# Patient Record
Sex: FEMALE | Race: WHITE | NOT HISPANIC OR LATINO | Employment: OTHER | URBAN - METROPOLITAN AREA
[De-identification: names, ages, dates, MRNs, and addresses within clinical notes are randomized per-mention and may not be internally consistent; named-entity substitution may affect disease eponyms.]

---

## 2020-08-07 ENCOUNTER — HOSPITAL ENCOUNTER (EMERGENCY)
Facility: CLINIC | Age: 70
Discharge: HOME | End: 2020-08-07
Attending: EMERGENCY MEDICINE | Admitting: EMERGENCY MEDICINE
Payer: COMMERCIAL

## 2020-08-07 VITALS
RESPIRATION RATE: 16 BRPM | DIASTOLIC BLOOD PRESSURE: 85 MMHG | SYSTOLIC BLOOD PRESSURE: 169 MMHG | OXYGEN SATURATION: 99 % | TEMPERATURE: 97.8 F | HEART RATE: 68 BPM

## 2020-08-07 DIAGNOSIS — Z01.89 LABORATORY TEST: ICD-10-CM

## 2020-08-07 LAB
ANION GAP SERPL CALCULATED.3IONS-SCNC: 6 MMOL/L (ref 3–14)
BUN SERPL-MCNC: 28 MG/DL (ref 7–30)
CALCIUM SERPL-MCNC: 8.7 MG/DL (ref 8.5–10.1)
CHLORIDE SERPL-SCNC: 102 MMOL/L (ref 94–109)
CO2 SERPL-SCNC: 26 MMOL/L (ref 20–32)
CREAT SERPL-MCNC: 1.14 MG/DL (ref 0.52–1.04)
GFR SERPL CREATININE-BSD FRML MDRD: 48 ML/MIN/{1.73_M2}
GLUCOSE SERPL-MCNC: 117 MG/DL (ref 70–99)
POTASSIUM SERPL-SCNC: 4.9 MMOL/L (ref 3.4–5.3)
SODIUM SERPL-SCNC: 134 MMOL/L (ref 133–144)

## 2020-08-07 PROCEDURE — 99285 EMERGENCY DEPT VISIT HI MDM: CPT

## 2020-08-07 PROCEDURE — 80048 BASIC METABOLIC PNL TOTAL CA: CPT | Performed by: EMERGENCY MEDICINE

## 2020-08-07 PROCEDURE — 93005 ELECTROCARDIOGRAM TRACING: CPT

## 2020-08-07 ASSESSMENT — ENCOUNTER SYMPTOMS
NAUSEA: 0
VOMITING: 0

## 2020-08-07 NOTE — ED AVS SNAPSHOT
[facility] Emergency Department  [address]  [facility] [address]  Phone:  [phone]  Fax:  [phone]                                    Julia Andre   MRN: [id number]    Department:  [facility] Emergency Department   Date of Visit:  8/7/2020           After Visit Summary Signature Page    I have received my discharge instructions, and my questions have been answered. I have discussed any challenges I see with this plan with the nurse or doctor.    ..........................................................................................................................................  Patient/Patient Representative Signature      ..........................................................................................................................................  Patient Representative Print Name and Relationship to Patient    ..................................................               ................................................  Date                                   Time    ..........................................................................................................................................  Reviewed by Signature/Title    ...................................................              ..............................................  Date                                               Time          22EPIC Rev 08/18

## 2020-08-08 NOTE — ED PROVIDER NOTES
History     Chief Complaint:  Abnormal Labs    HPI   Julia Andre is a 69 year old female, with a history of myocardial infarction, CAD, type 2 diabetes, hypertension, and hyperlipidemia, who presents with her  to the ED for evaluation of abnormal labs. The patient reports she had repeat blood work completed today due to abnormal labs a week ago. She was advised to visit the ED because of a potassium level of 5.4. The patient denies any recent diet change, nausea, vomiting, or other symptoms/complaints.     Laboratory, 7/28/2020  Iron: 40(L)  HGB A1c: 6.2(H)  CBC: HGB 10.6(L), o/w WNL (WBC 9.8, )  BMP: (L), GFR estimate 42(L), Creatinine 1.30(H), o/w WNL     Laboratory, 8/7/2020  Cortisol: 16.0  BMP: (L), Potassium 5.4(H), GFR estimate 46(L), Creatinine 1.20(H), o/w WNL     Allergies:  Atorvastatin: myalgia  Simvastatin   Sulfa drugs: hives      Medications:    Magnesium   Calcium  Vitamin C  Aspirin 81mg  Fish oil  Wellbutrin  Lisinopril   Rosuvastatin    Metoprolol   Metformin    Past Medical History:    Type 2 diabetes   Squamous cell carcinoma  Anxiety   Chronic rhinitis  Osteopenia  Anemia, iron deficiency  Sleep apnea  Hypertension   Hyperlipidemia   Depression   Psoriasis  GERD  Cervical cancer  Angina pectoris  Myocardial infarction  CAD    Past Surgical History:    D&C  Cervix cone biopsy  Strabismus surgery  Bladder suspension x2    Family History:    Esophageal cancer, heart disease, hypertension, stroke: father  Arthritis: mother  Stroke, kidney cancer: brother    Social History:  Smoking status: Former smoker, Quit 1993  Alcohol use: No  Presents to ED with        Review of Systems   Gastrointestinal: Negative for nausea and vomiting.   All other systems reviewed and are negative.    Physical Exam     Patient Vitals for the past 24 hrs:   BP Temp Temp src Pulse Heart Rate Resp SpO2   08/07/20 2151 (!) 169/85 97.8  F (36.6  C) Oral -- -- -- --   08/07/20 2150 -- --  -- 68 68 16 99 %     Physical Exam  Constitutional:       Appearance: She is well-developed.   HENT:      Right Ear: External ear normal.      Left Ear: External ear normal.      Mouth/Throat:      Mouth: Mucous membranes are moist.      Pharynx: Oropharynx is clear. No oropharyngeal exudate or posterior oropharyngeal erythema.   Cardiovascular:      Rate and Rhythm: Normal rate and regular rhythm.      Heart sounds: Normal heart sounds. No murmur. No friction rub. No gallop.    Pulmonary:      Effort: Pulmonary effort is normal. No respiratory distress.      Breath sounds: Normal breath sounds. No wheezing or rales.   Abdominal:      General: Bowel sounds are normal. There is no distension.      Palpations: Abdomen is soft. There is no mass.      Tenderness: There is no abdominal tenderness.   Skin:     General: Skin is warm and dry.      Capillary Refill: Capillary refill takes less than 2 seconds.      Findings: No rash.   Neurological:      Mental Status: She is alert.         Emergency Department Course     ECG (22:06:20):  Rate 67 bpm. VA interval 196. QRS duration 88. QT/QTc 392/414. P-R-T axes 61 -11 44. Normal sinus rhythm. Normal ECG. Interpreted at 2224 by Alexsander Gasca MD.    Laboratory:  Laboratory findings were communicated with the patient and family who voiced understanding of the findings.    BMP: Glucose 117(H), GFR estimate 48(L), Creatinine 1.14(H), o/w WNL     Emergency Department Course:  Past medical records, nursing notes, and vitals reviewed.    2158: I performed an exam of the patient as documented above.     2206: EKG obtained in the ED, see results above. Blood was drawn for laboratory testing, results above.    2319: I rechecked the patient and discussed the results of her workup thus far.     Findings and plan explained to the patient and spouse. Patient discharged home with instructions regarding supportive care, medications, and reasons to return. The importance of close follow-up  was reviewed.     I personally reviewed the laboratory results with the patient and spouse and answered all related questions prior to discharge.     Impression & Plan      Medical Decision Making:  Patient presents with possible hyperkalemia. Patient had no symptoms. She did have a potassium elevated on her [facility] labs earlier today, but repeat check here shows a potassium of 4.9. She can continue all her medications. Likely could be an error or hemolysis that caused the previous elevation. She's asked to continue seeing her primary care provider for reevaluation and work-up.     Diagnosis:   ICD-10-CM   Laboratory test  Z01.89     Disposition: Patient discharged to home with      Natacha Claudio  8/7/2020   [facility] EMERGENCY DEPARTMENT    Scribe Disclosure:  I, Natacha Claudio, am serving as a scribe at 9:58 PM on 8/7/2020 to document services personally performed by Alexsander Gasca MD based on my observations and the provider's statements to me.        Alexsander Gasca MD  08/08/20 0028

## 2020-08-08 NOTE — ED TRIAGE NOTES
Called by PCP at [facility], told K+ was 5.4 after blood work drawn d/t constipation.  Asymptomatic.

## 2020-08-08 NOTE — ED NOTES
Patient remains asymptomatic throughout stay. Discussed symptoms/reasons to return. Verbalized understanding.

## 2020-08-10 LAB — INTERPRETATION ECG - MUSE: NORMAL

## 2020-08-21 ENCOUNTER — TRANSFERRED RECORDS (OUTPATIENT)
Dept: HEALTH INFORMATION MANAGEMENT | Facility: CLINIC | Age: 70
End: 2020-08-21

## 2020-08-21 DIAGNOSIS — Z11.59 ENCOUNTER FOR SCREENING FOR OTHER VIRAL DISEASES: Primary | ICD-10-CM

## 2020-08-21 PROBLEM — F32.A ANXIETY AND DEPRESSION: Status: ACTIVE | Noted: 2020-08-21

## 2020-08-21 PROBLEM — F41.9 ANXIETY AND DEPRESSION: Status: ACTIVE | Noted: 2020-08-21

## 2020-08-21 PROBLEM — R19.00 PELVIC MASS: Status: ACTIVE | Noted: 2020-08-21

## 2020-08-21 PROBLEM — K59.00 CONSTIPATION: Status: ACTIVE | Noted: 2020-08-21

## 2020-08-21 PROBLEM — E78.5 HYPERLIPIDEMIA: Status: ACTIVE | Noted: 2020-08-21

## 2020-08-21 PROBLEM — R97.1 ELEVATED CA-125: Status: ACTIVE | Noted: 2020-08-21

## 2020-08-21 PROBLEM — K21.9 GERD (GASTROESOPHAGEAL REFLUX DISEASE): Status: ACTIVE | Noted: 2020-08-21

## 2020-08-21 PROBLEM — E11.9 DIABETES MELLITUS TYPE II, CONTROLLED (H): Status: ACTIVE | Noted: 2020-08-21

## 2020-08-21 PROBLEM — I25.10 CAD (CORONARY ARTERY DISEASE): Status: ACTIVE | Noted: 2020-08-21

## 2020-08-30 DIAGNOSIS — Z11.59 ENCOUNTER FOR SCREENING FOR OTHER VIRAL DISEASES: ICD-10-CM

## 2020-08-30 PROCEDURE — U0003 INFECTIOUS AGENT DETECTION BY NUCLEIC ACID (DNA OR RNA); SEVERE ACUTE RESPIRATORY SYNDROME CORONAVIRUS 2 (SARS-COV-2) (CORONAVIRUS DISEASE [COVID-19]), AMPLIFIED PROBE TECHNIQUE, MAKING USE OF HIGH THROUGHPUT TECHNOLOGIES AS DESCRIBED BY CMS-2020-01-R: HCPCS | Performed by: OBSTETRICS & GYNECOLOGY

## 2020-09-01 LAB
SARS-COV-2 RNA SPEC QL NAA+PROBE: NOT DETECTED
SPECIMEN SOURCE: NORMAL

## 2020-09-01 RX ORDER — MULTIVIT-MIN/IRON/FOLIC ACID/K 18-600-40
500 CAPSULE ORAL EVERY EVENING
COMMUNITY
End: 2020-09-18

## 2020-09-01 RX ORDER — ASPIRIN 81 MG/1
81 TABLET ORAL DAILY
COMMUNITY

## 2020-09-01 RX ORDER — OXYMETAZOLINE HYDROCHLORIDE 0.05 G/100ML
2 SPRAY NASAL DAILY PRN
COMMUNITY

## 2020-09-01 RX ORDER — LISINOPRIL 10 MG/1
10 TABLET ORAL DAILY
Status: ON HOLD | COMMUNITY
End: 2020-09-07

## 2020-09-01 RX ORDER — POLYETHYLENE GLYCOL 3350 17 G/17G
1 POWDER, FOR SOLUTION ORAL DAILY
COMMUNITY
End: 2021-08-16

## 2020-09-01 RX ORDER — CALCIUM ACETATE 667 MG/1
667 CAPSULE ORAL EVERY EVENING
COMMUNITY
End: 2021-08-16

## 2020-09-01 RX ORDER — METOPROLOL TARTRATE 50 MG
50 TABLET ORAL 2 TIMES DAILY
COMMUNITY
End: 2021-12-07

## 2020-09-01 RX ORDER — ROSUVASTATIN CALCIUM 20 MG/1
20 TABLET, COATED ORAL DAILY
COMMUNITY
End: 2021-12-07

## 2020-09-01 RX ORDER — BUPROPION HYDROCHLORIDE 300 MG/1
300 TABLET ORAL EVERY MORNING
COMMUNITY
End: 2021-12-07

## 2020-09-01 NOTE — PROGRESS NOTES
PTA medications updated by Medication Scribe prior to surgery via phone call with patient      -LAST DOSES ENTERED BY NURSE-    Comments:    Medication history sources: Patient, Surescripts and H&P  Medication history source reliability: Good  Adherence assessment: N/A Not Observed    Significant changes made to the medication list:  None      Additional medication history information:   None        Prior to Admission medications    Medication Sig Last Dose Taking? Auth Provider   Ascorbic Acid (VITAMIN C) 500 MG CAPS Take 500 mg by mouth every evening  at PM Yes Reported, Patient   aspirin 81 MG EC tablet Take 81 mg by mouth daily 1 WEEK AGO Yes Reported, Patient   BIOTIN PO Take 1 tablet by mouth every morning  at AM Yes Reported, Patient   buPROPion (WELLBUTRIN XL) 300 MG 24 hr tablet Take 300 mg by mouth every morning  at AM Yes Reported, Patient   calcium acetate (PHOSLO) 667 MG CAPS capsule Take 667 mg by mouth every evening   at PM Yes Reported, Patient   lisinopril (ZESTRIL) 10 MG tablet Take 10 mg by mouth daily  at AM Yes Reported, Patient   metFORMIN (GLUCOPHAGE) 500 MG tablet Take 500 mg by mouth 2 times daily (with meals)  Yes Reported, Patient   metoprolol tartrate (LOPRESSOR) 50 MG tablet Take 50 mg by mouth 2 times daily  Yes Reported, Patient   Multiple Vitamin (MULTIVITAMIN PO) Take 1 tablet by mouth every morning  at AM Yes Reported, Patient   Omega-3 Fatty Acids (FISH OIL PO) Take 2 capsules by mouth 2 times daily  Yes Reported, Patient   oxymetazoline (AFRIN) 0.05 % nasal spray Spray 2 sprays into both nostrils daily as needed for congestion  Yes Reported, Patient   polyethylene glycol (MIRALAX) 17 g packet Take 1 packet by mouth daily  Yes Reported, Patient   rosuvastatin (CRESTOR) 20 MG tablet Take 20 mg by mouth daily  at AM Yes Reported, Patient

## 2020-09-02 ENCOUNTER — HOSPITAL ENCOUNTER (INPATIENT)
Facility: CLINIC | Age: 70
LOS: 13 days | Discharge: HOME | DRG: 737 | End: 2020-09-15
Attending: OBSTETRICS & GYNECOLOGY | Admitting: OBSTETRICS & GYNECOLOGY
Payer: COMMERCIAL

## 2020-09-02 ENCOUNTER — TRANSFERRED RECORDS (OUTPATIENT)
Dept: HEALTH INFORMATION MANAGEMENT | Facility: CLINIC | Age: 70
End: 2020-09-02

## 2020-09-02 ENCOUNTER — ANESTHESIA (OUTPATIENT)
Dept: SURGERY | Facility: CLINIC | Age: 70
DRG: 737 | End: 2020-09-02
Payer: COMMERCIAL

## 2020-09-02 ENCOUNTER — ANESTHESIA EVENT (OUTPATIENT)
Dept: SURGERY | Facility: CLINIC | Age: 70
DRG: 737 | End: 2020-09-02
Payer: COMMERCIAL

## 2020-09-02 DIAGNOSIS — I10 ESSENTIAL HYPERTENSION: ICD-10-CM

## 2020-09-02 DIAGNOSIS — R78.81 BACTEREMIA: ICD-10-CM

## 2020-09-02 DIAGNOSIS — B99.9 INTRA-ABDOMINAL INFECTION: ICD-10-CM

## 2020-09-02 DIAGNOSIS — R19.00 PELVIC MASS: Primary | ICD-10-CM

## 2020-09-02 LAB
ABO + RH BLD: NORMAL
ABO + RH BLD: NORMAL
BLD GP AB SCN SERPL QL: NORMAL
BLOOD BANK CMNT PATIENT-IMP: NORMAL
CREAT SERPL-MCNC: 1.11 MG/DL (ref 0.52–1.04)
GFR SERPL CREATININE-BSD FRML MDRD: 50 ML/MIN/{1.73_M2}
GLUCOSE BLDC GLUCOMTR-MCNC: 138 MG/DL (ref 70–99)
GLUCOSE BLDC GLUCOMTR-MCNC: 144 MG/DL (ref 70–99)
GLUCOSE BLDC GLUCOMTR-MCNC: 149 MG/DL (ref 70–99)
GLUCOSE SERPL-MCNC: 123 MG/DL (ref 70–99)
HBA1C MFR BLD: 6.1 % (ref 0–5.6)
HGB BLD-MCNC: 10.7 G/DL (ref 11.7–15.7)
PLATELET # BLD AUTO: 393 10E9/L (ref 150–450)
POTASSIUM SERPL-SCNC: 4.6 MMOL/L (ref 3.4–5.3)
SPECIMEN EXP DATE BLD: NORMAL

## 2020-09-02 PROCEDURE — 25000128 H RX IP 250 OP 636: Performed by: NURSE ANESTHETIST, CERTIFIED REGISTERED

## 2020-09-02 PROCEDURE — 27210794 ZZH OR GENERAL SUPPLY STERILE: Performed by: OBSTETRICS & GYNECOLOGY

## 2020-09-02 PROCEDURE — 88341 IMHCHEM/IMCYTCHM EA ADD ANTB: CPT | Mod: 26 | Performed by: OBSTETRICS & GYNECOLOGY

## 2020-09-02 PROCEDURE — 88307 TISSUE EXAM BY PATHOLOGIST: CPT | Mod: 26,59 | Performed by: OBSTETRICS & GYNECOLOGY

## 2020-09-02 PROCEDURE — 99207 ZZC CONSULT E&M CHANGED TO INITIAL LEVEL: CPT | Performed by: PHYSICIAN ASSISTANT

## 2020-09-02 PROCEDURE — 37000009 ZZH ANESTHESIA TECHNICAL FEE, EACH ADDTL 15 MIN: Performed by: OBSTETRICS & GYNECOLOGY

## 2020-09-02 PROCEDURE — 25000125 ZZHC RX 250: Performed by: NURSE ANESTHETIST, CERTIFIED REGISTERED

## 2020-09-02 PROCEDURE — 88342 IMHCHEM/IMCYTCHM 1ST ANTB: CPT | Mod: 26,XU | Performed by: OBSTETRICS & GYNECOLOGY

## 2020-09-02 PROCEDURE — 25000301 ZZH OR RX SURGIFLO W/THROMBIN KIT 2ML 1991 OPNP: Performed by: OBSTETRICS & GYNECOLOGY

## 2020-09-02 PROCEDURE — 88305 TISSUE EXAM BY PATHOLOGIST: CPT | Performed by: OBSTETRICS & GYNECOLOGY

## 2020-09-02 PROCEDURE — 25800030 ZZH RX IP 258 OP 636: Performed by: NURSE ANESTHETIST, CERTIFIED REGISTERED

## 2020-09-02 PROCEDURE — 88112 CYTOPATH CELL ENHANCE TECH: CPT | Mod: 26 | Performed by: OBSTETRICS & GYNECOLOGY

## 2020-09-02 PROCEDURE — 0UT20ZZ RESECTION OF BILATERAL OVARIES, OPEN APPROACH: ICD-10-PCS | Performed by: OBSTETRICS & GYNECOLOGY

## 2020-09-02 PROCEDURE — 86901 BLOOD TYPING SEROLOGIC RH(D): CPT | Performed by: OBSTETRICS & GYNECOLOGY

## 2020-09-02 PROCEDURE — 25800030 ZZH RX IP 258 OP 636: Performed by: ANESTHESIOLOGY

## 2020-09-02 PROCEDURE — 0UT90ZZ RESECTION OF UTERUS, OPEN APPROACH: ICD-10-PCS | Performed by: OBSTETRICS & GYNECOLOGY

## 2020-09-02 PROCEDURE — 25000128 H RX IP 250 OP 636: Performed by: NURSE PRACTITIONER

## 2020-09-02 PROCEDURE — 0DBU0ZZ EXCISION OF OMENTUM, OPEN APPROACH: ICD-10-PCS | Performed by: OBSTETRICS & GYNECOLOGY

## 2020-09-02 PROCEDURE — 36415 COLL VENOUS BLD VENIPUNCTURE: CPT | Performed by: OBSTETRICS & GYNECOLOGY

## 2020-09-02 PROCEDURE — 88309 TISSUE EXAM BY PATHOLOGIST: CPT | Performed by: OBSTETRICS & GYNECOLOGY

## 2020-09-02 PROCEDURE — 88341 IMHCHEM/IMCYTCHM EA ADD ANTB: CPT | Performed by: OBSTETRICS & GYNECOLOGY

## 2020-09-02 PROCEDURE — 12000000 ZZH R&B MED SURG/OB

## 2020-09-02 PROCEDURE — 25000132 ZZH RX MED GY IP 250 OP 250 PS 637: Performed by: PHYSICIAN ASSISTANT

## 2020-09-02 PROCEDURE — 88331 PATH CONSLTJ SURG 1 BLK 1SPC: CPT | Performed by: OBSTETRICS & GYNECOLOGY

## 2020-09-02 PROCEDURE — 86900 BLOOD TYPING SEROLOGIC ABO: CPT | Performed by: OBSTETRICS & GYNECOLOGY

## 2020-09-02 PROCEDURE — 85018 HEMOGLOBIN: CPT | Performed by: ANESTHESIOLOGY

## 2020-09-02 PROCEDURE — 71000013 ZZH RECOVERY PHASE 1 LEVEL 1 EA ADDTL HR: Performed by: OBSTETRICS & GYNECOLOGY

## 2020-09-02 PROCEDURE — 82947 ASSAY GLUCOSE BLOOD QUANT: CPT | Performed by: ANESTHESIOLOGY

## 2020-09-02 PROCEDURE — 25000128 H RX IP 250 OP 636: Performed by: ANESTHESIOLOGY

## 2020-09-02 PROCEDURE — 88305 TISSUE EXAM BY PATHOLOGIST: CPT | Mod: 26 | Performed by: OBSTETRICS & GYNECOLOGY

## 2020-09-02 PROCEDURE — 25000132 ZZH RX MED GY IP 250 OP 250 PS 637: Performed by: OBSTETRICS & GYNECOLOGY

## 2020-09-02 PROCEDURE — 85049 AUTOMATED PLATELET COUNT: CPT | Performed by: ANESTHESIOLOGY

## 2020-09-02 PROCEDURE — 36000063 ZZH SURGERY LEVEL 4 EA 15 ADDTL MIN: Performed by: OBSTETRICS & GYNECOLOGY

## 2020-09-02 PROCEDURE — 88360 TUMOR IMMUNOHISTOCHEM/MANUAL: CPT | Performed by: OBSTETRICS & GYNECOLOGY

## 2020-09-02 PROCEDURE — 00000146 ZZHCL STATISTIC GLUCOSE BY METER IP

## 2020-09-02 PROCEDURE — 71000012 ZZH RECOVERY PHASE 1 LEVEL 1 FIRST HR: Performed by: OBSTETRICS & GYNECOLOGY

## 2020-09-02 PROCEDURE — C1765 ADHESION BARRIER: HCPCS | Performed by: OBSTETRICS & GYNECOLOGY

## 2020-09-02 PROCEDURE — 36000093 ZZH SURGERY LEVEL 4 1ST 30 MIN: Performed by: OBSTETRICS & GYNECOLOGY

## 2020-09-02 PROCEDURE — 0DTJ0ZZ RESECTION OF APPENDIX, OPEN APPROACH: ICD-10-PCS | Performed by: OBSTETRICS & GYNECOLOGY

## 2020-09-02 PROCEDURE — 88331 PATH CONSLTJ SURG 1 BLK 1SPC: CPT | Mod: 26 | Performed by: OBSTETRICS & GYNECOLOGY

## 2020-09-02 PROCEDURE — 88309 TISSUE EXAM BY PATHOLOGIST: CPT | Mod: 26 | Performed by: OBSTETRICS & GYNECOLOGY

## 2020-09-02 PROCEDURE — 88112 CYTOPATH CELL ENHANCE TECH: CPT | Performed by: OBSTETRICS & GYNECOLOGY

## 2020-09-02 PROCEDURE — 84132 ASSAY OF SERUM POTASSIUM: CPT | Performed by: ANESTHESIOLOGY

## 2020-09-02 PROCEDURE — 37000008 ZZH ANESTHESIA TECHNICAL FEE, 1ST 30 MIN: Performed by: OBSTETRICS & GYNECOLOGY

## 2020-09-02 PROCEDURE — 88307 TISSUE EXAM BY PATHOLOGIST: CPT | Performed by: OBSTETRICS & GYNECOLOGY

## 2020-09-02 PROCEDURE — 40000170 ZZH STATISTIC PRE-PROCEDURE ASSESSMENT II: Performed by: OBSTETRICS & GYNECOLOGY

## 2020-09-02 PROCEDURE — 86850 RBC ANTIBODY SCREEN: CPT | Performed by: OBSTETRICS & GYNECOLOGY

## 2020-09-02 PROCEDURE — 00000155 ZZHCL STATISTIC H-CELL BLOCK W/STAIN: Performed by: OBSTETRICS & GYNECOLOGY

## 2020-09-02 PROCEDURE — 82565 ASSAY OF CREATININE: CPT | Performed by: NURSE PRACTITIONER

## 2020-09-02 PROCEDURE — 25000125 ZZHC RX 250: Performed by: OBSTETRICS & GYNECOLOGY

## 2020-09-02 PROCEDURE — 07BD0ZZ EXCISION OF AORTIC LYMPHATIC, OPEN APPROACH: ICD-10-PCS | Performed by: OBSTETRICS & GYNECOLOGY

## 2020-09-02 PROCEDURE — 0UT70ZZ RESECTION OF BILATERAL FALLOPIAN TUBES, OPEN APPROACH: ICD-10-PCS | Performed by: OBSTETRICS & GYNECOLOGY

## 2020-09-02 PROCEDURE — 25000128 H RX IP 250 OP 636: Performed by: OBSTETRICS & GYNECOLOGY

## 2020-09-02 PROCEDURE — 07BC0ZZ EXCISION OF PELVIS LYMPHATIC, OPEN APPROACH: ICD-10-PCS | Performed by: OBSTETRICS & GYNECOLOGY

## 2020-09-02 PROCEDURE — 88305 TISSUE EXAM BY PATHOLOGIST: CPT | Mod: 26,59 | Performed by: OBSTETRICS & GYNECOLOGY

## 2020-09-02 PROCEDURE — 25000132 ZZH RX MED GY IP 250 OP 250 PS 637: Performed by: NURSE PRACTITIONER

## 2020-09-02 PROCEDURE — P9041 ALBUMIN (HUMAN),5%, 50ML: HCPCS | Performed by: NURSE ANESTHETIST, CERTIFIED REGISTERED

## 2020-09-02 PROCEDURE — 0UTC0ZZ RESECTION OF CERVIX, OPEN APPROACH: ICD-10-PCS | Performed by: OBSTETRICS & GYNECOLOGY

## 2020-09-02 PROCEDURE — 88360 TUMOR IMMUNOHISTOCHEM/MANUAL: CPT | Mod: 26 | Performed by: OBSTETRICS & GYNECOLOGY

## 2020-09-02 PROCEDURE — 83036 HEMOGLOBIN GLYCOSYLATED A1C: CPT | Performed by: ANESTHESIOLOGY

## 2020-09-02 PROCEDURE — 36415 COLL VENOUS BLD VENIPUNCTURE: CPT | Performed by: NURSE PRACTITIONER

## 2020-09-02 PROCEDURE — 99222 1ST HOSP IP/OBS MODERATE 55: CPT | Performed by: PHYSICIAN ASSISTANT

## 2020-09-02 PROCEDURE — 25800030 ZZH RX IP 258 OP 636: Performed by: NURSE PRACTITIONER

## 2020-09-02 PROCEDURE — 3E033PZ INTRODUCTION OF PLATELET INHIBITOR INTO PERIPHERAL VEIN, PERCUTANEOUS APPROACH: ICD-10-PCS | Performed by: OBSTETRICS & GYNECOLOGY

## 2020-09-02 PROCEDURE — 88342 IMHCHEM/IMCYTCHM 1ST ANTB: CPT | Performed by: OBSTETRICS & GYNECOLOGY

## 2020-09-02 RX ORDER — PROCHLORPERAZINE MALEATE 5 MG
5 TABLET ORAL EVERY 6 HOURS PRN
Status: DISCONTINUED | OUTPATIENT
Start: 2020-09-02 | End: 2020-09-15 | Stop reason: HOSPADM

## 2020-09-02 RX ORDER — FENTANYL CITRATE 50 UG/ML
25-50 INJECTION, SOLUTION INTRAMUSCULAR; INTRAVENOUS
Status: DISCONTINUED | OUTPATIENT
Start: 2020-09-02 | End: 2020-09-02 | Stop reason: HOSPADM

## 2020-09-02 RX ORDER — ACETAMINOPHEN 325 MG/1
975 TABLET ORAL ONCE
Status: COMPLETED | OUTPATIENT
Start: 2020-09-02 | End: 2020-09-02

## 2020-09-02 RX ORDER — FENTANYL CITRATE 50 UG/ML
INJECTION, SOLUTION INTRAMUSCULAR; INTRAVENOUS PRN
Status: DISCONTINUED | OUTPATIENT
Start: 2020-09-02 | End: 2020-09-02

## 2020-09-02 RX ORDER — NEOSTIGMINE METHYLSULFATE 1 MG/ML
VIAL (ML) INJECTION PRN
Status: DISCONTINUED | OUTPATIENT
Start: 2020-09-02 | End: 2020-09-02

## 2020-09-02 RX ORDER — DEXTROSE MONOHYDRATE 25 G/50ML
25-50 INJECTION, SOLUTION INTRAVENOUS
Status: DISCONTINUED | OUTPATIENT
Start: 2020-09-02 | End: 2020-09-15 | Stop reason: HOSPADM

## 2020-09-02 RX ORDER — HEPARIN SODIUM 5000 [USP'U]/.5ML
5000 INJECTION, SOLUTION INTRAVENOUS; SUBCUTANEOUS EVERY 8 HOURS
Status: COMPLETED | OUTPATIENT
Start: 2020-09-03 | End: 2020-09-03

## 2020-09-02 RX ORDER — LIDOCAINE 40 MG/G
CREAM TOPICAL
Status: DISCONTINUED | OUTPATIENT
Start: 2020-09-02 | End: 2020-09-15 | Stop reason: HOSPADM

## 2020-09-02 RX ORDER — VECURONIUM BROMIDE 1 MG/ML
INJECTION, POWDER, LYOPHILIZED, FOR SOLUTION INTRAVENOUS PRN
Status: DISCONTINUED | OUTPATIENT
Start: 2020-09-02 | End: 2020-09-02

## 2020-09-02 RX ORDER — ONDANSETRON 4 MG/1
4 TABLET, ORALLY DISINTEGRATING ORAL EVERY 6 HOURS PRN
Status: DISCONTINUED | OUTPATIENT
Start: 2020-09-02 | End: 2020-09-15 | Stop reason: HOSPADM

## 2020-09-02 RX ORDER — FENTANYL CITRATE 50 UG/ML
50 INJECTION, SOLUTION INTRAMUSCULAR; INTRAVENOUS
Status: DISCONTINUED | OUTPATIENT
Start: 2020-09-02 | End: 2020-09-02 | Stop reason: HOSPADM

## 2020-09-02 RX ORDER — ONDANSETRON 4 MG/1
4 TABLET, ORALLY DISINTEGRATING ORAL EVERY 30 MIN PRN
Status: DISCONTINUED | OUTPATIENT
Start: 2020-09-02 | End: 2020-09-02 | Stop reason: HOSPADM

## 2020-09-02 RX ORDER — HYDROMORPHONE HYDROCHLORIDE 1 MG/ML
.3-.5 INJECTION, SOLUTION INTRAMUSCULAR; INTRAVENOUS; SUBCUTANEOUS EVERY 5 MIN PRN
Status: DISCONTINUED | OUTPATIENT
Start: 2020-09-02 | End: 2020-09-02 | Stop reason: HOSPADM

## 2020-09-02 RX ORDER — LIDOCAINE HYDROCHLORIDE 20 MG/ML
INJECTION, SOLUTION INFILTRATION; PERINEURAL PRN
Status: DISCONTINUED | OUTPATIENT
Start: 2020-09-02 | End: 2020-09-02

## 2020-09-02 RX ORDER — SODIUM CHLORIDE, SODIUM LACTATE, POTASSIUM CHLORIDE, CALCIUM CHLORIDE 600; 310; 30; 20 MG/100ML; MG/100ML; MG/100ML; MG/100ML
INJECTION, SOLUTION INTRAVENOUS CONTINUOUS
Status: DISCONTINUED | OUTPATIENT
Start: 2020-09-02 | End: 2020-09-02 | Stop reason: HOSPADM

## 2020-09-02 RX ORDER — PROPOFOL 10 MG/ML
INJECTION, EMULSION INTRAVENOUS PRN
Status: DISCONTINUED | OUTPATIENT
Start: 2020-09-02 | End: 2020-09-02

## 2020-09-02 RX ORDER — CEFAZOLIN SODIUM 2 G/100ML
2 INJECTION, SOLUTION INTRAVENOUS
Status: COMPLETED | OUTPATIENT
Start: 2020-09-02 | End: 2020-09-02

## 2020-09-02 RX ORDER — CEFAZOLIN SODIUM 1 G/3ML
1 INJECTION, POWDER, FOR SOLUTION INTRAMUSCULAR; INTRAVENOUS SEE ADMIN INSTRUCTIONS
Status: DISCONTINUED | OUTPATIENT
Start: 2020-09-02 | End: 2020-09-02 | Stop reason: HOSPADM

## 2020-09-02 RX ORDER — HYDROCODONE BITARTRATE AND ACETAMINOPHEN 5; 325 MG/1; MG/1
1 TABLET ORAL EVERY 4 HOURS PRN
Status: DISCONTINUED | OUTPATIENT
Start: 2020-09-02 | End: 2020-09-03

## 2020-09-02 RX ORDER — BUPROPION HYDROCHLORIDE 150 MG/1
300 TABLET ORAL EVERY MORNING
Status: DISCONTINUED | OUTPATIENT
Start: 2020-09-03 | End: 2020-09-15 | Stop reason: HOSPADM

## 2020-09-02 RX ORDER — HYDRALAZINE HYDROCHLORIDE 20 MG/ML
10 INJECTION INTRAMUSCULAR; INTRAVENOUS EVERY 4 HOURS PRN
Status: DISCONTINUED | OUTPATIENT
Start: 2020-09-02 | End: 2020-09-15 | Stop reason: HOSPADM

## 2020-09-02 RX ORDER — DIMENHYDRINATE 50 MG/ML
12.5 INJECTION, SOLUTION INTRAMUSCULAR; INTRAVENOUS
Status: DISCONTINUED | OUTPATIENT
Start: 2020-09-02 | End: 2020-09-02 | Stop reason: HOSPADM

## 2020-09-02 RX ORDER — SENNOSIDES A AND B 8.6 MG/1
1-3 TABLET, FILM COATED ORAL 2 TIMES DAILY PRN
Qty: 30 TABLET | Refills: 0 | Status: SHIPPED | OUTPATIENT
Start: 2020-09-02 | End: 2021-08-16

## 2020-09-02 RX ORDER — ACETAMINOPHEN 500 MG
1000 TABLET ORAL EVERY 8 HOURS PRN
Qty: 30 TABLET | Refills: 1 | Status: SHIPPED | OUTPATIENT
Start: 2020-09-02 | End: 2021-08-16

## 2020-09-02 RX ORDER — GLYCOPYRROLATE 0.2 MG/ML
INJECTION, SOLUTION INTRAMUSCULAR; INTRAVENOUS PRN
Status: DISCONTINUED | OUTPATIENT
Start: 2020-09-02 | End: 2020-09-02

## 2020-09-02 RX ORDER — MEPERIDINE HYDROCHLORIDE 25 MG/ML
12.5 INJECTION INTRAMUSCULAR; INTRAVENOUS; SUBCUTANEOUS EVERY 5 MIN PRN
Status: DISCONTINUED | OUTPATIENT
Start: 2020-09-02 | End: 2020-09-02 | Stop reason: HOSPADM

## 2020-09-02 RX ORDER — LISINOPRIL 10 MG/1
10 TABLET ORAL DAILY
Status: DISCONTINUED | OUTPATIENT
Start: 2020-09-03 | End: 2020-09-04

## 2020-09-02 RX ORDER — NALOXONE HYDROCHLORIDE 0.4 MG/ML
.1-.4 INJECTION, SOLUTION INTRAMUSCULAR; INTRAVENOUS; SUBCUTANEOUS
Status: ACTIVE | OUTPATIENT
Start: 2020-09-02 | End: 2020-09-03

## 2020-09-02 RX ORDER — ONDANSETRON 2 MG/ML
4 INJECTION INTRAMUSCULAR; INTRAVENOUS EVERY 6 HOURS PRN
Status: DISCONTINUED | OUTPATIENT
Start: 2020-09-02 | End: 2020-09-15 | Stop reason: HOSPADM

## 2020-09-02 RX ORDER — ACETAMINOPHEN 325 MG/1
975 TABLET ORAL EVERY 6 HOURS PRN
Status: DISCONTINUED | OUTPATIENT
Start: 2020-09-02 | End: 2020-09-15 | Stop reason: HOSPADM

## 2020-09-02 RX ORDER — NALOXONE HYDROCHLORIDE 0.4 MG/ML
.1-.4 INJECTION, SOLUTION INTRAMUSCULAR; INTRAVENOUS; SUBCUTANEOUS
Status: DISCONTINUED | OUTPATIENT
Start: 2020-09-02 | End: 2020-09-15 | Stop reason: HOSPADM

## 2020-09-02 RX ORDER — ROSUVASTATIN CALCIUM 10 MG/1
20 TABLET, COATED ORAL DAILY
Status: DISCONTINUED | OUTPATIENT
Start: 2020-09-03 | End: 2020-09-15 | Stop reason: HOSPADM

## 2020-09-02 RX ORDER — METOPROLOL TARTRATE 50 MG
50 TABLET ORAL 2 TIMES DAILY
Status: DISCONTINUED | OUTPATIENT
Start: 2020-09-02 | End: 2020-09-15 | Stop reason: HOSPADM

## 2020-09-02 RX ORDER — NICOTINE POLACRILEX 4 MG
15-30 LOZENGE BUCCAL
Status: DISCONTINUED | OUTPATIENT
Start: 2020-09-02 | End: 2020-09-15 | Stop reason: HOSPADM

## 2020-09-02 RX ORDER — PROPOFOL 10 MG/ML
INJECTION, EMULSION INTRAVENOUS CONTINUOUS PRN
Status: DISCONTINUED | OUTPATIENT
Start: 2020-09-02 | End: 2020-09-02

## 2020-09-02 RX ORDER — MAGNESIUM HYDROXIDE 1200 MG/15ML
LIQUID ORAL PRN
Status: DISCONTINUED | OUTPATIENT
Start: 2020-09-02 | End: 2020-09-02 | Stop reason: HOSPADM

## 2020-09-02 RX ORDER — DEXAMETHASONE SODIUM PHOSPHATE 4 MG/ML
INJECTION, SOLUTION INTRA-ARTICULAR; INTRALESIONAL; INTRAMUSCULAR; INTRAVENOUS; SOFT TISSUE PRN
Status: DISCONTINUED | OUTPATIENT
Start: 2020-09-02 | End: 2020-09-02

## 2020-09-02 RX ORDER — ALBUMIN, HUMAN INJ 5% 5 %
SOLUTION INTRAVENOUS CONTINUOUS PRN
Status: DISCONTINUED | OUTPATIENT
Start: 2020-09-02 | End: 2020-09-02

## 2020-09-02 RX ORDER — ONDANSETRON 2 MG/ML
4 INJECTION INTRAMUSCULAR; INTRAVENOUS EVERY 30 MIN PRN
Status: DISCONTINUED | OUTPATIENT
Start: 2020-09-02 | End: 2020-09-02 | Stop reason: HOSPADM

## 2020-09-02 RX ORDER — SODIUM CHLORIDE 9 MG/ML
INJECTION, SOLUTION INTRAVENOUS CONTINUOUS
Status: DISCONTINUED | OUTPATIENT
Start: 2020-09-02 | End: 2020-09-05

## 2020-09-02 RX ORDER — ONDANSETRON 2 MG/ML
INJECTION INTRAMUSCULAR; INTRAVENOUS PRN
Status: DISCONTINUED | OUTPATIENT
Start: 2020-09-02 | End: 2020-09-02

## 2020-09-02 RX ORDER — EPHEDRINE SULFATE 50 MG/ML
INJECTION, SOLUTION INTRAMUSCULAR; INTRAVENOUS; SUBCUTANEOUS PRN
Status: DISCONTINUED | OUTPATIENT
Start: 2020-09-02 | End: 2020-09-02

## 2020-09-02 RX ORDER — CEFAZOLIN SODIUM 1 G/3ML
1 INJECTION, POWDER, FOR SOLUTION INTRAMUSCULAR; INTRAVENOUS EVERY 8 HOURS
Status: COMPLETED | OUTPATIENT
Start: 2020-09-02 | End: 2020-09-03

## 2020-09-02 RX ORDER — CEFAZOLIN SODIUM 1 G/3ML
INJECTION, POWDER, FOR SOLUTION INTRAMUSCULAR; INTRAVENOUS PRN
Status: DISCONTINUED | OUTPATIENT
Start: 2020-09-02 | End: 2020-09-02

## 2020-09-02 RX ADMIN — PHENYLEPHRINE HYDROCHLORIDE 0.2 MCG/KG/MIN: 10 INJECTION INTRAVENOUS at 09:05

## 2020-09-02 RX ADMIN — ONDANSETRON 4 MG: 2 INJECTION INTRAMUSCULAR; INTRAVENOUS at 10:06

## 2020-09-02 RX ADMIN — FENTANYL CITRATE 100 MCG: 50 INJECTION, SOLUTION INTRAMUSCULAR; INTRAVENOUS at 07:45

## 2020-09-02 RX ADMIN — VECURONIUM BROMIDE 1 MG: 1 INJECTION, POWDER, LYOPHILIZED, FOR SOLUTION INTRAVENOUS at 09:10

## 2020-09-02 RX ADMIN — ONDANSETRON 4 MG: 2 INJECTION INTRAMUSCULAR; INTRAVENOUS at 07:55

## 2020-09-02 RX ADMIN — FENTANYL CITRATE 50 MCG: 0.05 INJECTION, SOLUTION INTRAMUSCULAR; INTRAVENOUS at 13:12

## 2020-09-02 RX ADMIN — ACETAMINOPHEN 975 MG: 325 TABLET, FILM COATED ORAL at 06:54

## 2020-09-02 RX ADMIN — ACETAMINOPHEN 975 MG: 325 TABLET, FILM COATED ORAL at 16:02

## 2020-09-02 RX ADMIN — METOPROLOL TARTRATE 50 MG: 50 TABLET, FILM COATED ORAL at 19:42

## 2020-09-02 RX ADMIN — SODIUM CHLORIDE: 9 INJECTION, SOLUTION INTRAVENOUS at 15:16

## 2020-09-02 RX ADMIN — FENTANYL CITRATE 50 MCG: 0.05 INJECTION, SOLUTION INTRAMUSCULAR; INTRAVENOUS at 11:23

## 2020-09-02 RX ADMIN — CEFAZOLIN SODIUM 2 G: 2 INJECTION, SOLUTION INTRAVENOUS at 07:55

## 2020-09-02 RX ADMIN — SODIUM CHLORIDE, POTASSIUM CHLORIDE, SODIUM LACTATE AND CALCIUM CHLORIDE: 600; 310; 30; 20 INJECTION, SOLUTION INTRAVENOUS at 06:55

## 2020-09-02 RX ADMIN — VECURONIUM BROMIDE 2 MG: 1 INJECTION, POWDER, LYOPHILIZED, FOR SOLUTION INTRAVENOUS at 08:39

## 2020-09-02 RX ADMIN — LIDOCAINE HYDROCHLORIDE 100 MG: 20 INJECTION, SOLUTION INFILTRATION; PERINEURAL at 07:45

## 2020-09-02 RX ADMIN — BUPIVACAINE HYDROCHLORIDE 20 ML: 2.5 INJECTION, SOLUTION EPIDURAL; INFILTRATION; INTRACAUDAL at 10:14

## 2020-09-02 RX ADMIN — MIDAZOLAM 2 MG: 1 INJECTION INTRAMUSCULAR; INTRAVENOUS at 07:38

## 2020-09-02 RX ADMIN — CEFAZOLIN 1 G: 1 INJECTION, POWDER, FOR SOLUTION INTRAMUSCULAR; INTRAVENOUS at 09:51

## 2020-09-02 RX ADMIN — HYDROMORPHONE HYDROCHLORIDE 0.5 MG: 1 INJECTION, SOLUTION INTRAMUSCULAR; INTRAVENOUS; SUBCUTANEOUS at 11:13

## 2020-09-02 RX ADMIN — ROCURONIUM BROMIDE 50 MG: 10 INJECTION INTRAVENOUS at 07:45

## 2020-09-02 RX ADMIN — GLYCOPYRROLATE 0.5 MG: 0.2 INJECTION, SOLUTION INTRAMUSCULAR; INTRAVENOUS at 10:05

## 2020-09-02 RX ADMIN — SODIUM CHLORIDE, POTASSIUM CHLORIDE, SODIUM LACTATE AND CALCIUM CHLORIDE: 600; 310; 30; 20 INJECTION, SOLUTION INTRAVENOUS at 09:04

## 2020-09-02 RX ADMIN — HYDROCODONE BITARTRATE AND ACETAMINOPHEN 1 TABLET: 5; 325 TABLET ORAL at 21:46

## 2020-09-02 RX ADMIN — DEXAMETHASONE SODIUM PHOSPHATE 4 MG: 4 INJECTION, SOLUTION INTRA-ARTICULAR; INTRALESIONAL; INTRAMUSCULAR; INTRAVENOUS; SOFT TISSUE at 07:57

## 2020-09-02 RX ADMIN — Medication 5 MG: at 07:57

## 2020-09-02 RX ADMIN — PROPOFOL 150 MCG/KG/MIN: 10 INJECTION, EMULSION INTRAVENOUS at 07:49

## 2020-09-02 RX ADMIN — HYDROMORPHONE HYDROCHLORIDE 0.5 MG: 1 INJECTION, SOLUTION INTRAMUSCULAR; INTRAVENOUS; SUBCUTANEOUS at 09:19

## 2020-09-02 RX ADMIN — PROPOFOL 150 MG: 10 INJECTION, EMULSION INTRAVENOUS at 07:45

## 2020-09-02 RX ADMIN — BUPIVACAINE HYDROCHLORIDE 20 ML: 2.5 INJECTION, SOLUTION EPIDURAL; INFILTRATION; INTRACAUDAL at 10:20

## 2020-09-02 RX ADMIN — PHENYLEPHRINE HYDROCHLORIDE 100 MCG: 10 INJECTION INTRAVENOUS at 08:39

## 2020-09-02 RX ADMIN — CEFAZOLIN 1 G: 1 INJECTION, POWDER, FOR SOLUTION INTRAMUSCULAR; INTRAVENOUS at 16:02

## 2020-09-02 RX ADMIN — NEOSTIGMINE METHYLSULFATE 3.5 MG: 1 INJECTION, SOLUTION INTRAVENOUS at 10:05

## 2020-09-02 RX ADMIN — PHENYLEPHRINE HYDROCHLORIDE 100 MCG: 10 INJECTION INTRAVENOUS at 07:57

## 2020-09-02 RX ADMIN — ALBUMIN HUMAN: 0.05 INJECTION, SOLUTION INTRAVENOUS at 08:44

## 2020-09-02 ASSESSMENT — MIFFLIN-ST. JEOR: SCORE: 1153.65

## 2020-09-02 ASSESSMENT — ACTIVITIES OF DAILY LIVING (ADL)
ADLS_ACUITY_SCORE: 11
ADLS_ACUITY_SCORE: 14

## 2020-09-02 ASSESSMENT — LIFESTYLE VARIABLES: TOBACCO_USE: 1

## 2020-09-02 ASSESSMENT — ENCOUNTER SYMPTOMS: SEIZURES: 0

## 2020-09-02 ASSESSMENT — COPD QUESTIONNAIRES: COPD: 0

## 2020-09-02 NOTE — ANESTHESIA PREPROCEDURE EVALUATION
Anesthesia Pre-Procedure Evaluation    Patient: Julia Andre   MRN: [id number] : 1950          Preoperative Diagnosis: Pelvic mass [R19.00]    Procedure(s):  EXPLORATORY LAPAROTOMY  TOTAL ABDOMINAL HYSTERECTOMY WITH BILATERAL SALPINGO-OOPHORECTOMY; WASHINGS; OMENTECTOMY TUMOR DEBULKING; POSSIBLE PELVIC AND PARA-AORTIC LYMPHADENECTOMY  POSSIBLE BOWEL RESECTION    Past Medical History:   Diagnosis Date     Anxiety      Cervical cancer (H)      Coronary artery disease 2009    angina. elevated troponins, normal coronary arteries found     Depression      Depressive disorder      Diabetes (H)     type 2     Gastroesophageal reflux disease      Heart attack (H)      Hyperlipidemia      Hypertension      Iron deficiency anemia      Osteopenia      Psoriasis      Sleep apnea      Past Surgical History:   Procedure Laterality Date     EYE SURGERY      strabismus     GI SURGERY      cystoscopy     GYN SURGERY      bladder suspension     GYN SURGERY      cone biopsy     SOFT TISSUE SURGERY      knee liposuction     Allergies   Allergen Reactions     Atorvastatin Muscle Pain (Myalgia)     Simvastatin      Sulfa Drugs      Social History     Tobacco Use     Smoking status: Former Smoker     Packs/day: 2.50     Years: 27.00     Pack years: 67.50     Last attempt to quit: 1993     Years since quittin.7     Smokeless tobacco: Never Used   Substance Use Topics     Alcohol use: Not Currently     Comment: sober for 32 years     Prior to Admission medications    Medication Sig Start Date End Date Taking? Authorizing Provider   Ascorbic Acid (VITAMIN C) 500 MG CAPS Take 500 mg by mouth every evening   Yes Reported, Patient   aspirin 81 MG EC tablet Take 81 mg by mouth daily   Yes Reported, Patient   BIOTIN PO Take 1 tablet by mouth every morning   Yes Reported, Patient   buPROPion (WELLBUTRIN XL) 300 MG 24 hr tablet Take 300 mg by mouth every morning   Yes Reported, Patient   calcium acetate (PHOSLO) 667 MG  CAPS capsule Take 667 mg by mouth every evening    Yes Reported, Patient   lisinopril (ZESTRIL) 10 MG tablet Take 10 mg by mouth daily   Yes Reported, Patient   metFORMIN (GLUCOPHAGE) 500 MG tablet Take 500 mg by mouth 2 times daily (with meals)   Yes Reported, Patient   metoprolol tartrate (LOPRESSOR) 50 MG tablet Take 50 mg by mouth 2 times daily   Yes Reported, Patient   Multiple Vitamin (MULTIVITAMIN PO) Take 1 tablet by mouth every morning   Yes Reported, Patient   Omega-3 Fatty Acids (FISH OIL PO) Take 2 capsules by mouth 2 times daily   Yes Reported, Patient   oxymetazoline (AFRIN) 0.05 % nasal spray Spray 2 sprays into both nostrils daily as needed for congestion   Yes Reported, Patient   polyethylene glycol (MIRALAX) 17 g packet Take 1 packet by mouth daily   Yes Reported, Patient   rosuvastatin (CRESTOR) 20 MG tablet Take 20 mg by mouth daily   Yes Reported, Patient     Current Facility-Administered Medications Ordered in Epic   Medication Dose Route Frequency Last Rate Last Dose     acetaminophen (TYLENOL) tablet 975 mg  975 mg Oral Once         ceFAZolin (ANCEF) 1 g vial to attach to  ml bag for ADULT or 50 ml bag for PEDS  1 g Intravenous See Admin Instructions         ceFAZolin (ANCEF) intermittent infusion 2 g in 100 mL dextrose PRE-MIX  2 g Intravenous Pre-Op/Pre-procedure x 1 dose         lactated ringers infusion   Intravenous Continuous         lidocaine 1 % 0.1-1 mL  0.1-1 mL Other Q1H PRN         No current [facility]-ordered outpatient medications on file.       lactated ringers       Recent Labs   Lab Test 09/02/20  0611 08/07/20  2206   NA  --  134   POTASSIUM 4.6 4.9   CHLORIDE  --  102   CO2  --  26   ANIONGAP  --  6   * 117*   BUN  --  28   CR  --  1.14*   RADHA  --  8.7     Recent Labs   Lab Test 09/02/20 0611   HGB 10.7*     Recent Labs   Lab Test 09/02/20  0611   ABO PENDING     RECENT LABS:     Anesthesia Evaluation     . Pt has had prior anesthetic. Type: General    History of  anesthetic complications   - PONV and motion sickness        ROS/MED HX    ENT/Pulmonary:     (+)sleep apnea, tobacco use, Past use doesn't use CPAP , . .   (-) asthma, COPD and recent URI   Neurologic:      (-) seizures and CVA   Cardiovascular:     (+) Dyslipidemia, hypertension--CAD, --. : . . . :. . Previous cardiac testing date:results:Stress Testdate:2012 results:CONCLUSIONS  REST:  Chamber size, wall motion, and wall thickness are normal. No  significant valvular abnormalities are seen.  The visually estimated LVEF is 60%.  STRESS:  All segments display appropriate hyperkinesis; ejection fraction  increases appropriately.  End systolic area did NOT increase.  CONCLUSION:  Normal bicycle stress echocardiogram with adequate heart rate and  workload.  No evidence for inducible ischemia.ECG reviewed date: results:NSR, no significant abnormalities date: results:         (-) angina, irregular heartbeat/palpitations, valvular problems/murmurs, stent and angina   METS/Exercise Tolerance:  >4 METS   Hematologic:        (-) History of Transfusion   Musculoskeletal:         GI/Hepatic:     (+) GERD (none recently) Other, hiatal hernia,      (-) liver disease   Renal/Genitourinary:      (-) renal disease   Endo:     (+) type II DM Not using insulin .   (-) thyroid disease and chronic steroid usage   Psychiatric:     (+) psychiatric history anxiety and depression      Infectious Disease:         Malignancy:         Other:                          Physical Exam      Airway   Mallampati: II  TM distance: >3 FB  Neck ROM: full    Dental   (+) caps    Cardiovascular   Rhythm and rate: regular and normal  (-) no murmur    Pulmonary    breath sounds clear to auscultation(-) no wheezes            Lab Results   Component Value Date    HGB 10.7 (L) 09/02/2020     08/07/2020    POTASSIUM 4.6 09/02/2020    CHLORIDE 102 08/07/2020    CO2 26 08/07/2020    BUN 28 08/07/2020    CR 1.14 (H) 08/07/2020     (H) 09/02/2020     "RADHA 8.7 08/07/2020       Preop Vitals  BP Readings from Last 3 Encounters:   09/02/20 (!) 158/77   08/07/20 (!) 169/85    Pulse Readings from Last 3 Encounters:   09/02/20 58   08/07/20 68      Resp Readings from Last 3 Encounters:   09/02/20 17   08/07/20 16    SpO2 Readings from Last 3 Encounters:   09/02/20 96%   08/07/20 99%      Temp Readings from Last 1 Encounters:   09/02/20 36.8  C (98.2  F) (Temporal)    Ht Readings from Last 1 Encounters:   09/02/20 1.575 m (5' 2\")      Wt Readings from Last 1 Encounters:   09/02/20 68 kg (150 lb)    Estimated body mass index is 27.44 kg/m  as calculated from the following:    Height as of this encounter: 1.575 m (5' 2\").    Weight as of this encounter: 68 kg (150 lb).       Anesthesia Plan      History & Physical Review  History and physical reviewed and following examination; no interval change.    ASA Status:  3 .    NPO Status:  > 8 hours    Plan for General with Intravenous and Propofol induction. Maintenance will be Balanced.    PONV prophylaxis:  Ondansetron (or other 5HT-3), Other (See comment) and Dexamethasone or Solumedrol (Propofol infusion)  Propofol infusion - 150 mcg/kg/min or >   Zofran 8 mg (divided)   Decadron 4 mg   Benadryl 12.5 mg     Precedex prior to wakeup   Ketamine 20 mg prior to wakeup   Light on narcotics as able     Pt and  requested a nerve block, as they had discussed with Dr. Hickey prior to the DOS.  We will discuss with Dr. Hickey, and if requested, will perform TAP blocks.            Postoperative Care  Postoperative pain management:  Multi-modal analgesia and Peripheral nerve block (Single Shot).      Consents  Anesthetic plan, risks, benefits and alternatives discussed with:  Patient..                 Kadeem Lima MD  "

## 2020-09-02 NOTE — CONSULTS
Consult Date:  09/02/2020      MEDICINE CONSULTATION      PRIMARY CARE PHYSICIAN:  Dr. Martinez      REQUESTING PROVIDER:  Sherita Cordova NP      REASON FOR CONSULTATION:  Assistance with postoperative medical management following an exploratory laparotomy with total abdominal hysterectomy with bilateral salpingo-oophorectomy, omentectomy with tumor debulking and pelvic and paraaortic lymphadenectomy.      HISTORY OF PRESENT ILLNESS:  Julia Andre is a 69-year-old female with a past medical history significant for stress-induced MI with associated ischemic cardiomyopathy, hypertension, hyperlipidemia, type 2 diabetes, major depressive disorder with anxiety, osteopenia, GERD, obstructive sleep apnea, history of cervical cancer and recent diagnosis of ovarian mass/cancer with elevated CA-125 lab who underwent an elective exploratory laparotomy with total abdominal hysterectomy with bilateral salpingo-oophorectomy, omentectomy with tumor debulking and pelvic and paraaortic lymphadenectomy for which the Hospitalist Service has been consulted to assist with medical management.      This procedure was performed earlier today under general endotracheal anesthesia with estimated blood loss of 200 mL and no complications.      I evaluated the patient in her hospital room with her  present at bedside.  The patient indicates that she is always tired and this has been an ongoing issue for 1-2 years.  She indicates that she is very active and exercises daily.  She has ongoing itchiness on her back that is normal for her.  The patient indicated that she can become winded if she is walking uphill for a prolonged period of time.  The patient has occasional cough that is sometimes associated with eating.  She sometimes states she gets overly excited or distracted.  The patient's  indicated that she does have a very large hiatal hernia.  The patient indicated that about 2 months ago she developed abdominal pain, which was  thought to be secondary to constipation and led to the diagnosis of the ovarian mass/cancer.  The patient has not had issues with constipation lately and indicated that she had completed the bowel prep in order to proceed with surgery that occurred earlier today.  The patient occasionally has back pain and indicates some discoloration that is ongoing in her ankles that is unchanged.  At night, she gets some tingling and numbness sensation in her feet, which she utilizes a magnesium spray that clears that up, but she indicates she has to get up frequently throughout the night.      PAST MEDICAL HISTORY:   1.  Stress-induced MI with known clean coronary arteries, but associated ischemic cardiomyopathy.   2.  Hypertension.   3.  Hyperlipidemia.   4.  Type 2 diabetes.   5.  Major depressive disorder with anxiety.   6.  History of iron deficiency anemia.   7.  Osteopenia.   8.  Obstructive sleep apnea, for which the patient does not utilize CPAP.   9.  GERD.   10.  History of cervical cancer.   11.  Ovarian mass/cancer, with an elevated CA-125 lab study.      PAST SURGICAL HISTORY:   1.  Bladder suspension x2.   2.  Cervical surgery at the age of 24.   3.  D and C.   4.  Strabismus correction as a child.   5.  Right macular hole performed 2 years ago.   6.  Right cataract removal and lens replacement.   7.  Bilateral LASIK procedure.   8.  Bilateral liposuction of the knees.      PRIOR TO ADMIT MEDICATIONS:    Prior to Admission Medications   Prescriptions Last Dose Informant Patient Reported? Taking?   Ascorbic Acid (VITAMIN C) 500 MG CAPS 8/31/2020 at 2200 Self Yes Yes   Sig: Take 500 mg by mouth every evening   BIOTIN PO 8/31/2020 at 2200 Self Yes Yes   Sig: Take 1 tablet by mouth every morning   Multiple Vitamin (MULTIVITAMIN PO) 9/1/2020 at 0900 Self Yes Yes   Sig: Take 1 tablet by mouth every morning   Omega-3 Fatty Acids (FISH OIL PO) 8/29/2020 at 2200 Self Yes Yes   Sig: Take 2 capsules by mouth 2 times daily    aspirin 81 MG EC tablet Past Week at 0900 Self Yes Yes   Sig: Take 81 mg by mouth daily   buPROPion (WELLBUTRIN XL) 300 MG 24 hr tablet 9/2/2020 at 0500 Self Yes Yes   Sig: Take 300 mg by mouth every morning   calcium acetate (PHOSLO) 667 MG CAPS capsule 8/31/2020 at 2200 Self Yes Yes   Sig: Take 667 mg by mouth every evening    lisinopril (ZESTRIL) 10 MG tablet 9/2/2020 at 0500 Self Yes Yes   Sig: Take 10 mg by mouth daily   metFORMIN (GLUCOPHAGE) 500 MG tablet 9/1/2020 at 0900 Self Yes Yes   Sig: Take 500 mg by mouth 2 times daily (with meals)   metoprolol tartrate (LOPRESSOR) 50 MG tablet 9/2/2020 at 0500 Self Yes Yes   Sig: Take 50 mg by mouth 2 times daily   oxymetazoline (AFRIN) 0.05 % nasal spray 9/1/2020 at 1600 Self Yes Yes   Sig: Spray 2 sprays into both nostrils daily as needed for congestion   polyethylene glycol (MIRALAX) 17 g packet 8/31/2020 at 2200 Self Yes Yes   Sig: Take 1 packet by mouth daily   rosuvastatin (CRESTOR) 20 MG tablet 9/2/2020 at 0500 Self Yes Yes   Sig: Take 20 mg by mouth daily      Facility-Administered Medications: None        ALLERGIES:    Allergies   Allergen Reactions     Atorvastatin Muscle Pain (Myalgia)     Simvastatin      Sulfa Drugs        SOCIAL HISTORY:  The patient currently resides in a [facility] in [place] with her .  She is a former smoker.  She had a 2-1/2-pack-per-day habit, for which she quit in 1993 and has a 67.5 pack-year history.  The patient has been sober from alcohol for approximately 32 years.  She denied any history or active use of illicit drugs.  She does not currently utilize a cane or a walker.      FAMILY HISTORY:   1.  Father had esophageal cancer and known coronary artery disease with hypertension and passed away due to a CVA.   2.  Brother had a CVA.   3.  Brother #2 passed away with COPD and known kidney cancer.      REVIEW OF SYSTEMS:  A 10-point review of systems was performed.  All pertinent positives are listed in the history  of present illness, otherwise is negative.      PHYSICAL EXAMINATION:   VITAL SIGNS:  Temperature is 97.5 degrees Fahrenheit with a blood pressure of 142/68, heart rate of 67 beats per minute, respiratory rate of 12, O2 saturation 98% on 2 liters per nasal cannula.  The patient's last pain rating was noted to be 1/10.   GENERAL:  The patient is awake, alert, cooperative, in no apparent distress, alert and oriented x3.   HEENT:  Normocephalic, atraumatic.  Moist mucous membranes present.  No exudates noted in the posterior pharynx.  Uvula is midline.  Eyes:  Pupils are equal, round, reactive to light.  Extraocular movements are intact.  Normal sclerae.   NECK:  Supple.  Normal range of motion.  No tracheal deviation.  No cervical lymphadenopathy present.   CARDIOVASCULAR:  Regular rate and rhythm.  No rubs, murmurs or gallops appreciated.   PULMONARY:  Lungs are clear to auscultation bilaterally.  No wheezes, rhonchus or rales appreciated.   GASTROINTESTINAL:  The patient currently has an abdominal binder in place, so was deferred.  The patient is complaining of some slight abdominal tenderness.   GENITOURINARY:  Gutierrez catheter is in place, with clear yellow urine present in the bag.   NEUROLOGIC:  Cranial nerves II-XII are grossly intact.  The patient demonstrates equal sensation, coordination and strength in the upper and lower extremities bilaterally.   EXTREMITIES:  No lower extremity edema noted bilaterally.  Calves are nontender to palpation and PCDs are in place bilaterally.      ASSESSMENT AND PLAN:  Julia Andre is a 69-year-old female with a past medical history significant for stress-induced MI with associated ischemic cardiomyopathy, hypertension, hyperlipidemia, type 2 diabetes, major depressive disorder with anxiety, osteopenia, GERD, obstructive sleep apnea, history of cervical cancer and recent diagnosis of ovarian mass/cancer with elevated CA-125 lab who underwent an elective exploratory laparotomy  with total abdominal hysterectomy with bilateral salpingo-oophorectomy, omentectomy with tumor debulking and pelvic and paraaortic lymphadenectomy for which the Hospitalist Service has been consulted to assist with medical management.     1.  Ovarian mass/cancer with elevated CA-125 lab study, status post elective exploratory laparotomy with total abdominal hysterectomy with bilateral salpingo-oophorectomy, omentectomy with tumor debulking and pelvic and paraaortic lymphadenectomy:  Postoperative day #0.  OB/GYN Service is managing at this point.  We will defer analgesic management, DVT prophylaxis and PT, OT assessment to their service.  Would strongly encourage utilization of incentive spirometer.  Would check a hemoglobin in the morning to assess for acute blood loss anemia.   2.  History of stress-induced MI with associated ischemic cardiomyopathy, hypertension and hyperlipidemia:  This appears to be stable at this point.  Upon review of electronic medical records, it appears coronary angiogram at time of MI had clean coronaries.  The patient has been holding baby aspirin daily for the last week and this will be continued to be held.  We will plan to resume prior to admit lisinopril 10 mg daily, Lopressor 50 mg 2 times daily with hold parameters in place.  We will also resume prior to admit Crestor 20 mg daily.   3.  Type 2 diabetes:  The patient indicated that this is well controlled and she is currently on metformin 500 mg 2 times daily.  We will hold this medication and initiate medium intensity sliding scale insulin with Accu-Cheks performed 4 times daily and hypoglycemic protocol in place.   4.  History of iron deficiency anemia:  Per electronic medical record, the patient underwent a full GI workup and 2012 that indicated tiny erosions in the stomach.  The patient is no longer on iron supplements.  We will plan to check a hemoglobin in the morning to assess for acute blood loss anemia.   5.  GERD:  The  patient infrequently uses antacids.  We will ensure as-needed Tums are made available.   6.  Obstructive sleep apnea:  The patient indicated that she has a diagnosis of moderate obstructive sleep apnea and has been working on trying to get a new CPAP machine; however, she would prefer not having this, as she did not tolerate it in the past.  We will monitor O2 spot checks.   7.  Major depressive disorder with anxiety:  We will resume prior to admit Wellbutrin 300 mg every morning.   8.  History of cervical cancer:  The patient is status post surgical intervention.  No further interventions appear necessary at this point.   9.  DVT prophylaxis:  Per OB/GYN Service, the patient has been started on subcutaneous heparin and PCDs.      CODE STATUS:  Discussed with the patient, she elected to be full code.      DISPOSITION:  Ultimately up to OB/GYN Service.      The patient was discussed with Dr. Mery Garcia, who agrees with the assessment and plan as stated above.  Dr. Garcia will evaluate the patient independently.      At this time, I would like to thank nurse practitioner Sherita Cordova for consulting the Hospitalist Service.  We will continue to follow.         MERY GARCIA MD       As dictated by MUSHTAQ KLEIN PA-C            D: 2020   T: 2020   MT: ERICK      Name:     BARBARA PERRIN   MRN:      [id number]        Account:       [id number]   :      1950           Consult Date:  2020      Document: [id number]       cc: Linda Martinez MD

## 2020-09-02 NOTE — PLAN OF CARE
Patient arrived to unit around 14:30. VSS. Hypotension resolved. A&O. CMS intact. Bowel sounds absent. Abdominal dressing marked and intact with small amount of bleed. Abdominal binder and ice packs in place. Pt. Currently reports only mild discomfort. On 2L oxgyen and CAPNO. Handoff given.

## 2020-09-02 NOTE — OR NURSING
PNDS Criteria met.  Dr Lazo here to assess Mrs. Andre; order received to transfer to [facility] for continued recovery.  Awaiting availability of room for transfer.

## 2020-09-02 NOTE — PROGRESS NOTES
MD Notification     Notified Person: MD GYNONC    Notified Person Name: Sherita Cordova    Notification Date/Time: 1530 9.2.2020    Notification Interaction: Telephone w/ readback    Purpose of Notification: Only have  PRN Tylenol 975mg q6h for pain. Can we get something stronger if needed?     Orders Received: Norco 5/325mg q4h 1 tablet    Comments:

## 2020-09-02 NOTE — BRIEF OP NOTE
[facility]    Brief Operative Note    Pre-operative diagnosis: Pelvic mass [R19.00]  Post-operative diagnosis Same as pre-operative diagnosis    Procedure: Procedure(s):  EXPLORATORY LAPAROTOMY  TOTAL ABDOMINAL HYSTERECTOMY WITH BILATERAL SALPINGO-OOPHORECTOMY; WASHINGS; OMENTECTOMY TUMOR DEBULKING; PELVIC AND PARA-AORTIC LYMPHADENECTOMY  Surgeon: Surgeon(s) and Role:     * Emily Hickey MD - Primary  Anesthesia: General   Estimated blood loss: 200 ml  Drains: None  Specimens:   ID Type Source Tests Collected by Time Destination   1 : pelvic washings  Washings Pelvis CYTOLOGY NON GYN Emily Hickey MD 9/2/2020  8:26 AM    A : uterus, cervix, bilateral fallopian tubes, bilateral ovaries and appendix Tissue Other SURGICAL PATHOLOGY EXAM Emily Hickey MD 9/2/2020  8:45 AM    B : cul de sac peritoneum Tissue Peritoneum SURGICAL PATHOLOGY EXAM Emily Hickey MD 9/2/2020  9:03 AM    C : right pelvic lymph nodes Tissue Lymph Node, Right Pelvic SURGICAL PATHOLOGY EXAM Emily Hickey MD 9/2/2020  9:06 AM    D : omentum Tissue Omentum SURGICAL PATHOLOGY EXAM Emily Hickey MD 9/2/2020  9:20 AM    E :  Tissue Lymph Node, Right Para Aortic SURGICAL PATHOLOGY EXAM Emily Hickey MD 9/2/2020  9:25 AM      Findings:   Large, right ovarian mass metastatic to appendix, c/w ovarian malignancy on frozen. Unresectable right para-aortic lymph nodes encased around vena cava.   Complications: None.  Implants: * No implants in log *

## 2020-09-02 NOTE — OP NOTE
Procedure Date: 09/02/2020      PREOPERATIVE DIAGNOSIS:  Right pelvic mass.  Elevated CA-125.      POSTOPERATIVE DIAGNOSIS:  Ovarian cancer and abnormal appendix.      SURGEON:  VIVEK Hickey MD.      ASSISTANT:  SAVAGE Santiago.      ANESTHESIA:  General endotracheal anesthesia.      PROCEDURES:  Exploratory laparotomy, radical resection of pelvic tumor with total abdominal hysterectomy, bilateral salpingo-oophorectomy, bilateral ureterolysis, appendectomy, stripping of vesicouterine peritoneum and cul-de-sac peritoneum, omentectomy, right pelvic and paraaortic lymphadenectomy.      FINDINGS:  The patient was found to have a large abnormal mass accounting for the right ovary.  Initially, I could not tell if this was of an ovarian origin or appendiceal because the appendix was densely plastered to it with a dilated tip of the appendix, bringing up the question whether this was an appendiceal primary or an ovarian primary.  Her left tube and ovary were normal.  She did have a 4 cm nodule of tumor on the vesicouterine peritoneum and the small amount of disease in the cul-de-sac peritoneum.  She had some grossly suspicious lymph nodes in the paraaortic area.  I was able to resect 3 of these lymph nodes.  There were 2 lymph nodes that were retroduodenal that I could palpate that were densely adherent to the vena cava and I felt that they were unresectable.  She had no other upper abdominal disease on the diaphragm surfaces pericolic surfaces, mesenteric surfaces or infrahepatic areas.  At the completion of the procedure, the only disease remaining was the 2 lymph nodes that were retroduodenal.      INDICATIONS FOR THE PROCEDURE:  The patient is a 69-year-old female who on 06/10/2020 had a telehealth visit with Dr. Martinez and was complaining of right-sided lower back pain of a few days' duration and 1 day of right flank pain wrapping to the right lower abdomen.  Labs and UA were done as well as abdominal x-ray.   She was found to have a large amount of stool and was noted to be anemic with a hemoglobin of 11.4.  She was treated with magnesium citrate and MiraLax.  Repeat abdominal x-ray was consistent with constipation on 06/22/2020.  On 07/30/2020, a renal ultrasound was done and revealed no hydronephrosis.  On 08/07/2020 she was noted to have elevated creatinine of 1.3, a hemoglobin of 10.6, sodium 134 and potassium of 5.1.  On 08/13/2020, a pelvic ultrasound was obtained and revealed a very small uterus, 3.6 x 1.8 x 2.6 cm with a 3 mm endometrial stripe.  The right adnexa had a large complex mass with irregular borders, cystic areas and some intrinsic vascularity measuring 11.4 x 6.6 x 6.6.  The left ovary was normal.  There was trace fluid in the endometrial canal and a moderate amount of complex free fluid in the pelvis.  A CT scan of the chest, abdomen and pelvis on 08/19/2020 revealed a 5.3 x 4.6 mass-like soft tissue thickening in the right adnexa, which could not be  from the uterus.  A few mildly prominent retroperitoneal lymph nodes, small amount of ascites, a 0.8 cm nodule in the left upper quadrant mesentery or omentum and a large hiatal hernia containing nearly the entire stomach.  CA-125 done on 08/2020 was elevated at 4670 units/mL.  She was brought to the operating room for definitive surgical removal and staging.  She had had informed consent was obtained in the office and also at the bedside prior to the procedure.      FINDINGS:  As noted above.      PROCEDURE IN DETAIL:  The patient was taken to the operating room.  She was placed in a supine position.  She had received broad-spectrum antibiotic prophylaxis and knee-high sequential compression devices were placed on her lower extremities.  General endotracheal anesthesia was administered in the usual fashion.  Once intubated, she was repositioned in low lithotomy position using well-padded Calin stirrups.  Her arms were left on arm boards and  well padded.  She was prepped and draped in the usual sterile fashion including insertion of a 16-Afghan Gutierrez catheter into her bladder.  A midline vertical incision was made from the symphysis pubis around the right side of the umbilicus and to the infra-epigastric area.  It was taken down through the subcutaneous tissue and through the fascia with electrocautery.  The right posterior rectus sheath was opened.  The abdomen was tented up, and I opened the peritoneum and then extended this incision superiorly and inferiorly.  Manual exploration of the upper abdomen revealed no nodularity that I could appreciate.  There was no diaphragm.  involvement.  The pericolic gutters looked good.  You could feel a solid mass in the pelvis.  At that point, I placed a Bookwalter around the incision.  I had Anesthesia place her in moderate Trendelenburg.  I packed the bowel away with moist laps and a moist towel.  The first thing that I visualized was a large malignant looking mass.  On top of this was the appendix that was extremely densely adherent to the mass and the tip of the appendix was dilated and looked abnormal.  I made a defect in the mesoappendix.  I used the LigaSure to ligate and divide the mesoappendix, freeing it up from the cecum and the ileum.  I used a TA-30 stapler, the base of the appendix to amputate the appendix and it was amputated with a scalpel blade and then I tied off the free end that was attached to the tumor with a suture.  I then elevated the right round ligament with an Allis.  I cauterized and divided it with the LigaSure device.  I opened up the posterior broad ligament lateral to the ovarian vessels.  I isolated the ovarian vessels and then made sure that the ureter was clearly out of harm's way and then came across the rest of the mesentery along with the ovarian vessels on that side and that freed up the cecum and ileum from the appendix completely.  I then undercut the peritoneum, mobilizing  the mass with it and it appeared to be possibly ovarian in nature.  I then dissected out the ureter with a right angle clamp and I tagged it with a vessel loop and then dissected it off the peritoneum towards its entrance into the cardinal web.  I then repeated this on the left hand side.  On the left hand side, the ovary and tube appeared normal.  Again, I dissected out the ureter, tagged with a vessel loop, cauterized and divided the ovarian vessels and undercut the peritoneum below the ovarian vessels towards the uterus.  There was a large clump of tumor nodule on the vesicouterine peritoneum.  I divided the peritoneum above this from side-to-side from where we had divided the round ligaments and then I peeled back the bladder off of that peritoneum.  There was one area where it was quite stuck because there was tumor infiltrating into the bladder muscularis.  Once I had the bladder down off of the peritoneum, I took it off of the cervix as well.  I oversewed the area where the muscularis had been densely attached to the tumor and this was done with a running 3-0 Vicryl suture on an SH needle.  Once I got the bladder down completely, we used the LigaSure device to come across the soft tissues at the isthmus on either side and come across the main trunk of the uterine artery.  We then used straight Zeppelins to come down the cardinal ligament, clamping the tissue lateral to the cervix, dividing it with the scalpel blade and suture ligating it with 0 Vicryl suture on a CT2 needle.  We did this down to and included the uterosacral ligament in this endeavor.  Making sure the bladder was adequately down anteriorly, I placed 2 right angle Zeppelins across the upper vagina and used Esther scissors to amputate the cervix free.  The cervix, uterus, tubes, ovaries, appendix and vesicouterine peritoneum were passed off the table and sent to pathology for frozen section evaluation of the appendix and the right ovary.  At  that juncture, I had already freed up the ureters.  I divided the peritoneum in the cul-de-sac where it looked like there was nodular enhancement and peeled this off of the colon and off of the posterior vagina as well.  I then performed a pelvic lymphadenectomy on the right-hand side.  There were no suspicious lymph nodes.  I stripped the lymph-bearing fatty tissue from the circumflex iliac vein to the mid common iliac artery off the external iliac artery and vein and out of the obturator fossa and freed it up from the obturator nerve.  This was passed off as right pelvic lymph nodes.  I then went into the upper abdomen.  We optimized visualization with a body wall retractor superiorly.  I brought the omentum out of the patient's abdomen.  The peritoneal attachments of the omentum to the transverse colon were taken down from the hepatic flexure towards the splenic flexure.  We mobilized part of the gastrocolic ligament and divided this free of the stomach with the LigaSure device.  Then I came across the omentum in the left upper quadrant, freeing up the omentum completely.  There was no diaphragmatic disease, no infrahepatic disease, no pericolic gutter disease and no clear mesenteric disease.  I then opened up the white line of Toldt along the ascending colon.  I also opened up the peritoneum at the base of the mesentery and I could feel abnormal lymph nodes in the periaortic distribution.  We used Bookwalter retractors to optimize visualization in this area.  I dissected at least 3 suspicious or positive lymph nodes from the aorta and the vena cava.  This went all the way up to the entrance of the ovarian vessels into the vena cava.  Above this I mobilized the lower part of the duodenum and I could feel two 1.5 cm lymph nodes that were densely adherent to the vena cava and I felt that these were unresectable.  At that point, we placed Surgiflo in the cul-de-sac after ensuring hemostasis.  We placed Seprafilm in  the pelvis and then over the bowel underneath the incision.  We used a clean closure tray and closed the abdomen.  This was done in a mass closure using #1 looped PDS suture from the superior and inferior aspects towards the midline where each suture was tied to itself.  We then reinforced it with interrupted #1 Ethibond sutures along the incision and irrigated the subcutaneous tissue was then reapproximated the skin with staples.  Estimated blood loss for the procedure was 200 mL.  Anesthesia, prior to extubating her, came to the room to perform a TAP block and you will have to see their separate dictation.         RANDY BARRERA MD             D: 2020   T: 2020   MT: KIRK      Name:     BARBARA PERRIN   MRN:      [id number]        Account:        [id number]   :      1950           Procedure Date: 2020      Document: [id number]       cc: Linda Martinez MD

## 2020-09-02 NOTE — ANESTHESIA CARE TRANSFER NOTE
Patient: Julia Andre    Procedure(s):  EXPLORATORY LAPAROTOMY  TOTAL ABDOMINAL HYSTERECTOMY WITH BILATERAL SALPINGO-OOPHORECTOMY; WASHINGS; OMENTECTOMY TUMOR DEBULKING; PELVIC AND PARA-AORTIC LYMPHADENECTOMY    Diagnosis: Pelvic mass [R19.00]  Diagnosis Additional Information: No value filed.    Anesthesia Type:   General     Note:  Airway :Face Mask  Patient transferred to:PACU  Comments: Neuromuscular blockade reversed after TOF 4/4, spontaneous respirations, adequate tidal volumes, followed commands to voice, oropharynx suctioned with soft flexible catheter, extubated atraumatically, extubated with suction, airway patent after extubation.  Oxygen via facemask at 6 liters per minute to PACU. Oxygen tubing connected to wall O2 in PACU, SpO2, NiBP, and EKG monitors and alarms on and functioning, Janet Hugger warmer connected to patient gown.   Handoff Report: Identifed the Patient, Identified the Reponsible Provider, Reviewed the pertinent medical history, Discussed the surgical course, Reviewed Intra-OP anesthesia mangement and issues during anesthesia, Set expectations for post-procedure period and Allowed opportunity for questions and acknowledgement of understanding      Vitals: (Last set prior to Anesthesia Care Transfer)    CRNA VITALS  9/2/2020 0956 - 9/2/2020 1033      9/2/2020             Pulse:  59    SpO2:  98 %    Resp Rate (set):  10                Electronically Signed By: CARMEN Nelson CRNA  September 2, 2020  10:33 AM

## 2020-09-02 NOTE — ANESTHESIA PROCEDURE NOTES
Procedure note : TAP  Staff -   Anesthesiologist:  Antoine Lazo MD      Performed By: Anesthesiologist and anesthesiologist        Pre-Procedure  Performed by Antoine Lazo MD  Location: OR    Procedure Times:9/2/2020 10:05 AM and 9/2/2020 10:20 AM  Pre-Anesthestic Checklist: patient identified, IV checked, site marked, risks and benefits discussed, informed consent, monitors and equipment checked, at physician/surgeon's request and post-op pain management    Timeout  Correct Patient: Yes   Correct Procedure: Yes   Correct Site: Yes   Correct Laterality: Yes   Correct Position: Yes   Site Marked: Yes   .   Procedure Documentation    .    Procedure: TAP, bilateral.   Patient Position:supine Local skin infiltrated with mL of 1% lidocaine.    Ultrasound used to identify targeted nerve, plexus, or vascular marker and placed a needle adjacent to it., Ultrasound was used to visualize the spread of the anesthetic in close proximity to the above stated nerve. A permanent image is entered into the patient's record.  Patient Prep/Sterile Barriers; mask, sterile gloves, chlorhexidine gluconate and isopropyl alcohol.  .  Needle: insulated, short bevel (Arrow)   Needle Gauge: 21.  Needle Length (millimeters) 90  Insertion Method: Single Shot.        Assessment/Narrative  Paresthesias: No.  .  The placement was negative for: blood aspirated, painful injection and site bleeding.  Bolus given via needle..   Secured via.   Complications: none. Comments:  Postoperative pain block requested by surgeon for severe postoperative pain. In OR prior to emergence.      Ultrasound Interpretation, Peripheral Nerve Block    1. Under ultrasound guidance, the needle was inserted and placed in the correct fascial plane.  2. Ultrasound was also used to visualize the spread of the anesthetic in close proximity to the nerve(s) being blocked.  Local anesthetic was administered in incremental doses, with intermittent negative aspiration.    3.  The nerve(s) appeared anatomically normal.  4. There were no apparent abnormal pathological findings.  5. A permanent ultrasound image was saved in the patient's record.    Pt tolerated well.    No complications.      The surgeon has given a verbal order transferring care of this patient to me for the performance of a regional analgesia block for post-op pain control. It is requested of me because I am uniquely trained and qualified to perform this block and the surgeon is neither trained nor qualified to perform this procedure.

## 2020-09-02 NOTE — ANESTHESIA POSTPROCEDURE EVALUATION
Patient: Julia Andre    Procedure(s):  EXPLORATORY LAPAROTOMY  TOTAL ABDOMINAL HYSTERECTOMY WITH BILATERAL SALPINGO-OOPHORECTOMY; WASHINGS; OMENTECTOMY TUMOR DEBULKING; PELVIC AND PARA-AORTIC LYMPHADENECTOMY    Diagnosis:Pelvic mass [R19.00]  Diagnosis Additional Information: No value filed.    Anesthesia Type:  General    Note:  Anesthesia Post Evaluation    Patient location during evaluation: PACU  Patient participation: Able to fully participate in evaluation  Level of consciousness: awake  Pain management: adequate  Airway patency: patent  Cardiovascular status: acceptable  Respiratory status: acceptable  Hydration status: acceptable  PONV: none     Anesthetic complications: None          Last vitals:  Vitals:    09/02/20 1110 09/02/20 1120 09/02/20 1130   BP: 138/71 118/74    Pulse: 61 62 60   Resp: 16 12 9   Temp: 36.8  C (98.2  F) 36.8  C (98.2  F) 36.9  C (98.4  F)   SpO2: 99% 91% 96%         Electronically Signed By: YANE COLUNGA MD  September 2, 2020  11:31 AM

## 2020-09-03 LAB
ANION GAP SERPL CALCULATED.3IONS-SCNC: 3 MMOL/L (ref 3–14)
BASOPHILS # BLD AUTO: 0 10E9/L (ref 0–0.2)
BASOPHILS NFR BLD AUTO: 0.2 %
BUN SERPL-MCNC: 21 MG/DL (ref 7–30)
CALCIUM SERPL-MCNC: 7.8 MG/DL (ref 8.5–10.1)
CHLORIDE SERPL-SCNC: 103 MMOL/L (ref 94–109)
CO2 SERPL-SCNC: 25 MMOL/L (ref 20–32)
CREAT SERPL-MCNC: 1.08 MG/DL (ref 0.52–1.04)
DIFFERENTIAL METHOD BLD: ABNORMAL
EOSINOPHIL # BLD AUTO: 0.1 10E9/L (ref 0–0.7)
EOSINOPHIL NFR BLD AUTO: 1.4 %
ERYTHROCYTE [DISTWIDTH] IN BLOOD BY AUTOMATED COUNT: 14.2 % (ref 10–15)
GFR SERPL CREATININE-BSD FRML MDRD: 52 ML/MIN/{1.73_M2}
GLUCOSE BLDC GLUCOMTR-MCNC: 102 MG/DL (ref 70–99)
GLUCOSE BLDC GLUCOMTR-MCNC: 107 MG/DL (ref 70–99)
GLUCOSE BLDC GLUCOMTR-MCNC: 108 MG/DL (ref 70–99)
GLUCOSE BLDC GLUCOMTR-MCNC: 116 MG/DL (ref 70–99)
GLUCOSE BLDC GLUCOMTR-MCNC: 119 MG/DL (ref 70–99)
GLUCOSE SERPL-MCNC: 116 MG/DL (ref 70–99)
HCT VFR BLD AUTO: 30 % (ref 35–47)
HGB BLD-MCNC: 9.5 G/DL (ref 11.7–15.7)
IMM GRANULOCYTES # BLD: 0 10E9/L (ref 0–0.4)
IMM GRANULOCYTES NFR BLD: 0.2 %
LYMPHOCYTES # BLD AUTO: 0.9 10E9/L (ref 0.8–5.3)
LYMPHOCYTES NFR BLD AUTO: 15.7 %
MCH RBC QN AUTO: 27.5 PG (ref 26.5–33)
MCHC RBC AUTO-ENTMCNC: 31.7 G/DL (ref 31.5–36.5)
MCV RBC AUTO: 87 FL (ref 78–100)
MONOCYTES # BLD AUTO: 0.7 10E9/L (ref 0–1.3)
MONOCYTES NFR BLD AUTO: 12.2 %
NEUTROPHILS # BLD AUTO: 3.9 10E9/L (ref 1.6–8.3)
NEUTROPHILS NFR BLD AUTO: 70.3 %
NRBC # BLD AUTO: 0 10*3/UL
NRBC BLD AUTO-RTO: 0 /100
PLATELET # BLD AUTO: 362 10E9/L (ref 150–450)
POTASSIUM SERPL-SCNC: 4.7 MMOL/L (ref 3.4–5.3)
POTASSIUM SERPL-SCNC: 5.2 MMOL/L (ref 3.4–5.3)
POTASSIUM SERPL-SCNC: 5.5 MMOL/L (ref 3.4–5.3)
RBC # BLD AUTO: 3.46 10E12/L (ref 3.8–5.2)
SODIUM SERPL-SCNC: 131 MMOL/L (ref 133–144)
SODIUM SERPL-SCNC: 132 MMOL/L (ref 133–144)
SODIUM SERPL-SCNC: 133 MMOL/L (ref 133–144)
WBC # BLD AUTO: 5.6 10E9/L (ref 4–11)

## 2020-09-03 PROCEDURE — 36415 COLL VENOUS BLD VENIPUNCTURE: CPT | Performed by: NURSE PRACTITIONER

## 2020-09-03 PROCEDURE — 36415 COLL VENOUS BLD VENIPUNCTURE: CPT | Performed by: OBSTETRICS & GYNECOLOGY

## 2020-09-03 PROCEDURE — 25000128 H RX IP 250 OP 636: Performed by: NURSE PRACTITIONER

## 2020-09-03 PROCEDURE — 36415 COLL VENOUS BLD VENIPUNCTURE: CPT | Performed by: PHYSICIAN ASSISTANT

## 2020-09-03 PROCEDURE — 25000132 ZZH RX MED GY IP 250 OP 250 PS 637: Performed by: PHYSICIAN ASSISTANT

## 2020-09-03 PROCEDURE — 85025 COMPLETE CBC W/AUTO DIFF WBC: CPT | Performed by: OBSTETRICS & GYNECOLOGY

## 2020-09-03 PROCEDURE — 25800030 ZZH RX IP 258 OP 636: Performed by: NURSE PRACTITIONER

## 2020-09-03 PROCEDURE — 85025 COMPLETE CBC W/AUTO DIFF WBC: CPT | Performed by: NURSE PRACTITIONER

## 2020-09-03 PROCEDURE — 00000146 ZZHCL STATISTIC GLUCOSE BY METER IP

## 2020-09-03 PROCEDURE — 84295 ASSAY OF SERUM SODIUM: CPT | Performed by: PHYSICIAN ASSISTANT

## 2020-09-03 PROCEDURE — 84132 ASSAY OF SERUM POTASSIUM: CPT | Performed by: PHYSICIAN ASSISTANT

## 2020-09-03 PROCEDURE — 12000000 ZZH R&B MED SURG/OB

## 2020-09-03 PROCEDURE — 25000132 ZZH RX MED GY IP 250 OP 250 PS 637: Performed by: NURSE PRACTITIONER

## 2020-09-03 PROCEDURE — 80048 BASIC METABOLIC PNL TOTAL CA: CPT | Performed by: NURSE PRACTITIONER

## 2020-09-03 PROCEDURE — 99232 SBSQ HOSP IP/OBS MODERATE 35: CPT | Performed by: PHYSICIAN ASSISTANT

## 2020-09-03 PROCEDURE — 25800030 ZZH RX IP 258 OP 636: Performed by: PHYSICIAN ASSISTANT

## 2020-09-03 RX ORDER — CEFAZOLIN SODIUM 2 G/100ML
2 INJECTION, SOLUTION INTRAVENOUS
Status: COMPLETED | OUTPATIENT
Start: 2020-09-03 | End: 2020-09-04

## 2020-09-03 RX ORDER — LIDOCAINE 40 MG/G
CREAM TOPICAL
Status: DISCONTINUED | OUTPATIENT
Start: 2020-09-03 | End: 2020-09-04 | Stop reason: HOSPADM

## 2020-09-03 RX ORDER — HYDROCODONE BITARTRATE AND ACETAMINOPHEN 5; 325 MG/1; MG/1
1-2 TABLET ORAL EVERY 4 HOURS PRN
Qty: 15 TABLET | Refills: 0 | Status: SHIPPED | OUTPATIENT
Start: 2020-09-03 | End: 2021-08-16

## 2020-09-03 RX ORDER — HYDROCODONE BITARTRATE AND ACETAMINOPHEN 5; 325 MG/1; MG/1
1-2 TABLET ORAL EVERY 4 HOURS PRN
Status: DISCONTINUED | OUTPATIENT
Start: 2020-09-03 | End: 2020-09-15 | Stop reason: HOSPADM

## 2020-09-03 RX ORDER — CALCIUM CARBONATE 500 MG/1
500 TABLET, CHEWABLE ORAL DAILY PRN
Status: DISCONTINUED | OUTPATIENT
Start: 2020-09-03 | End: 2020-09-15 | Stop reason: HOSPADM

## 2020-09-03 RX ADMIN — HYDROCODONE BITARTRATE AND ACETAMINOPHEN 1 TABLET: 5; 325 TABLET ORAL at 08:04

## 2020-09-03 RX ADMIN — METOPROLOL TARTRATE 50 MG: 50 TABLET, FILM COATED ORAL at 08:03

## 2020-09-03 RX ADMIN — ROSUVASTATIN CALCIUM 20 MG: 20 TABLET, FILM COATED ORAL at 08:04

## 2020-09-03 RX ADMIN — HYDROCODONE BITARTRATE AND ACETAMINOPHEN 1 TABLET: 5; 325 TABLET ORAL at 17:28

## 2020-09-03 RX ADMIN — HYDROCODONE BITARTRATE AND ACETAMINOPHEN 2 TABLET: 5; 325 TABLET ORAL at 12:58

## 2020-09-03 RX ADMIN — ACETAMINOPHEN 975 MG: 325 TABLET, FILM COATED ORAL at 08:33

## 2020-09-03 RX ADMIN — BUPROPION HYDROCHLORIDE 300 MG: 150 TABLET, FILM COATED, EXTENDED RELEASE ORAL at 08:04

## 2020-09-03 RX ADMIN — METOPROLOL TARTRATE 50 MG: 50 TABLET, FILM COATED ORAL at 21:35

## 2020-09-03 RX ADMIN — CEFAZOLIN 1 G: 1 INJECTION, POWDER, FOR SOLUTION INTRAMUSCULAR; INTRAVENOUS at 00:01

## 2020-09-03 RX ADMIN — HEPARIN SODIUM 5000 UNITS: 5000 INJECTION, SOLUTION INTRAVENOUS; SUBCUTANEOUS at 05:46

## 2020-09-03 RX ADMIN — HYDROCODONE BITARTRATE AND ACETAMINOPHEN 1 TABLET: 5; 325 TABLET ORAL at 02:36

## 2020-09-03 RX ADMIN — SODIUM CHLORIDE: 9 INJECTION, SOLUTION INTRAVENOUS at 15:47

## 2020-09-03 RX ADMIN — HEPARIN SODIUM 5000 UNITS: 5000 INJECTION, SOLUTION INTRAVENOUS; SUBCUTANEOUS at 12:58

## 2020-09-03 RX ADMIN — HYDROCODONE BITARTRATE AND ACETAMINOPHEN 2 TABLET: 5; 325 TABLET ORAL at 21:35

## 2020-09-03 RX ADMIN — LISINOPRIL 10 MG: 10 TABLET ORAL at 08:04

## 2020-09-03 RX ADMIN — SODIUM CHLORIDE: 9 INJECTION, SOLUTION INTRAVENOUS at 05:46

## 2020-09-03 RX ADMIN — HEPARIN SODIUM 5000 UNITS: 5000 INJECTION, SOLUTION INTRAVENOUS; SUBCUTANEOUS at 21:35

## 2020-09-03 ASSESSMENT — ACTIVITIES OF DAILY LIVING (ADL)
ADLS_ACUITY_SCORE: 13
ADLS_ACUITY_SCORE: 13
ADLS_ACUITY_SCORE: 15
ADLS_ACUITY_SCORE: 13

## 2020-09-03 NOTE — PROGRESS NOTES
[facility] Progress Note  [facility]    Brief visit with pt and her , per request in flow sheet.    Pt said she had surgery yesterday, and indicated that she and her  received good news today.  She hopes to discharge home tomorrow, and has no need for additional support at this time.     team available if needs arise.                                                                                                                                                 Sofi Hagan M.A.  Staff   Pager [phone]  Phone [phone]

## 2020-09-03 NOTE — PROGRESS NOTES
[facility]    Hospitalist Progress Note    Date of Service (when I saw the patient): 09/03/2020    Assessment & Plan   Pelvic mass: A 70 yo POD #1 s/p Exploratory laparotomy, radical resection of pelvic tumor with total abdominal hysterectomy, bilateral salpingo-oophorectomy, bilateral ureterolysis, appendectomy, stripping of vesicouterine peritoneum and cul-de-sac peritoneum, omentectomy, right pelvic and paraaortic lymphadenectomy. Discussed intraop findings and need for chemotherapy. Discussed port placement prior to discharge and will need 3 1/2 weeks of Lovenox and teaching prior to discharge.   Abdominal pain:  Norco, Tylenol and Ice. Patient w/ history of alcoholism, so trying to minimize narcotic use.   Can ADAT once passing flatus. Ambulate. IS.     Likely home over weekend once passing flatus and tolerating regular diet.    Patient seen and examined with Sherita Cordova NP.  Discussed intra-op findings, procedures and need for chemotherpay starting in 4 wks.  All questions answered.      VIVEK Hickey MD    Interval History   Having some abdominal discomfort. No flatus. Denies N/V.     Physical Exam   Temp: 97.4  F (36.3  C) Temp src: Oral BP: 133/56 Pulse: 59   Resp: 16 SpO2: 92 % O2 Device: None (Room air) Oxygen Delivery: 1 LPM  Vitals:    09/02/20 0605   Weight: 68 kg (150 lb)     Vital Signs with Ranges  Temp:  [97.1  F (36.2  C)-97.8  F (36.6  C)] 97.4  F (36.3  C)  Pulse:  [59-71] 59  Resp:  [12-19] 16  BP: (107-147)/(40-68) 133/56  SpO2:  [90 %-99 %] 92 %  I/O last 3 completed shifts:  In: 788 [P.O.:240; I.V.:548]  Out: 1150 [Urine:1150]    Constitutional:  NAD  Respiratory: No dyspnea  Cardiovascular: RRR  GI: Soft, NT, ND, dressing intact  Skin/Integumen: no rashes or edema     Medications     sodium chloride 100 mL/hr at 09/03/20 0738       buPROPion  300 mg Oral QAM     ceFAZolin  2 g Intravenous Pre-Op/Pre-procedure x 1 dose     [START ON 9/4/2020] enoxaparin ANTICOAGULANT   40 mg Subcutaneous Q24H     heparin ANTICOAGULANT  5,000 Units Subcutaneous Q8H     insulin aspart  1-7 Units Subcutaneous TID AC     insulin aspart  1-5 Units Subcutaneous At Bedtime     lisinopril  10 mg Oral Daily     metoprolol tartrate  50 mg Oral BID     rosuvastatin  20 mg Oral Daily     sodium chloride (PF)  3 mL Intracatheter Q8H       Data   Recent Labs   Lab 09/03/20  1205 09/03/20  0736 09/03/20  0717 09/03/20  0643 09/02/20  1053 09/02/20  0611   WBC  --   --  5.6  --   --   --    HGB  --   --  9.5*  --   --  10.7*   MCV  --   --  87  --   --   --    PLT  --   --  362  --   --  393   *  --   --  131*  --   --    POTASSIUM  --  5.2  --  5.5*  --  4.6   CHLORIDE  --   --   --  103  --   --    CO2  --   --   --  25  --   --    BUN  --   --   --  21  --   --    CR  --   --   --  1.08* 1.11*  --    ANIONGAP  --   --   --  3  --   --    RADHA  --   --   --  7.8*  --   --    GLC  --   --   --  116*  --  123*       No results found for this or any previous visit (from the past 24 hour(s)).

## 2020-09-03 NOTE — PROVIDER NOTIFICATION
MD Notification    Notified Person: MD    Notified Person Name: Skylar    Notification Date/Time: 9/3 0715    Notification Interaction: web page    Purpose of Notification: ELISABETH JOY 5.5    Orders Received: Repeat STAT K,     Comments:

## 2020-09-03 NOTE — DISCHARGE SUMMARY
HOSPITAL DISCHARGE SUMMARY    Patient Name: Julia Andre  YOB: 1950 Age: 69 year old  Medical Record Number: [id number]  Primary Physician: Linda Martinez MD  Phone: [phone]  Admission Date: 9/2/2020  Discharge Date: 9/15/20    Julia Andre will be discharged from [facility] to Home.    PRINCIPAL DISCHARGE DIAGNOSIS:   FINAL DIAGNOSIS:   A. 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   - Appendiceal mucosa with no evidence of dysplasia or malignancy; proximal    appendiceal resection margin   uninvolved by malignancy.   - Parametrial involvement by serous carcinoma   - Left ovary and fallopian tube with no evidence of malignancy.   - Inactive atrophic endometrium; no evidence of hyperplasia, atypia or   malignancy.   - Cervical glandular and squamous epithelium with no evidence of dysplasia    or malignancy.     B. Peritoneum cul-de-sac:   - Serous carcinoma, high grade.     C. Lymph nodes, right pelvic, excision:   - One lymph node with no evidence of malignancy in the planes examined   (0/1).     D. Omentum resection:   - Adipose tissue with no evidence of malignancy in the planes examined.     E. 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 She tolerated the procedure well. Her post operative course was complicated by fevers and leukocytosis. Blood culture was positive for GNR and she was placed on Zosyn with ID consult. A CT a/p demonstrated a pelvic fluid collection. IR placed a drain and cultures grew bacterioids. Patient is being discharged with a drain in place, IV invanz x 2 weeks with a repeat CT scan in 2 weeks. She will go home with 16 days of prophylactic Lovenox to  "complete a 28 day course. She is discharged to  home on POD #13 with adequate pain control, tolerating orals, voiding and ambulating.    PROCEDURES PERFORMED DURING HOSPITALIZATION:   Exploratory laparotomy, radical resection of pelvic tumor with total abdominal hysterectomy, bilateral salpingo-oophorectomy, bilateral ureterolysis, appendectomy, stripping of vesicouterine peritoneum and cul-de-sac peritoneum, omentectomy, right pelvic and paraaortic lymphadenectomy  Port placement  IR drain placement    COMPLICATIONS IN HOSPITAL: None    CONSULTATIONS:  IM  IR  Case mgmt  ID    PERTINENT FINDINGS/RESULTS AT DISCHARGE:   /56   Pulse 59   Temp 97.4  F (36.3  C) (Oral)   Resp 16   Ht 1.575 m (5' 2\")   Wt 68 kg (150 lb)   SpO2 92%   BMI 27.44 kg/m      Latest Laboratory Results:  Chem:  CBC RESULTS:   Recent Labs   Lab Test 09/03/20  0717   WBC 5.6   RBC 3.46*   HGB 9.5*   HCT 30.0*   MCV 87   MCH 27.5   MCHC 31.7   RDW 14.2        Last Basic Metabolic Panel:  Lab Results   Component Value Date     09/03/2020      Lab Results   Component Value Date    POTASSIUM 5.2 09/03/2020     Lab Results   Component Value Date    CHLORIDE 103 09/03/2020     Lab Results   Component Value Date    RADHA 7.8 09/03/2020     Lab Results   Component Value Date    CO2 25 09/03/2020     Lab Results   Component Value Date    BUN 21 09/03/2020     Lab Results   Component Value Date    CR 1.08 09/03/2020     Lab Results   Component Value Date     09/03/2020         IMPORTANT PENDING TEST RESULTS:  Pathology    CONDITION AT DISCHARGE:    Stabilized    DISCHARGE ORDERS  Current Discharge Medication List      START taking these medications    Details   acetaminophen (TYLENOL) 500 MG tablet Take 2 tablets (1,000 mg) by mouth every 8 hours as needed for mild pain  Qty: 30 tablet, Refills: 1    Associated Diagnoses: Pelvic mass      enoxaparin ANTICOAGULANT (LOVENOX ANTICOAGULANT) 40 MG/0.4ML syringe Take daily for 24 " days.  Qty: 24 Syringe, Refills: 0    Associated Diagnoses: Pelvic mass      HYDROcodone-acetaminophen (NORCO) 5-325 MG tablet Take 1-2 tablets by mouth every 4 hours as needed for pain maximum 10 tablet(s) per day  Qty: 15 tablet, Refills: 0    Associated Diagnoses: Pelvic mass      senna (SENOKOT) 8.6 MG tablet Take 1-3 tablets by mouth 2 times daily as needed for constipation  Qty: 30 tablet, Refills: 0    Associated Diagnoses: Pelvic mass         CONTINUE these medications which have NOT CHANGED    Details   Ascorbic Acid (VITAMIN C) 500 MG CAPS Take 500 mg by mouth every evening      aspirin 81 MG EC tablet Take 81 mg by mouth daily      BIOTIN PO Take 1 tablet by mouth every morning      buPROPion (WELLBUTRIN XL) 300 MG 24 hr tablet Take 300 mg by mouth every morning      calcium acetate (PHOSLO) 667 MG CAPS capsule Take 667 mg by mouth every evening       lisinopril (ZESTRIL) 10 MG tablet Take 10 mg by mouth daily      metFORMIN (GLUCOPHAGE) 500 MG tablet Take 500 mg by mouth 2 times daily (with meals)      metoprolol tartrate (LOPRESSOR) 50 MG tablet Take 50 mg by mouth 2 times daily      Multiple Vitamin (MULTIVITAMIN PO) Take 1 tablet by mouth every morning      Omega-3 Fatty Acids (FISH OIL PO) Take 2 capsules by mouth 2 times daily      oxymetazoline (AFRIN) 0.05 % nasal spray Spray 2 sprays into both nostrils daily as needed for congestion      polyethylene glycol (MIRALAX) 17 g packet Take 1 packet by mouth daily      rosuvastatin (CRESTOR) 20 MG tablet Take 20 mg by mouth daily             DISCHARGE INSTRUCTION.      FOLLOW-UP: Julia Andre should see Linda Martinez MD PRN.    Specialty follow-up: as above.     AFTER HOSPITAL RECOMMENDATIONS  As above.      Physician(s) in addition to primary physician who should receive a copy:  Linda Martinez MD Kelli Frances Daly, APRN CNP

## 2020-09-03 NOTE — PLAN OF CARE
POD#0 TAHBSO. Patient is A/ox4, lethargic but resolved as evening progressed. VSS on RA. CAPNO WNL. C/o dull, constant mid-abdominal incisional pain, PRN Tylenol (975mg) given x1 and PRN Noroc x1 at bedtime with ice pack applied to site - effective. LS clear. Tolerating clear liquids for supper (popsicle, apple juice, and water), advanced to full liquids for breakfast tomorrow morning. Blood sugar checks 141 and 149.  BS hypoactive. - gas. Midline incision covered with a gauze dressing, small dried serosanguinous drainage to lower area of dressing - marked and within boarders. Abdominal binder in place. Gutierrez cathter patent with adequate yellow urine output. Dangled at EOB, stood, and ambulated in hallway x1 with SBA and gb - patient denied being lightheaded or dizzy.  L PIV infusing NS at 75mL/hr. Cytology/pathology pending. Discharge pending. Nursing to continue to monitor.

## 2020-09-03 NOTE — PLAN OF CARE
"  Problem: Bowel Elimination Impaired (Hysterectomy)  Goal: Effective Bowel Elimination  9/3/2020 1741 by Xiao Contreras, RN  Outcome: No Change  VSS, A/O, ambulating SBA w/GB.  Norco given for pain x1 with moderate relief, pt does not like taking 2 due to feeling \"loopy\".  Regular diet, poor appetite, BS hypo, no flatus, denies nausea.  NPO at midnight for port placement tomorrow.  Midline incision with dried sanguinous drainage on the bottom of the bandage, abdominal binder in place.    ** per pharmacy ok to give Heparin dose this evening @ 2100 but hold AM Lovenox dose tomorrow.       "

## 2020-09-03 NOTE — PLAN OF CARE
POD 1 TAHBSO. A&Ox4. VSS on RA, capno WNL. C/o incisional pain, controlled w/ ice & norco. Full liquid diet, no nausea. BG check overnight 119. BS hypoactive, no gas. Midline incision w/ dried drainage, abdominal binder in place. Gutierrez patent, adequate UOP. A1 + gb/ IV pole to ambulate. L PIV infusing NS @75ml/hr. Discharge pending recovery.

## 2020-09-03 NOTE — PROGRESS NOTES
[facility]    Hospitalist Progress Note    Assessment & Plan   Julia Andre is a 69 year old female who was admitted on 9/2/2020.     Past medical history significant for stress-induced MI with associated ischemic cardiomyopathy, hypertension, hyperlipidemia, type 2 diabetes, major depressive disorder with anxiety, osteopenia, GERD, obstructive sleep apnea, history of cervical cancer and recent diagnosis of ovarian mass/cancer with elevated CA-125 lab who underwent an elective exploratory laparotomy with total abdominal hysterectomy with bilateral salpingo-oophorectomy, omentectomy with tumor debulking and pelvic and paraaortic lymphadenectomy for which the Hospitalist Service has been consulted to assist with medical management.     Ovarian mass/cancer with elevated CA-125 lab study, status post elective exploratory laparotomy with total abdominal hysterectomy with bilateral salpingo-oophorectomy, omentectomy with tumor debulking and pelvic and paraaortic lymphadenectomy:    Postoperative day #1.    --OB/GYN Service is managing at this point.     --Defer analgesic management, DVT prophylaxis and PT/OT assessment to their service.    --Strongly encourage utilization of incentive spirometer.      Acute blood loss anemia in the setting of suspected chronic anemia:  Review of EMR with noted HGB ranging between 11.5-10.5.  Pre-op 10.7 and post-op HGB of 9.5.    --Monitor.      Hyperkalemia:  Noted potassium of 5.5 this morning.  Repeat lab with potassium of 5.2.  Discussed with the patient and she indicated ongoing issues as an outpatient with elevated potassium levels.    --Continue IV Fluids, but increase to 100 ml/hr.   --Potassium level tonight.     --Repeat BMP tomorrow morning.      Mild hyponatremia:  Sodium level of 131 this morning.  Recheck at noon at 132.    --Continue IV Fluids, but increase to 100 ml/hr.    --Sodium level tonight.     --Repeat BMP tomorrow morning.      History of  stress-induced MI with associated ischemic cardiomyopathy, hypertension and hyperlipidemia:    This appears to be stable at this point.  Upon review of electronic medical records, it appears coronary angiogram at time of MI had clean coronaries.  The patient has been holding baby aspirin daily for the last week.  --Hold ASA and defer to OB/GYN as to when this can be resumed.    --Resume prior to admit lisinopril 10 mg daily, Lopressor 50 mg 2 times daily with hold parameters in place.    --Resume prior to admit Crestor 20 mg daily.     Type 2 diabetes:    The patient indicated that this is well controlled and she is currently on metformin 500 mg 2 times daily.    --Hold PTA metformin.    --Initiate medium intensity sliding scale insulin with Accu-Cheks performed 4 times daily.  --Hypoglycemic protocol in place.     History of iron deficiency anemia:    Per electronic medical record, the patient underwent a full GI workup and 2012 that indicated tiny erosions in the stomach.  The patient is no longer on iron supplements. --Plan to check a hemoglobin in the morning to assess for acute blood loss anemia.     GERD:    The patient infrequently uses antacids.   --PRNTums are made available.     Obstructive sleep apnea:    The patient indicated that she has a diagnosis of moderate obstructive sleep apnea and has been working on trying to get a new CPAP machine; however, she would prefer not having this, as she did not tolerate it in the past.    --Monitor O2 spot checks.     Major depressive disorder with anxiety:    --Resume prior to admit Wellbutrin 300 mg every morning.     History of cervical cancer:    The patient is status post surgical intervention.  No further interventions appear necessary at this point.    Diet: Advance Diet as Tolerated: Full Liquid Diet     DVT Prophylaxis: Defer to primary service (subcutaneous Heparins and PCD'S)   Gutierrez Catheter: in place, indication: /GI/GYN Pelvic Procedure  Code Status:  Full Code     Disposition Plan    Expected discharge: Per OB/GYN  Entered: Lenin Portia Cheng PA-C 09/03/2020, 7:08 AM        The patient has been discussed with Dr. Mares, who agrees with the assessment and plan at this time.  Dr. Mares will evaluate the patient independently.      Kevin Cheng PA-C   [phone]    Interval History   Patient was resting in bed upon arrival.  She continues to have abdominal pain and has requested increase/adjustment of pain medications.      She denied fever, chills, chest pain, shortness of breath.  Reviewed lab studies (potassium and sodium).  Patient indicated that she has had ongoing issues with elevated potassium and low sodium and underwent a work-up that included renal ultrasound.  She was told she was dehydrated.      Patient is slowly advancing her diet (has only had cream of wheat) since surgery.      -Data reviewed today: I reviewed all new labs and imaging results over the last 24 hours. I personally reviewed no images or EKG's today.    Physical Exam   Temp: 97.5  F (36.4  C) Temp src: Oral BP: 107/40 Pulse: 63   Resp: 16 SpO2: 93 % O2 Device: None (Room air) Oxygen Delivery: 1 LPM  Vitals:    09/02/20 0605   Weight: 68 kg (150 lb)     Vital Signs with Ranges  Temp:  [96.4  F (35.8  C)-99.1  F (37.3  C)] 97.5  F (36.4  C)  Pulse:  [59-67] 63  Resp:  [8-24] 16  BP: ()/(40-89) 107/40  SpO2:  [91 %-100 %] 93 %  I/O last 3 completed shifts:  In: 2828 [P.O.:30; I.V.:2548]  Out: 350 [Urine:350]      Constitutional: Awake, alert, cooperative, no apparent distress.   ENT: Normocephalic, without obvious abnormality, atraumatic, oral pharynx with moist mucus membranes, tonsils without erythema or exudates.  Neck: Supple, symmetrical, trachea midline, no adenopathy.  Pulmonary: No increased work of breathing, good air exchange, clear to auscultation bilaterally, no crackles or wheezing.  Cardiovascular: Regular rate and rhythm, normal S1 and S2, no S3 or S4,  and no murmur noted.  GI: Abdominal binder in place.  Tender to palpation.    Skin/Integumen: Clear.  Neuro: CN II-XII grossly intact.  Psych:  Alert and oriented x 3. Normal affect.  Extremities: No lower extremity edema noted, and calves are non-TTP bilaterally.   : Gutierrez catheter in place with clear yellow urine.        Medications     sodium chloride 75 mL/hr at 09/03/20 0546       buPROPion  300 mg Oral QAM     [START ON 9/4/2020] enoxaparin ANTICOAGULANT  40 mg Subcutaneous Q24H     heparin ANTICOAGULANT  5,000 Units Subcutaneous Q8H     insulin aspart  1-7 Units Subcutaneous TID AC     insulin aspart  1-5 Units Subcutaneous At Bedtime     lisinopril  10 mg Oral Daily     metoprolol tartrate  50 mg Oral BID     rosuvastatin  20 mg Oral Daily     sodium chloride (PF)  3 mL Intracatheter Q8H       Data   Recent Labs   Lab 09/03/20  1205 09/03/20  0736 09/03/20  0717 09/03/20  0643 09/02/20  1053 09/02/20  0611   WBC  --   --  5.6  --   --   --    HGB  --   --  9.5*  --   --  10.7*   MCV  --   --  87  --   --   --    PLT  --   --  362  --   --  393   *  --   --  131*  --   --    POTASSIUM  --  5.2  --  5.5*  --  4.6   CHLORIDE  --   --   --  103  --   --    CO2  --   --   --  25  --   --    BUN  --   --   --  21  --   --    CR  --   --   --  1.08* 1.11*  --    ANIONGAP  --   --   --  3  --   --    RADHA  --   --   --  7.8*  --   --    GLC  --   --   --  116*  --  123*       No results found for this or any previous visit (from the past 24 hour(s)).

## 2020-09-03 NOTE — PLAN OF CARE
Pt A/Ox4, VSS on RA except BP elevated. C/o 7/10 incisional pain, 1 tab norco not effective, MD aware and dose increased to 2 tabs Q4. K and Na levels abnormal, recheck scheduled @ 1700 and order to update provider with levels once they are back as fluids may need to be adjusted.  Diet advanced to reg, tolerating, no nausea. BS hypoactive, no flatus. Ambulated in sidhu x2 with SBA, steady. Midline incision with marked dried drainage, abdominal binder in place. Using IS. Plan for port placement tomorrow @ 1000, NPO@ midnight per IR.       ** per pharmacy ok to give Heparin dose this evening @ 2100 but hold AM Lovenox dose tomorrow.

## 2020-09-04 ENCOUNTER — APPOINTMENT (OUTPATIENT)
Dept: INTERVENTIONAL RADIOLOGY/VASCULAR | Facility: CLINIC | Age: 70
DRG: 737 | End: 2020-09-04
Attending: NURSE PRACTITIONER
Payer: COMMERCIAL

## 2020-09-04 LAB
ANION GAP SERPL CALCULATED.3IONS-SCNC: 3 MMOL/L (ref 3–14)
BUN SERPL-MCNC: 13 MG/DL (ref 7–30)
CALCIUM SERPL-MCNC: 8.1 MG/DL (ref 8.5–10.1)
CHLORIDE SERPL-SCNC: 106 MMOL/L (ref 94–109)
CO2 SERPL-SCNC: 28 MMOL/L (ref 20–32)
COPATH REPORT: NORMAL
COPATH REPORT: NORMAL
CREAT SERPL-MCNC: 0.93 MG/DL (ref 0.52–1.04)
GFR SERPL CREATININE-BSD FRML MDRD: 62 ML/MIN/{1.73_M2}
GLUCOSE BLDC GLUCOMTR-MCNC: 104 MG/DL (ref 70–99)
GLUCOSE BLDC GLUCOMTR-MCNC: 107 MG/DL (ref 70–99)
GLUCOSE BLDC GLUCOMTR-MCNC: 107 MG/DL (ref 70–99)
GLUCOSE BLDC GLUCOMTR-MCNC: 108 MG/DL (ref 70–99)
GLUCOSE BLDC GLUCOMTR-MCNC: 95 MG/DL (ref 70–99)
GLUCOSE SERPL-MCNC: 107 MG/DL (ref 70–99)
INR PPP: 1.13 (ref 0.86–1.14)
POTASSIUM SERPL-SCNC: 4.7 MMOL/L (ref 3.4–5.3)
SODIUM SERPL-SCNC: 137 MMOL/L (ref 133–144)

## 2020-09-04 PROCEDURE — C1788 PORT, INDWELLING, IMP: HCPCS

## 2020-09-04 PROCEDURE — 12000000 ZZH R&B MED SURG/OB

## 2020-09-04 PROCEDURE — 25000128 H RX IP 250 OP 636: Performed by: PHYSICIAN ASSISTANT

## 2020-09-04 PROCEDURE — 85610 PROTHROMBIN TIME: CPT | Performed by: PHYSICIAN ASSISTANT

## 2020-09-04 PROCEDURE — 99232 SBSQ HOSP IP/OBS MODERATE 35: CPT | Performed by: PHYSICIAN ASSISTANT

## 2020-09-04 PROCEDURE — 36415 COLL VENOUS BLD VENIPUNCTURE: CPT | Performed by: PHYSICIAN ASSISTANT

## 2020-09-04 PROCEDURE — 25800030 ZZH RX IP 258 OP 636: Performed by: PHYSICIAN ASSISTANT

## 2020-09-04 PROCEDURE — 25000132 ZZH RX MED GY IP 250 OP 250 PS 637: Performed by: NURSE PRACTITIONER

## 2020-09-04 PROCEDURE — 25000125 ZZHC RX 250: Performed by: PHYSICIAN ASSISTANT

## 2020-09-04 PROCEDURE — C1769 GUIDE WIRE: HCPCS

## 2020-09-04 PROCEDURE — 25000128 H RX IP 250 OP 636: Performed by: RADIOLOGY

## 2020-09-04 PROCEDURE — 02HV33Z INSERTION OF INFUSION DEVICE INTO SUPERIOR VENA CAVA, PERCUTANEOUS APPROACH: ICD-10-PCS | Performed by: RADIOLOGY

## 2020-09-04 PROCEDURE — 00000146 ZZHCL STATISTIC GLUCOSE BY METER IP

## 2020-09-04 PROCEDURE — 27211193 ZZ H WOUND GLUE CR1

## 2020-09-04 PROCEDURE — 36561 INSERT TUNNELED CV CATH: CPT | Mod: LT

## 2020-09-04 PROCEDURE — 80048 BASIC METABOLIC PNL TOTAL CA: CPT | Performed by: PHYSICIAN ASSISTANT

## 2020-09-04 PROCEDURE — 27210886 ZZH ACCESSORY CR5

## 2020-09-04 PROCEDURE — 25000132 ZZH RX MED GY IP 250 OP 250 PS 637: Performed by: PHYSICIAN ASSISTANT

## 2020-09-04 RX ORDER — FENTANYL CITRATE 50 UG/ML
25-50 INJECTION, SOLUTION INTRAMUSCULAR; INTRAVENOUS EVERY 5 MIN PRN
Status: DISCONTINUED | OUTPATIENT
Start: 2020-09-04 | End: 2020-09-04 | Stop reason: HOSPADM

## 2020-09-04 RX ORDER — HEPARIN SODIUM (PORCINE) LOCK FLUSH IV SOLN 100 UNIT/ML 100 UNIT/ML
500 SOLUTION INTRAVENOUS ONCE
Status: COMPLETED | OUTPATIENT
Start: 2020-09-04 | End: 2020-09-04

## 2020-09-04 RX ORDER — AMLODIPINE BESYLATE 5 MG/1
5 TABLET ORAL DAILY
Status: DISCONTINUED | OUTPATIENT
Start: 2020-09-05 | End: 2020-09-15 | Stop reason: HOSPADM

## 2020-09-04 RX ORDER — FLUMAZENIL 0.1 MG/ML
0.2 INJECTION, SOLUTION INTRAVENOUS
Status: DISCONTINUED | OUTPATIENT
Start: 2020-09-04 | End: 2020-09-04 | Stop reason: HOSPADM

## 2020-09-04 RX ORDER — LISINOPRIL 5 MG/1
5 TABLET ORAL DAILY
Status: DISCONTINUED | OUTPATIENT
Start: 2020-09-05 | End: 2020-09-11

## 2020-09-04 RX ORDER — POLYETHYLENE GLYCOL 3350 17 G/17G
17 POWDER, FOR SOLUTION ORAL DAILY
Status: DISCONTINUED | OUTPATIENT
Start: 2020-09-04 | End: 2020-09-15 | Stop reason: HOSPADM

## 2020-09-04 RX ORDER — NALOXONE HYDROCHLORIDE 0.4 MG/ML
.1-.4 INJECTION, SOLUTION INTRAMUSCULAR; INTRAVENOUS; SUBCUTANEOUS
Status: DISCONTINUED | OUTPATIENT
Start: 2020-09-04 | End: 2020-09-04 | Stop reason: HOSPADM

## 2020-09-04 RX ORDER — SENNOSIDES 8.6 MG
1 TABLET ORAL 2 TIMES DAILY
Status: DISCONTINUED | OUTPATIENT
Start: 2020-09-04 | End: 2020-09-08 | Stop reason: ALTCHOICE

## 2020-09-04 RX ADMIN — MIDAZOLAM HYDROCHLORIDE 1 MG: 1 INJECTION, SOLUTION INTRAMUSCULAR; INTRAVENOUS at 11:37

## 2020-09-04 RX ADMIN — POLYETHYLENE GLYCOL 3350 17 G: 17 POWDER, FOR SOLUTION ORAL at 13:04

## 2020-09-04 RX ADMIN — METOPROLOL TARTRATE 50 MG: 50 TABLET, FILM COATED ORAL at 20:17

## 2020-09-04 RX ADMIN — MIDAZOLAM HYDROCHLORIDE 1 MG: 1 INJECTION, SOLUTION INTRAMUSCULAR; INTRAVENOUS at 11:22

## 2020-09-04 RX ADMIN — SODIUM CHLORIDE: 9 INJECTION, SOLUTION INTRAVENOUS at 12:58

## 2020-09-04 RX ADMIN — FENTANYL CITRATE 50 MCG: 50 INJECTION, SOLUTION INTRAMUSCULAR; INTRAVENOUS at 11:37

## 2020-09-04 RX ADMIN — LISINOPRIL 10 MG: 10 TABLET ORAL at 07:56

## 2020-09-04 RX ADMIN — SODIUM CHLORIDE: 9 INJECTION, SOLUTION INTRAVENOUS at 01:27

## 2020-09-04 RX ADMIN — LIDOCAINE HYDROCHLORIDE 17 ML: 10 INJECTION, SOLUTION INFILTRATION; PERINEURAL at 11:53

## 2020-09-04 RX ADMIN — HYDROCODONE BITARTRATE AND ACETAMINOPHEN 1 TABLET: 5; 325 TABLET ORAL at 17:26

## 2020-09-04 RX ADMIN — MIDAZOLAM HYDROCHLORIDE 0.5 MG: 1 INJECTION, SOLUTION INTRAMUSCULAR; INTRAVENOUS at 11:45

## 2020-09-04 RX ADMIN — FENTANYL CITRATE 25 MCG: 50 INJECTION, SOLUTION INTRAMUSCULAR; INTRAVENOUS at 11:45

## 2020-09-04 RX ADMIN — FENTANYL CITRATE 50 MCG: 50 INJECTION, SOLUTION INTRAMUSCULAR; INTRAVENOUS at 11:22

## 2020-09-04 RX ADMIN — SENNOSIDES 1 TABLET: 8.6 TABLET, FILM COATED ORAL at 20:17

## 2020-09-04 RX ADMIN — BUPROPION HYDROCHLORIDE 300 MG: 150 TABLET, FILM COATED, EXTENDED RELEASE ORAL at 07:56

## 2020-09-04 RX ADMIN — HYDROCODONE BITARTRATE AND ACETAMINOPHEN 2 TABLET: 5; 325 TABLET ORAL at 22:46

## 2020-09-04 RX ADMIN — ROSUVASTATIN CALCIUM 20 MG: 20 TABLET, FILM COATED ORAL at 07:56

## 2020-09-04 RX ADMIN — METOPROLOL TARTRATE 50 MG: 50 TABLET, FILM COATED ORAL at 07:56

## 2020-09-04 RX ADMIN — HYDROCODONE BITARTRATE AND ACETAMINOPHEN 2 TABLET: 5; 325 TABLET ORAL at 03:45

## 2020-09-04 RX ADMIN — SENNOSIDES 1 TABLET: 8.6 TABLET, FILM COATED ORAL at 13:03

## 2020-09-04 RX ADMIN — CEFAZOLIN SODIUM 2 G: 2 INJECTION, SOLUTION INTRAVENOUS at 10:50

## 2020-09-04 RX ADMIN — HYDROCODONE BITARTRATE AND ACETAMINOPHEN 1 TABLET: 5; 325 TABLET ORAL at 13:04

## 2020-09-04 RX ADMIN — HEPARIN SODIUM (PORCINE) LOCK FLUSH IV SOLN 100 UNIT/ML 500 UNITS: 100 SOLUTION at 11:54

## 2020-09-04 ASSESSMENT — ACTIVITIES OF DAILY LIVING (ADL)
ADLS_ACUITY_SCORE: 15
ADLS_ACUITY_SCORE: 14
ADLS_ACUITY_SCORE: 15

## 2020-09-04 NOTE — IR NOTE
Interventional Radiology Intra-procedural Nursing Note    Patient Name: Julia Andre  Medical Record Number: [id number]  Today's Date: September 4, 2020    Start Time: 1122  End of procedure time: 1202  Procedure: Port a Catheter Placement  Report given to: RN station [id number]    Other Notes: Pt. transferred to IR table. Prepped and draped appropriately. Monitoring equipment applied. NC applied. No complaints of pain at this time. Timeout recorded.    VSS. Pt remains on RA. No c/o pain at this time. Chest site sutured in multiple layers, dermabond, steristrips, gauze and tegaderm. Neck site has dermabond and steristrips. Both sites are CDI, soft.    Medication administration:    Fentanyl 125 mcg IVP  Versed 2.5 mg IVP  Ancef 2g IV  Heparin 500 units Intraport Flush (given by Dr. Altamirano)

## 2020-09-04 NOTE — PRE-PROCEDURE
GENERAL PRE-PROCEDURE:   Procedure:  CVC port placement  Date/Time:  9/4/2020 9:40 AM    Verbal consent obtained?: Yes    Written consent obtained?: Yes    Risks and benefits: Risks, benefits and alternatives were discussed    Consent given by:  Patient  Patient states understanding of procedure being performed: Yes    Patient's understanding of procedure matches consent: Yes    Procedure consent matches procedure scheduled: Yes    Expected level of sedation:  Moderate  Appropriately NPO:  Yes  ASA Class:  Class 1- healthy patient  Mallampati  :  Grade 1- soft palate, uvula, tonsillar pillars, and posterior pharyngeal wall visible  Lungs:  Lungs clear with good breath sounds bilaterally  Heart:  Normal heart sounds and rate  History & Physical reviewed:  History and physical reviewed and no updates needed  Statement of review:  I have reviewed the lab findings, diagnostic data, medications, and the plan for sedation

## 2020-09-04 NOTE — PROGRESS NOTES
[facility]    Hospitalist Progress Note    Assessment & Plan   Julia Andre is a 69 year old female who was admitted on 9/2/2020.     Past medical history significant for stress-induced MI with associated ischemic cardiomyopathy, hypertension, hyperlipidemia, type 2 diabetes, major depressive disorder with anxiety, osteopenia, GERD, obstructive sleep apnea, history of cervical cancer and recent diagnosis of ovarian mass/cancer with elevated CA-125 lab who underwent an elective exploratory laparotomy with total abdominal hysterectomy with bilateral salpingo-oophorectomy, omentectomy with tumor debulking and pelvic and paraaortic lymphadenectomy for which the Hospitalist Service has been consulted to assist with medical management.     Ovarian mass/cancer with elevated CA-125 lab study, status post elective exploratory laparotomy with total abdominal hysterectomy with bilateral salpingo-oophorectomy, omentectomy with tumor debulking and pelvic and paraaortic lymphadenectomy:    Postoperative day #2.    --OB/GYN Service is managing at this point.     --Defer analgesic management, DVT prophylaxis and PT/OT assessment to their service.     --Plan for chest port placement today.    --Strongly encourage utilization of incentive spirometer.    --Bowel regimen started today (Miralax daily and BID Senokot).      Hyperkalemia:  Resolved  Noted potassium of 5.5 this morning.  Repeat lab with potassium of 5.2.  Discussed with the patient and she indicated ongoing issues as an outpatient with elevated potassium levels.   Recent Labs   Lab 09/04/20  0708 09/03/20  1816 09/03/20  0736 09/03/20  0643 09/02/20  0611   POTASSIUM 4.7 4.7 5.2 5.5* 4.6   --Continue IV Fluids, will decrease to 75 ml/hr and plan to stop once tolerating adequate PO intake.      Mild hyponatremia:  Resolved  Sodium level of 131 this morning.  Recheck at noon at 132.    Recent Labs   Lab 09/04/20  0708 09/03/20  1816 09/03/20  1205  09/03/20  0643    133 132* 131*   --Continue IV Fluids, will decrease to 75 ml/hr and plan to stop once tolerating adequate PO intake.      History of stress-induced MI with associated ischemic cardiomyopathy, hypertension and hyperlipidemia:    This appears to be stable at this point.  Upon review of electronic medical records, it appears coronary angiogram at time of MI had clean coronaries.  The patient has been holding baby aspirin daily for the last week.  -Continued hypertension with blood pressure peak noted at 185/93.   --Starting 9/5 will reduce lisinopril to 5 mg daily and start Norvasc 5 mg daily.    --Hold ASA and defer to OB/GYN as to when this can be resumed.    --Resume prior to admit lisinopril 10 mg daily, Lopressor 50 mg 2 times daily with hold parameters in place.    --Resume prior to admit Crestor 20 mg daily.   --PRN IV hydralazine available for SBP > 180.      Type 2 diabetes:    The patient indicated that this is well controlled and she is currently on metformin 500 mg 2 times daily.    Recent Labs   Lab 09/04/20  0708 09/04/20  0600 09/04/20  0132 09/03/20  2145 09/03/20  1721 09/03/20  1230 09/03/20  0736 09/03/20  0643  09/02/20  0611   *  --   --   --   --   --   --  116*  --  123*   BGM  --  104* 108* 108* 107* 116* 102*  --    < >  --     < > = values in this interval not displayed.   --Hold PTA metformin.    --Initiate medium intensity sliding scale insulin with Accu-Cheks performed 4 times daily.  --Hypoglycemic protocol in place.     Suspected CKD stage 2-3:  Review of EMR with noted creatinine between 1.0-1.3 and GFR in the 50-60 range and at times in the 40's.  Previous renal US 7/30/2020 with noted bilateral renal cysts.    Recent Labs   Lab 09/04/20  0708 09/03/20  0643 09/02/20  1053   CR 0.93 1.08* 1.11*   --Continue IV Fluids, will decrease to 75 ml/hr and plan to stop once tolerating adequate PO intake.     History of iron deficiency anemia with acute blood loss  anemia:    Per electronic medical record, the patient underwent a full GI workup and 2012 that indicated tiny erosions in the stomach.  The patient is no longer on iron supplements.   Recent Labs   Lab 09/03/20  0717 09/02/20  0611   HGB 9.5* 10.7*   --Monitor.      GERD:    The patient infrequently uses antacids.   --PRN Tums are made available.     Obstructive sleep apnea:    The patient indicated that she has a diagnosis of moderate obstructive sleep apnea and has been working on trying to get a new CPAP machine; however, she would prefer not having this, as she did not tolerate it in the past.    --Monitor O2 spot checks.     Major depressive disorder with anxiety:    --Resume prior to admit Wellbutrin 300 mg every morning.     History of cervical cancer:    The patient is status post surgical intervention.  No further interventions appear necessary at this point.    Diet: NPO for Medical/Clinical Reasons Except for: Meds     DVT Prophylaxis: Defer to primary service (subcutaneous Heparins and PCD'S)   Gutierrez Catheter: in place, indication: /GI/GYN Pelvic Procedure  Code Status: Full Code     Disposition Plan    Expected discharge: Per OB/GYN  Entered: Lenin Cheng PA-C 09/04/2020, 7:33 AM        The patient has been discussed with Dr. Mares, who agrees with the assessment and plan at this time.  Dr. Mares will evaluate the patient independently.      Kevin Cheng PA-C   [phone]    Interval History   Patient was resting in bed upon arrival and being evaluated by OB/GYN.  She indicated that she had a fever last night (99.9).  She has some shortness of breath when using the incentive spirometer.  She continues to have abdominal pain and has not passed flatus yet.  She denied chest pain.      Discussed PTA blood pressure medications again and she indicated that her PCP attempted to adjust medications.  They attempted to increase lisinopril to 20 mg daily and reduce metoprolol to once a day.   Patient had elevated blood pressures and after discussion returned to previous regimen (lisinopril 10 mg daily and metoprolol 50 mg BID).      Reviewed lab studies with normalization of sodium and potassium.  Discussed potential dehydration as a cause.  Reviewed what she drinks at home and she appears to drink plenty of water throughout the day.      Discussed potential for adjusting blood pressure medications.  Reviewed use of IS.     -Data reviewed today: I reviewed all new labs and imaging results over the last 24 hours. I personally reviewed no images or EKG's today.    Physical Exam   Temp: 98  F (36.7  C) Temp src: Oral BP: (!) 152/83 Pulse: 69   Resp: 18 SpO2: 98 % O2 Device: Nasal cannula Oxygen Delivery: 2 LPM  Vitals:    09/02/20 0605   Weight: 68 kg (150 lb)     Vital Signs with Ranges  Temp:  [97.3  F (36.3  C)-99.9  F (37.7  C)] 98  F (36.7  C)  Pulse:  [59-79] 69  Resp:  [16-18] 18  BP: (112-185)/(52-93) 152/83  SpO2:  [86 %-98 %] 98 %  I/O last 3 completed shifts:  In: 2715 [P.O.:360; I.V.:2355]  Out: 3650 [Urine:3650]      Constitutional: Awake, alert, cooperative, NAD.    ENT: NCAT, oral pharynx with moist mucus membranes, tonsils without erythema or exudates.  Neck: Supple, symmetrical, trachea midline, no adenopathy.  Pulmonary: No increased work of breathing, fair air exchange, CTA bilaterally, no crackles or wheezing.  Cardiovascular: R/R/R, normal S1 and S2, no S3 or S4, and no murmur noted.  GI: Abdominal binder in place.  Tender to palpation.  Hypoactive.     Skin/Integumen: Clear.  Neuro: CN II-XII grossly intact.  Psych:  Alert and oriented x 3. Normal affect.  Extremities: No lower extremity edema noted, and calves are non-TTP bilaterally.   : Gutierrez catheter in place with clear yellow urine.        Medications     sodium chloride 100 mL/hr at 09/04/20 0127       buPROPion  300 mg Oral QAM     ceFAZolin  2 g Intravenous Pre-Op/Pre-procedure x 1 dose     enoxaparin ANTICOAGULANT  40 mg  Subcutaneous Q24H     insulin aspart  1-7 Units Subcutaneous TID AC     insulin aspart  1-5 Units Subcutaneous At Bedtime     lisinopril  10 mg Oral Daily     metoprolol tartrate  50 mg Oral BID     polyethylene glycol  17 g Oral Daily     rosuvastatin  20 mg Oral Daily     sennosides  1 tablet Oral BID     sodium chloride (PF)  3 mL Intracatheter Q8H       Data   Recent Labs   Lab 09/04/20  0708 09/03/20  1816 09/03/20  1205 09/03/20  0736 09/03/20  0717 09/03/20  0643  09/02/20  1053 09/02/20  0611   WBC  --   --   --   --  5.6  --   --   --   --    HGB  --   --   --   --  9.5*  --   --   --  10.7*   MCV  --   --   --   --  87  --   --   --   --    PLT  --   --   --   --  362  --   --   --  393   INR 1.13  --   --   --   --   --   --   --   --     133 132*  --   --  131*   < >  --   --    POTASSIUM 4.7 4.7  --  5.2  --  5.5*  --   --  4.6   CHLORIDE 106  --   --   --   --  103  --   --   --    CO2 28  --   --   --   --  25  --   --   --    BUN 13  --   --   --   --  21  --   --   --    CR 0.93  --   --   --   --  1.08*  --  1.11*  --    ANIONGAP 3  --   --   --   --  3  --   --   --    RADHA 8.1*  --   --   --   --  7.8*  --   --   --    *  --   --   --   --  116*  --   --  123*    < > = values in this interval not displayed.       No results found for this or any previous visit (from the past 24 hour(s)).

## 2020-09-04 NOTE — PLAN OF CARE
Pt A&O- anxious at times. VSS on 2 L overnight due to sleep apnea- tmax 99.9 and BP hypertensive episode that resolved on its own. 4-6 /10 pain controlled with 2 tabs of norco x2. Denied nausea. Tolerated diet, NPO at midnight. BS hypoactive. No gas. Midline incision with dried drainage. PIV infusing NS @ 100 ml. Na 133, K 4.7. Lovenox held this AM for port placement- scheduled for 10 AM. Ambulated SBA in sidhu this AM. MD to address james removal due to port placement.

## 2020-09-04 NOTE — PROCEDURES
[facility]    Procedure: Port and catheter    Date/Time: 9/4/2020 1:18 PM  Performed by: Ben Altamirano MD  Authorized by: Ben Altamirano MD     UNIVERSAL PROTOCOL   Site Marked: NA  Prior Images Obtained and Reviewed:  Yes  Required items: Required blood products, implants, devices and special equipment available    Patient identity confirmed:  Verbally with patient, arm band, provided demographic data and hospital-assigned identification number  Patient was reevaluated immediately before administering moderate or deep sedation or anesthesia  Confirmation Checklist:  Patient's identity using two indicators, relevant allergies, procedure was appropriate and matched the consent or emergent situation and correct equipment/implants were available  Time out: Immediately prior to the procedure a time out was called    Universal Protocol: the Joint Commission Universal Protocol was followed    Preparation: Patient was prepped and draped in usual sterile fashion    ESBL (mL):  8         ANESTHESIA    Anesthesia: Local infiltration  Local Anesthetic:  Lidocaine 1% without epinephrine      SEDATION    Patient Sedated: Yes    Sedation Type:  Moderate (conscious) sedation  Vital signs: Vital signs monitored during sedation    See dictated procedure note for full details.  Findings: Right Internal Jugular port and catheter with tip at the RA/SVC junction.  Heparinized and ready for use.  No complications.    Specimens: none    Complications: None    Condition: Stable    PROCEDURE   Patient Tolerance:  Patient tolerated the procedure well with no immediate complications    Length of time physician/provider present for 1:1 monitoring during sedation: 25

## 2020-09-04 NOTE — PROGRESS NOTES
[facility]    Hospitalist Progress Note    Date of Service (when I saw the patient): 09/04/2020    Assessment & Plan   Pelvic mass: A 68 yo POD #2 s/p Exploratory laparotomy, radical resection of pelvic tumor with total abdominal hysterectomy, bilateral salpingo-oophorectomy, bilateral ureterolysis, appendectomy, stripping of vesicouterine peritoneum and cul-de-sac peritoneum, omentectomy, right pelvic and paraaortic lymphadenectomy. She will need chemotherapy as adjuvant, so port will be placed today.  She will need 3 1/2 weeks of Lovenox and teaching prior to discharge.   Abdominal pain:  Norco, Tylenol and Ice. Patient w/ history of alcoholism, so trying to minimize narcotic use.   Can ADAT once passing flatus. Ambulate. IS.   Gutierrez out today if mobilizing well.   Likely home over weekend once passing flatus and tolerating regular diet.    Hypokalemia/HTN/RADHA/GERD:  Hospitalist is following.  Creatinine has normalized. Kidney function stable.   Aurelia Gleason,   [facility]  [phone]    Interval History   Having some abdominal discomfort, but it's better today. No flatus yet. Denies N/V.     Physical Exam   Temp: 97.5  F (36.4  C) Temp src: Oral BP: (!) 145/66 Pulse: 65   Resp: 16 SpO2: 98 % O2 Device: Nasal cannula Oxygen Delivery: 2 LPM  Vitals:    09/02/20 0605   Weight: 68 kg (150 lb)     Vital Signs with Ranges  Temp:  [97.5  F (36.4  C)-99.9  F (37.7  C)] 97.5  F (36.4  C)  Pulse:  [65-79] 65  Resp:  [16-18] 16  BP: (112-185)/(52-93) 145/66  SpO2:  [86 %-98 %] 98 %  I/O last 3 completed shifts:  In: 2715 [P.O.:360; I.V.:2355]  Out: 3650 [Urine:3650]    Constitutional:  NAD  Respiratory: No dyspnea  Cardiovascular: RRR  GI: Soft, NT, ND, no BS.  Incision with staples clean, dry, intact.   Skin/Integumen: no rashes or edema     Medications     sodium chloride 75 mL/hr at 09/04/20 0757       [START ON 9/5/2020] amLODIPine  5 mg Oral Daily     buPROPion  300 mg Oral QAM     ceFAZolin  2 g  Intravenous Pre-Op/Pre-procedure x 1 dose     enoxaparin ANTICOAGULANT  40 mg Subcutaneous Q24H     insulin aspart  1-7 Units Subcutaneous TID AC     insulin aspart  1-5 Units Subcutaneous At Bedtime     [START ON 9/5/2020] lisinopril  5 mg Oral Daily     metoprolol tartrate  50 mg Oral BID     polyethylene glycol  17 g Oral Daily     rosuvastatin  20 mg Oral Daily     sennosides  1 tablet Oral BID     sodium chloride (PF)  3 mL Intracatheter Q8H       Data   Recent Labs   Lab 09/04/20  0708 09/03/20  1816 09/03/20  1205 09/03/20  0736 09/03/20  0717 09/03/20  0643  09/02/20  1053 09/02/20  0611   WBC  --   --   --   --  5.6  --   --   --   --    HGB  --   --   --   --  9.5*  --   --   --  10.7*   MCV  --   --   --   --  87  --   --   --   --    PLT  --   --   --   --  362  --   --   --  393   INR 1.13  --   --   --   --   --   --   --   --     133 132*  --   --  131*   < >  --   --    POTASSIUM 4.7 4.7  --  5.2  --  5.5*  --   --  4.6   CHLORIDE 106  --   --   --   --  103  --   --   --    CO2 28  --   --   --   --  25  --   --   --    BUN 13  --   --   --   --  21  --   --   --    CR 0.93  --   --   --   --  1.08*  --  1.11*  --    ANIONGAP 3  --   --   --   --  3  --   --   --    RADHA 8.1*  --   --   --   --  7.8*  --   --   --    *  --   --   --   --  116*  --   --  123*    < > = values in this interval not displayed.       No results found for this or any previous visit (from the past 24 hour(s)).

## 2020-09-04 NOTE — PLAN OF CARE
Pt A/Ox4, VSS on RA except hypertensive. Norco 1 tab given for incisional pain. BS hypoactive, no flatus. Miralax and senna given.  NPO this am, down for port placement in IR @ 1100. UTV, bandage CDI, not accessed.  Gutierrez removed @ 1300, DTV. Midline incison WDL, staples, MANDY. Discharge pending TOV, bowel function, continue to monitor.    **pt's spouse (Don) will need Lovenox teaching before discharge.

## 2020-09-05 LAB
ANION GAP SERPL CALCULATED.3IONS-SCNC: 3 MMOL/L (ref 3–14)
BUN SERPL-MCNC: 10 MG/DL (ref 7–30)
CALCIUM SERPL-MCNC: 8.3 MG/DL (ref 8.5–10.1)
CHLORIDE SERPL-SCNC: 105 MMOL/L (ref 94–109)
CO2 SERPL-SCNC: 27 MMOL/L (ref 20–32)
CREAT SERPL-MCNC: 0.73 MG/DL (ref 0.52–1.04)
GFR SERPL CREATININE-BSD FRML MDRD: 83 ML/MIN/{1.73_M2}
GLUCOSE BLDC GLUCOMTR-MCNC: 113 MG/DL (ref 70–99)
GLUCOSE BLDC GLUCOMTR-MCNC: 126 MG/DL (ref 70–99)
GLUCOSE BLDC GLUCOMTR-MCNC: 127 MG/DL (ref 70–99)
GLUCOSE BLDC GLUCOMTR-MCNC: 153 MG/DL (ref 70–99)
GLUCOSE BLDC GLUCOMTR-MCNC: 99 MG/DL (ref 70–99)
GLUCOSE SERPL-MCNC: 105 MG/DL (ref 70–99)
HGB BLD-MCNC: 8.5 G/DL (ref 11.7–15.7)
PLATELET # BLD AUTO: 301 10E9/L (ref 150–450)
POTASSIUM SERPL-SCNC: 4.4 MMOL/L (ref 3.4–5.3)
SODIUM SERPL-SCNC: 135 MMOL/L (ref 133–144)

## 2020-09-05 PROCEDURE — 80048 BASIC METABOLIC PNL TOTAL CA: CPT | Performed by: PHYSICIAN ASSISTANT

## 2020-09-05 PROCEDURE — 25000132 ZZH RX MED GY IP 250 OP 250 PS 637: Performed by: NURSE PRACTITIONER

## 2020-09-05 PROCEDURE — 25000132 ZZH RX MED GY IP 250 OP 250 PS 637: Performed by: PHYSICIAN ASSISTANT

## 2020-09-05 PROCEDURE — 99232 SBSQ HOSP IP/OBS MODERATE 35: CPT | Performed by: INTERNAL MEDICINE

## 2020-09-05 PROCEDURE — 00000146 ZZHCL STATISTIC GLUCOSE BY METER IP

## 2020-09-05 PROCEDURE — 25800030 ZZH RX IP 258 OP 636: Performed by: PHYSICIAN ASSISTANT

## 2020-09-05 PROCEDURE — 12000000 ZZH R&B MED SURG/OB

## 2020-09-05 PROCEDURE — 85018 HEMOGLOBIN: CPT | Performed by: PHYSICIAN ASSISTANT

## 2020-09-05 PROCEDURE — 25000132 ZZH RX MED GY IP 250 OP 250 PS 637: Performed by: OBSTETRICS & GYNECOLOGY

## 2020-09-05 PROCEDURE — 25000128 H RX IP 250 OP 636: Performed by: INTERNAL MEDICINE

## 2020-09-05 PROCEDURE — 85049 AUTOMATED PLATELET COUNT: CPT | Performed by: PHYSICIAN ASSISTANT

## 2020-09-05 PROCEDURE — 25000125 ZZHC RX 250: Performed by: NURSE PRACTITIONER

## 2020-09-05 PROCEDURE — 36415 COLL VENOUS BLD VENIPUNCTURE: CPT | Performed by: PHYSICIAN ASSISTANT

## 2020-09-05 PROCEDURE — 25000128 H RX IP 250 OP 636: Performed by: NURSE PRACTITIONER

## 2020-09-05 RX ORDER — HEPARIN SODIUM,PORCINE 10 UNIT/ML
5-10 VIAL (ML) INTRAVENOUS EVERY 24 HOURS
Status: DISCONTINUED | OUTPATIENT
Start: 2020-09-05 | End: 2020-09-15 | Stop reason: HOSPADM

## 2020-09-05 RX ORDER — SIMETHICONE 80 MG
80 TABLET,CHEWABLE ORAL 4 TIMES DAILY
Status: DISCONTINUED | OUTPATIENT
Start: 2020-09-05 | End: 2020-09-15 | Stop reason: HOSPADM

## 2020-09-05 RX ORDER — HEPARIN SODIUM,PORCINE 10 UNIT/ML
5-10 VIAL (ML) INTRAVENOUS
Status: DISCONTINUED | OUTPATIENT
Start: 2020-09-05 | End: 2020-09-15 | Stop reason: HOSPADM

## 2020-09-05 RX ORDER — LIDOCAINE 40 MG/G
CREAM TOPICAL
Status: DISCONTINUED | OUTPATIENT
Start: 2020-09-05 | End: 2020-09-15 | Stop reason: HOSPADM

## 2020-09-05 RX ORDER — HEPARIN SODIUM (PORCINE) LOCK FLUSH IV SOLN 100 UNIT/ML 100 UNIT/ML
5 SOLUTION INTRAVENOUS
Status: DISCONTINUED | OUTPATIENT
Start: 2020-09-05 | End: 2020-09-15 | Stop reason: HOSPADM

## 2020-09-05 RX ADMIN — AMLODIPINE BESYLATE 5 MG: 5 TABLET ORAL at 08:18

## 2020-09-05 RX ADMIN — SENNOSIDES 1 TABLET: 8.6 TABLET, FILM COATED ORAL at 08:18

## 2020-09-05 RX ADMIN — HYDROCODONE BITARTRATE AND ACETAMINOPHEN 1 TABLET: 5; 325 TABLET ORAL at 08:18

## 2020-09-05 RX ADMIN — LIDOCAINE HYDROCHLORIDE 1 ML: 10 INJECTION, SOLUTION INFILTRATION; PERINEURAL at 13:58

## 2020-09-05 RX ADMIN — POLYETHYLENE GLYCOL 3350 17 G: 17 POWDER, FOR SOLUTION ORAL at 08:24

## 2020-09-05 RX ADMIN — IBUPROFEN 600 MG: 400 TABLET ORAL at 15:35

## 2020-09-05 RX ADMIN — SENNOSIDES 1 TABLET: 8.6 TABLET, FILM COATED ORAL at 21:42

## 2020-09-05 RX ADMIN — SODIUM CHLORIDE: 9 INJECTION, SOLUTION INTRAVENOUS at 02:18

## 2020-09-05 RX ADMIN — METOPROLOL TARTRATE 50 MG: 50 TABLET, FILM COATED ORAL at 08:18

## 2020-09-05 RX ADMIN — HYDROCODONE BITARTRATE AND ACETAMINOPHEN 1 TABLET: 5; 325 TABLET ORAL at 13:04

## 2020-09-05 RX ADMIN — SIMETHICONE 80 MG: 80 TABLET, CHEWABLE ORAL at 21:41

## 2020-09-05 RX ADMIN — BUPROPION HYDROCHLORIDE 300 MG: 150 TABLET, FILM COATED, EXTENDED RELEASE ORAL at 08:18

## 2020-09-05 RX ADMIN — SIMETHICONE 80 MG: 80 TABLET, CHEWABLE ORAL at 15:35

## 2020-09-05 RX ADMIN — METOPROLOL TARTRATE 50 MG: 50 TABLET, FILM COATED ORAL at 21:42

## 2020-09-05 RX ADMIN — ROSUVASTATIN CALCIUM 20 MG: 20 TABLET, FILM COATED ORAL at 08:18

## 2020-09-05 RX ADMIN — LISINOPRIL 5 MG: 5 TABLET ORAL at 08:19

## 2020-09-05 RX ADMIN — ENOXAPARIN SODIUM 40 MG: 40 INJECTION SUBCUTANEOUS at 08:24

## 2020-09-05 RX ADMIN — IBUPROFEN 600 MG: 400 TABLET ORAL at 21:43

## 2020-09-05 RX ADMIN — Medication 5 ML: at 18:04

## 2020-09-05 RX ADMIN — IBUPROFEN 400 MG: 400 TABLET ORAL at 15:37

## 2020-09-05 RX ADMIN — SIMETHICONE 80 MG: 80 TABLET, CHEWABLE ORAL at 18:02

## 2020-09-05 ASSESSMENT — ENCOUNTER SYMPTOMS
NAUSEA: 0
SUBJECTIVE PAIN PROGRESSION: IMPROVING
CHEST PRESSURE: 0
PAIN SEVERITY NOW: MODERATE
SUBJECTIVE PATIENT PAIN CONTROL: WELL CONTROLLED
SHORTNESS OF BREATH: 0
WEAKNESS: 1
DIETARY ISSUES: POOR INTAKE
VOMITING: 0
ANOREXIA: 1
ACTIVITY IMPAIRMENT: IMPAIRED DUE TO PAIN

## 2020-09-05 ASSESSMENT — ACTIVITIES OF DAILY LIVING (ADL)
ADLS_ACUITY_SCORE: 13

## 2020-09-05 ASSESSMENT — MIFFLIN-ST. JEOR: SCORE: 1180.86

## 2020-09-05 NOTE — PROGRESS NOTES
"Julia Andre is a 69 year old female patient.  1. Pelvic mass      Past Medical History:   Diagnosis Date     Anxiety      Cervical cancer (H)      Coronary artery disease 2009    angina. elevated troponins, normal coronary arteries found     Depression      Depressive disorder      Diabetes (H)     type 2     Gastroesophageal reflux disease      Heart attack (H)      Hyperlipidemia      Hypertension      Iron deficiency anemia      Osteopenia      Psoriasis      Sleep apnea      Current Outpatient Medications   Medication Sig Dispense Refill     acetaminophen (TYLENOL) 500 MG tablet Take 2 tablets (1,000 mg) by mouth every 8 hours as needed for mild pain 30 tablet 1     enoxaparin ANTICOAGULANT (LOVENOX ANTICOAGULANT) 40 MG/0.4ML syringe Take daily for 24 days. 24 Syringe 0     HYDROcodone-acetaminophen (NORCO) 5-325 MG tablet Take 1-2 tablets by mouth every 4 hours as needed for pain maximum 10 tablet(s) per day 15 tablet 0     senna (SENOKOT) 8.6 MG tablet Take 1-3 tablets by mouth 2 times daily as needed for constipation 30 tablet 0     Allergies   Allergen Reactions     Atorvastatin Muscle Pain (Myalgia)     Simvastatin      Sulfa Drugs      Principal Problem:    Pelvic mass  Active Problems:    Elevated CA-125    CAD (coronary artery disease)    Diabetes mellitus type II, controlled (H)    HTN (hypertension)    Hyperlipidemia    GERD (gastroesophageal reflux disease)    Anxiety and depression    Constipation    Iron deficiency anemia    Osteopenia    Psoriasis    Depression    Pelvic mass in female    Blood pressure (!) 165/76, pulse 71, temperature 99.5  F (37.5  C), temperature source Oral, resp. rate 15, height 1.575 m (5' 2\"), weight 70.8 kg (156 lb), SpO2 91 %.    Subjective:  Symptoms:  Stable.  She reports weakness and anorexia.  No shortness of breath, chest pain or chest pressure.  (Low grade temps, abdominal bloating. ).    Diet:  Poor intake.  No nausea or vomiting.    Activity level: Impaired " "due to pain.    Pain:  She complains of pain that is moderate.  She reports pain is improving.  Pain is well controlled.      Objective:  General Appearance:  Comfortable, well-appearing, in no acute distress and not in pain (Appears less than baseline).    Vital signs: (most recent): Blood pressure (!) 165/76, pulse 71, temperature 99.5  F (37.5  C), temperature source Oral, resp. rate 15, height 1.575 m (5' 2\"), weight 70.8 kg (156 lb), SpO2 91 %.  Vital signs are normal.    Output: Producing urine and no stool output.    HEENT: Normal HEENT exam.    Lungs:  Normal effort.  She is not in respiratory distress.  No decreased breath sounds.    Chest: Symmetric chest wall expansion. No chest wall tenderness.    Abdomen: Abdomen is soft and distended.  Hypoactive bowel sounds.   There is generalized tenderness.     Extremities: Normal range of motion.  There is no deformity or dependent edema.    Pulses: Distal pulses are intact.    Neurological: Patient is alert and oriented to person, place and time.    Pupils:  Pupils are equal, round, and reactive to light.    Skin:  Warm and dry.      Assessment:    Condition: In stable condition.  Improving.   (Pelvic mass: A 70 yo POD #3 s/p Exploratory laparotomy, radical resection of pelvic tumor with total abdominal hysterectomy, bilateral salpingo-oophorectomy, bilateral ureterolysis, appendectomy, stripping of vesicouterine peritoneum and cul-de-sac peritoneum, omentectomy, right pelvic and paraaortic lymphadenectomy.     Path: reviewed with primary team. She will need chemotherapy as adjuvant, s/p port placement in house.  She will need 3 1/2 weeks of Lovenox and teaching prior to discharge.     Low grade temps. Most likely atelectasis at this point. Encourage IS. Incision looks great, denies urinary symptoms. Not requiring o2. IF develops fevers, plan to obtain cultures (urine, blood) and get CT scan since no bowel activity yet.     Abdominal pain:  Norco, Tylenol and " Ice. Now with a postoperative ileus. Reviewed using ibuprofen as adjunct considering recent renal function. Add simethicone. Patient w/ history of alcoholism, so trying to minimize narcotic use.   Can ADAT once passing flatus. Ambulate. IS.     S/p james removal with adequate ambulation, voiding.     Likely home over weekend once passing flatus and tolerating regular diet.     Hypokalemia/HTN/RADHA/GERD:  Hospitalist is following.  Creatinine has normalized. Kidney function stable.       Samantha Nelson MD  Gynecologic Oncology  [facility]  [place] office  [phone]  ).       Samantha Nelson MD  9/5/2020

## 2020-09-05 NOTE — PLAN OF CARE
A&Ox4. VSS on RA. C/o mild incisional pain, norco & ice effective for pain control. Midline incision w/ staples, WDL & MANDY. BS hypoactive, no gas. Voiding adequately. Regular diet, tolerating well but minimal intake. Port placed today, dressing covered w/ gauze & UTV. PIV infusing NS @75ml/hr. SBA to ambulate. Will need Lovenox teaching prior to discharge. Discharge pending bowel function.

## 2020-09-05 NOTE — PROGRESS NOTES
[facility]    Hospitalist Progress Note    Assessment & Plan   Julia Andre is a 69 year old female who was admitted on 9/2/2020.      Past medical history significant for stress-induced MI with associated ischemic cardiomyopathy, hypertension, hyperlipidemia, type 2 diabetes, major depressive disorder with anxiety, osteopenia, GERD, obstructive sleep apnea, history of cervical cancer and recent diagnosis of ovarian mass/cancer with elevated CA-125 lab who underwent an elective exploratory laparotomy with total abdominal hysterectomy with bilateral salpingo-oophorectomy, omentectomy with tumor debulking and pelvic and paraaortic lymphadenectomy for which the Hospitalist Service has been consulted to assist with medical management.     Ovarian mass/cancer with elevated CA-125 lab study, status post elective exploratory laparotomy with total abdominal hysterectomy with bilateral salpingo-oophorectomy, omentectomy with tumor debulking and pelvic and paraaortic lymphadenectomy:    -Postoperative day #3.    -  --chest port placement 9/4  -feels a little worse today. Temp 99. No focal sxs. Still no passing gas and stool. Taking po, ambulating  -hb down to 8 range.     Plan:   --OB/GYN Service is managing at this point.                 --Defer analgesic management, DVT prophylaxis and PT/OT assessment to their service.                 --Strongly encourage utilization of incentive spirometer.    --Bowel regimen started today (Miralax daily and BID Senokot).  -regular diet  - will stop fluids and follow po intake  - am cbc and bmp         Hyperkalemia:  Resolved  Noted potassium of 5.5 peak this admission. Improved to nl range with IVFs and reduction in Lisinopril dose.   - follow bmp, consider increase lisinopril to PTA dosing outpatient     Mild hyponatremia:  Resolved with NS fluids.   2/2 dehydration     History of stress-induced MI with associated ischemic cardiomyopathy, hypertension and  hyperlipidemia:    This appears to be stable at this point.  Upon review of electronic medical records, it appears coronary angiogram at time of MI had clean coronaries.  The patient has been holding baby aspirin daily for the last week.  - good control on current regimen. High in am before am meds    Plan:     --Hold ASA and defer to OB/GYN as to when this can be resumed.    --Resumed prior to admit lisinopril 10 mg daily, Lopressor 50 mg 2 times daily with hold parameters in place---> -Starting 9/5 reduced lisinopril to 5 mg daily and start Norvasc 5 mg daily.      --Resumed prior to admit Crestor 20 mg daily.   --PRN IV hydralazine available for SBP > 180.    -follow  Bmp  -stop fluids.      Type 2 diabetes:    The patient indicated that this is well controlled and she is currently on metformin 500 mg 2 times daily.    - range. Not needing ISS  Plan;   --Hold PTA metformin.    --Initiate medium intensity sliding scale insulin with Accu-Cheks performed 4 times daily.  --Hypoglycemic protocol in place.      Suspected CKD stage 2-3:  Review of EMR with noted creatinine between 1.0-1.3 and GFR in the 50-60 range and at times in the 40's.  Previous renal US 7/30/2020 with noted bilateral renal cysts.    -KRISSY and hypovolemic hyponatremia resolved with NS fluids  - taking po  -K nl.   - stop fluids and follow po intake. Restart if RN reports poor po intake.   -     History of iron deficiency anemia with acute blood loss anemia:    Per electronic medical record, the patient underwent a full GI workup and 2012 that indicated tiny erosions in the stomach.  The patient is no longer on iron supplements.   Hb 10 [id number]> 9.5---> 8.5 today.   --Monitor per surgery     GERD:    The patient infrequently uses antacids.   --PRN Tums are made available.      Obstructive sleep apnea:    The patient indicated that she has a diagnosis of moderate obstructive sleep apnea and has been working on trying to get a new CPAP machine;  however, she would prefer not having this, as she did not tolerate it in the past.    --Monitor O2 spot checks.      Major depressive disorder with anxiety:    --Resume prior to admit Wellbutrin 300 mg every morning.      History of cervical cancer:    The patient is status post surgical intervention.  No further interventions appear necessary at this point.     Diet: regular diet per surgery. Encourage po/fluid intake  DVT Prophylaxis: Defer to primary service (subcutaneous Heparins and PCD'S)   Gutierrez Catheter: in place, indication: /GI/GYN Pelvic Procedure  Code Status: Full Code        Disposition Plan-      Expected discharge: Per OB/GYN      Tavo Mares MD  Text Page  (7am to 6pm)  Interval History   Feels a little worse today. Temp 99. No cp,sob, uti sxs. Iv site ok. No cough. No leg/calf pain.   No abd pain. Not yet passing gas/stool.   Taking fluids ok and ambulating. Good IS.     -Data reviewed today: I reviewed all new labs and imaging results over the last 24 hours. I personally reviewed labs last 24 hours    Physical Exam   Temp: 99.5  F (37.5  C) Temp src: Oral BP: (!) 165/76 Pulse: 71   Resp: 14 SpO2: 91 % O2 Device: None (Room air) Oxygen Delivery: 1 LPM  Vitals:    09/02/20 0605 09/05/20 0455   Weight: 68 kg (150 lb) 70.8 kg (156 lb)     Vital Signs with Ranges  Temp:  [96.6  F (35.9  C)-99.5  F (37.5  C)] 99.5  F (37.5  C)  Pulse:  [61-78] 71  Resp:  [12-16] 14  BP: (106-165)/(55-79) 165/76  SpO2:  [90 %-100 %] 91 %  I/O last 3 completed shifts:  In: 2243 [I.V.:2243]  Out: 3075 [Urine:3075]    Constitutional: Up in bed, nad  Respiratory: CTAB, breathing easily  Cardiovascular: RRR no r/g/m  GI: midline  incision, binder, slight BS.   Skin/Integumen: no rash, no calf pain  Neuro/psych: nl affect, nl speech and mentation      Medications     sodium chloride Stopped (09/05/20 0826)       amLODIPine  5 mg Oral Daily     buPROPion  300 mg Oral QAM     enoxaparin ANTICOAGULANT  40 mg Subcutaneous  Q24H     insulin aspart  1-7 Units Subcutaneous TID AC     insulin aspart  1-5 Units Subcutaneous At Bedtime     lisinopril  5 mg Oral Daily     metoprolol tartrate  50 mg Oral BID     polyethylene glycol  17 g Oral Daily     rosuvastatin  20 mg Oral Daily     sennosides  1 tablet Oral BID     sodium chloride (PF)  3 mL Intracatheter Q8H       Data   Recent Labs   Lab 09/05/20  0713 09/04/20  0708 09/03/20  1816  09/03/20  0717 09/03/20  0643  09/02/20  0611   WBC  --   --   --   --  5.6  --   --   --    HGB 8.5*  --   --   --  9.5*  --   --  10.7*   MCV  --   --   --   --  87  --   --   --      --   --   --  362  --   --  393   INR  --  1.13  --   --   --   --   --   --     137 133   < >  --  131*  --   --    POTASSIUM 4.4 4.7 4.7   < >  --  5.5*  --  4.6   CHLORIDE 105 106  --   --   --  103  --   --    CO2 27 28  --   --   --  25  --   --    BUN 10 13  --   --   --  21  --   --    CR 0.73 0.93  --   --   --  1.08*   < >  --    ANIONGAP 3 3  --   --   --  3  --   --    RADHA 8.3* 8.1*  --   --   --  7.8*  --   --    * 107*  --   --   --  116*  --  123*    < > = values in this interval not displayed.       Imaging:   Recent Results (from the past 24 hour(s))   IR Chest Port Placement > 5 Yrs of Age    Narrative    INTERVENTIONAL RADIOLOGY CHEST PORT PLACEMENT GREATER THAN FIVE YEARS  OF AGE 9/4/2020 11:57 AM    PORT PLACEMENT     INDICATION:  Chronic intravenous access required for drug infusion.   69-year-old woman with need for long-term IV access for chemotherapy  administration.    LOCATION:  Right internal jugular vein    PROCEDURE: Ultrasound was used to localize and document patency of the  internal jugular vein.  A permanent image of the vein was recorded.   Local anesthesia was administered to the skin over the vein and a 4 mm  incision was made.  Ultrasound was used to place a 6F peel-away sheath  in the vein using standard technique.      Maximal Sterile Barrier Technique Utilized: Cap  AND mask AND sterile  gown AND sterile gloves AND sterile full body drape AND hand hygiene  AND skin preparation 2% chlorhexidine for cutaneous antisepsis (or  acceptable alternative antiseptics).  Sterile Ultrasound Technique  Utilized ?Sterile gel AND sterile probe covers.    Lidocaine was then administered in the subcutaneous tissue over the  clavicle to a point about 5 cm below the midclavicle.  A transverse 2  cm long incision was made at this point.  Blunt dissection was carried  out to create a small subcutaneous pocket cephalad to the incision.   The 6F port catheter was brought through the tract between the two  incisions and trimmed to appropriate length.  The catheter was  attached to the port and the port was brought into the pocket.  The  pocket was flushed with antibiotic solution.      The catheter was then inserted into the superior vena cava through the  jugular sheath.  It was adjusted so that it lay well with no kinking.  The port was flushed with heparinized solution.  The port incision was  closed with interrupted 3-0 Vicryl and Dermabond adhesive.  The neck  incision was closed with adhesive.  Dressings were applied.      Complications:  None    Sedation:       A moderate level of sedation was achieved with 2.5 mg  midazolam                            125  mcg fentanyl     Vital signs and sedation monitored by our nursing staff under my  supervision.      Sedation time:  40 minutes    Fluoroscopy time:  .2 minutes   Air Kerma: 1 mGy  Contrast given:  None  Local anesthetic:  17 mL of 1% lidocaine    FINDINGS:  Fluoroscopic guidance with a permanent image confirmed the  port catheter tip location is at the right atrial/SVC junction.      Impression    IMPRESSION: Successful placement of right internal jugular slim  PowerPort and catheter. The port is ready for immediate use.    JACKY CORONEL MD

## 2020-09-05 NOTE — PLAN OF CARE
Pt A/Ox4. Tmax 100.2, BP elevated. Norco given x1 for incisional pain, ibuprofen ordered by gyn for future pain control. BS still hypoactive, no flatus.   Miralax and senna given, ambulation and PO fluids encouraged. Per gyn if no flatus/BM by tomorrow, may need CT scan of abdomen. Voiding adequately. Fair appetite, no nausea. IV fluids discontinued. Lovenox teaching completed with spouse, Shashi.  Midline incision MANDY, WDL. R port accessed and heparin locked. Discharge to home once bowel function returns, continue to monitor.

## 2020-09-06 ENCOUNTER — APPOINTMENT (OUTPATIENT)
Dept: PHYSICAL THERAPY | Facility: CLINIC | Age: 70
DRG: 737 | End: 2020-09-06
Attending: OBSTETRICS & GYNECOLOGY
Payer: COMMERCIAL

## 2020-09-06 LAB
ANION GAP SERPL CALCULATED.3IONS-SCNC: 4 MMOL/L (ref 3–14)
BUN SERPL-MCNC: 10 MG/DL (ref 7–30)
CALCIUM SERPL-MCNC: 7.6 MG/DL (ref 8.5–10.1)
CHLORIDE SERPL-SCNC: 105 MMOL/L (ref 94–109)
CO2 SERPL-SCNC: 26 MMOL/L (ref 20–32)
CREAT SERPL-MCNC: 0.84 MG/DL (ref 0.52–1.04)
ERYTHROCYTE [DISTWIDTH] IN BLOOD BY AUTOMATED COUNT: 14.2 % (ref 10–15)
GFR SERPL CREATININE-BSD FRML MDRD: 70 ML/MIN/{1.73_M2}
GLUCOSE BLDC GLUCOMTR-MCNC: 105 MG/DL (ref 70–99)
GLUCOSE BLDC GLUCOMTR-MCNC: 122 MG/DL (ref 70–99)
GLUCOSE BLDC GLUCOMTR-MCNC: 123 MG/DL (ref 70–99)
GLUCOSE BLDC GLUCOMTR-MCNC: 162 MG/DL (ref 70–99)
GLUCOSE SERPL-MCNC: 117 MG/DL (ref 70–99)
HCT VFR BLD AUTO: 24.1 % (ref 35–47)
HGB BLD-MCNC: 7.6 G/DL (ref 11.7–15.7)
MCH RBC QN AUTO: 27.2 PG (ref 26.5–33)
MCHC RBC AUTO-ENTMCNC: 31.5 G/DL (ref 31.5–36.5)
MCV RBC AUTO: 86 FL (ref 78–100)
PLATELET # BLD AUTO: 279 10E9/L (ref 150–450)
POTASSIUM SERPL-SCNC: 3.8 MMOL/L (ref 3.4–5.3)
RBC # BLD AUTO: 2.79 10E12/L (ref 3.8–5.2)
SODIUM SERPL-SCNC: 135 MMOL/L (ref 133–144)
WBC # BLD AUTO: 6.7 10E9/L (ref 4–11)

## 2020-09-06 PROCEDURE — 97161 PT EVAL LOW COMPLEX 20 MIN: CPT | Mod: GP | Performed by: PHYSICAL THERAPIST

## 2020-09-06 PROCEDURE — 99232 SBSQ HOSP IP/OBS MODERATE 35: CPT | Performed by: INTERNAL MEDICINE

## 2020-09-06 PROCEDURE — 36415 COLL VENOUS BLD VENIPUNCTURE: CPT | Performed by: INTERNAL MEDICINE

## 2020-09-06 PROCEDURE — 87076 CULTURE ANAEROBE IDENT EACH: CPT | Performed by: INTERNAL MEDICINE

## 2020-09-06 PROCEDURE — 87181 SC STD AGAR DILUTION PER AGT: CPT | Performed by: STUDENT IN AN ORGANIZED HEALTH CARE EDUCATION/TRAINING PROGRAM

## 2020-09-06 PROCEDURE — 25000132 ZZH RX MED GY IP 250 OP 250 PS 637: Performed by: NURSE PRACTITIONER

## 2020-09-06 PROCEDURE — 87040 BLOOD CULTURE FOR BACTERIA: CPT | Performed by: INTERNAL MEDICINE

## 2020-09-06 PROCEDURE — 80048 BASIC METABOLIC PNL TOTAL CA: CPT | Performed by: OBSTETRICS & GYNECOLOGY

## 2020-09-06 PROCEDURE — 87800 DETECT AGNT MULT DNA DIREC: CPT | Performed by: STUDENT IN AN ORGANIZED HEALTH CARE EDUCATION/TRAINING PROGRAM

## 2020-09-06 PROCEDURE — 00000146 ZZHCL STATISTIC GLUCOSE BY METER IP

## 2020-09-06 PROCEDURE — 87076 CULTURE ANAEROBE IDENT EACH: CPT | Performed by: STUDENT IN AN ORGANIZED HEALTH CARE EDUCATION/TRAINING PROGRAM

## 2020-09-06 PROCEDURE — 25000132 ZZH RX MED GY IP 250 OP 250 PS 637: Performed by: OBSTETRICS & GYNECOLOGY

## 2020-09-06 PROCEDURE — 87040 BLOOD CULTURE FOR BACTERIA: CPT | Performed by: STUDENT IN AN ORGANIZED HEALTH CARE EDUCATION/TRAINING PROGRAM

## 2020-09-06 PROCEDURE — 97530 THERAPEUTIC ACTIVITIES: CPT | Mod: GP | Performed by: PHYSICAL THERAPIST

## 2020-09-06 PROCEDURE — 85027 COMPLETE CBC AUTOMATED: CPT | Performed by: OBSTETRICS & GYNECOLOGY

## 2020-09-06 PROCEDURE — 25000128 H RX IP 250 OP 636: Performed by: INTERNAL MEDICINE

## 2020-09-06 PROCEDURE — 25000132 ZZH RX MED GY IP 250 OP 250 PS 637: Performed by: PHYSICIAN ASSISTANT

## 2020-09-06 PROCEDURE — 25000128 H RX IP 250 OP 636: Performed by: NURSE PRACTITIONER

## 2020-09-06 PROCEDURE — 12000000 ZZH R&B MED SURG/OB

## 2020-09-06 RX ADMIN — SIMETHICONE 80 MG: 80 TABLET, CHEWABLE ORAL at 22:34

## 2020-09-06 RX ADMIN — BUPROPION HYDROCHLORIDE 300 MG: 150 TABLET, FILM COATED, EXTENDED RELEASE ORAL at 09:36

## 2020-09-06 RX ADMIN — SIMETHICONE 80 MG: 80 TABLET, CHEWABLE ORAL at 09:36

## 2020-09-06 RX ADMIN — SENNOSIDES 1 TABLET: 8.6 TABLET, FILM COATED ORAL at 09:37

## 2020-09-06 RX ADMIN — IBUPROFEN 600 MG: 400 TABLET ORAL at 12:41

## 2020-09-06 RX ADMIN — LISINOPRIL 5 MG: 5 TABLET ORAL at 09:37

## 2020-09-06 RX ADMIN — POLYETHYLENE GLYCOL 3350 17 G: 17 POWDER, FOR SOLUTION ORAL at 09:37

## 2020-09-06 RX ADMIN — SODIUM CHLORIDE, PRESERVATIVE FREE 5 ML: 5 INJECTION INTRAVENOUS at 23:34

## 2020-09-06 RX ADMIN — AMLODIPINE BESYLATE 5 MG: 5 TABLET ORAL at 09:37

## 2020-09-06 RX ADMIN — ENOXAPARIN SODIUM 40 MG: 40 INJECTION SUBCUTANEOUS at 09:37

## 2020-09-06 RX ADMIN — SIMETHICONE 80 MG: 80 TABLET, CHEWABLE ORAL at 19:04

## 2020-09-06 RX ADMIN — METOPROLOL TARTRATE 50 MG: 50 TABLET, FILM COATED ORAL at 09:37

## 2020-09-06 RX ADMIN — ACETAMINOPHEN 975 MG: 325 TABLET, FILM COATED ORAL at 01:53

## 2020-09-06 RX ADMIN — SENNOSIDES 1 TABLET: 8.6 TABLET, FILM COATED ORAL at 20:30

## 2020-09-06 RX ADMIN — ACETAMINOPHEN 975 MG: 325 TABLET, FILM COATED ORAL at 23:25

## 2020-09-06 RX ADMIN — Medication 5 ML: at 19:04

## 2020-09-06 RX ADMIN — ROSUVASTATIN CALCIUM 20 MG: 20 TABLET, FILM COATED ORAL at 09:36

## 2020-09-06 RX ADMIN — IBUPROFEN 600 MG: 400 TABLET ORAL at 23:25

## 2020-09-06 RX ADMIN — METOPROLOL TARTRATE 50 MG: 50 TABLET, FILM COATED ORAL at 20:31

## 2020-09-06 ASSESSMENT — ACTIVITIES OF DAILY LIVING (ADL)
ADLS_ACUITY_SCORE: 13

## 2020-09-06 ASSESSMENT — MIFFLIN-ST. JEOR: SCORE: 1188.12

## 2020-09-06 NOTE — PROGRESS NOTES
[facility]    Hospitalist Progress Note    Assessment & Plan   Julia Andre is a 69 year old female who was admitted on 9/2/2020.      Past medical history significant for stress-induced MI with associated ischemic cardiomyopathy, hypertension, hyperlipidemia, type 2 diabetes, major depressive disorder with anxiety, osteopenia, GERD, obstructive sleep apnea, history of cervical cancer and recent diagnosis of ovarian mass/cancer with elevated CA-125 lab who underwent an elective exploratory laparotomy with total abdominal hysterectomy with bilateral salpingo-oophorectomy, omentectomy with tumor debulking and pelvic and paraaortic lymphadenectomy for which the Hospitalist Service has been consulted to assist with medical management.     Ovarian mass/cancer with elevated CA-125 lab study, status post elective exploratory laparotomy with total abdominal hysterectomy with bilateral salpingo-oophorectomy, omentectomy with tumor debulking and pelvic and paraaortic lymphadenectomy:    -Postoperative day #4.    -  --chest port placement 9/4  - Taking po, ambulating  -hb down to 7 range  Tolerating po. Taking adequate po  Nl bmp.   Now passing gas  Feeling better today.   No fever. No new issues. Seems to be improving    Plan:   --OB/GYN Service is managing at this point.                 --Defer analgesic management, DVT prophylaxis and PT/OT assessment to their service.                 --Strongly encourage utilization of incentive spirometer.    --Bowel regimen started today (Miralax daily and BID Senokot).  -regular diet         Hyperkalemia:  Resolved  Noted potassium of 5.5 peak this admission. Improved to nl range with IVFs and reduction in Lisinopril dose.   - follow bmp, consider increase lisinopril to PTA dosing outpatient, for now follow on reduced dose Lisinopril     Mild hyponatremia:  Resolved with NS fluids.   2/2 dehydration     History of stress-induced MI with associated ischemic  cardiomyopathy, hypertension and hyperlipidemia:    This appears to be stable at this point.  Upon review of electronic medical records, it appears coronary angiogram at time of MI had clean coronaries.  The patient has been holding baby aspirin daily for the last week.  - good control on current regimen. High in am before am meds    Plan:     --Hold ASA and defer to OB/GYN as to when this can be resumed.    --Resumed prior to admit lisinopril 10 mg daily, Lopressor 50 mg 2 times daily with hold parameters in place---> -Starting 9/5 reduced lisinopril to 5 mg daily and start Norvasc 5 mg daily.      --Resumed prior to admit Crestor 20 mg daily.   --PRN IV hydralazine available for SBP > 180.    -follow  Bmp  -stop fluids.      Type 2 diabetes:    The patient indicated that this is well controlled and she is currently on metformin 500 mg 2 times daily.    - range. Not needing ISS  Plan;   --Hold PTA metformin.  restart several days after discharge once po intake at/nearer baseline  --Initiate medium intensity sliding scale insulin with Accu-Cheks performed 4 times daily.  --Hypoglycemic protocol in place.      Suspected CKD stage 2-3:  Review of EMR with noted creatinine between 1.0-1.3 and GFR in the 50-60 range and at times in the 40's.  Previous renal US 7/30/2020 with noted bilateral renal cysts.    -KRISSY and hypovolemic hyponatremia resolved with NS fluids  - taking po  -K nl.   - stopped fluids 9/5 and po intake has been adequate. Bmp nl.   - encouraged po intake and fluid intake    History of iron deficiency anemia with acute blood loss anemia:    Per electronic medical record, the patient underwent a full GI workup and 2012 that indicated tiny erosions in the stomach.  The patient is no longer on iron supplements.   Hb 10 [id number]> 9.5---> 8.5 --> 7.6 today.   --Monitor per surgery     GERD:    The patient infrequently uses antacids.   --PRN Tums are made available.      Obstructive sleep apnea:    The  patient indicated that she has a diagnosis of moderate obstructive sleep apnea and has been working on trying to get a new CPAP machine; however, she would prefer not having this, as she did not tolerate it in the past.    --Monitor O2 spot checks.   -IS     Major depressive disorder with anxiety:    --Resume prior to admit Wellbutrin 300 mg every morning.      History of cervical cancer:    The patient is status post surgical intervention.  No further interventions appear necessary at this point.     Diet: regular diet per surgery. Encourage po/fluid intake  DVT Prophylaxis: Defer to primary service (subcutaneous Heparins and PCD'S)   Gutierrez Catheter: in place, indication: /GI/GYN Pelvic Procedure  Code Status: Full Code        Disposition Plan-      Expected discharge: Per OB/GYN, maybe tomorrow       Tavo Mares MD  Text Page  (7am to 6pm)  Interval History   Feels better today. Taking po and fluids, no fever.   No cp or sob. No n/v.   Tolerating po  Passing gas this am  Up ambulating.   No new issues.   -Data reviewed today: I reviewed all new labs and imaging results over the last 24 hours. I personally reviewed labs last 24 hours    Physical Exam   Temp: 97.6  F (36.4  C) Temp src: Oral BP: 132/74 Pulse: 64   Resp: 16 SpO2: 93 % O2 Device: None (Room air)    Vitals:    09/02/20 0605 09/05/20 0455 09/06/20 0548   Weight: 68 kg (150 lb) 70.8 kg (156 lb) 71.5 kg (157 lb 9.6 oz)     Vital Signs with Ranges  Temp:  [97.6  F (36.4  C)-100.2  F (37.9  C)] 97.6  F (36.4  C)  Pulse:  [64-73] 64  Resp:  [15-17] 16  BP: (125-137)/(60-74) 132/74  SpO2:  [92 %-93 %] 93 %  I/O last 3 completed shifts:  In: 120 [P.O.:120]  Out: 1500 [Urine:1500]    Constitutional: Up in bed, nad  Respiratory: CTAB, breathing easily  Chest: port right upper chest  Cardiovascular: RRR no r/g/m  GI: midline  incision, binder, slight BS.   Skin/Integumen: no rash, no calf pain  Neuro/psych: nl affect, nl speech and  mentation      Medications       amLODIPine  5 mg Oral Daily     buPROPion  300 mg Oral QAM     enoxaparin ANTICOAGULANT  40 mg Subcutaneous Q24H     heparin  5 mL Intracatheter Q28 Days     heparin lock flush  5-10 mL Intracatheter Q24H     insulin aspart  1-7 Units Subcutaneous TID AC     insulin aspart  1-5 Units Subcutaneous At Bedtime     lisinopril  5 mg Oral Daily     metoprolol tartrate  50 mg Oral BID     polyethylene glycol  17 g Oral Daily     rosuvastatin  20 mg Oral Daily     sennosides  1 tablet Oral BID     simethicone  80 mg Oral 4x Daily     sodium chloride (PF)  3 mL Intracatheter Q8H       Data   Recent Labs   Lab 09/06/20  0600 09/05/20  0713 09/04/20  0708  09/03/20  0717   WBC 6.7  --   --   --  5.6   HGB 7.6* 8.5*  --   --  9.5*   MCV 86  --   --   --  87    301  --   --  362   INR  --   --  1.13  --   --     135 137   < >  --    POTASSIUM 3.8 4.4 4.7   < >  --    CHLORIDE 105 105 106  --   --    CO2 26 27 28  --   --    BUN 10 10 13  --   --    CR 0.84 0.73 0.93  --   --    ANIONGAP 4 3 3  --   --    RADHA 7.6* 8.3* 8.1*  --   --    * 105* 107*  --   --     < > = values in this interval not displayed.       Imaging:   No results found for this or any previous visit (from the past 24 hour(s)).

## 2020-09-06 NOTE — PLAN OF CARE
POD 3. AOx4. VSS on RA, Tmax 99.5. Up SBA with IV pole, ambulating in hallways. Reg diet with BS checks, tolerated well, denies nausea. Midline incision with staples, MANDY. Port hep locked, blood return noted. IVF discontinued, PO intake encouraged. PRN ibuprofen x2 for incisional pain, effective. Voiding adequately. BS hypoactive, miralax and senna given, ambulation and PO fluids encouraged, denies flatus, no BM. May need CT scan of abdomen tomorrow if no flatus/BM. Discharge home pending return of bowel functions.

## 2020-09-06 NOTE — PLAN OF CARE
Up ind in room and halls. Tolerating small po intake. C/o abdominal pain, ibuprofen given with relief. TMax 99. Passing flatus. Plan to discharge tomorrow.

## 2020-09-06 NOTE — PLAN OF CARE
[id number]: POD4. A&O. VSS, lower O2 sats- encouraging deep breathing and using IS. Afebrile. Tylenol/ibuprofen for pain. Midline incision WDL. BS hypo throughout. Still no flatus. Ambulating well. Good UOP. Not taking in much PO overnight, encouraging fluid intake. Denies N/V. Hgb drop to 7.6. No S/S of bleeding. Possible abdominal CT today pending return of bowel fxn.

## 2020-09-06 NOTE — PROGRESS NOTES
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 She will need chemotherapy as adjuvant, s/p port placement in house.  She will need 3 1/2 weeks of Lovenox and teaching prior to discharge.      Low grade temps: Now improving. Most likely atelectasis at this point. Encourage IS. Incision looks great, denies urinary symptoms. Not requiring o2. WBC normal.     Abdominal pain:  Norco, Tylenol and Ice.  Reviewed using ibuprofen as adjunct considering recent renal function. Patient w/ history of alcoholism, so trying to minimize narcotic use.     GI: Diet as tolerated, now starting to pass flatus. Still somewhat distended/tympanitic on exam, will monitor. Ambulate. IS.      S/p james removal with adequate ambulation, voiding.      Hypokalemia/HTN/RADHA/GERD:  Hospitalist is following.  Creatinine has normalized. Kidney function stable.     Anemia: Hgb 7.6 from 8.5 yesterday, suspect dilutional component, no evidence of bleeding. Asymptomatic. Repeat hgb in am. Consider iron supplement at discharge.    Dispo: anticipate discharge to home tomorrow if continues to do well, and improvement in abdominal distention    Steffi Silva PA-C  GYN Oncology    Interval History   Overall feeling better. No nausea, tolerating small amounts of soft diet. Just started passing a little gas this am, reports x 2. No BM. Pain managed. Denies CP, SOB, dizziness. Ambulating without issues. No new fevers.     Physical Exam   Temp: 97.6  F (36.4  C) Temp src: Oral BP: 132/74 Pulse: 64   Resp: 16 SpO2: 93 % O2  Device: None (Room air)    Vitals:    09/02/20 0605 09/05/20 0455 09/06/20 0548   Weight: 68 kg (150 lb) 70.8 kg (156 lb) 71.5 kg (157 lb 9.6 oz)     Vital Signs with Ranges  Temp:  [97.6  F (36.4  C)-100.2  F (37.9  C)] 97.6  F (36.4  C)  Pulse:  [64-73] 64  Resp:  [15-17] 16  BP: (125-137)/(60-74) 132/74  SpO2:  [92 %-93 %] 93 %  I/O last 3 completed shifts:  In: 120 [P.O.:120]  Out: 1500 [Urine:1500]    Constitutional:  NAD  Respiratory: No dyspnea, slightly diminished at bases  Cardiovascular: RRR  GI: Somewhat distended/tympanitic, +some BS, soft, nontender.  Incision with staples clean, dry, intact.   Skin/Integumen: no rashes or edema     Medications       amLODIPine  5 mg Oral Daily     buPROPion  300 mg Oral QAM     enoxaparin ANTICOAGULANT  40 mg Subcutaneous Q24H     heparin  5 mL Intracatheter Q28 Days     heparin lock flush  5-10 mL Intracatheter Q24H     insulin aspart  1-7 Units Subcutaneous TID AC     insulin aspart  1-5 Units Subcutaneous At Bedtime     lisinopril  5 mg Oral Daily     metoprolol tartrate  50 mg Oral BID     polyethylene glycol  17 g Oral Daily     rosuvastatin  20 mg Oral Daily     sennosides  1 tablet Oral BID     simethicone  80 mg Oral 4x Daily     sodium chloride (PF)  3 mL Intracatheter Q8H       Data   Recent Labs   Lab 09/06/20  0600 09/05/20  0713 09/04/20  0708  09/03/20  0717   WBC 6.7  --   --   --  5.6   HGB 7.6* 8.5*  --   --  9.5*   MCV 86  --   --   --  87    301  --   --  362   INR  --   --  1.13  --   --     135 137   < >  --    POTASSIUM 3.8 4.4 4.7   < >  --    CHLORIDE 105 105 106  --   --    CO2 26 27 28  --   --    BUN 10 10 13  --   --    CR 0.84 0.73 0.93  --   --    ANIONGAP 4 3 3  --   --    RADHA 7.6* 8.3* 8.1*  --   --    * 105* 107*  --   --     < > = values in this interval not displayed.       No results found for this or any previous visit (from the past 24 hour(s)).

## 2020-09-06 NOTE — PLAN OF CARE
Discharge Planner PT   Patient plan for discharge: Home with assist of .  Current status: Patient seen for initial eval and treatment provided. Patient and  [facility] with 2 steps to enter without a rail. Patient was independent and very active prior to admission.   Currently patient is able to demonstrate independence with bed mobility after educated on abdominal precautions. She is independent with sit to/from stand with amb on level surface. She needs min assist to go up and down 2 steps without a rail.  is retired and will be able to provide assist needed on steps. No further PT indicated. Patient left in supine.   Barriers to return to prior living situation: 2 steps to enter without a rail.   Recommendations for discharge: Home with assist of  on steps without a rail and on household chores.   Rationale for recommendations: Patient is able to demonstrate independence with all functional mobility except on steps without a rail. She reports her  will be with her any time she is going in or out of the home.        Entered by: Triny Taveras 09/06/2020 2:29 PM

## 2020-09-06 NOTE — PROGRESS NOTES
09/06/20 1354   Quick Adds   Type of Visit Initial PT Evaluation   Living Environment   Lives With spouse   Living Arrangements   ([facility])   Home Accessibility stairs to enter home;stairs within home   Number of Stairs, Main Entrance 2   Stair Railings, Main Entrance none   Number of Stairs, Within Home, Primary   (Stairs to basement but she doens't have to access. )   Transportation Anticipated car, drives self;family or friend will provide   Living Environment Comment Main floor bedroom.    Self-Care   Usual Activity Tolerance good   Regular Exercise Yes   Activity/Exercise Type strength training;walking   Exercise Amount/Frequency daily   Functional Level Prior   Ambulation 0-->independent   Transferring 0-->independent   Toileting 0-->independent   Bathing 0-->independent   Communication 0-->understands/communicates without difficulty   Swallowing 0-->swallows foods/liquids without difficulty   Cognition 0 - no cognition issues reported   Fall history within last six months no   Which of the above functional risks had a recent onset or change? none   General Information   Onset of Illness/Injury or Date of Surgery - Date 09/02/20   Referring Physician Nahum Nelson, Samantha Diaz MD   Patient/Family Goals Statement To go home with .    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   Precautions/Limitations abdominal precautions   Cognitive Status Examination   Orientation orientation to person, place and time   Level of Consciousness alert   Follows Commands and Answers Questions 100% of the time   Personal  Safety and Judgment intact   Memory intact   Pain Assessment   Patient Currently in Pain Yes, see Vital Sign flowsheet  (Especially with sup to sit at incision sites. )   Posture    Posture Not impaired   Range of Motion (ROM)   ROM Comment Trunk limited by pain from incision sites. C/o stretching with lying flat.    Strength   Strength Comments No specific deficits noted.    Bed Mobility   Bed Mobility Comments Sup to sit with supervision after educated on technique.    Transfer Skills   Transfer Comments Sit to stand independently.    Gait   Gait Comments Gait without AD independnetly on level surface.    Balance   Balance Comments Good sitting and stanidng balance.    Sensory Examination   Sensory Perception no deficits were identified   General Therapy Interventions   Planned Therapy Interventions bed mobility training;other (see comments)   Intervention Comments Stair training as she has 2 to enter without a rail.    Clinical Impression   Criteria for Skilled Therapeutic Intervention yes, treatment indicated   PT Diagnosis Impaired functional independence.    Influenced by the following impairments Pain, generalized weakness from surgery.    Functional limitations due to impairments Needs cues for decreasing pain with bed mobility, steps   Clinical Presentation Stable/Uncomplicated   Clinical Decision Making (Complexity) Low complexity   Therapy Frequency   (No further treatment indicated. )   Predicted Duration of Therapy Intervention (days/wks) One time eval and treat only.    Anticipated Discharge Disposition   ( to assist on steps and with IADL's)   Risk & Benefits of therapy have been explained Yes   Patient, Family & other staff in agreement with plan of care Yes   Total Evaluation Time   Total Evaluation Time (Minutes) 12

## 2020-09-07 LAB
GLUCOSE BLDC GLUCOMTR-MCNC: 113 MG/DL (ref 70–99)
GLUCOSE BLDC GLUCOMTR-MCNC: 118 MG/DL (ref 70–99)
GLUCOSE BLDC GLUCOMTR-MCNC: 133 MG/DL (ref 70–99)
GLUCOSE BLDC GLUCOMTR-MCNC: 136 MG/DL (ref 70–99)
GLUCOSE BLDC GLUCOMTR-MCNC: 96 MG/DL (ref 70–99)
HGB BLD-MCNC: 7.9 G/DL (ref 11.7–15.7)
WBC # BLD AUTO: 6.6 10E9/L (ref 4–11)

## 2020-09-07 PROCEDURE — 85018 HEMOGLOBIN: CPT | Performed by: PHYSICIAN ASSISTANT

## 2020-09-07 PROCEDURE — 25000132 ZZH RX MED GY IP 250 OP 250 PS 637: Performed by: OBSTETRICS & GYNECOLOGY

## 2020-09-07 PROCEDURE — 94799 UNLISTED PULMONARY SVC/PX: CPT

## 2020-09-07 PROCEDURE — 25000132 ZZH RX MED GY IP 250 OP 250 PS 637: Performed by: NURSE PRACTITIONER

## 2020-09-07 PROCEDURE — 25000128 H RX IP 250 OP 636: Performed by: INTERNAL MEDICINE

## 2020-09-07 PROCEDURE — 25000128 H RX IP 250 OP 636: Performed by: NURSE PRACTITIONER

## 2020-09-07 PROCEDURE — 12000000 ZZH R&B MED SURG/OB

## 2020-09-07 PROCEDURE — 25000132 ZZH RX MED GY IP 250 OP 250 PS 637: Performed by: PHYSICIAN ASSISTANT

## 2020-09-07 PROCEDURE — 85048 AUTOMATED LEUKOCYTE COUNT: CPT | Performed by: PHYSICIAN ASSISTANT

## 2020-09-07 PROCEDURE — 00000146 ZZHCL STATISTIC GLUCOSE BY METER IP

## 2020-09-07 PROCEDURE — 99232 SBSQ HOSP IP/OBS MODERATE 35: CPT | Performed by: INTERNAL MEDICINE

## 2020-09-07 RX ORDER — AMLODIPINE BESYLATE 5 MG/1
5 TABLET ORAL DAILY
Qty: 30 TABLET | Refills: 0 | Status: SHIPPED | OUTPATIENT
Start: 2020-09-07 | End: 2021-12-07

## 2020-09-07 RX ORDER — LISINOPRIL 5 MG/1
5 TABLET ORAL DAILY
Qty: 30 TABLET | Refills: 0 | Status: SHIPPED | OUTPATIENT
Start: 2020-09-07 | End: 2021-12-07

## 2020-09-07 RX ADMIN — SIMETHICONE 80 MG: 80 TABLET, CHEWABLE ORAL at 09:29

## 2020-09-07 RX ADMIN — ROSUVASTATIN CALCIUM 20 MG: 20 TABLET, FILM COATED ORAL at 09:29

## 2020-09-07 RX ADMIN — HYDROCODONE BITARTRATE AND ACETAMINOPHEN 2 TABLET: 5; 325 TABLET ORAL at 22:16

## 2020-09-07 RX ADMIN — SENNOSIDES 1 TABLET: 8.6 TABLET, FILM COATED ORAL at 21:01

## 2020-09-07 RX ADMIN — IBUPROFEN 600 MG: 400 TABLET ORAL at 19:12

## 2020-09-07 RX ADMIN — METOPROLOL TARTRATE 50 MG: 50 TABLET, FILM COATED ORAL at 09:29

## 2020-09-07 RX ADMIN — Medication 5 ML: at 16:30

## 2020-09-07 RX ADMIN — METOPROLOL TARTRATE 50 MG: 50 TABLET, FILM COATED ORAL at 19:13

## 2020-09-07 RX ADMIN — LISINOPRIL 5 MG: 5 TABLET ORAL at 09:31

## 2020-09-07 RX ADMIN — AMLODIPINE BESYLATE 5 MG: 5 TABLET ORAL at 09:30

## 2020-09-07 RX ADMIN — ACETAMINOPHEN 975 MG: 325 TABLET, FILM COATED ORAL at 11:17

## 2020-09-07 RX ADMIN — SENNOSIDES 1 TABLET: 8.6 TABLET, FILM COATED ORAL at 09:32

## 2020-09-07 RX ADMIN — ENOXAPARIN SODIUM 40 MG: 40 INJECTION SUBCUTANEOUS at 09:29

## 2020-09-07 RX ADMIN — HYDROCODONE BITARTRATE AND ACETAMINOPHEN 1 TABLET: 5; 325 TABLET ORAL at 18:14

## 2020-09-07 RX ADMIN — POLYETHYLENE GLYCOL 3350 17 G: 17 POWDER, FOR SOLUTION ORAL at 09:29

## 2020-09-07 RX ADMIN — BUPROPION HYDROCHLORIDE 300 MG: 150 TABLET, FILM COATED, EXTENDED RELEASE ORAL at 09:29

## 2020-09-07 RX ADMIN — IBUPROFEN 600 MG: 400 TABLET ORAL at 11:17

## 2020-09-07 RX ADMIN — SODIUM CHLORIDE, PRESERVATIVE FREE 5 ML: 5 INJECTION INTRAVENOUS at 06:17

## 2020-09-07 ASSESSMENT — ENCOUNTER SYMPTOMS
FEVER: 0
ACTIVITY IMPAIRMENT: RETURNING TO NORMAL
DIETARY ISSUES: ADEQUATE INTAKE
NAUSEA: 0
SUBJECTIVE PAIN PROGRESSION: IMPROVING
CHEST PRESSURE: 0
SUBJECTIVE PATIENT PAIN CONTROL: PARTIALLY CONTROLLED
SHORTNESS OF BREATH: 0
PAIN SEVERITY NOW: MODERATE
VOMITING: 0

## 2020-09-07 ASSESSMENT — ACTIVITIES OF DAILY LIVING (ADL)
ADLS_ACUITY_SCORE: 13

## 2020-09-07 ASSESSMENT — MIFFLIN-ST. JEOR: SCORE: 1179.05

## 2020-09-07 NOTE — PLAN OF CARE
Up ind in room and halls. Tolerating small PO intake. Passing flatus, no stool.   C/o abdominal pain, tylenol and ibuprofen given. Afebrile. Plan to discharge 9/8

## 2020-09-07 NOTE — PLAN OF CARE
POD 5 A&O. VSS ex spiked new temp of 100.7- MD notified and blood cultures drawn. Ibuprofen/tylenol for abdominal pain r/t incision, mostly effective. BS more active this shift, + flatus. No BM. Midline incision CDI, ice and abdominal binder for comfort. Denies N/V. Encouraging PO intake. Abd slightly distended, monitor. Hgb 7.9. R port HL, good blood return, dressing CDI. BGM, no coverage needed. LS dim, encouraging IS. Discharge pending resolution of fevers, bowel fxn.

## 2020-09-07 NOTE — PROVIDER NOTIFICATION
MD Notification    Notified Person: MD    Notified Person Name: yamilex    Notification Date/Time: 9/6 2255    Notification Interaction:  Text page    Purpose of Notification: new temp of 100.7, do you want blood cultures?    Orders Received: STAT Bc's    Comments:

## 2020-09-07 NOTE — PROGRESS NOTES
"Julia Andre is a 69 year old female patient.  1. Pelvic mass    2. Essential hypertension      Past Medical History:   Diagnosis Date     Anxiety      Cervical cancer (H)      Coronary artery disease 2009    angina. elevated troponins, normal coronary arteries found     Depression      Depressive disorder      Diabetes (H)     type 2     Gastroesophageal reflux disease      Heart attack (H)      Hyperlipidemia      Hypertension      Iron deficiency anemia      Osteopenia      Psoriasis      Sleep apnea      Current Outpatient Medications   Medication Sig Dispense Refill     acetaminophen (TYLENOL) 500 MG tablet Take 2 tablets (1,000 mg) by mouth every 8 hours as needed for mild pain 30 tablet 1     amLODIPine (NORVASC) 5 MG tablet Take 1 tablet (5 mg) by mouth daily 30 tablet 0     enoxaparin ANTICOAGULANT (LOVENOX ANTICOAGULANT) 40 MG/0.4ML syringe Take daily for 24 days. 24 Syringe 0     HYDROcodone-acetaminophen (NORCO) 5-325 MG tablet Take 1-2 tablets by mouth every 4 hours as needed for pain maximum 10 tablet(s) per day 15 tablet 0     lisinopril (ZESTRIL) 5 MG tablet Take 1 tablet (5 mg) by mouth daily 30 tablet 0     senna (SENOKOT) 8.6 MG tablet Take 1-3 tablets by mouth 2 times daily as needed for constipation 30 tablet 0     Allergies   Allergen Reactions     Atorvastatin Muscle Pain (Myalgia)     Simvastatin      Sulfa Drugs      Principal Problem:    Pelvic mass  Active Problems:    Elevated CA-125    CAD (coronary artery disease)    Diabetes mellitus type II, controlled (H)    HTN (hypertension)    Hyperlipidemia    GERD (gastroesophageal reflux disease)    Anxiety and depression    Constipation    Iron deficiency anemia    Osteopenia    Psoriasis    Depression    Pelvic mass in female    Blood pressure (!) 141/58, pulse 62, temperature 97.1  F (36.2  C), temperature source Oral, resp. rate 16, height 1.575 m (5' 2\"), weight 70.6 kg (155 lb 9.6 oz), SpO2 95 %.    Subjective:  Symptoms:  Stable.  No " "shortness of breath, chest pain or chest pressure.    Diet:  Adequate intake.  No nausea or vomiting.    Activity level: Returning to normal.    Pain:  She complains of pain that is moderate.  She reports pain is improving.  Pain is partially controlled.      Objective:  General Appearance:  Well-appearing, in no acute distress and uncomfortable (Appears mildly uncomfortable, shallow breaths. ).    Vital signs: (most recent): Blood pressure (!) 141/58, pulse 62, temperature 97.1  F (36.2  C), temperature source Oral, resp. rate 16, height 1.575 m (5' 2\"), weight 70.6 kg (155 lb 9.6 oz), SpO2 95 %.  Vital signs are normal.  No fever.    Output: Producing urine and no stool output.    HEENT: Normal HEENT exam.    Lungs:  Normal effort.  Breath sounds clear to auscultation.    Chest: Symmetric chest wall expansion.   Abdomen: Abdomen is soft and non-distended.  (Incision c/d/i).  Bowel sounds are normal.   There is no abdominal tenderness.     Extremities: Normal range of motion.    Pulses: Distal pulses are intact.    Neurological: Patient is alert and oriented to person, place and time.    Pupils:  Pupils are equal, round, and reactive to light.    Skin:  Warm and dry.  No rash.     Assessment:    Condition: In stable condition.  Improving.   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     Plan:   Encourage ambulation, out of bed and up to chair and ad radha activity.  Start/continue incentive spirometry.  Regular diet.  (Path: reviewed with primary team. She will need chemotherapy as adjuvant, s/p port placement in house.  She will need 3 1/2 weeks of Lovenox and teaching prior to discharge.      Low grade temps: Improving. Most " likely atelectasis at this point. Encourage IS. Incision looks great, denies urinary symptoms. Not requiring o2. WBC normal.     Abdominal pain:  Norco, Tylenol and Ice. Patient w/ history of alcoholism, so trying to minimize narcotic use. However, we reviewed that inadequate pain control can contribute to shallow breathing, atelectasis and low grade temps.      GI: Diet as tolerated, passing flatus, no stool as of yet. Still somewhat distended/tympanitic on exam, will monitor. Ambulate. IS.      S/p james removal with adequate ambulation, voiding.      Hypokalemia/HTN/RADHA/GERD:  Hospitalist is following.  Creatinine has normalized. Kidney function stable.      Anemia: Hgb stable at 7.9. suspect dilutional component, no evidence of bleeding. Asymptomatic. Consider iron supplement at discharge.     Dispo: anticipate discharge to home tomorrow if continues to do well with lack of recurrent low grade temps. ).       Samantha Nelson MD  9/7/2020

## 2020-09-07 NOTE — PROGRESS NOTES
[facility]    Hospitalist Progress Note    Assessment & Plan   Julia Andre is a 69 year old female who was admitted on 9/2/2020.      Past medical history significant for stress-induced MI with associated ischemic cardiomyopathy, hypertension, hyperlipidemia, type 2 diabetes, major depressive disorder with anxiety, osteopenia, GERD, obstructive sleep apnea, history of cervical cancer and recent diagnosis of ovarian mass/cancer with elevated CA-125 lab who underwent an elective exploratory laparotomy with total abdominal hysterectomy with bilateral salpingo-oophorectomy, omentectomy with tumor debulking and pelvic and paraaortic lymphadenectomy for which the Hospitalist Service has been consulted to assist with medical management.     Ovarian mass/cancer with elevated CA-125 lab study, status post elective exploratory laparotomy with total abdominal hysterectomy with bilateral salpingo-oophorectomy, omentectomy with tumor debulking and pelvic and paraaortic lymphadenectomy:    -Postoperative day #5.    -  --chest port placement 9/4  - Taking po, ambulating  -hb down to 7 range  Tolerating po. Taking adequate po  Nl bmp.    passing gas, no bm yet  Feeling better daily overall.   Low grade fever last night but no new symtpoms. Wbc nl today.   No focal infectious sx.   Seems to be improving daily overall.   -suspect atelectasis and/or surgery.   -bld cx drawn last night.   -vitals nl    Plan:   -management per surgery  --OB/GYN Service is managing at this point.                 --Defer analgesic management, DVT prophylaxis and PT/OT assessment to their service.                 --Strongly encourage utilization of incentive spirometer.    --Bowel regimen started today (Miralax daily and BID Senokot).  -regular diet         Hyperkalemia:  Resolved  Noted potassium of 5.5 peak this admission. Improved to nl range with IVFs and reduction in Lisinopril dose.   -  consider increase lisinopril to PTA dosing  outpatient, for now follow on reduced dose Lisinopril as prone to hyperkalemia. Follow up pcp with giuseppe     Mild hyponatremia:  Resolved with NS fluids.   2/2 dehydration     History of stress-induced MI with associated ischemic cardiomyopathy, hypertension and hyperlipidemia:    This appears to be stable at this point.  Upon review of electronic medical records, it appears coronary angiogram at time of MI had clean coronaries.  The patient has been holding baby aspirin daily for the last week.  - good control on current regimen. High in am before am meds    Plan:     --Hold ASA and defer to OB/GYN as to when this can be resumed.    --Resumed prior to admit lisinopril 10 mg daily, with hold parameters in place---> -Starting 9/5 reduced lisinopril to 5 mg daily as prone to hyperkalemia and start Norvasc 5 mg daily.   Scripts sent to pharmacy. Follow up with pcp. Discussed with pt  - Lopressor 50 mg 2 times daily    --Resumed prior to admit Crestor 20 mg daily.   --PRN IV hydralazine available for SBP > 180.  not need  - folow up pcp 1 week       Type 2 diabetes:    The patient indicated that this is well controlled and she is currently on metformin 500 mg 2 times daily.    - range. Not needing ISS  Plan;   --Hold PTA metformin.  restart several days after discharge once po intake at/nearer baseline  --Initiate medium intensity sliding scale insulin with Accu-Cheks performed 4 times daily.  --Hypoglycemic protocol in place.      Suspected CKD stage 2-3:  Review of EMR with noted creatinine between 1.0-1.3 and GFR in the 50-60 range and at times in the 40's.  Previous renal US 7/30/2020 with noted bilateral renal cysts.    -KRISSY and hypovolemic hyponatremia resolved with NS fluids  - taking po  -K nl.   - stopped fluids 9/5 and po intake has been adequate. Bmp nl.   - encouraged po intake and fluid intake---> taking good po    History of iron deficiency anemia with acute blood loss anemia:    Per electronic medical  record, the patient underwent a full GI workup and 2012 that indicated tiny erosions in the stomach.  The patient is no longer on iron supplements.   Hb 10 [id number]> 9.5---> 8.5 --> 7.6 ---> 7.9 today. Asymptomatic  - every other day iron per surgery  --Monitor per surgery     GERD:    The patient infrequently uses antacids.   --PRN Tums are made available.      Obstructive sleep apnea:    The patient indicated that she has a diagnosis of moderate obstructive sleep apnea and has been working on trying to get a new CPAP machine; however, she would prefer not having this, as she did not tolerate it in the past.    --Monitor O2 spot checks.   -IS     Major depressive disorder with anxiety:    --Resume prior to admit Wellbutrin 300 mg every morning.      History of cervical cancer:    The patient is status post surgical intervention.  No further interventions appear necessary at this point.     Diet: regular diet per surgery. Encourage po/fluid intake  DVT Prophylaxis: Defer to primary service (subcutaneous Heparins and PCD'S)   Gutierrez Catheter: in place, indication: /GI/GYN Pelvic Procedure  Code Status: Full Code        Disposition Plan-      Expected discharge: Per OB/GYN, maybe today      Tavo Mares MD  Text Page  (7am to 6pm)  Interval History   Low grade fever last night  Continues to feel better though. Passing gas. No bm  Taking po and ambulating  No cough,sob, n/v/d. No uti symptoms  No calf tenderness    -Data reviewed today: I reviewed all new labs and imaging results over the last 24 hours. I personally reviewed labs last 24 hours    Physical Exam   Temp: 97.8  F (36.6  C) Temp src: Oral BP: 132/74 Pulse: 76   Resp: 17 SpO2: 93 % O2 Device: None (Room air)    Vitals:    09/05/20 0455 09/06/20 0548 09/07/20 0556   Weight: 70.8 kg (156 lb) 71.5 kg (157 lb 9.6 oz) 70.6 kg (155 lb 9.6 oz)     Vital Signs with Ranges  Temp:  [97.8  F (36.6  C)-100.7  F (38.2  C)] 97.8  F (36.6  C)  Pulse:  [74-81]  76  Resp:  [16-17] 17  BP: (132-159)/(66-80) 132/74  SpO2:  [92 %-93 %] 93 %  I/O last 3 completed shifts:  In: 600 [P.O.:600]  Out: 1100 [Urine:1100]    Constitutional: Up in bed, nad  Respiratory: CTAB, breathing easily  Chest: port right upper chest  Cardiovascular: RRR no r/g/m  GI: midline  incision, binder, good BS.   Skin/Integumen: no rash, no calf pain  Neuro/psych: nl affect, nl speech and mentation      Medications       amLODIPine  5 mg Oral Daily     buPROPion  300 mg Oral QAM     enoxaparin ANTICOAGULANT  40 mg Subcutaneous Q24H     heparin  5 mL Intracatheter Q28 Days     heparin lock flush  5-10 mL Intracatheter Q24H     insulin aspart  1-7 Units Subcutaneous TID AC     insulin aspart  1-5 Units Subcutaneous At Bedtime     lisinopril  5 mg Oral Daily     metoprolol tartrate  50 mg Oral BID     polyethylene glycol  17 g Oral Daily     rosuvastatin  20 mg Oral Daily     sennosides  1 tablet Oral BID     simethicone  80 mg Oral 4x Daily       Data   Recent Labs   Lab 09/07/20  0600 09/06/20  0600 09/05/20  0713 09/04/20  0708  09/03/20  0717   WBC 6.6 6.7  --   --   --  5.6   HGB 7.9* 7.6* 8.5*  --   --  9.5*   MCV  --  86  --   --   --  87   PLT  --  279 301  --   --  362   INR  --   --   --  1.13  --   --    NA  --  135 135 137   < >  --    POTASSIUM  --  3.8 4.4 4.7   < >  --    CHLORIDE  --  105 105 106  --   --    CO2  --  26 27 28  --   --    BUN  --  10 10 13  --   --    CR  --  0.84 0.73 0.93  --   --    ANIONGAP  --  4 3 3  --   --    RADHA  --  7.6* 8.3* 8.1*  --   --    GLC  --  117* 105* 107*  --   --     < > = values in this interval not displayed.       Imaging:   No results found for this or any previous visit (from the past 24 hour(s)).

## 2020-09-08 LAB
ALBUMIN UR-MCNC: 10 MG/DL
APPEARANCE UR: CLEAR
BASOPHILS # BLD AUTO: 0 10E9/L (ref 0–0.2)
BASOPHILS NFR BLD AUTO: 0.1 %
BILIRUB UR QL STRIP: NEGATIVE
COLOR UR AUTO: YELLOW
CREAT SERPL-MCNC: 0.81 MG/DL (ref 0.52–1.04)
DIFFERENTIAL METHOD BLD: ABNORMAL
EOSINOPHIL # BLD AUTO: 0.6 10E9/L (ref 0–0.7)
EOSINOPHIL NFR BLD AUTO: 6.7 %
ERYTHROCYTE [DISTWIDTH] IN BLOOD BY AUTOMATED COUNT: 14.2 % (ref 10–15)
GFR SERPL CREATININE-BSD FRML MDRD: 73 ML/MIN/{1.73_M2}
GLUCOSE BLDC GLUCOMTR-MCNC: 116 MG/DL (ref 70–99)
GLUCOSE BLDC GLUCOMTR-MCNC: 117 MG/DL (ref 70–99)
GLUCOSE BLDC GLUCOMTR-MCNC: 93 MG/DL (ref 70–99)
GLUCOSE BLDC GLUCOMTR-MCNC: 98 MG/DL (ref 70–99)
GLUCOSE UR STRIP-MCNC: NEGATIVE MG/DL
HCT VFR BLD AUTO: 24.9 % (ref 35–47)
HGB BLD-MCNC: 8 G/DL (ref 11.7–15.7)
HGB UR QL STRIP: ABNORMAL
IMM GRANULOCYTES # BLD: 0 10E9/L (ref 0–0.4)
IMM GRANULOCYTES NFR BLD: 0.2 %
KETONES UR STRIP-MCNC: NEGATIVE MG/DL
LEUKOCYTE ESTERASE UR QL STRIP: ABNORMAL
LYMPHOCYTES # BLD AUTO: 0.6 10E9/L (ref 0.8–5.3)
LYMPHOCYTES NFR BLD AUTO: 7.6 %
MCH RBC QN AUTO: 27.5 PG (ref 26.5–33)
MCHC RBC AUTO-ENTMCNC: 32.1 G/DL (ref 31.5–36.5)
MCV RBC AUTO: 86 FL (ref 78–100)
MONOCYTES # BLD AUTO: 0.7 10E9/L (ref 0–1.3)
MONOCYTES NFR BLD AUTO: 7.9 %
NEUTROPHILS # BLD AUTO: 6.5 10E9/L (ref 1.6–8.3)
NEUTROPHILS NFR BLD AUTO: 77.5 %
NITRATE UR QL: NEGATIVE
NRBC # BLD AUTO: 0 10*3/UL
NRBC BLD AUTO-RTO: 0 /100
PH UR STRIP: 6 PH (ref 5–7)
PLATELET # BLD AUTO: 338 10E9/L (ref 150–450)
PLATELET # BLD AUTO: 411 10E9/L (ref 150–450)
RBC # BLD AUTO: 2.91 10E12/L (ref 3.8–5.2)
RBC #/AREA URNS AUTO: 3 /HPF (ref 0–2)
SOURCE: ABNORMAL
SP GR UR STRIP: 1.02 (ref 1–1.03)
SQUAMOUS #/AREA URNS AUTO: <1 /HPF (ref 0–1)
UROBILINOGEN UR STRIP-MCNC: NORMAL MG/DL (ref 0–2)
WBC # BLD AUTO: 8.4 10E9/L (ref 4–11)
WBC #/AREA URNS AUTO: 37 /HPF (ref 0–5)
WBC CLUMPS #/AREA URNS HPF: PRESENT /HPF

## 2020-09-08 PROCEDURE — 99207 ZZC NO BILLABLE SERVICE THIS VISIT: CPT | Performed by: STUDENT IN AN ORGANIZED HEALTH CARE EDUCATION/TRAINING PROGRAM

## 2020-09-08 PROCEDURE — 99233 SBSQ HOSP IP/OBS HIGH 50: CPT | Performed by: HOSPITALIST

## 2020-09-08 PROCEDURE — 25000132 ZZH RX MED GY IP 250 OP 250 PS 637: Performed by: PHYSICIAN ASSISTANT

## 2020-09-08 PROCEDURE — 87076 CULTURE ANAEROBE IDENT EACH: CPT | Performed by: STUDENT IN AN ORGANIZED HEALTH CARE EDUCATION/TRAINING PROGRAM

## 2020-09-08 PROCEDURE — 82565 ASSAY OF CREATININE: CPT | Performed by: OBSTETRICS & GYNECOLOGY

## 2020-09-08 PROCEDURE — 94799 UNLISTED PULMONARY SVC/PX: CPT

## 2020-09-08 PROCEDURE — 87040 BLOOD CULTURE FOR BACTERIA: CPT | Performed by: STUDENT IN AN ORGANIZED HEALTH CARE EDUCATION/TRAINING PROGRAM

## 2020-09-08 PROCEDURE — 25000132 ZZH RX MED GY IP 250 OP 250 PS 637: Performed by: OBSTETRICS & GYNECOLOGY

## 2020-09-08 PROCEDURE — 85049 AUTOMATED PLATELET COUNT: CPT | Performed by: OBSTETRICS & GYNECOLOGY

## 2020-09-08 PROCEDURE — 25000128 H RX IP 250 OP 636: Performed by: NURSE PRACTITIONER

## 2020-09-08 PROCEDURE — 12000000 ZZH R&B MED SURG/OB

## 2020-09-08 PROCEDURE — 00000146 ZZHCL STATISTIC GLUCOSE BY METER IP

## 2020-09-08 PROCEDURE — 87086 URINE CULTURE/COLONY COUNT: CPT | Performed by: INTERNAL MEDICINE

## 2020-09-08 PROCEDURE — 25000128 H RX IP 250 OP 636: Performed by: STUDENT IN AN ORGANIZED HEALTH CARE EDUCATION/TRAINING PROGRAM

## 2020-09-08 PROCEDURE — 25000132 ZZH RX MED GY IP 250 OP 250 PS 637: Performed by: NURSE PRACTITIONER

## 2020-09-08 PROCEDURE — 81001 URINALYSIS AUTO W/SCOPE: CPT | Performed by: INTERNAL MEDICINE

## 2020-09-08 PROCEDURE — 25000128 H RX IP 250 OP 636: Performed by: INTERNAL MEDICINE

## 2020-09-08 PROCEDURE — 25000132 ZZH RX MED GY IP 250 OP 250 PS 637: Performed by: HOSPITALIST

## 2020-09-08 PROCEDURE — 85025 COMPLETE CBC W/AUTO DIFF WBC: CPT | Performed by: HOSPITALIST

## 2020-09-08 PROCEDURE — 40000275 ZZH STATISTIC RCP TIME EA 10 MIN

## 2020-09-08 RX ORDER — BISACODYL 10 MG
10 SUPPOSITORY, RECTAL RECTAL 2 TIMES DAILY PRN
Status: DISCONTINUED | OUTPATIENT
Start: 2020-09-08 | End: 2020-09-11

## 2020-09-08 RX ORDER — PIPERACILLIN SODIUM, TAZOBACTAM SODIUM 3; .375 G/15ML; G/15ML
3.38 INJECTION, POWDER, LYOPHILIZED, FOR SOLUTION INTRAVENOUS EVERY 6 HOURS
Status: DISCONTINUED | OUTPATIENT
Start: 2020-09-08 | End: 2020-09-14

## 2020-09-08 RX ORDER — ASPIRIN 81 MG/1
81 TABLET, CHEWABLE ORAL DAILY
Status: DISCONTINUED | OUTPATIENT
Start: 2020-09-08 | End: 2020-09-08

## 2020-09-08 RX ORDER — ASPIRIN 81 MG/1
81 TABLET ORAL DAILY
Status: DISCONTINUED | OUTPATIENT
Start: 2020-09-08 | End: 2020-09-15 | Stop reason: HOSPADM

## 2020-09-08 RX ORDER — AMOXICILLIN 250 MG
2 CAPSULE ORAL 2 TIMES DAILY
Status: DISCONTINUED | OUTPATIENT
Start: 2020-09-08 | End: 2020-09-09 | Stop reason: ALTCHOICE

## 2020-09-08 RX ADMIN — PIPERACILLIN AND TAZOBACTAM 3.38 G: 3; .375 INJECTION, POWDER, FOR SOLUTION INTRAVENOUS at 10:21

## 2020-09-08 RX ADMIN — LISINOPRIL 5 MG: 5 TABLET ORAL at 08:52

## 2020-09-08 RX ADMIN — METOPROLOL TARTRATE 50 MG: 50 TABLET, FILM COATED ORAL at 19:53

## 2020-09-08 RX ADMIN — ASPIRIN 81 MG: 81 TABLET ORAL at 10:21

## 2020-09-08 RX ADMIN — IBUPROFEN 600 MG: 400 TABLET ORAL at 16:18

## 2020-09-08 RX ADMIN — METOPROLOL TARTRATE 50 MG: 50 TABLET, FILM COATED ORAL at 08:52

## 2020-09-08 RX ADMIN — PIPERACILLIN AND TAZOBACTAM 3.38 G: 3; .375 INJECTION, POWDER, FOR SOLUTION INTRAVENOUS at 22:29

## 2020-09-08 RX ADMIN — PIPERACILLIN AND TAZOBACTAM 3.38 G: 3; .375 INJECTION, POWDER, FOR SOLUTION INTRAVENOUS at 16:15

## 2020-09-08 RX ADMIN — IBUPROFEN 600 MG: 400 TABLET ORAL at 08:58

## 2020-09-08 RX ADMIN — BUPROPION HYDROCHLORIDE 300 MG: 150 TABLET, FILM COATED, EXTENDED RELEASE ORAL at 08:52

## 2020-09-08 RX ADMIN — DOCUSATE SODIUM AND SENNOSIDES 2 TABLET: 8.6; 5 TABLET, FILM COATED ORAL at 22:28

## 2020-09-08 RX ADMIN — AMLODIPINE BESYLATE 5 MG: 5 TABLET ORAL at 08:52

## 2020-09-08 RX ADMIN — ENOXAPARIN SODIUM 40 MG: 40 INJECTION SUBCUTANEOUS at 08:49

## 2020-09-08 RX ADMIN — SIMETHICONE 80 MG: 80 TABLET, CHEWABLE ORAL at 08:53

## 2020-09-08 RX ADMIN — SODIUM CHLORIDE, PRESERVATIVE FREE 5 ML: 5 INJECTION INTRAVENOUS at 23:05

## 2020-09-08 RX ADMIN — ROSUVASTATIN CALCIUM 20 MG: 20 TABLET, FILM COATED ORAL at 08:52

## 2020-09-08 RX ADMIN — SODIUM CHLORIDE, PRESERVATIVE FREE 5 ML: 5 INJECTION INTRAVENOUS at 11:12

## 2020-09-08 RX ADMIN — DOCUSATE SODIUM AND SENNOSIDES 1 TABLET: 8.6; 5 TABLET, FILM COATED ORAL at 10:21

## 2020-09-08 RX ADMIN — SENNOSIDES 1 TABLET: 8.6 TABLET, FILM COATED ORAL at 08:52

## 2020-09-08 RX ADMIN — ACETAMINOPHEN 975 MG: 325 TABLET, FILM COATED ORAL at 11:50

## 2020-09-08 RX ADMIN — Medication 5 ML: at 16:54

## 2020-09-08 RX ADMIN — BISACODYL 10 MG: 10 SUPPOSITORY RECTAL at 11:44

## 2020-09-08 RX ADMIN — IBUPROFEN 600 MG: 400 TABLET ORAL at 22:29

## 2020-09-08 RX ADMIN — ACETAMINOPHEN 975 MG: 325 TABLET, FILM COATED ORAL at 18:24

## 2020-09-08 RX ADMIN — SIMETHICONE 80 MG: 80 TABLET, CHEWABLE ORAL at 13:20

## 2020-09-08 RX ADMIN — POLYETHYLENE GLYCOL 3350 17 G: 17 POWDER, FOR SOLUTION ORAL at 08:53

## 2020-09-08 RX ADMIN — PIPERACILLIN AND TAZOBACTAM 3.38 G: 3; .375 INJECTION, POWDER, FOR SOLUTION INTRAVENOUS at 05:05

## 2020-09-08 ASSESSMENT — ACTIVITIES OF DAILY LIVING (ADL)
ADLS_ACUITY_SCORE: 13

## 2020-09-08 NOTE — PLAN OF CARE
POD6 A&O. VSS, afebrile. Blood cultures from port back + for ARTI DE LEÓN notified and zosyn started as well as repeat blood cultures drawn. Pain well controlled this shift. Incision sight WDL. + flatus. No BM yet. Discharge pending cultures.

## 2020-09-08 NOTE — PLAN OF CARE
Ambulating independently. Tolerating regular diet well. Passing flatus, suppository given, no results. Does not want another supp or enema today. Afebrile. Continue zosyn for + BC as ordered. Discharge home pending cultures.

## 2020-09-08 NOTE — PROVIDER NOTIFICATION
MD Notification    Notified Person: MD    Notified Person Name: yamilex    Notification Date/Time: 9/8 0350    Notification Interaction: text page    Purpose of Notification: blood cultures from port 9/6 2315 positive for GNR    Orders Received:    Comments:

## 2020-09-08 NOTE — PROGRESS NOTES
"[facility]  Hospitalist Progress Note          Assessment and Plan:    Pelvic mass:  POD 6 X-lap, TAHBSO, omentectomy, tumor debulking, right pelvic and para-aortic lymphadenectomy.  GI function returning and tolerated regular diet.  Heplock IV.     Gram neg alen bacteremia:  1 BC from port reportedly positive with GNR.  On Zosyn and afebrile with normal WBC.  Will consult ID for duration and type of oral antibiotic she should be on and whether port needs to be removed.  Repeat BC from port drawn last night.      Anemia:  Stable        Disposition:   Likely 1-2 days once ID determines antibiotic.               Interval History:   Passing flatus.  Ambulating well.  Voiding well.              Medications:   I have reviewed this patient's current medications               Physical Exam:   Blood pressure (!) 149/66, pulse 69, temperature 98.1  F (36.7  C), temperature source Oral, resp. rate 16, height 1.575 m (5' 2\"), weight 70.6 kg (155 lb 9.6 oz), SpO2 96 %.        Vital Sign Ranges  Temperature Temp  Av.9  F (36.6  C)  Min: 97  F (36.1  C)  Max: 98.3  F (36.8  C)   Blood pressure Systolic (24hrs), Av , Min:118 , Max:163        Diastolic (24hrs), Av, Min:60, Max:71      Pulse Pulse  Av.6  Min: 62  Max: 72   Respirations Resp  Avg: 15.9  Min: 15  Max: 16   Pulse oximetry SpO2  Av.5 %  Min: 94 %  Max: 96 %         Intake/Output Summary (Last 24 hours) at 2020 0840  Last data filed at 2020 0400  Gross per 24 hour   Intake 720 ml   Output 1200 ml   Net -480 ml       Lungs:   Clear to auscultation     Cardiovascular:   normal apical pulses      Abdomen:   BS present.  No incisional erythema     Musculoskeletal:   no lower extremity pitting edema present                Data:   All laboratory data reviewed  "

## 2020-09-08 NOTE — PROGRESS NOTES
[facility]    Hospitalist Progress Note    Assessment & Plan   Julia Andre is a 69 year old female who was admitted on 9/2/2020.      Past medical history significant for stress-induced MI with associated ischemic cardiomyopathy, hypertension, hyperlipidemia, type 2 diabetes, major depressive disorder with anxiety, osteopenia, GERD, obstructive sleep apnea, history of cervical cancer and recent diagnosis of ovarian mass/cancer with elevated CA-125 lab who underwent an elective exploratory laparotomy with total abdominal hysterectomy with bilateral salpingo-oophorectomy, omentectomy with tumor debulking and pelvic and paraaortic lymphadenectomy for which the Hospitalist Service has been consulted to assist with medical management.     Ovarian mass/cancer with elevated CA-125 lab study, status post elective exploratory laparotomy with total abdominal hysterectomy with bilateral salpingo-oophorectomy, omentectomy with tumor debulking and pelvic and paraaortic lymphadenectomy:    -  --chest port placement 9/4.   Low grade fever 9/7/2020, BC from port positive GNR, started on Vanco, BC x2 repeated including from port.  Monitor blood cultures/sensitivities  -ID consult, appreciate input.  They felt that port can remain in place for now, follow-up BC.  -Monitor temp profile, WBC.    --Defer analgesic management, DVT prophylaxis and PT/OT assessment to GYN.    Bacteremia, 9/6/2020, GNR, see #1.  -Follow-up blood culture/sensitivity including repeat BC 9/8/2020 including from port.  -ID consult, appreciate input  -Follow-up UA/UC.        Hyperkalemia:  Resolved  Noted potassium of 5.5 peak this admission. Improved to nl range with IVFs and reduction in Lisinopril dose.   -  consider increase lisinopril to PTA dosing outpatient, for now follow on reduced dose Lisinopril as prone to hyperkalemia. Follow up pcp with bmp     Mild hyponatremia:  Resolved with NS fluids.   2/2 dehydration     History of  stress-induced MI with associated ischemic cardiomyopathy, hypertension and hyperlipidemia:    This appears to be stable at this point.  Upon review of electronic medical records, it appears coronary angiogram at time of MI had clean coronaries.  The patient has been holding baby aspirin daily for the last week.  - good control on current regimen. High in am before am meds    --Hold ASA and defer to OB/GYN as to when this can be resumed.    --Resumed prior to admit lisinopril 10 mg daily, with hold parameters in place---> -Starting 9/5 reduced lisinopril to 5 mg daily as prone to hyperkalemia and start Norvasc 5 mg daily. - Lopressor 50 mg 2 times daily  --Resumed prior to admit Crestor 20 mg daily.   --PRN IV hydralazine available for SBP > 180.        Type 2 diabetes:    The patient indicated that this is well controlled and she is currently on metformin 500 mg 2 times daily.    - range. Not needing ISS  --Hold PTA metformin.  restart several days after discharge once po intake at/nearer baseline  --Initiate medium intensity sliding scale insulin with Accu-Cheks performed 4 times daily.  --Hypoglycemic protocol in place.      Suspected CKD stage 2-3:  Review of EMR with noted creatinine between 1.0-1.3 and GFR in the 50-60 range and at times in the 40's.  Previous renal US 7/30/2020 with noted bilateral renal cysts.    -KRISSY and hypovolemic hyponatremia resolved with NS fluids  - taking po  -K nl.   - stopped fluids 9/5 and po intake has been adequate. Bmp nl.   - encouraged po intake and fluid intake---> taking good po    History of iron deficiency anemia with acute blood loss anemia:    Per electronic medical record, the patient underwent a full GI workup and 2012 that indicated tiny erosions in the stomach.  The patient is no longer on iron supplements.   Hb 10 [id number]> 9.5---> 8.5 --> 7.6 ---> 7.9 Asymptomatic  - every other day iron per surgery  --Monitor per surgery     GERD:    The patient  infrequently uses antacids.   --PRN Tums are made available.      Obstructive sleep apnea:    The patient indicated that she has a diagnosis of moderate obstructive sleep apnea and has been working on trying to get a new CPAP machine; however, she would prefer not having this, as she did not tolerate it in the past.    --Monitor O2 spot checks.   -IS     Major depressive disorder with anxiety:    --Resume prior to admit Wellbutrin 300 mg every morning.      History of cervical cancer:    The patient is status post surgical intervention.  Follow-up GYN.      Diet: regular diet per surgery. Encourage po/fluid intake  DVT Prophylaxis: Defer to primary service (subcutaneous Heparins and PCD'S)   Gutierrez Catheter: in place, indication: /GI/GYN Pelvic Procedure  Code Status: Full Code        Disposition Plan-  TBD pending blood culture results including repeat BC from port, ID recommendations/plan established.      Memo Sanchez MD  Text Page  (7am to 6pm)  Interval History   Disappointed and reported positive culture, she wanted to go home.  Feels well, some discomfort on position changes related to recent abdominal/pelvic surgery.  Nursing reports no wound issues, afebrile.  BC positive from port, GNR.      Physical Exam   Temp: 98.6  F (37  C) Temp src: Oral BP: (!) 149/66 Pulse: 69   Resp: 16 SpO2: 96 % O2 Device: None (Room air)    Vitals:    09/05/20 0455 09/06/20 0548 09/07/20 0556   Weight: 70.8 kg (156 lb) 71.5 kg (157 lb 9.6 oz) 70.6 kg (155 lb 9.6 oz)     Vital Signs with Ranges  Temp:  [97  F (36.1  C)-98.6  F (37  C)] 98.6  F (37  C)  Pulse:  [62-72] 69  Resp:  [15-16] 16  BP: (118-163)/(60-71) 149/66  SpO2:  [94 %-96 %] 96 %  I/O last 3 completed shifts:  In: 720 [P.O.:720]  Out: 1200 [Urine:1200]    Constitutional: NAD, alert, calm, cooperative.  Respiratory: CTAB, respirations nonlabored.  Chest: port right upper chest  Cardiovascular: The right, no murmur appreciated.  GI: midline  incision, binder,  good BS.  Nontender, nondistended.  Skin/Integumen: no rash on gross exam., no calf pain  Neuro, nl speech and mentation  Psych: Affect calm.      Medications       amLODIPine  5 mg Oral Daily     aspirin  81 mg Oral Daily     buPROPion  300 mg Oral QAM     enoxaparin ANTICOAGULANT  40 mg Subcutaneous Q24H     heparin  5 mL Intracatheter Q28 Days     heparin lock flush  5-10 mL Intracatheter Q24H     insulin aspart  1-7 Units Subcutaneous TID AC     insulin aspart  1-5 Units Subcutaneous At Bedtime     lisinopril  5 mg Oral Daily     metoprolol tartrate  50 mg Oral BID     piperacillin-tazobactam  3.375 g Intravenous Q6H     polyethylene glycol  17 g Oral Daily     rosuvastatin  20 mg Oral Daily     senna-docusate  2 tablet Oral BID     simethicone  80 mg Oral 4x Daily       Data   Recent Labs   Lab 09/08/20  1025 09/08/20  0540 09/07/20  0600 09/06/20  0600 09/05/20  0713 09/04/20  0708  09/03/20  0717   WBC 8.4  --  6.6 6.7  --   --   --  5.6   HGB 8.0*  --  7.9* 7.6* 8.5*  --   --  9.5*   MCV 86  --   --  86  --   --   --  87    338  --  279 301  --   --  362   INR  --   --   --   --   --  1.13  --   --    NA  --   --   --  135 135 137   < >  --    POTASSIUM  --   --   --  3.8 4.4 4.7   < >  --    CHLORIDE  --   --   --  105 105 106  --   --    CO2  --   --   --  26 27 28  --   --    BUN  --   --   --  10 10 13  --   --    CR  --  0.81  --  0.84 0.73 0.93  --   --    ANIONGAP  --   --   --  4 3 3  --   --    RADHA  --   --   --  7.6* 8.3* 8.1*  --   --    GLC  --   --   --  117* 105* 107*  --   --     < > = values in this interval not displayed.

## 2020-09-08 NOTE — PLAN OF CARE
POD5. Vitals stable, afebrile. Ambulating halls independently. C/o pain in abdomen, Norco and Ibuprofen effective. Tolerating regular diet. Adequate urine out put. Passing gas, no BM yet. Midline incision stapled and open to air. Port hep locked, good blood return. Likely discharge to home tomorrow.

## 2020-09-08 NOTE — PROVIDER NOTIFICATION
Dr. Sanchez notified of UA results. Per MD, pt already on Zosyn which will cover. Wait for UC results.

## 2020-09-08 NOTE — PROGRESS NOTES
Blood cultures from port growing gram negative rods.     Currently pt is vitally stable and afebrile, but will start Zosyn for treatment of bacteremia. Collect repeat culture from port. Defer to day provider for additional work-up.     Yvonne Lloyd MD

## 2020-09-08 NOTE — CONSULTS
[facility]    Infectious Disease Consultation     Date of Admission:  9/2/2020  Date of Consult (When I saw the patient): 09/08/20    Assessment & Plan   Julia Andre is a 69 year old female who was admitted on 9/2/2020.     Impression:  1. 69 y.o female with stress-induced MI with associated ischemic cardiomyopathy, hypertension, hyperlipidemia.   2. Type 2 diabetes.   3. Major depressive disorder with anxiety.   4. History of cervical cancer and recent diagnosis of ovarian mass/cancer with elevated CA-125 lab who underwent an elective exploratory laparotomy with total abdominal hysterectomy with bilateral salpingo-oophorectomy, omentectomy with tumor debulking and pelvic and paraaortic lymphadenectomy on 9/2  5. Recent PORT placement on 9/4. No local pain   6. Post operative fevers leading to blood cultures, which are now positive for GNR, pending ID.     Recommendations:   Continue on zosyn   Follow up on the full cultures ID and SLOANE   Get UA and UC   PORT OK TO STAY for now       Chas Trent MD    Reason for Consult   Reason for consult: I was asked to evaluate this patient for GNR bacteremia.    Primary Care Physician   Linda Martinez MD    Chief Complaint   Elective surgery     History is obtained from the patient and medical records    History of Present Illness   Julia Andre is a 69 year old female with stress-induced MI with associated ischemic cardiomyopathy, hypertension, hyperlipidemia, type 2 diabetes, major depressive disorder with anxiety, osteopenia, GERD, obstructive sleep apnea, history of cervical cancer and recent diagnosis of ovarian mass/cancer with elevated CA-125 lab who underwent an elective exploratory laparotomy with total abdominal hysterectomy with bilateral salpingo-oophorectomy, omentectomy with tumor debulking and pelvic and paraaortic lymphadenectomy    Past Medical History   I have reviewed this patient's medical history and updated it with pertinent  information if needed.   Past Medical History:   Diagnosis Date     Anxiety      Cervical cancer (H)      Coronary artery disease 2009    angina. elevated troponins, normal coronary arteries found     Depression      Depressive disorder      Diabetes (H)     type 2     Gastroesophageal reflux disease      Heart attack (H)      Hyperlipidemia      Hypertension      Iron deficiency anemia      Osteopenia      Psoriasis      Sleep apnea        Past Surgical History   I have reviewed this patient's surgical history and updated it with pertinent information if needed.  Past Surgical History:   Procedure Laterality Date     EYE SURGERY      strabismus     GI SURGERY  1996    cystoscopy     GYN SURGERY      bladder suspension     GYN SURGERY  1974    cone biopsy     HYSTERECTOMY TOTAL ABD, BRANDI SALPINGO-OOPHORECTOMY, NODE DISSECTION, TUMOR DEBULKING, COMBINED Bilateral 9/2/2020    Procedure: TOTAL ABDOMINAL HYSTERECTOMY WITH BILATERAL SALPINGO-OOPHORECTOMY; WASHINGS; OMENTECTOMY TUMOR DEBULKING; PELVIC AND PARA-AORTIC LYMPHADENECTOMY;  Surgeon: Emily Hickey MD;  Location: [facility] OR     IR CHEST PORT PLACEMENT > 5 YRS OF AGE  9/4/2020     LAPAROTOMY EXPLORATORY N/A 9/2/2020    Procedure: EXPLORATORY LAPAROTOMY;  Surgeon: Emily Hickey MD;  Location: [facility] OR     SOFT TISSUE SURGERY      knee liposuction       Prior to Admission Medications   Prior to Admission Medications   Prescriptions Last Dose Informant Patient Reported? Taking?   Ascorbic Acid (VITAMIN C) 500 MG CAPS 8/31/2020 at 2200 Self Yes Yes   Sig: Take 500 mg by mouth every evening   BIOTIN PO 8/31/2020 at 2200 Self Yes Yes   Sig: Take 1 tablet by mouth every morning   Multiple Vitamin (MULTIVITAMIN PO) 9/1/2020 at 0900 Self Yes Yes   Sig: Take 1 tablet by mouth every morning   Omega-3 Fatty Acids (FISH OIL PO) 8/29/2020 at 2200 Self Yes Yes   Sig: Take 2 capsules by mouth 2 times daily   aspirin 81 MG EC tablet Past Week at 0900 Self Yes Yes   Sig:  Take 81 mg by mouth daily   buPROPion (WELLBUTRIN XL) 300 MG 24 hr tablet 9/2/2020 at 0500 Self Yes Yes   Sig: Take 300 mg by mouth every morning   calcium acetate (PHOSLO) 667 MG CAPS capsule 8/31/2020 at 2200 Self Yes Yes   Sig: Take 667 mg by mouth every evening    lisinopril (ZESTRIL) 10 MG tablet 9/2/2020 at 0500 Self Yes No   Sig: Take 10 mg by mouth daily   metFORMIN (GLUCOPHAGE) 500 MG tablet 9/1/2020 at 0900 Self Yes Yes   Sig: Take 500 mg by mouth 2 times daily (with meals)   metoprolol tartrate (LOPRESSOR) 50 MG tablet 9/2/2020 at 0500 Self Yes Yes   Sig: Take 50 mg by mouth 2 times daily   oxymetazoline (AFRIN) 0.05 % nasal spray 9/1/2020 at 1600 Self Yes Yes   Sig: Spray 2 sprays into both nostrils daily as needed for congestion   polyethylene glycol (MIRALAX) 17 g packet 8/31/2020 at 2200 Self Yes Yes   Sig: Take 1 packet by mouth daily   rosuvastatin (CRESTOR) 20 MG tablet 9/2/2020 at 0500 Self Yes Yes   Sig: Take 20 mg by mouth daily      Facility-Administered Medications: None     Allergies   Allergies   Allergen Reactions     Atorvastatin Muscle Pain (Myalgia)     Simvastatin      Sulfa Drugs        Immunization History     There is no immunization history on file for this patient.    Social History   I have reviewed this patient's social history and updated it with pertinent information if needed. Julia Andre  reports that she quit smoking about 26 years ago. She has a 67.50 pack-year smoking history. She has never used smokeless tobacco. She reports previous alcohol use. She reports that she does not use drugs.    Family History   I have reviewed this patient's family history and updated it with pertinent information if needed.   History reviewed. No pertinent family history.    Review of Systems   The 10 point Review of Systems is negative other than noted in the HPI or here.     Physical Exam   Temp: 98.6  F (37  C) Temp src: Oral BP: (!) 149/66 Pulse: 69   Resp: 16 SpO2: 96 % O2 Device:  None (Room air)    Vital Signs with Ranges  Temp:  [97  F (36.1  C)-98.6  F (37  C)] 98.6  F (37  C)  Pulse:  [62-72] 69  Resp:  [15-16] 16  BP: (118-163)/(60-71) 149/66  SpO2:  [94 %-96 %] 96 %  155 lbs 9.6 oz  Body mass index is 28.46 kg/m .    GENERAL APPEARANCE:  alert and no distress  EYES: Eyes grossly normal to inspection, PERRL and conjunctivae and sclerae normal  HENT: ear canals and TM's normal and nose and mouth without ulcers or lesions  NECK: no adenopathy, no asymmetry, masses, or scars and thyroid normal to palpation  RESP: lungs clear to auscultation - no rales, rhonchi or wheezes  CV: regular rates and rhythm, normal S1 S2, no S3 or S4 and no murmur, click or rub  LYMPHATICS: normal ant/post cervical and supraclavicular nodes  ABDOMEN: soft, nontender, without hepatosplenomegaly or masses and bowel sounds normal  MS: extremities normal- no gross deformities noted  SKIN: no suspicious lesions or rashes      Data   Lab Results   Component Value Date    WBC 8.4 09/08/2020    HGB 8.0 (L) 09/08/2020    HCT 24.9 (L) 09/08/2020     09/08/2020     09/06/2020    POTASSIUM 3.8 09/06/2020    CHLORIDE 105 09/06/2020    CO2 26 09/06/2020    BUN 10 09/06/2020    CR 0.81 09/08/2020     (H) 09/06/2020    INR 1.13 09/04/2020     Recent Labs   Lab 09/08/20  0420 09/06/20  2353 09/06/20  2315   CULT No growth after 2 hours Cultured on the 1st day of incubation:  Gram negative rods  *  Critical Value/Significant Value, preliminary result only, called to and read back by  Nela Catherine RN @ 7402 9.8.20 JE/LM   Cultured on the 1st day of incubation:  Gram negative rods    Critical Value/Significant Value, preliminary result only, called to and read back by  Dante Coffey RN at 2361 9.8.20. amd    (Note)  NEGATIVE for the following organisms and resistance markers:  Acinetobacter sp., Citrobacter sp., Enterobacter sp., Proteus sp., E.  coli, K. pneumoniae/oxytoca, P. aeruginosa, CTX-M, KPC, NDM,  VIM, IMP  and OXA by Verigene multiplex nucleic acid test. Final identification  and antimicrobial susceptibility testing will be verified by standard  methods.    Critical Value/Significant Value called to and read back by  Clara Kwon RN 9/8/20 @ 0557 TF       Recent Labs   Lab Test 09/08/20  0420 09/06/20  2353 09/06/20  2315   CULT No growth after 2 hours Cultured on the 1st day of incubation:  Gram negative rods  *  Critical Value/Significant Value, preliminary result only, called to and read back by  Nela Catherine RN @ 3130 9.8.20 JE/LM   Cultured on the 1st day of incubation:  Gram negative rods    Critical Value/Significant Value, preliminary result only, called to and read back by  Dante Coffey RN at 0346 9.8.20. amd    (Note)  NEGATIVE for the following organisms and resistance markers:  Acinetobacter sp., Citrobacter sp., Enterobacter sp., Proteus sp., E.  coli, K. pneumoniae/oxytoca, P. aeruginosa, CTX-M, KPC, NDM, VIM, IMP  and OXA by Verigene multiplex nucleic acid test. Final identification  and antimicrobial susceptibility testing will be verified by standard  methods.    Critical Value/Significant Value called to and read back by  Clara Kwon RN 9/8/20 @ 0557 TF         Amount of time performed on this consult: 45 minutes. This includes face to face assessment and care coordination with the primary team.

## 2020-09-08 NOTE — PROVIDER NOTIFICATION
Lab reported GM neg rods from L arm BC 9/6/20. + BC already reported (GN neg rods from port), antibiotics started. Will update MD when rounding.

## 2020-09-09 ENCOUNTER — APPOINTMENT (OUTPATIENT)
Dept: CT IMAGING | Facility: CLINIC | Age: 70
DRG: 737 | End: 2020-09-09
Attending: NURSE PRACTITIONER
Payer: COMMERCIAL

## 2020-09-09 LAB
BACTERIA SPEC CULT: NO GROWTH
ERYTHROCYTE [DISTWIDTH] IN BLOOD BY AUTOMATED COUNT: 14.1 % (ref 10–15)
GLUCOSE BLDC GLUCOMTR-MCNC: 102 MG/DL (ref 70–99)
GLUCOSE BLDC GLUCOMTR-MCNC: 103 MG/DL (ref 70–99)
GLUCOSE BLDC GLUCOMTR-MCNC: 107 MG/DL (ref 70–99)
GLUCOSE BLDC GLUCOMTR-MCNC: 152 MG/DL (ref 70–99)
HCT VFR BLD AUTO: 24 % (ref 35–47)
HGB BLD-MCNC: 7.8 G/DL (ref 11.7–15.7)
Lab: NORMAL
MCH RBC QN AUTO: 27.8 PG (ref 26.5–33)
MCHC RBC AUTO-ENTMCNC: 32.5 G/DL (ref 31.5–36.5)
MCV RBC AUTO: 85 FL (ref 78–100)
PLATELET # BLD AUTO: 404 10E9/L (ref 150–450)
RBC # BLD AUTO: 2.81 10E12/L (ref 3.8–5.2)
SPECIMEN SOURCE: NORMAL
WBC # BLD AUTO: 7.5 10E9/L (ref 4–11)

## 2020-09-09 PROCEDURE — 25000132 ZZH RX MED GY IP 250 OP 250 PS 637: Performed by: NURSE PRACTITIONER

## 2020-09-09 PROCEDURE — 94799 UNLISTED PULMONARY SVC/PX: CPT

## 2020-09-09 PROCEDURE — 74177 CT ABD & PELVIS W/CONTRAST: CPT

## 2020-09-09 PROCEDURE — 25000132 ZZH RX MED GY IP 250 OP 250 PS 637: Performed by: PHYSICIAN ASSISTANT

## 2020-09-09 PROCEDURE — 99232 SBSQ HOSP IP/OBS MODERATE 35: CPT | Performed by: HOSPITALIST

## 2020-09-09 PROCEDURE — 25000128 H RX IP 250 OP 636: Performed by: NURSE PRACTITIONER

## 2020-09-09 PROCEDURE — 25000128 H RX IP 250 OP 636: Performed by: INTERNAL MEDICINE

## 2020-09-09 PROCEDURE — 12000000 ZZH R&B MED SURG/OB

## 2020-09-09 PROCEDURE — 25000125 ZZHC RX 250: Performed by: OBSTETRICS & GYNECOLOGY

## 2020-09-09 PROCEDURE — 25000128 H RX IP 250 OP 636: Performed by: OBSTETRICS & GYNECOLOGY

## 2020-09-09 PROCEDURE — 25000132 ZZH RX MED GY IP 250 OP 250 PS 637: Performed by: OBSTETRICS & GYNECOLOGY

## 2020-09-09 PROCEDURE — 40000275 ZZH STATISTIC RCP TIME EA 10 MIN

## 2020-09-09 PROCEDURE — 25000132 ZZH RX MED GY IP 250 OP 250 PS 637: Performed by: HOSPITALIST

## 2020-09-09 PROCEDURE — 25000128 H RX IP 250 OP 636: Performed by: STUDENT IN AN ORGANIZED HEALTH CARE EDUCATION/TRAINING PROGRAM

## 2020-09-09 PROCEDURE — 85027 COMPLETE CBC AUTOMATED: CPT | Performed by: HOSPITALIST

## 2020-09-09 PROCEDURE — 00000146 ZZHCL STATISTIC GLUCOSE BY METER IP

## 2020-09-09 RX ORDER — IOPAMIDOL 755 MG/ML
78 INJECTION, SOLUTION INTRAVASCULAR ONCE
Status: COMPLETED | OUTPATIENT
Start: 2020-09-09 | End: 2020-09-09

## 2020-09-09 RX ORDER — SENNOSIDES 8.6 MG
2 TABLET ORAL 2 TIMES DAILY
Status: DISCONTINUED | OUTPATIENT
Start: 2020-09-09 | End: 2020-09-11 | Stop reason: ALTCHOICE

## 2020-09-09 RX ORDER — LACTOBACILLUS RHAMNOSUS GG 10B CELL
1 CAPSULE ORAL 2 TIMES DAILY
Status: DISCONTINUED | OUTPATIENT
Start: 2020-09-09 | End: 2020-09-15 | Stop reason: HOSPADM

## 2020-09-09 RX ADMIN — SODIUM CHLORIDE, PRESERVATIVE FREE 5 ML: 5 INJECTION INTRAVENOUS at 17:53

## 2020-09-09 RX ADMIN — SENNOSIDES 2 TABLET: 8.6 TABLET, FILM COATED ORAL at 20:27

## 2020-09-09 RX ADMIN — ACETAMINOPHEN 975 MG: 325 TABLET, FILM COATED ORAL at 23:14

## 2020-09-09 RX ADMIN — ENOXAPARIN SODIUM 40 MG: 40 INJECTION SUBCUTANEOUS at 09:13

## 2020-09-09 RX ADMIN — PIPERACILLIN AND TAZOBACTAM 3.38 G: 3; .375 INJECTION, POWDER, FOR SOLUTION INTRAVENOUS at 05:22

## 2020-09-09 RX ADMIN — SODIUM CHLORIDE 62 ML: 9 INJECTION, SOLUTION INTRAVENOUS at 15:52

## 2020-09-09 RX ADMIN — ACETAMINOPHEN 975 MG: 325 TABLET, FILM COATED ORAL at 01:22

## 2020-09-09 RX ADMIN — METOPROLOL TARTRATE 50 MG: 50 TABLET, FILM COATED ORAL at 09:14

## 2020-09-09 RX ADMIN — PIPERACILLIN AND TAZOBACTAM 3.38 G: 3; .375 INJECTION, POWDER, FOR SOLUTION INTRAVENOUS at 23:07

## 2020-09-09 RX ADMIN — SODIUM CHLORIDE, PRESERVATIVE FREE 5 ML: 5 INJECTION INTRAVENOUS at 12:08

## 2020-09-09 RX ADMIN — SIMETHICONE 80 MG: 80 TABLET, CHEWABLE ORAL at 17:10

## 2020-09-09 RX ADMIN — SIMETHICONE 80 MG: 80 TABLET, CHEWABLE ORAL at 14:57

## 2020-09-09 RX ADMIN — POLYETHYLENE GLYCOL 3350 17 G: 17 POWDER, FOR SOLUTION ORAL at 09:13

## 2020-09-09 RX ADMIN — Medication 1 CAPSULE: at 20:28

## 2020-09-09 RX ADMIN — IBUPROFEN 600 MG: 400 TABLET ORAL at 19:26

## 2020-09-09 RX ADMIN — AMLODIPINE BESYLATE 5 MG: 5 TABLET ORAL at 09:13

## 2020-09-09 RX ADMIN — IOPAMIDOL 78 ML: 755 INJECTION, SOLUTION INTRAVENOUS at 15:53

## 2020-09-09 RX ADMIN — LISINOPRIL 5 MG: 5 TABLET ORAL at 09:14

## 2020-09-09 RX ADMIN — DOCUSATE SODIUM AND SENNOSIDES 2 TABLET: 8.6; 5 TABLET, FILM COATED ORAL at 09:13

## 2020-09-09 RX ADMIN — METOPROLOL TARTRATE 50 MG: 50 TABLET, FILM COATED ORAL at 20:28

## 2020-09-09 RX ADMIN — ASPIRIN 81 MG: 81 TABLET ORAL at 09:14

## 2020-09-09 RX ADMIN — ACETAMINOPHEN 975 MG: 325 TABLET, FILM COATED ORAL at 14:57

## 2020-09-09 RX ADMIN — BISACODYL 10 MG: 10 SUPPOSITORY RECTAL at 14:09

## 2020-09-09 RX ADMIN — BUPROPION HYDROCHLORIDE 300 MG: 150 TABLET, FILM COATED, EXTENDED RELEASE ORAL at 09:14

## 2020-09-09 RX ADMIN — IBUPROFEN 600 MG: 400 TABLET ORAL at 09:33

## 2020-09-09 RX ADMIN — ROSUVASTATIN CALCIUM 20 MG: 20 TABLET, FILM COATED ORAL at 09:13

## 2020-09-09 RX ADMIN — PIPERACILLIN AND TAZOBACTAM 3.38 G: 3; .375 INJECTION, POWDER, FOR SOLUTION INTRAVENOUS at 17:10

## 2020-09-09 RX ADMIN — SIMETHICONE 80 MG: 80 TABLET, CHEWABLE ORAL at 09:15

## 2020-09-09 RX ADMIN — PIPERACILLIN AND TAZOBACTAM 3.38 G: 3; .375 INJECTION, POWDER, FOR SOLUTION INTRAVENOUS at 10:53

## 2020-09-09 ASSESSMENT — ACTIVITIES OF DAILY LIVING (ADL)
ADLS_ACUITY_SCORE: 13

## 2020-09-09 ASSESSMENT — MIFFLIN-ST. JEOR: SCORE: 1175.87

## 2020-09-09 NOTE — PLAN OF CARE
POD7. A&O. VSS. Pain controlled w/ tylenol and ibuprofen. Continues on zosyn q6h. ID following. + flatus, having small amount of stool that is mucous appearance. Abd distended, BS slightly more hypo than previous days, pt ambulating throughout the day. Denies N/V. Abdominal binder in place, incision site WDL. Following repeat Bc's, so far negative. Uc's pending. Discharge pending abx plan.

## 2020-09-09 NOTE — PROGRESS NOTES
"[facility]  Hospitalist Progress Note          Assessment and Plan:    Pelvic mass:  POD 7 X-lap, TAHBSO, omentectomy, tumor debulking, right pelvic and para-aortic lymphadenectomy.  GI function returning and tolerated regular diet.  Constipated. Repeat suppository.      Gram neg alen bacteremia:  1 BC from port reportedly positive with GNR.  On Zosyn and afebrile with normal WBC.  Appreciate ID rec's for duration and type of oral antibiotic she should be on and Port may stay in for now.  Repeat BC's NTD. UC pending.       Anemia:  Stable        Disposition: Once ID determines PO antibiotic and gives clearance for discharge.                Interval History:   Passing flatus.  Ambulating well.  Voiding well. No N/V.               Medications:   I have reviewed this patient's current medications               Physical Exam:   Blood pressure (!) 153/66, pulse 66, temperature 97.4  F (36.3  C), temperature source Oral, resp. rate 16, height 1.575 m (5' 2\"), weight 70.3 kg (154 lb 14.4 oz), SpO2 96 %.        Vital Sign Ranges  Temperature Temp  Av.9  F (36.6  C)  Min: 97  F (36.1  C)  Max: 98.3  F (36.8  C)   Blood pressure Systolic (24hrs), Av , Min:118 , Max:163        Diastolic (24hrs), Av, Min:60, Max:71      Pulse Pulse  Av.6  Min: 62  Max: 72   Respirations Resp  Avg: 15.9  Min: 15  Max: 16   Pulse oximetry SpO2  Av.5 %  Min: 94 %  Max: 96 %         Intake/Output Summary (Last 24 hours) at 2020 0840  Last data filed at 2020 0400  Gross per 24 hour   Intake 720 ml   Output 1200 ml   Net -480 ml       Lungs:   Clear to auscultation     Cardiovascular:   normal apical pulses      Abdomen:   BS present.  No incisional erythema     Musculoskeletal:   no lower extremity pitting edema present                Data:   All laboratory data reviewed  "

## 2020-09-09 NOTE — PROGRESS NOTES
"BRIEF NUTRITION ASSESSMENT      REASON FOR ASSESSMENT:  Julia Andre is a 69 year old female assessed by Registered Dietitian for LOS      CURRENT DIET AND INTAKE:  Diet:  Regular              Chart reviewed  Pt receiving cares  Pt noted to be tolerating po  Breakfast today - bagel, eggs, pancake, valdez      ANTHROPOMETRICS:  Height: 5' 2\"  Weight: (9/9) 70.3 kg / 154 lbs 14.4 oz  Body mass index is 28.33 kg/m .   Weight Status: Overweight BMI 25-29.9  IBW:  50 kg  %IBW: 141%  Weight History:   Wt Readings from Last 10 Encounters:   09/09/20 70.3 kg (154 lb 14.4 oz)     Per Care Everywhere:  7/28/20 67.6 kg    LABS:  Labs noted    MALNUTRITION:  Visual Nutrition Focused Physical Assessment (NFPA) not completed due to pt not available at this time to visit.   Do not suspect muscle/fat losses.  Patient does not meet two of the following criteria necessary for diagnosing malnutrition.     % Weight Loss:  None noted  % Intake:  No decreased intake noted  Subcutaneous Fat Loss:  deferred  Muscle Loss:  deferred  Fluid Retention:  None noted    NUTRITION INTERVENTION:  Nutrition Diagnosis:  No nutrition diagnosis at this time.    Implementation:  Nutrition Education ---> Per Provider order if indicated    FOLLOW UP/MONITORING:   Will re-evaluate in 7 - 10 days, or sooner, if re-consulted.          "

## 2020-09-09 NOTE — PLAN OF CARE
POD7. A&O. VSS. Pain controlled w/ tylenol and ibuprofen. Continues on zosyn q6h. ID following. + flatus, had scant mucous w/ suppository. Abd sl distended from baseline, pt ambulating throughout the day. Denies N/V. Abdominal binder in place, staples intact with faint pink. repeat Bc's from 9/8 +, UC ok. CT scan of abdomen results pending. Mat PO well. Afebrile and WBC WDL. Discharge pending abx plan.

## 2020-09-09 NOTE — PHARMACY-RX INSURANCE COVERAGE
Enoxaparin (Lovenox) coverage check.  Patient has Medicare Part D through with unmet $350 deductible.     Enoxaparin 40mg x 24 syringes: $191    RX was dispensed by [facility] on 9/3/20.     ROSALEE Garza, Pharmacy Technician/Liaison, Discharge Pharmacy [phone]

## 2020-09-09 NOTE — PROGRESS NOTES
[facility]    Hospitalist Progress Note    Assessment & Plan   Julia Andre is a 69 year old female who was admitted on 9/2/2020.      Past medical history significant for stress-induced MI with associated ischemic cardiomyopathy, hypertension, hyperlipidemia, type 2 diabetes, major depressive disorder with anxiety, osteopenia, GERD, obstructive sleep apnea, history of cervical cancer and recent diagnosis of ovarian mass/cancer with elevated CA-125 lab who underwent an elective exploratory laparotomy with total abdominal hysterectomy with bilateral salpingo-oophorectomy, omentectomy with tumor debulking and pelvic and paraaortic lymphadenectomy for which the Hospitalist Service has been consulted to assist with medical management.     Ovarian mass/cancer with elevated CA-125 lab study, status post elective exploratory laparotomy with total abdominal hysterectomy with bilateral salpingo-oophorectomy, omentectomy with tumor debulking and pelvic and paraaortic lymphadenectomy:    -  --chest port placement 9/4.   Low grade fever 9/7/2020, BC from port positive GNR, started on Vanco, BC x2 repeated 9/8/2020 including from port.  Monitor blood cultures/sensitivities  -ID consult, appreciate input.  They felt that port can remain in place for now, follow-up BC.  9/9/2020 cites Bacteroides, generally not from urinary tract, proceed with CT abdomen/pelvis,  -Monitor temp profile, WBC.    --Defer analgesic management, DVT prophylaxis and PT/OT assessment to GYN.  Minimizing use of narcotics due to patient's self-reported past overuse.  Reports no substantial BM however 1 looser mucousy stool, see Subjective.  Continue stool softener, continue rectal suppository, MiraLAX, senna.  Monitor.      Bacteremia, 9/6/2020, GNR, see #1.  -Follow-up blood culture/sensitivity including repeat BC 9/8/2020 including from port.  -ID consult, appreciate input, see above.  -Follow-up UA UC 37, UC pending, on Zosyn,  asymptomatic..        Hyperkalemia:  Resolved  Noted potassium of 5.5 peak this admission. Improved to nl range with IVFs and reduction in Lisinopril dose.   -  consider increase lisinopril to PTA dosing outpatient, for now follow on reduced dose Lisinopril as prone to hyperkalemia. Follow up pcp with giuseppe     Mild hyponatremia:  Resolved with NS fluids.   2/2 dehydration     History of stress-induced MI with associated ischemic cardiomyopathy, hypertension and hyperlipidemia:    This appears to be stable at this point.  Upon review of electronic medical records, it appears coronary angiogram at time of MI had clean coronaries.  The patient has been holding baby aspirin daily for the last week.  - good control on current regimen. High in am before am meds    --Hold ASA and defer to OB/GYN as to when this can be resumed.    --Resumed prior to admit lisinopril 10 mg daily, with hold parameters in place---> -Starting 9/5 reduced lisinopril to 5 mg daily as prone to hyperkalemia and start Norvasc 5 mg daily. - Lopressor 50 mg 2 times daily  --Resumed prior to admit Crestor 20 mg daily.   --PRN IV hydralazine available for SBP > 180.        Type 2 diabetes:    The patient indicated that this is well controlled and she is currently on metformin 500 mg 2 times daily.    - range. Not needing ISS  --Hold PTA metformin.  restart several days after discharge once po intake at/nearer baseline  --Initiate medium intensity sliding scale insulin with Accu-Cheks performed 4 times daily.  --Hypoglycemic protocol in place.      Suspected CKD stage 2-3:  Review of EMR with noted creatinine between 1.0-1.3 and GFR in the 50-60 range and at times in the 40's.  Previous renal US 7/30/2020 with noted bilateral renal cysts.    -KRISSY and hypovolemic hyponatremia resolved with NS fluids  - taking po  -K nl.   - stopped fluids 9/5 and po intake has been adequate. Bmp nl.   - encouraged po intake and fluid intake---> taking good  po    History of iron deficiency anemia with acute blood loss anemia:    Per electronic medical record, the patient underwent a full GI workup and 2012 that indicated tiny erosions in the stomach.  The patient is no longer on iron supplements.   Hb 10 [id number]> 9.5---> 8.5 --> 7.6 ---> 7.9 Asymptomatic  - every other day iron per surgery  --Monitor per surgery     GERD:    The patient infrequently uses antacids.   --PRN Tums are made available.      Obstructive sleep apnea:    The patient indicated that she has a diagnosis of moderate obstructive sleep apnea and has been working on trying to get a new CPAP machine; however, she would prefer not having this, as she did not tolerate it in the past.    --Monitor O2 spot checks.   -IS     Major depressive disorder with anxiety:    --Resume prior to admit Wellbutrin 300 mg every morning.      History of cervical cancer:    The patient is status post surgical intervention.  Follow-up GYN.      Diet: regular diet per surgery. Encourage po/fluid intake  DVT Prophylaxis: Defer to primary service (subcutaneous Heparins and PCD'S)   Gutierrez Catheter: in place, indication: /GI/GYN Pelvic Procedure  Code Status: Full Code        Disposition Plan-  TBD pending blood culture results including repeat BC from port, ID recommendations/plan established.      Memo Sanchez MD  Text Page  (7am to 6pm)  Interval History   Still no substantial bowel movement, she reports described as mucousy, no hematochezia, non-melanotic.  No nausea, emesis, continued discomfort on position changes related to recent abdominal surgery.  Eating.  Nursing reports no wound issues, afebrile.    Physical Exam   Temp: 95.7  F (35.4  C) Temp src: Oral BP: 139/67 Pulse: 61   Resp: 16 SpO2: 97 % O2 Device: None (Room air)    Vitals:    09/06/20 0548 09/07/20 0556 09/09/20 0122   Weight: 71.5 kg (157 lb 9.6 oz) 70.6 kg (155 lb 9.6 oz) 70.3 kg (154 lb 14.4 oz)     Vital Signs with Ranges  Temp:  [95.7  F  (35.4  C)-97.6  F (36.4  C)] 95.7  F (35.4  C)  Pulse:  [61-72] 61  Resp:  [16-17] 16  BP: (130-159)/(62-72) 139/67  SpO2:  [95 %-98 %] 97 %  I/O last 3 completed shifts:  In: -   Out: 200 [Urine:200]    Constitutional: Alert, calm, cooperative, NAD.  Respiratory: CTAB, respirations nonlabored.  Chest: port right upper chest  Cardiovascular: The right, no murmur appreciated.  GI: midline  incision, binder, good BS.  Nontender, nondistended.  Skin/Integumen: no rash on gross exam., no calf pain  Neuro, nl speech and mentation  Psych: Affect calm.      Medications       amLODIPine  5 mg Oral Daily     aspirin  81 mg Oral Daily     buPROPion  300 mg Oral QAM     enoxaparin ANTICOAGULANT  40 mg Subcutaneous Q24H     heparin  5 mL Intracatheter Q28 Days     heparin lock flush  5-10 mL Intracatheter Q24H     insulin aspart  1-7 Units Subcutaneous TID AC     insulin aspart  1-5 Units Subcutaneous At Bedtime     lactobacillus rhamnosus (GG)  1 capsule Oral BID     lisinopril  5 mg Oral Daily     metoprolol tartrate  50 mg Oral BID     piperacillin-tazobactam  3.375 g Intravenous Q6H     polyethylene glycol  17 g Oral Daily     rosuvastatin  20 mg Oral Daily     sennosides  2 tablet Oral BID     simethicone  80 mg Oral 4x Daily       Data   Recent Labs   Lab 09/09/20  0500 09/08/20  1025 09/08/20  0540 09/07/20  0600 09/06/20  0600 09/05/20  0713 09/04/20  0708   WBC 7.5 8.4  --  6.6 6.7  --   --    HGB 7.8* 8.0*  --  7.9* 7.6* 8.5*  --    MCV 85 86  --   --  86  --   --     411 338  --  279 301  --    INR  --   --   --   --   --   --  1.13   NA  --   --   --   --  135 135 137   POTASSIUM  --   --   --   --  3.8 4.4 4.7   CHLORIDE  --   --   --   --  105 105 106   CO2  --   --   --   --  26 27 28   BUN  --   --   --   --  10 10 13   CR  --   --  0.81  --  0.84 0.73 0.93   ANIONGAP  --   --   --   --  4 3 3   RADHA  --   --   --   --  7.6* 8.3* 8.1*   GLC  --   --   --   --  117* 105* 107*

## 2020-09-09 NOTE — PROGRESS NOTES
[facility]    Infectious Disease Progress Note    Date of Service (when I saw the patient): 09/09/2020     Assessment & Plan   Julia Andre is a 69 year old female who was admitted on 9/2/2020.     Impression:  1. 69 y.o female with stress-induced MI with associated ischemic cardiomyopathy, hypertension, hyperlipidemia.   2. Type 2 diabetes.   3. Major depressive disorder with anxiety.   4. History of cervical cancer and recent diagnosis of ovarian mass/cancer with elevated CA-125 lab who underwent an elective exploratory laparotomy with total abdominal hysterectomy with bilateral salpingo-oophorectomy, omentectomy with tumor debulking and pelvic and paraaortic lymphadenectomy on 9/2  5. Recent PORT placement on 9/4. No local pain   6. Post operative fevers leading to blood cultures, which are now positive for GNR, pending ID.      Recommendations:   Bacteroids in the cultures, not a uti organism. Most cases bacteremia related to intraabdominal source, zosyn covers, consider CT abdomen and pelvis to look for obvious sources.       PORT OK TO STAY for now       Chas Trent MD    Interval History   Afebrile   Right flank pain     Physical Exam   Temp: 97.4  F (36.3  C) Temp src: Oral BP: (!) 153/66 Pulse: 66   Resp: 16 SpO2: 96 % O2 Device: None (Room air)    Vitals:    09/06/20 0548 09/07/20 0556 09/09/20 0122   Weight: 71.5 kg (157 lb 9.6 oz) 70.6 kg (155 lb 9.6 oz) 70.3 kg (154 lb 14.4 oz)     Vital Signs with Ranges  Temp:  [96.9  F (36.1  C)-97.6  F (36.4  C)] 97.4  F (36.3  C)  Pulse:  [64-72] 66  Resp:  [16-17] 16  BP: (130-159)/(62-72) 153/66  SpO2:  [95 %-98 %] 96 %    Constitutional: Awake, alert, cooperative, no apparent distress  Lungs: Clear to auscultation bilaterally, no crackles or wheezing  Cardiovascular: Regular rate and rhythm, normal S1 and S2, and no murmur noted  Abdomen: Normal bowel sounds, soft, non-distended, non-tender  Skin: No rashes, no cyanosis, no  edema  Other:    Medications       amLODIPine  5 mg Oral Daily     aspirin  81 mg Oral Daily     buPROPion  300 mg Oral QAM     enoxaparin ANTICOAGULANT  40 mg Subcutaneous Q24H     heparin  5 mL Intracatheter Q28 Days     heparin lock flush  5-10 mL Intracatheter Q24H     insulin aspart  1-7 Units Subcutaneous TID AC     insulin aspart  1-5 Units Subcutaneous At Bedtime     lisinopril  5 mg Oral Daily     metoprolol tartrate  50 mg Oral BID     piperacillin-tazobactam  3.375 g Intravenous Q6H     polyethylene glycol  17 g Oral Daily     rosuvastatin  20 mg Oral Daily     senna-docusate  2 tablet Oral BID     simethicone  80 mg Oral 4x Daily       Data   All microbiology laboratory data reviewed.  Recent Labs   Lab Test 09/09/20  0500 09/08/20  1025 09/08/20  0540 09/07/20  0600 09/06/20  0600   WBC 7.5 8.4  --  6.6 6.7   HGB 7.8* 8.0*  --  7.9* 7.6*   HCT 24.0* 24.9*  --   --  24.1*   MCV 85 86  --   --  86    411 338  --  279     Recent Labs   Lab Test 09/08/20  0540 09/06/20  0600 09/05/20  0713   CR 0.81 0.84 0.73     No lab results found.  Recent Labs   Lab Test 09/08/20  1615 09/08/20  0420 09/06/20  2353 09/06/20  2315   CULT Culture negative monitoring continues No growth after 22 hours Cultured on the 1st day of incubation:  Gram negative rods  Referred to anaerobes for identification  *  Critical Value/Significant Value, preliminary result only, called to and read back by  Nela Catherine RN @ 5831 9.8.20 JE/LM    Culture in progress Cultured on the 1st day of incubation:  Gram negative rods  Referred to anaerobes for identification    Critical Value/Significant Value, preliminary result only, called to and read back by  Dante Coffey RN at 3233 9.8.20. amd    Culture in progress  (Note)  NEGATIVE for the following organisms and resistance markers:  Acinetobacter sp., Citrobacter sp., Enterobacter sp., Proteus sp., E.  coli, K. pneumoniae/oxytoca, P. aeruginosa, CTX-M, KPC, NDM, VIM, IMP  and  OXA by [employer] multiplex nucleic acid test. Final identification  and antimicrobial susceptibility testing will be verified by standard  methods.    Critical Value/Significant Value called to and read back by  Clara Kwon RN 9/8/20 @ 0557 TF         Attestation:  Total time on the floor involved in the patient's care: 35 minutes. Total time spent in counseling/care coordination: >50%

## 2020-09-10 ENCOUNTER — APPOINTMENT (OUTPATIENT)
Dept: CT IMAGING | Facility: CLINIC | Age: 70
DRG: 737 | End: 2020-09-10
Attending: NURSE PRACTITIONER
Payer: COMMERCIAL

## 2020-09-10 LAB
ERYTHROCYTE [DISTWIDTH] IN BLOOD BY AUTOMATED COUNT: 14.2 % (ref 10–15)
GLUCOSE BLDC GLUCOMTR-MCNC: 101 MG/DL (ref 70–99)
GLUCOSE BLDC GLUCOMTR-MCNC: 114 MG/DL (ref 70–99)
GLUCOSE BLDC GLUCOMTR-MCNC: 118 MG/DL (ref 70–99)
GLUCOSE BLDC GLUCOMTR-MCNC: 127 MG/DL (ref 70–99)
GRAM STN SPEC: NORMAL
GRAM STN SPEC: NORMAL
HCT VFR BLD AUTO: 24.2 % (ref 35–47)
HGB BLD-MCNC: 7.8 G/DL (ref 11.7–15.7)
MCH RBC QN AUTO: 27.5 PG (ref 26.5–33)
MCHC RBC AUTO-ENTMCNC: 32.2 G/DL (ref 31.5–36.5)
MCV RBC AUTO: 85 FL (ref 78–100)
PLATELET # BLD AUTO: 426 10E9/L (ref 150–450)
RBC # BLD AUTO: 2.84 10E12/L (ref 3.8–5.2)
SPECIMEN SOURCE: NORMAL
WBC # BLD AUTO: 6.9 10E9/L (ref 4–11)

## 2020-09-10 PROCEDURE — 25000128 H RX IP 250 OP 636: Performed by: INTERNAL MEDICINE

## 2020-09-10 PROCEDURE — 99232 SBSQ HOSP IP/OBS MODERATE 35: CPT | Performed by: HOSPITALIST

## 2020-09-10 PROCEDURE — 87070 CULTURE OTHR SPECIMN AEROBIC: CPT | Performed by: OBSTETRICS & GYNECOLOGY

## 2020-09-10 PROCEDURE — 25000132 ZZH RX MED GY IP 250 OP 250 PS 637: Performed by: PHYSICIAN ASSISTANT

## 2020-09-10 PROCEDURE — 87075 CULTR BACTERIA EXCEPT BLOOD: CPT | Performed by: OBSTETRICS & GYNECOLOGY

## 2020-09-10 PROCEDURE — 25000132 ZZH RX MED GY IP 250 OP 250 PS 637: Performed by: HOSPITALIST

## 2020-09-10 PROCEDURE — 25000132 ZZH RX MED GY IP 250 OP 250 PS 637: Performed by: NURSE PRACTITIONER

## 2020-09-10 PROCEDURE — 40000863 ZZH STATISTIC RADIOLOGY XRAY, US, CT, MAR, NM

## 2020-09-10 PROCEDURE — 25000128 H RX IP 250 OP 636: Performed by: NURSE PRACTITIONER

## 2020-09-10 PROCEDURE — 25000132 ZZH RX MED GY IP 250 OP 250 PS 637: Performed by: OBSTETRICS & GYNECOLOGY

## 2020-09-10 PROCEDURE — 12000000 ZZH R&B MED SURG/OB

## 2020-09-10 PROCEDURE — 87076 CULTURE ANAEROBE IDENT EACH: CPT | Performed by: OBSTETRICS & GYNECOLOGY

## 2020-09-10 PROCEDURE — 25000125 ZZHC RX 250: Performed by: NURSE PRACTITIONER

## 2020-09-10 PROCEDURE — 00000146 ZZHCL STATISTIC GLUCOSE BY METER IP

## 2020-09-10 PROCEDURE — 0W9J30Z DRAINAGE OF PELVIC CAVITY WITH DRAINAGE DEVICE, PERCUTANEOUS APPROACH: ICD-10-PCS | Performed by: OBSTETRICS & GYNECOLOGY

## 2020-09-10 PROCEDURE — C1729 CATH, DRAINAGE: HCPCS

## 2020-09-10 PROCEDURE — 25000128 H RX IP 250 OP 636: Performed by: STUDENT IN AN ORGANIZED HEALTH CARE EDUCATION/TRAINING PROGRAM

## 2020-09-10 PROCEDURE — 87205 SMEAR GRAM STAIN: CPT | Performed by: OBSTETRICS & GYNECOLOGY

## 2020-09-10 PROCEDURE — 99152 MOD SED SAME PHYS/QHP 5/>YRS: CPT

## 2020-09-10 PROCEDURE — 85027 COMPLETE CBC AUTOMATED: CPT | Performed by: HOSPITALIST

## 2020-09-10 RX ORDER — FENTANYL CITRATE 50 UG/ML
25-50 INJECTION, SOLUTION INTRAMUSCULAR; INTRAVENOUS EVERY 5 MIN PRN
Status: DISCONTINUED | OUTPATIENT
Start: 2020-09-10 | End: 2020-09-10

## 2020-09-10 RX ORDER — AMOXICILLIN 250 MG
1 CAPSULE ORAL AT BEDTIME
Status: DISCONTINUED | OUTPATIENT
Start: 2020-09-10 | End: 2020-09-15 | Stop reason: HOSPADM

## 2020-09-10 RX ORDER — NALOXONE HYDROCHLORIDE 0.4 MG/ML
.1-.4 INJECTION, SOLUTION INTRAMUSCULAR; INTRAVENOUS; SUBCUTANEOUS
Status: DISCONTINUED | OUTPATIENT
Start: 2020-09-10 | End: 2020-09-10

## 2020-09-10 RX ORDER — FLUMAZENIL 0.1 MG/ML
0.2 INJECTION, SOLUTION INTRAVENOUS
Status: DISCONTINUED | OUTPATIENT
Start: 2020-09-10 | End: 2020-09-10

## 2020-09-10 RX ADMIN — ACETAMINOPHEN 975 MG: 325 TABLET, FILM COATED ORAL at 21:35

## 2020-09-10 RX ADMIN — POLYETHYLENE GLYCOL 3350 17 G: 17 POWDER, FOR SOLUTION ORAL at 08:11

## 2020-09-10 RX ADMIN — ACETAMINOPHEN 975 MG: 325 TABLET, FILM COATED ORAL at 15:37

## 2020-09-10 RX ADMIN — SIMETHICONE 80 MG: 80 TABLET, CHEWABLE ORAL at 13:16

## 2020-09-10 RX ADMIN — Medication 1 CAPSULE: at 08:12

## 2020-09-10 RX ADMIN — LISINOPRIL 5 MG: 5 TABLET ORAL at 08:12

## 2020-09-10 RX ADMIN — Medication 5 ML: at 18:00

## 2020-09-10 RX ADMIN — ENOXAPARIN SODIUM 40 MG: 40 INJECTION SUBCUTANEOUS at 08:12

## 2020-09-10 RX ADMIN — PIPERACILLIN AND TAZOBACTAM 3.38 G: 3; .375 INJECTION, POWDER, FOR SOLUTION INTRAVENOUS at 15:38

## 2020-09-10 RX ADMIN — METOPROLOL TARTRATE 50 MG: 50 TABLET, FILM COATED ORAL at 21:05

## 2020-09-10 RX ADMIN — BUPROPION HYDROCHLORIDE 300 MG: 150 TABLET, FILM COATED, EXTENDED RELEASE ORAL at 08:11

## 2020-09-10 RX ADMIN — LIDOCAINE HYDROCHLORIDE 8 ML: 10 INJECTION, SOLUTION EPIDURAL; INFILTRATION; INTRACAUDAL; PERINEURAL at 15:05

## 2020-09-10 RX ADMIN — SODIUM CHLORIDE, PRESERVATIVE FREE 5 ML: 5 INJECTION INTRAVENOUS at 06:07

## 2020-09-10 RX ADMIN — SODIUM CHLORIDE, PRESERVATIVE FREE 5 ML: 5 INJECTION INTRAVENOUS at 00:43

## 2020-09-10 RX ADMIN — ROSUVASTATIN CALCIUM 20 MG: 20 TABLET, FILM COATED ORAL at 08:11

## 2020-09-10 RX ADMIN — DOCUSATE SODIUM 50 MG AND SENNOSIDES 8.6 MG 1 TABLET: 8.6; 5 TABLET, FILM COATED ORAL at 21:04

## 2020-09-10 RX ADMIN — METOPROLOL TARTRATE 50 MG: 50 TABLET, FILM COATED ORAL at 08:12

## 2020-09-10 RX ADMIN — SIMETHICONE 80 MG: 80 TABLET, CHEWABLE ORAL at 21:05

## 2020-09-10 RX ADMIN — Medication 1 CAPSULE: at 21:05

## 2020-09-10 RX ADMIN — PIPERACILLIN AND TAZOBACTAM 3.38 G: 3; .375 INJECTION, POWDER, FOR SOLUTION INTRAVENOUS at 05:22

## 2020-09-10 RX ADMIN — MIDAZOLAM HYDROCHLORIDE 1 MG: 1 INJECTION, SOLUTION INTRAMUSCULAR; INTRAVENOUS at 14:46

## 2020-09-10 RX ADMIN — SIMETHICONE 80 MG: 80 TABLET, CHEWABLE ORAL at 18:00

## 2020-09-10 RX ADMIN — ASPIRIN 81 MG: 81 TABLET ORAL at 08:12

## 2020-09-10 RX ADMIN — PIPERACILLIN AND TAZOBACTAM 3.38 G: 3; .375 INJECTION, POWDER, FOR SOLUTION INTRAVENOUS at 10:56

## 2020-09-10 RX ADMIN — SENNOSIDES 2 TABLET: 8.6 TABLET, FILM COATED ORAL at 08:12

## 2020-09-10 RX ADMIN — MIDAZOLAM HYDROCHLORIDE 1 MG: 1 INJECTION, SOLUTION INTRAMUSCULAR; INTRAVENOUS at 14:40

## 2020-09-10 RX ADMIN — FENTANYL CITRATE 50 MCG: 50 INJECTION, SOLUTION INTRAMUSCULAR; INTRAVENOUS at 14:40

## 2020-09-10 RX ADMIN — PIPERACILLIN AND TAZOBACTAM 3.38 G: 3; .375 INJECTION, POWDER, FOR SOLUTION INTRAVENOUS at 21:35

## 2020-09-10 RX ADMIN — FENTANYL CITRATE 50 MCG: 50 INJECTION, SOLUTION INTRAMUSCULAR; INTRAVENOUS at 14:46

## 2020-09-10 RX ADMIN — AMLODIPINE BESYLATE 5 MG: 5 TABLET ORAL at 08:12

## 2020-09-10 RX ADMIN — IBUPROFEN 600 MG: 400 TABLET ORAL at 05:23

## 2020-09-10 RX ADMIN — SIMETHICONE 80 MG: 80 TABLET, CHEWABLE ORAL at 08:11

## 2020-09-10 ASSESSMENT — ACTIVITIES OF DAILY LIVING (ADL)
ADLS_ACUITY_SCORE: 13

## 2020-09-10 NOTE — PLAN OF CARE
POD 8 A&O. VSS ex elevated Bp's. Pain controlled w/ tylenol and ibuprofen. Still passing flatus, very small amount of formed stool out but mostly mucous x1. Ambulated halls x2 this shift. Incision site WDL ex slight erythema toward middle of incision. Ice and abdominal binder for comfort. Abd CT back abnormal- on call ID MD notified, no changes for now. Remains on IV zosyn. Port HL, good blood return. Uc's negative. Discharge pending antibiotic plan.

## 2020-09-10 NOTE — PRE-PROCEDURE
GENERAL PRE-PROCEDURE:   Procedure:  Image guided pelvic fluid aspiration versus drain placement with intravenous moderate sedation   Date/Time:  9/10/2020 11:22 AM    Written consent obtained?: Yes    Risks and benefits: Risks, benefits and alternatives were discussed    Consent given by:  Patient  Patient states understanding of procedure being performed: Yes    Patient's understanding of procedure matches consent: Yes    Procedure consent matches procedure scheduled: Yes    Expected level of sedation:  Moderate  Appropriately NPO:  Yes  ASA Class:  Class 3- Severe systemic disease, definite functional limitations  Mallampati  :  Grade 2- soft palate, base of uvula, tonsillar pillars, and portion of posterior pharyngeal wall visible  Lungs:  Lungs clear with good breath sounds bilaterally  Heart:  Normal heart sounds and rate  History & Physical reviewed:  History and physical reviewed and no updates needed  Statement of review:  I have reviewed the lab findings, diagnostic data, medications, and the plan for sedation

## 2020-09-10 NOTE — PROGRESS NOTES
[facility]    Hospitalist Progress Note    Assessment & Plan   Julia Andre is a 69 year old female who was admitted on 9/2/2020.      Past medical history significant for stress-induced MI with associated ischemic cardiomyopathy, hypertension, hyperlipidemia, type 2 diabetes, major depressive disorder with anxiety, osteopenia, GERD, obstructive sleep apnea, history of cervical cancer and recent diagnosis of ovarian mass/cancer with elevated CA-125 lab who underwent an elective exploratory laparotomy with total abdominal hysterectomy with bilateral salpingo-oophorectomy, omentectomy with tumor debulking and pelvic and paraaortic lymphadenectomy for which the Hospitalist Service has been consulted to assist with medical management.     Ovarian mass/cancer with elevated CA-125 lab study, status post elective exploratory laparotomy with total abdominal hysterectomy with bilateral salpingo-oophorectomy, omentectomy with tumor debulking and pelvic and paraaortic lymphadenectomy:    -  --chest port placement 9/4.   Low grade fever 9/7/2020, BC from port positive GNR, started on Zosyn,, BC x2 repeated 9/8/2020 including from port.  Second set BC NGTD.  -ID consult, appreciate input.  They felt that port can remain in place for now, follow-up BC.  9/9/2020 cites Bacteroides, generally not from urinary tract  --Monitor temp profile, WBC.    -Abdominal/pelvic CT with 8 x 5 cm fluid collection, scheduled for IR drain placement this afternoon.  -Defer analgesic management, DVT prophylaxis and PT/OT assessment to GYN.  Minimizing use of narcotics due to patient's self-reported past overuse.  Reports no substantial BM, per ID may be related to intra-abdominal abscess.  Hold off bowel program.  No associated GI symptoms.      Bacteremia, 9/6/2020, GNR, see #1.  -repeat BC 9/8/2020 including from port GTD..  -ID consult, appreciate input, see above.  -,UC negative.  -See #1.        Hyperkalemia:  Resolved  Noted  potassium of 5.5 peak this admission. Improved to nl range with IVFs and reduction in Lisinopril dose.   -  consider increase lisinopril to PTA dosing outpatient, for now follow on reduced dose Lisinopril as prone to hyperkalemia. Follow up pcp with giuseppe     Mild hyponatremia:  Resolved with NS fluids.   2/2 dehydration     History of stress-induced MI with associated ischemic cardiomyopathy, hypertension and hyperlipidemia:    This appears to be stable at this point.  Upon review of electronic medical records, it appears coronary angiogram at time of MI had clean coronaries.  The patient has been holding baby aspirin daily for the last week.  - good control on current regimen. High in am before am meds    --Hold ASA and defer to OB/GYN as to when this can be resumed.    --Resumed prior to admit lisinopril 10 mg daily, with hold parameters in place---> -Starting 9/5 reduced lisinopril to 5 mg daily as prone to hyperkalemia and start Norvasc 5 mg daily. - Lopressor 50 mg 2 times daily  --Resumed prior to admit Crestor 20 mg daily.   --PRN IV hydralazine available for SBP > 180.        Type 2 diabetes:    The patient indicated that this is well controlled and she is currently on metformin 500 mg 2 times daily.    - range. Not needing ISS  --Hold PTA metformin.  restart several days after discharge once po intake at/nearer baseline  --Initiate medium intensity sliding scale insulin with Accu-Cheks performed 4 times daily.  --Hypoglycemic protocol in place.      Suspected CKD stage 2-3:  Review of EMR with noted creatinine between 1.0-1.3 and GFR in the 50-60 range and at times in the 40's.  Previous renal US 7/30/2020 with noted bilateral renal cysts.    -KRISSY and hypovolemic hyponatremia resolved with NS fluids  - taking po  -K nl.   - stopped fluids 9/5 and po intake has been adequate. Bmp nl.   - encouraged po intake and fluid intake---> taking good po    History of iron deficiency anemia with acute blood loss  anemia:    Per electronic medical record, the patient underwent a full GI workup and 2012 that indicated tiny erosions in the stomach.  The patient is no longer on iron supplements.   Hb 10 qebiy179> 9.5---> 8.5 --> 7.6 ---> 7.9 Asymptomatic  - every other day iron per surgery  --Monitor per surgery     GERD:    The patient infrequently uses antacids.   --PRN Tums are made available.      Obstructive sleep apnea:    The patient indicated that she has a diagnosis of moderate obstructive sleep apnea and has been working on trying to get a new CPAP machine; however, she would prefer not having this, as she did not tolerate it in the past.    --Monitor O2 spot checks.   -IS     Major depressive disorder with anxiety:    --Resume prior to admit Wellbutrin 300 mg every morning.      History of cervical cancer:    The patient is status post surgical intervention.  Follow-up GYN.      Diet: regular diet per surgery. Encourage po/fluid intake  DVT Prophylaxis: Defer to primary service (Lovenox and PCD'S)   Gutierrez Catheter: in place, indication: /GI/GYN Pelvic Procedure  Code Status: Full Code        Disposition Plan-  TBD pending ID recommendations/plan established regarding intra-abdominal fluid collection, culture results and antibiotic recommendations      Memo Sanchez MD  Text Page  (7am to 6pm)  Interval History   Patient reports no BM, no associated nausea, emesis, reports chronic constipation.  Nursing reports no acute wound issues, remains afebrile.  Scheduled for IR drain placement this afternoon.    Physical Exam   Temp: 96.6  F (35.9  C) Temp src: Oral BP: (!) 148/61 Pulse: 62   Resp: 14 SpO2: 97 % O2 Device: None (Room air) Oxygen Delivery: 2 LPM  Vitals:    09/06/20 0548 09/07/20 0556 09/09/20 0122   Weight: 71.5 kg (157 lb 9.6 oz) 70.6 kg (155 lb 9.6 oz) 70.3 kg (154 lb 14.4 oz)     Vital Signs with Ranges  Temp:  [96.4  F (35.8  C)-96.7  F (35.9  C)] 96.6  F (35.9  C)  Pulse:  [61-75] 62  Resp:   [14-17] 14  BP: (124-170)/(61-76) 148/61  SpO2:  [95 %-100 %] 97 %  I/O last 3 completed shifts:  In: -   Out: 500 [Urine:500]    Constitutional: NAD, alert, calm, cooperative.  Respiratory: CTAB, respirations nonlabored.  Chest: port right upper chest  Cardiovascular: Regular rhythm, no murmur appreciated.  GI: midline  incision, binder, good BS.  Nontender, nondistended.  No rebound, guarding or other peritoneal signs.  Skin/Integumen: no rash on gross exam., no calf pain  Neuro, nl speech and mentation  Psych: Affect calm.      Medications       amLODIPine  5 mg Oral Daily     aspirin  81 mg Oral Daily     buPROPion  300 mg Oral QAM     enoxaparin ANTICOAGULANT  40 mg Subcutaneous Q24H     heparin  5 mL Intracatheter Q28 Days     heparin lock flush  5-10 mL Intracatheter Q24H     insulin aspart  1-7 Units Subcutaneous TID AC     insulin aspart  1-5 Units Subcutaneous At Bedtime     lactobacillus rhamnosus (GG)  1 capsule Oral BID     lidocaine  30 mL Subcutaneous Once     lisinopril  5 mg Oral Daily     metoprolol tartrate  50 mg Oral BID     piperacillin-tazobactam  3.375 g Intravenous Q6H     [Held by provider] polyethylene glycol  17 g Oral Daily     rosuvastatin  20 mg Oral Daily     [Held by provider] sennosides  2 tablet Oral BID     simethicone  80 mg Oral 4x Daily       Data   Recent Labs   Lab 09/10/20  0530 09/09/20  0500 09/08/20  1025 09/08/20  0540  09/06/20  0600  09/05/20  0713  09/04/20  0708   WBC 6.9 7.5 8.4  --    < > 6.7  --   --   --   --    HGB 7.8* 7.8* 8.0*  --    < > 7.6*  --  8.5*   < >  --    MCV 85 85 86  --   --  86   < >  --   --   --     404 411 338  --  279  --  301   < >  --    INR  --   --   --   --   --   --   --   --   --  1.13   NA  --   --   --   --   --  135  --  135  --  137   POTASSIUM  --   --   --   --   --  3.8  --  4.4  --  4.7   CHLORIDE  --   --   --   --   --  105  --  105  --  106   CO2  --   --   --   --   --  26  --  27  --  28   BUN  --   --   --   --    --  10  --  10  --  13   CR  --   --   --  0.81  --  0.84  --  0.73  --  0.93   ANIONGAP  --   --   --   --   --  4  --  3  --  3   RADHA  --   --   --   --   --  7.6*  --  8.3*  --  8.1*   GLC  --   --   --   --   --  117*  --  105*  --  107*    < > = values in this interval not displayed.

## 2020-09-10 NOTE — PROGRESS NOTES
Care Suites Procedure Nursing Note    Patient Information  Name: Julia Andre  Age: 69 year old    Procedure  Procedure: Right posterior pelvic drain placement  Procedure start time: 14:40  Procedure complete time: 14:50  Concerns/abnormal assessment: None    Pt tolerated well. VSS. Total sedation given 100 mcg Fentanyl and 2 mg Versed. Drain placed w/o difficulty.  20 cc serousang fluid removed & specimen sent to lab.     Connected to LIZZETTE bulb suction. Stay Fix drsg applied & tube secured.    Pt back to rm 828 per cart & transport. Detailed report called to RN.

## 2020-09-10 NOTE — PROGRESS NOTES
"[facility]  Hospitalist Progress Note          Assessment and Plan:    Pelvic mass:  POD 8 X-lap, TAHBSO, omentectomy, tumor debulking, right pelvic and para-aortic lymphadenectomy.  GI function returning and tolerated regular diet.  Constipated. Repeat suppository and double miralax.      Gram neg alen bacteremia:  CT with 8cm pelvic fluid collection. IR to place drain vs aspirate this afternoon and send fluid for gram stain and culture. 1 BC from port reportedly positive with GNR.  On Zosyn, afebrile with normal WBC.  Await cultures. Appreciate ID rec's for duration and type of oral antibiotic.  Repeat BC's NTD. UC NGTD.       Anemia:  Stable        Disposition: Remain inpatient.              Interval History:   Passing flatus.  Ambulating well.  Voiding well. No N/V. Minimal pain.               Medications:   I have reviewed this patient's current medications               Physical Exam:   Blood pressure (!) 156/63, pulse 61, temperature 96.4  F (35.8  C), temperature source Oral, resp. rate 16, height 1.575 m (5' 2\"), weight 70.3 kg (154 lb 14.4 oz), SpO2 97 %.        Vital Sign Ranges  Temperature Temp  Av.9  F (36.6  C)  Min: 97  F (36.1  C)  Max: 98.3  F (36.8  C)   Blood pressure Systolic (24hrs), Av , Min:118 , Max:163        Diastolic (24hrs), Av, Min:60, Max:71      Pulse Pulse  Av.6  Min: 62  Max: 72   Respirations Resp  Avg: 15.9  Min: 15  Max: 16   Pulse oximetry SpO2  Av.5 %  Min: 94 %  Max: 96 %         Intake/Output Summary (Last 24 hours) at 2020 0840  Last data filed at 2020 0400  Gross per 24 hour   Intake 720 ml   Output 1200 ml   Net -480 ml       Lungs:   Clear to auscultation     Cardiovascular:   normal apical pulses      Abdomen:   BS present. Soft.  No incisional erythema     Musculoskeletal:   no lower extremity pitting edema present                Data:   All laboratory data reviewed  "

## 2020-09-10 NOTE — PROVIDER NOTIFICATION
MD Notification    Notified Person: MD    Notified Person Name: itz    Notification Date/Time: 9/9 1905    Notification Interaction: text page    Purpose of Notification: CT Results back. Please advise    Orders Received: no reply, will repage  Re-paged and called back at 2005- no changes. To be addressed tomorrow.   Comments:

## 2020-09-10 NOTE — PROGRESS NOTES
[facility]    Infectious Disease Progress Note    Date of Service (when I saw the patient): 09/10/2020     Assessment & Plan   Julia Andre is a 69 year old female who was admitted on 9/2/2020.     Impression:  1. 69 y.o female with stress-induced MI with associated ischemic cardiomyopathy, hypertension, hyperlipidemia.   2. Type 2 diabetes.   3. Major depressive disorder with anxiety.   4. History of cervical cancer and recent diagnosis of ovarian mass/cancer with elevated CA-125 lab who underwent an elective exploratory laparotomy with total abdominal hysterectomy with bilateral salpingo-oophorectomy, omentectomy with tumor debulking and pelvic and paraaortic lymphadenectomy on 9/2  5. Recent PORT placement on 9/4. No local pain   6. Post operative fevers leading to blood cultures, which are now positive for GNR, pending ID.      Recommendations:   Bacteroids in the cultures, Most cases bacteremia related to intraabdominal source, zosyn covers, CT abdomen and pelvis with :  partially encapsulated  fluid collection measuring 8.2 x 5.7 cm (series 3, image 163) with  multiple foci of gas within it concerning for infected postoperative  hematoma or developing abscess..   S/p  drain placement will follow up on the fluid cultures.       PORT OK TO STAY for now       Chas Trent MD    Interval History   Afebrile   S/p drain placement after CT scan from yesterday     Physical Exam   Temp: 96.4  F (35.8  C) Temp src: Oral BP: (!) 156/63 Pulse: 61   Resp: 16 SpO2: 97 % O2 Device: None (Room air)    Vitals:    09/06/20 0548 09/07/20 0556 09/09/20 0122   Weight: 71.5 kg (157 lb 9.6 oz) 70.6 kg (155 lb 9.6 oz) 70.3 kg (154 lb 14.4 oz)     Vital Signs with Ranges  Temp:  [95.7  F (35.4  C)-96.7  F (35.9  C)] 96.4  F (35.8  C)  Pulse:  [61-75] 61  Resp:  [15-17] 16  BP: (147-170)/(63-76) 156/63  SpO2:  [96 %-98 %] 97 %    Constitutional: Awake, alert, cooperative, no apparent distress  Lungs: Clear to  auscultation bilaterally, no crackles or wheezing  Cardiovascular: Regular rate and rhythm, normal S1 and S2, and no murmur noted  Abdomen: Normal bowel sounds, soft, non-distended, non-tender  Skin: No rashes, no cyanosis, no edema  Other:    Medications       amLODIPine  5 mg Oral Daily     aspirin  81 mg Oral Daily     buPROPion  300 mg Oral QAM     enoxaparin ANTICOAGULANT  40 mg Subcutaneous Q24H     heparin  5 mL Intracatheter Q28 Days     heparin lock flush  5-10 mL Intracatheter Q24H     insulin aspart  1-7 Units Subcutaneous TID AC     insulin aspart  1-5 Units Subcutaneous At Bedtime     lactobacillus rhamnosus (GG)  1 capsule Oral BID     lisinopril  5 mg Oral Daily     metoprolol tartrate  50 mg Oral BID     piperacillin-tazobactam  3.375 g Intravenous Q6H     polyethylene glycol  17 g Oral Daily     rosuvastatin  20 mg Oral Daily     sennosides  2 tablet Oral BID     simethicone  80 mg Oral 4x Daily       Data   All microbiology laboratory data reviewed.  Recent Labs   Lab Test 09/10/20  0530 09/09/20  0500 09/08/20  1025   WBC 6.9 7.5 8.4   HGB 7.8* 7.8* 8.0*   HCT 24.2* 24.0* 24.9*   MCV 85 85 86    404 411     Recent Labs   Lab Test 09/08/20  0540 09/06/20  0600 09/05/20  0713   CR 0.81 0.84 0.73     No lab results found.  Recent Labs   Lab Test 09/08/20  1615 09/08/20  0420 09/06/20  2353 09/06/20  2315   CULT No growth Cultured on the 1st day of incubation:  Bacteroides thetaiotaomicron  *  Critical Value/Significant Value, preliminary result only, called to and read back by  Caitlin Hatch RN at 1368 9.9.20 KZ    Susceptibility testing done on previous specimen Cultured on the 1st day of incubation:  Bacteroides thetaiotaomicron  Susceptibility testing done on previous specimen  *  Critical Value/Significant Value, preliminary result only, called to and read back by  Nela Catherine RN @ 7135 9.8.20 JE/LM   Cultured on the 1st day of incubation:  Bacteroides thetaiotaomicron  Susceptibility  testing in progress  *  Critical Value/Significant Value, preliminary result only, called to and read back by  Dante Coffey RN at 0346 9.8.20. amd    (Note)  NEGATIVE for the following organisms and resistance markers:  Acinetobacter sp., Citrobacter sp., Enterobacter sp., Proteus sp., E.  coli, K. pneumoniae/oxytoca, P. aeruginosa, CTX-M, KPC, NDM, VIM, IMP  and OXA by [employer] multiplex nucleic acid test. Final identification  and antimicrobial susceptibility testing will be verified by standard  methods.    Critical Value/Significant Value called to and read back by  Clara Kwon RN 9/8/20 @ 0557 TF         Attestation:  Total time on the floor involved in the patient's care: 35 minutes. Total time spent in counseling/care coordination: >50%

## 2020-09-10 NOTE — PLAN OF CARE
POD 8 A&O. VSS ex elevated Bp's, AM meds given, will recheck. Pain controlled w/ tylenol and ibuprofen. Still passing flatus, very small amount of formed stool out but mostly mucous x1 on nights. Ambulated halls x2 this shift. Incision site WDL ex slight erythema toward middle of incision. Ice and abdominal binder for comfort. Abd CT back abnormal- will get CT guided drain at 1400 today.  Remains on IV zosyn. Port HL, good blood return. Uc's negative. Sl nausea this am, passed without meds.  NPO, sips clears ok, now for drain placement.  Discharge pending antibiotic plan.

## 2020-09-11 LAB
BACTERIA SPEC CULT: ABNORMAL
CREAT SERPL-MCNC: 0.85 MG/DL (ref 0.52–1.04)
ERYTHROCYTE [DISTWIDTH] IN BLOOD BY AUTOMATED COUNT: 14.3 % (ref 10–15)
GFR SERPL CREATININE-BSD FRML MDRD: 69 ML/MIN/{1.73_M2}
GLUCOSE BLDC GLUCOMTR-MCNC: 112 MG/DL (ref 70–99)
GLUCOSE BLDC GLUCOMTR-MCNC: 167 MG/DL (ref 70–99)
GLUCOSE BLDC GLUCOMTR-MCNC: 96 MG/DL (ref 70–99)
GLUCOSE BLDC GLUCOMTR-MCNC: 98 MG/DL (ref 70–99)
HCT VFR BLD AUTO: 25.5 % (ref 35–47)
HGB BLD-MCNC: 8.1 G/DL (ref 11.7–15.7)
MCH RBC QN AUTO: 27.1 PG (ref 26.5–33)
MCHC RBC AUTO-ENTMCNC: 31.8 G/DL (ref 31.5–36.5)
MCV RBC AUTO: 85 FL (ref 78–100)
PLATELET # BLD AUTO: 460 10E9/L (ref 150–450)
RBC # BLD AUTO: 2.99 10E12/L (ref 3.8–5.2)
SPECIMEN SOURCE: ABNORMAL
SPECIMEN SOURCE: ABNORMAL
WBC # BLD AUTO: 6.6 10E9/L (ref 4–11)

## 2020-09-11 PROCEDURE — 82565 ASSAY OF CREATININE: CPT | Performed by: HOSPITALIST

## 2020-09-11 PROCEDURE — 25000132 ZZH RX MED GY IP 250 OP 250 PS 637: Performed by: PHYSICIAN ASSISTANT

## 2020-09-11 PROCEDURE — 99232 SBSQ HOSP IP/OBS MODERATE 35: CPT | Performed by: HOSPITALIST

## 2020-09-11 PROCEDURE — 12000000 ZZH R&B MED SURG/OB

## 2020-09-11 PROCEDURE — 25000132 ZZH RX MED GY IP 250 OP 250 PS 637: Performed by: HOSPITALIST

## 2020-09-11 PROCEDURE — 85027 COMPLETE CBC AUTOMATED: CPT | Performed by: HOSPITALIST

## 2020-09-11 PROCEDURE — 25000132 ZZH RX MED GY IP 250 OP 250 PS 637: Performed by: NURSE PRACTITIONER

## 2020-09-11 PROCEDURE — 25000128 H RX IP 250 OP 636: Performed by: INTERNAL MEDICINE

## 2020-09-11 PROCEDURE — 25000128 H RX IP 250 OP 636: Performed by: NURSE PRACTITIONER

## 2020-09-11 PROCEDURE — 25000132 ZZH RX MED GY IP 250 OP 250 PS 637: Performed by: OBSTETRICS & GYNECOLOGY

## 2020-09-11 PROCEDURE — 25000128 H RX IP 250 OP 636: Performed by: STUDENT IN AN ORGANIZED HEALTH CARE EDUCATION/TRAINING PROGRAM

## 2020-09-11 PROCEDURE — 00000146 ZZHCL STATISTIC GLUCOSE BY METER IP

## 2020-09-11 RX ORDER — NICOTINE POLACRILEX 4 MG
15-30 LOZENGE BUCCAL
Status: CANCELLED | OUTPATIENT
Start: 2020-09-11

## 2020-09-11 RX ORDER — DEXTROSE MONOHYDRATE 25 G/50ML
25-50 INJECTION, SOLUTION INTRAVENOUS
Status: CANCELLED | OUTPATIENT
Start: 2020-09-11

## 2020-09-11 RX ORDER — LISINOPRIL 10 MG/1
10 TABLET ORAL DAILY
Status: DISCONTINUED | OUTPATIENT
Start: 2020-09-12 | End: 2020-09-15 | Stop reason: HOSPADM

## 2020-09-11 RX ADMIN — ROSUVASTATIN CALCIUM 20 MG: 20 TABLET, FILM COATED ORAL at 08:39

## 2020-09-11 RX ADMIN — AMLODIPINE BESYLATE 5 MG: 5 TABLET ORAL at 08:39

## 2020-09-11 RX ADMIN — SIMETHICONE 80 MG: 80 TABLET, CHEWABLE ORAL at 22:30

## 2020-09-11 RX ADMIN — SIMETHICONE 80 MG: 80 TABLET, CHEWABLE ORAL at 08:39

## 2020-09-11 RX ADMIN — METOPROLOL TARTRATE 50 MG: 50 TABLET, FILM COATED ORAL at 20:53

## 2020-09-11 RX ADMIN — SODIUM CHLORIDE, PRESERVATIVE FREE 5 ML: 5 INJECTION INTRAVENOUS at 06:06

## 2020-09-11 RX ADMIN — MAGNESIUM HYDROXIDE 30 ML: 400 SUSPENSION ORAL at 15:23

## 2020-09-11 RX ADMIN — SODIUM CHLORIDE, PRESERVATIVE FREE 5 ML: 5 INJECTION INTRAVENOUS at 23:26

## 2020-09-11 RX ADMIN — PIPERACILLIN AND TAZOBACTAM 3.38 G: 3; .375 INJECTION, POWDER, FOR SOLUTION INTRAVENOUS at 16:43

## 2020-09-11 RX ADMIN — Medication 1 CAPSULE: at 08:39

## 2020-09-11 RX ADMIN — SODIUM CHLORIDE, PRESERVATIVE FREE 5 ML: 5 INJECTION INTRAVENOUS at 12:28

## 2020-09-11 RX ADMIN — ENOXAPARIN SODIUM 40 MG: 40 INJECTION SUBCUTANEOUS at 08:48

## 2020-09-11 RX ADMIN — ASPIRIN 81 MG: 81 TABLET ORAL at 08:40

## 2020-09-11 RX ADMIN — Medication 1 CAPSULE: at 20:53

## 2020-09-11 RX ADMIN — SIMETHICONE 80 MG: 80 TABLET, CHEWABLE ORAL at 17:54

## 2020-09-11 RX ADMIN — IBUPROFEN 600 MG: 400 TABLET ORAL at 14:06

## 2020-09-11 RX ADMIN — SIMETHICONE 80 MG: 80 TABLET, CHEWABLE ORAL at 13:59

## 2020-09-11 RX ADMIN — ACETAMINOPHEN 975 MG: 325 TABLET, FILM COATED ORAL at 20:54

## 2020-09-11 RX ADMIN — Medication 5 ML: at 17:22

## 2020-09-11 RX ADMIN — IBUPROFEN 600 MG: 400 TABLET ORAL at 23:24

## 2020-09-11 RX ADMIN — LISINOPRIL 5 MG: 5 TABLET ORAL at 08:53

## 2020-09-11 RX ADMIN — ACETAMINOPHEN 975 MG: 325 TABLET, FILM COATED ORAL at 08:39

## 2020-09-11 RX ADMIN — PIPERACILLIN AND TAZOBACTAM 3.38 G: 3; .375 INJECTION, POWDER, FOR SOLUTION INTRAVENOUS at 04:39

## 2020-09-11 RX ADMIN — METOPROLOL TARTRATE 50 MG: 50 TABLET, FILM COATED ORAL at 08:40

## 2020-09-11 RX ADMIN — PIPERACILLIN AND TAZOBACTAM 3.38 G: 3; .375 INJECTION, POWDER, FOR SOLUTION INTRAVENOUS at 22:29

## 2020-09-11 RX ADMIN — PIPERACILLIN AND TAZOBACTAM 3.38 G: 3; .375 INJECTION, POWDER, FOR SOLUTION INTRAVENOUS at 11:35

## 2020-09-11 RX ADMIN — DOCUSATE SODIUM 50 MG AND SENNOSIDES 8.6 MG 1 TABLET: 8.6; 5 TABLET, FILM COATED ORAL at 22:30

## 2020-09-11 RX ADMIN — BUPROPION HYDROCHLORIDE 300 MG: 150 TABLET, FILM COATED, EXTENDED RELEASE ORAL at 08:39

## 2020-09-11 RX ADMIN — POLYETHYLENE GLYCOL 3350 17 G: 17 POWDER, FOR SOLUTION ORAL at 08:49

## 2020-09-11 ASSESSMENT — ACTIVITIES OF DAILY LIVING (ADL)
ADLS_ACUITY_SCORE: 13

## 2020-09-11 ASSESSMENT — MIFFLIN-ST. JEOR: SCORE: 1171.34

## 2020-09-11 NOTE — PROGRESS NOTES
[facility]    Hospitalist Progress Note    Assessment & Plan   Julia Andre is a 69 year old female who was admitted on 9/2/2020.      Past medical history significant for stress-induced MI with associated ischemic cardiomyopathy, hypertension, hyperlipidemia, type 2 diabetes, major depressive disorder with anxiety, osteopenia, GERD, obstructive sleep apnea, history of cervical cancer and recent diagnosis of ovarian mass/cancer with elevated CA-125 lab who underwent an elective exploratory laparotomy with total abdominal hysterectomy with bilateral salpingo-oophorectomy, omentectomy with tumor debulking and pelvic and paraaortic lymphadenectomy for which the Hospitalist Service has been consulted to assist with medical management.     Ovarian mass/cancer with elevated CA-125 lab study, status post elective exploratory laparotomy with total abdominal hysterectomy with bilateral salpingo-oophorectomy, omentectomy with tumor debulking and pelvic and paraaortic lymphadenectomy:    -  --chest port placement 9/4.   -Defer analgesic management, DVT prophylaxis and PT/OT assessment to GYN.   Low grade fever 9/6/2020: BC from port positive GNR, started on Zosyn,, BC x2 repeated 9/8/2020 including from port.  BC 9/6/20 = 2/2 Bacteroides, 9/8/2020 1/2 = Bacteroides.  -ID consult, appreciate input.  They felt that port can remain in place for now, follow-up BC  -UC negative  --Monitor temp profile, WBC.    -Abdominal/pelvic CT with 8 x 5 cm fluid collection, LIZZETTE drain placed yesterday, 140 cc output over the last 24 hours.  Initial grams: No growth, NGTD, remains afebrile on Zosyn.     Bacteremia, 9/6/2020, GNR/Bacteroides, see #1.  -See #1.      Hyperkalemia:  Resolved  Noted potassium of 5.5 peak this admission. Improved to nl range with IVFs and reduction in Lisinopril dose.   -BP above goal, SBP upper 140s-150, increase lisinopril, recheck BMP.       Mild hyponatremia:  Resolved with NS fluids.   2/2  dehydration     History of stress-induced MI with associated ischemic cardiomyopathy, hypertension and hyperlipidemia:    This appears to be stable at this point.  Upon review of electronic medical records, it appears coronary angiogram at time of MI had clean coronaries.  The patient has been holding baby aspirin daily for the last week.  - good control on current regimen. High in am before am meds    --Hold ASA and defer to OB/GYN as to when this can be resumed.    --Resumed prior to admit lisinopril 10 mg daily, with hold parameters in place---> -Starting 9/5 reduced lisinopril to 5 mg daily as prone to hyperkalemia and start Norvasc 5 mg daily. - Lopressor 50 mg 2 times daily  --Resumed prior to admit Crestor 20 mg daily.   --PRN IV hydralazine available for SBP > 180.        Type 2 diabetes:    The patient indicated that this is well controlled and she is currently on metformin 500 mg 2 times daily.    - range. Not needing ISS  --Hold PTA metformin.  restart several days after discharge once po intake at/nearer baseline  -Glucoses have been running in the low 100 range off metformin without requiring SSI, for now continue hold metformin.  --Hypoglycemic protocol in place.      Suspected CKD stage 2-3:  Review of EMR with noted creatinine between 1.0-1.3 and GFR in the 50-60 range and at times in the 40's.  Previous renal US 7/30/2020 with noted bilateral renal cysts.    -KRISSY and hypovolemic hyponatremia resolved with NS fluids      History of iron deficiency anemia with acute blood loss anemia:    Per electronic medical record, the patient underwent a full GI workup and 2012 that indicated tiny erosions in the stomach.  The patient is no longer on iron supplements.   Hb 10 [id number]> 9.5---> 8.5 --> 7.6 ---> 7.9 Asymptomatic  - every other day iron per surgery  --Monitor per surgery     GERD:    The patient infrequently uses antacids.   --PRN Tums are made available.      Obstructive sleep apnea:    The  patient indicated that she has a diagnosis of moderate obstructive sleep apnea and has been working on trying to get a new CPAP machine; however, she would prefer not having this, as she did not tolerate it in the past.    --Monitor O2 spot checks.   -IS     Major depressive disorder with anxiety:    --Resume prior to admit Wellbutrin 300 mg every morning.      History of cervical cancer:    The patient is status post surgical intervention.  Follow-up GYN.        DVT Prophylaxis: Lovenox.  Code Status: Full Code        Disposition Plan-  2-3 days pending culture results from LIZZETTE drain placed yesterday and ID recommendations.    Memo Sanchez MD  Text Page  (7am to 6pm)  Interval History   Patient reports pain in back related to the LIZZETTE drain, nursing reports no wound issues, 140 cc serosanguineous output since placement yesterday.  NGTD, Gram stain no organisms.  Remains afebrile on Zosyn.       Physical Exam   Temp: 96  F (35.6  C) Temp src: Oral BP: (!) 150/63 Pulse: 55   Resp: 14 SpO2: 94 % O2 Device: None (Room air)    Vitals:    09/07/20 0556 09/09/20 0122 09/11/20 0657   Weight: 70.6 kg (155 lb 9.6 oz) 70.3 kg (154 lb 14.4 oz) 69.8 kg (153 lb 14.4 oz)     Vital Signs with Ranges  Temp:  [96  F (35.6  C)-96.6  F (35.9  C)] 96  F (35.6  C)  Pulse:  [55-62] 55  Resp:  [14] 14  BP: (147-152)/(55-63) 150/63  SpO2:  [94 %-97 %] 94 %  I/O last 3 completed shifts:  In: -   Out: 180 [Drains:180]    Constitutional: Alert, calm, cooperative, NAD.  Respiratory: CTAB, respirations nonlabored.  Chest: port right upper chest  Cardiovascular: Regular rhythm, no murmur appreciated.  GI: midline  incision, binder, good BS.  Nontender, nondistended.  No rebound, guarding or other peritoneal signs.  LIZZETTE drain in place posterior, minimal serosanguineous output  Skin/Integumen: no rash on gross exam., no calf pain  Neuro, nl speech and mentation  Psych: Affect calm.      Medications       amLODIPine  5 mg Oral Daily     aspirin   81 mg Oral Daily     buPROPion  300 mg Oral QAM     enoxaparin ANTICOAGULANT  40 mg Subcutaneous Q24H     heparin  5 mL Intracatheter Q28 Days     heparin lock flush  5-10 mL Intracatheter Q24H     insulin aspart  1-7 Units Subcutaneous TID AC     insulin aspart  1-5 Units Subcutaneous At Bedtime     lactobacillus rhamnosus (GG)  1 capsule Oral BID     lidocaine  30 mL Subcutaneous Once     lisinopril  5 mg Oral Daily     metoprolol tartrate  50 mg Oral BID     piperacillin-tazobactam  3.375 g Intravenous Q6H     polyethylene glycol  17 g Oral Daily     rosuvastatin  20 mg Oral Daily     senna-docusate  1 tablet Oral At Bedtime     [Held by provider] sennosides  2 tablet Oral BID     simethicone  80 mg Oral 4x Daily     sodium chloride (PF)  10 mL Irrigation Q8H       Data   Recent Labs   Lab 09/11/20  0600 09/10/20  0530 09/09/20  0500  09/08/20  0540  09/06/20  0600 09/05/20  0713   WBC 6.6 6.9 7.5   < >  --    < > 6.7  --    HGB 8.1* 7.8* 7.8*   < >  --    < > 7.6* 8.5*   MCV 85 85 85   < >  --   --  86  --    * 426 404   < > 338  --  279 301   NA  --   --   --   --   --   --  135 135   POTASSIUM  --   --   --   --   --   --  3.8 4.4   CHLORIDE  --   --   --   --   --   --  105 105   CO2  --   --   --   --   --   --  26 27   BUN  --   --   --   --   --   --  10 10   CR 0.85  --   --   --  0.81  --  0.84 0.73   ANIONGAP  --   --   --   --   --   --  4 3   RADHA  --   --   --   --   --   --  7.6* 8.3*   GLC  --   --   --   --   --   --  117* 105*    < > = values in this interval not displayed.

## 2020-09-11 NOTE — PLAN OF CARE
Pt POD9. VSS on RA. Denies pain. Discomfort to buttocks d/t LIZZETTE drain, ice applied, helpful. Midline incision closed with staples, CDI. Abd binder in place. Lizzette with serosanguinous output, dressing CDI. BS hypo, +flatus, no BM, MOM given prior to shift. Tolrating regular diet, BG WNL. Port HL w/ int abx. Plan to stay for weekend for abscess cx, pt will discharge on PO abx.

## 2020-09-11 NOTE — PROVIDER NOTIFICATION
MD Notification    Notified Person: GUS roca    Notified Person Name:    Notification Date/Time: 9/11/2020 1:13 PM      Notification Interaction:    Purpose of Notification:did you want LIZZETTE flushed? Please advise.     Orders Received:    Comments:

## 2020-09-11 NOTE — PLAN OF CARE
POD #9. VSS on RA. C/o discomfort on R buttocks d/t LIZZETTE, repositioning and supportive pillow effective. Midline incision closed with staples CDI, small amt erythema. Abd binder in place. LIZZETTE drain w/ serosanguinous output, dressing CDI. BS remain hypo, + flatus. Bowel regimen restarted, declined miralax/suppository until AM. Tolerating regular diet, denies N/V. Port HL w/ int abx. Plan to stay weekend for abscess culture results, discharge on PO abx. Slept between cares.

## 2020-09-11 NOTE — PROGRESS NOTES
"Interventional Radiology Progress Note:  Inpatient at [facility]  Date: 2020   Patient name: Julia Andre  MRN:[id number]  :  1950    History: Recent KAYLEEN/BSO with tumor debulking .  Had fevers post operatively and became bacteremic.  Abdominal fluid collection noted on CT which was drained 9/10 and catheter left in place.    Interval History: Significant pain around drain site in buttock, drain has been functioning well, overall patient feeling better than before drain placement    Physical Exam:   Vitals: BP (!) 150/63 (BP Location: Right arm)   Pulse 55   Temp 96  F (35.6  C) (Oral)   Resp 14   Ht 1.575 m (5' 2\")   Wt 69.8 kg (153 lb 14.4 oz)   SpO2 94%   BMI 28.15 kg/m     General: Stable. In no acute distress.  Neuro: A&O x 3. Moves all extremities equally.  Abdomen: Soft, non-distended, non-tender,  Skin: Without excoriations, ecchymosis, erythema, lesions or open sores.      Drain: Rt buttock Dressing is C/D/I. Tube is draining thin yellow serosanguinous fluid.   Output by Drain (mL) 20 0700 - 20 1459 20 1500 - 20 2259 20 2300 - 09/10/20 0659 09/10/20 0700 - 09/10/20 1459 09/10/20 1500 - 09/10/20 2259 09/10/20 2300 - 20 0659 20 0700 - 20 1312   Closed/Suction Drain Right Buttock Bulb 12 [place] posterior pelvic     60 10 70        Labs:  Recent Labs   Lab 20  0600 09/10/20  0530 20  0500   HGB 8.1* 7.8* 7.8*   WBC 6.6 6.9 7.5     Recent Labs   Lab 20  0600 20  0540 20  0600   CR 0.85 0.81 0.84      No results for input(s): PROTTOTAL, ALBUMIN, BILITOTAL, ALKPHOS, AST, ALT, BILIDIRECT in the last 168 hours.    Cultures:  Recent Labs   Lab 09/10/20  1450 20  1615 20  0420 20  2353 20  2315   CULT Culture negative monitoring continues  Culture negative monitoring continues No growth Cultured on the 1st day of incubation:  Bacteroides thetaiotaomicron  *  " Critical Value/Significant Value, preliminary result only, called to and read back by  Caitlin Hatch RN at 1254 9.9.20 KZ    Susceptibility testing done on previous specimen Cultured on the 1st day of incubation:  Bacteroides thetaiotaomicron  Susceptibility testing done on previous specimen  *  Critical Value/Significant Value, preliminary result only, called to and read back by  Nela Catherine RN @ 0730 9.8.20 JE/LM   Cultured on the 1st day of incubation:  Bacteroides thetaiotaomicron  *  Critical Value/Significant Value, preliminary result only, called to and read back by  Dante Coffey RN at 0346 9.8.20. amd    (Note)  NEGATIVE for the following organisms and resistance markers:  Acinetobacter sp., Citrobacter sp., Enterobacter sp., Proteus sp., E.  coli, K. pneumoniae/oxytoca, P. aeruginosa, CTX-M, KPC, NDM, VIM, IMP  and OXA by [employer] multiplex nucleic acid test. Final identification  and antimicrobial susceptibility testing will be verified by standard  methods.    Critical Value/Significant Value called to and read back by  Clara Kwon RN 9/8/20 @ 0557 TF         Assessment: Right buttock drain placement for pelvic fluid collection following KAYLEEN/BSO    Plan: Continue to monitor tube outputs, added flushing orders although fluid is thin will flush to ensure tube patency and drainage of complete collection.  NGTD from aspirated fluid, will likely leave tube in over the weekend and may either discharge with tube if outputs greater than 20cc/day or will be able to remove tube before discharge.    20 minutes were spent with patient during today's visit with greater than 50% of the time spent face to face with the patient, in reviewing medical record and images and in counseling and coordinating patient's care.    Kaushik Artis PA-C  Interventional Radiology

## 2020-09-11 NOTE — PROVIDER NOTIFICATION
MD Notification    Notified Person: MD    Notified Person Name: Gabrielle    Notification Date/Time: 9/10/20 20:45    Notification Interaction: page    Purpose of Notification: Can we get scheduled bowel regimen started again please? Or at least PRN? Pt has not had BM in 8 days. Thanks!    Orders Received: pending

## 2020-09-11 NOTE — PROGRESS NOTES
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 GI function returning and tolerating regular diet.  Constipated.  Miralax daily.  Prn MOM.  Will need outpatient chemotherapy with Carboplatin/Taxol.     Bacteroides bacteremia:  On Zosyn.  Dr. Trent to determine when switch to oral antibiotic OK.    Pelvic fluid collection:  IR placed drain yesterday.  Gram stain with no organisms.  Culture pending.    Diabetes:  Good control currently on Metformin.    Acute blood loss anemia:  Stable    H/o stress induced MI/CMP:  ASA restarted                    Interval History:   Anxious to get out of hospital.  Still constipated              Medications:   I have reviewed this patient's current medications               Physical Exam:   Blood pressure (!) 150/63, pulse 55, temperature 96  F (35.6  C), temperature source Oral, resp. rate 14, height 1.575 m (5' 2\"), weight 69.8 kg (153 lb 14.4 oz), SpO2 94 %.        Vital Sign Ranges  Temperature Temp  Av.2  F (35.7  C)  Min: 96  F (35.6  C)  Max: 96.6  F (35.9  C)   Blood pressure Systolic (24hrs), Av , Min:124 , Max:152        Diastolic (24hrs), Av, Min:55, Max:63      Pulse Pulse  Av.7  Min: 55  Max: 70   Respirations Resp  Av  Min: 14  Max: 14   Pulse oximetry SpO2  Av.5 %  Min: 94 %  Max: 100 %         Intake/Output Summary (Last 24 hours) at 2020 1246  Last data filed at 2020 0800  Gross per 24 hour   Intake --   Output 140 ml   Net -140 ml       Lungs:   Clear      Cardiovascular:   normal apical pulses      Abdomen:   Non-distended, BS good, incision clean     Musculoskeletal:     no lower extremity pitting edema present                Data:   All laboratory data reviewed  "

## 2020-09-11 NOTE — PROGRESS NOTES
[facility]    Infectious Disease Progress Note    Date of Service (when I saw the patient): 09/11/2020     Assessment & Plan   Julia Andre is a 69 year old female who was admitted on 9/2/2020.     Impression:  1. 69 y.o female with stress-induced MI with associated ischemic cardiomyopathy, hypertension, hyperlipidemia.   2. Type 2 diabetes.   3. Major depressive disorder with anxiety.   4. History of cervical cancer and recent diagnosis of ovarian mass/cancer with elevated CA-125 lab who underwent an elective exploratory laparotomy with total abdominal hysterectomy with bilateral salpingo-oophorectomy, omentectomy with tumor debulking and pelvic and paraaortic lymphadenectomy on 9/2  5. Recent PORT placement on 9/4. No local pain   6. Post operative fevers leading to blood cultures, which are now positive for GNR, pending ID.      Recommendations:   Bacteroids in the cultures, Most cases bacteremia related to intraabdominal source, zosyn covers, CT abdomen and pelvis with :  partially encapsulated  fluid collection measuring 8.2 x 5.7 cm (series 3, image 163) with  multiple foci of gas within it concerning for infected postoperative  hematoma or developing abscess..   S/p  drain placement will follow up on the fluid cultures.       PORT OK TO STAY for now       Chas Trent MD    Interval History   Afebrile   S/p drain placement after CT scan from yesterday     Physical Exam   Temp: 96  F (35.6  C) Temp src: Oral BP: (!) 147/55 Pulse: 62   Resp: 14 SpO2: 94 % O2 Device: None (Room air) Oxygen Delivery: 2 LPM  Vitals:    09/07/20 0556 09/09/20 0122 09/11/20 0657   Weight: 70.6 kg (155 lb 9.6 oz) 70.3 kg (154 lb 14.4 oz) 69.8 kg (153 lb 14.4 oz)     Vital Signs with Ranges  Temp:  [96  F (35.6  C)-96.6  F (35.9  C)] 96  F (35.6  C)  Pulse:  [60-70] 62  Resp:  [14-16] 14  BP: (124-156)/(55-63) 147/55  SpO2:  [94 %-100 %] 94 %    Constitutional: Awake, alert, cooperative, no apparent  distress  Lungs: Clear to auscultation bilaterally, no crackles or wheezing  Cardiovascular: Regular rate and rhythm, normal S1 and S2, and no murmur noted  Abdomen: Normal bowel sounds, soft, non-distended, non-tender  Skin: No rashes, no cyanosis, no edema  Other:    Medications       amLODIPine  5 mg Oral Daily     aspirin  81 mg Oral Daily     buPROPion  300 mg Oral QAM     enoxaparin ANTICOAGULANT  40 mg Subcutaneous Q24H     heparin  5 mL Intracatheter Q28 Days     heparin lock flush  5-10 mL Intracatheter Q24H     insulin aspart  1-7 Units Subcutaneous TID AC     insulin aspart  1-5 Units Subcutaneous At Bedtime     lactobacillus rhamnosus (GG)  1 capsule Oral BID     lidocaine  30 mL Subcutaneous Once     lisinopril  5 mg Oral Daily     metoprolol tartrate  50 mg Oral BID     piperacillin-tazobactam  3.375 g Intravenous Q6H     polyethylene glycol  17 g Oral Daily     rosuvastatin  20 mg Oral Daily     senna-docusate  1 tablet Oral At Bedtime     [Held by provider] sennosides  2 tablet Oral BID     simethicone  80 mg Oral 4x Daily       Data   All microbiology laboratory data reviewed.  Recent Labs   Lab Test 09/11/20  0600 09/10/20  0530 09/09/20  0500   WBC 6.6 6.9 7.5   HGB 8.1* 7.8* 7.8*   HCT 25.5* 24.2* 24.0*   MCV 85 85 85   * 426 404     Recent Labs   Lab Test 09/11/20  0600 09/08/20  0540 09/06/20  0600   CR 0.85 0.81 0.84     No lab results found.  Recent Labs   Lab Test 09/10/20  1450 09/08/20  1615 09/08/20  0420 09/06/20  2353 09/06/20  2315   CULT Culture negative monitoring continues No growth Cultured on the 1st day of incubation:  Bacteroides thetaiotaomicron  *  Critical Value/Significant Value, preliminary result only, called to and read back by  Caitlin Hatch RN at 5846 1.4.20 KZ    Susceptibility testing done on previous specimen Cultured on the 1st day of incubation:  Bacteroides thetaiotaomicron  Susceptibility testing done on previous specimen  *  Critical Value/Significant  Value, preliminary result only, called to and read back by  Nela Catherine RN @ 0730 9.8.20 JE/LM   Cultured on the 1st day of incubation:  Bacteroides thetaiotaomicron  *  Critical Value/Significant Value, preliminary result only, called to and read back by  Dante Coffey RN at 0346 9.8.20. amd    (Note)  NEGATIVE for the following organisms and resistance markers:  Acinetobacter sp., Citrobacter sp., Enterobacter sp., Proteus sp., E.  coli, K. pneumoniae/oxytoca, P. aeruginosa, CTX-M, KPC, NDM, VIM, IMP  and OXA by [employer] multiplex nucleic acid test. Final identification  and antimicrobial susceptibility testing will be verified by standard  methods.    Critical Value/Significant Value called to and read back by  Clara Kwon RN 9/8/20 @ 0557 TF         Attestation:  Total time on the floor involved in the patient's care: 35 minutes. Total time spent in counseling/care coordination: >50%

## 2020-09-11 NOTE — PLAN OF CARE
POD #8. VSS on RA. C/o discomfort to buttock where new LIZZETTE drain was placed, tylenol given X2. BS hypo, + gas. Continue bowel regimen. No BM today. Given scheduled senna and denying miralax or suppository until the AM. Ambulated independently in the halls. Regular diet, tolerating well. LIZZETTE drain with serosanguinous output. Incision site w/ slight erythema, staples in place. Abdominal binder in place for support. Port HL w/ int abx. Plan to stay this weekend pending abscess cultures.

## 2020-09-12 LAB
ERYTHROCYTE [DISTWIDTH] IN BLOOD BY AUTOMATED COUNT: 14.4 % (ref 10–15)
GLUCOSE BLDC GLUCOMTR-MCNC: 106 MG/DL (ref 70–99)
GLUCOSE BLDC GLUCOMTR-MCNC: 114 MG/DL (ref 70–99)
GLUCOSE BLDC GLUCOMTR-MCNC: 163 MG/DL (ref 70–99)
GLUCOSE BLDC GLUCOMTR-MCNC: 81 MG/DL (ref 70–99)
GLUCOSE BLDC GLUCOMTR-MCNC: 97 MG/DL (ref 70–99)
HCT VFR BLD AUTO: 26.1 % (ref 35–47)
HGB BLD-MCNC: 8.2 G/DL (ref 11.7–15.7)
MCH RBC QN AUTO: 27 PG (ref 26.5–33)
MCHC RBC AUTO-ENTMCNC: 31.4 G/DL (ref 31.5–36.5)
MCV RBC AUTO: 86 FL (ref 78–100)
PLATELET # BLD AUTO: 476 10E9/L (ref 150–450)
RBC # BLD AUTO: 3.04 10E12/L (ref 3.8–5.2)
WBC # BLD AUTO: 7.5 10E9/L (ref 4–11)

## 2020-09-12 PROCEDURE — 12000000 ZZH R&B MED SURG/OB

## 2020-09-12 PROCEDURE — 25000128 H RX IP 250 OP 636: Performed by: NURSE PRACTITIONER

## 2020-09-12 PROCEDURE — 25000128 H RX IP 250 OP 636: Performed by: STUDENT IN AN ORGANIZED HEALTH CARE EDUCATION/TRAINING PROGRAM

## 2020-09-12 PROCEDURE — 25000132 ZZH RX MED GY IP 250 OP 250 PS 637: Performed by: NURSE PRACTITIONER

## 2020-09-12 PROCEDURE — 25000132 ZZH RX MED GY IP 250 OP 250 PS 637: Performed by: OBSTETRICS & GYNECOLOGY

## 2020-09-12 PROCEDURE — 25000132 ZZH RX MED GY IP 250 OP 250 PS 637: Performed by: PHYSICIAN ASSISTANT

## 2020-09-12 PROCEDURE — 85027 COMPLETE CBC AUTOMATED: CPT | Performed by: OBSTETRICS & GYNECOLOGY

## 2020-09-12 PROCEDURE — 99232 SBSQ HOSP IP/OBS MODERATE 35: CPT | Performed by: INTERNAL MEDICINE

## 2020-09-12 PROCEDURE — 00000146 ZZHCL STATISTIC GLUCOSE BY METER IP

## 2020-09-12 PROCEDURE — 25000132 ZZH RX MED GY IP 250 OP 250 PS 637: Performed by: HOSPITALIST

## 2020-09-12 PROCEDURE — 25000128 H RX IP 250 OP 636: Performed by: INTERNAL MEDICINE

## 2020-09-12 RX ADMIN — Medication 1 CAPSULE: at 20:07

## 2020-09-12 RX ADMIN — ROSUVASTATIN CALCIUM 20 MG: 20 TABLET, FILM COATED ORAL at 08:12

## 2020-09-12 RX ADMIN — METOPROLOL TARTRATE 50 MG: 50 TABLET, FILM COATED ORAL at 20:07

## 2020-09-12 RX ADMIN — SODIUM CHLORIDE, PRESERVATIVE FREE 5 ML: 5 INJECTION INTRAVENOUS at 06:19

## 2020-09-12 RX ADMIN — ENOXAPARIN SODIUM 40 MG: 40 INJECTION SUBCUTANEOUS at 08:14

## 2020-09-12 RX ADMIN — PIPERACILLIN AND TAZOBACTAM 3.38 G: 3; .375 INJECTION, POWDER, FOR SOLUTION INTRAVENOUS at 04:34

## 2020-09-12 RX ADMIN — METOPROLOL TARTRATE 50 MG: 50 TABLET, FILM COATED ORAL at 08:21

## 2020-09-12 RX ADMIN — Medication 1 CAPSULE: at 08:13

## 2020-09-12 RX ADMIN — IBUPROFEN 600 MG: 400 TABLET ORAL at 12:08

## 2020-09-12 RX ADMIN — SODIUM CHLORIDE, PRESERVATIVE FREE 5 ML: 5 INJECTION INTRAVENOUS at 23:34

## 2020-09-12 RX ADMIN — LISINOPRIL 10 MG: 10 TABLET ORAL at 08:13

## 2020-09-12 RX ADMIN — IBUPROFEN 600 MG: 400 TABLET ORAL at 20:10

## 2020-09-12 RX ADMIN — SIMETHICONE 80 MG: 80 TABLET, CHEWABLE ORAL at 17:03

## 2020-09-12 RX ADMIN — PIPERACILLIN AND TAZOBACTAM 3.38 G: 3; .375 INJECTION, POWDER, FOR SOLUTION INTRAVENOUS at 15:59

## 2020-09-12 RX ADMIN — SODIUM CHLORIDE, PRESERVATIVE FREE 5 ML: 5 INJECTION INTRAVENOUS at 16:56

## 2020-09-12 RX ADMIN — AMLODIPINE BESYLATE 5 MG: 5 TABLET ORAL at 08:13

## 2020-09-12 RX ADMIN — SIMETHICONE 80 MG: 80 TABLET, CHEWABLE ORAL at 22:29

## 2020-09-12 RX ADMIN — PIPERACILLIN AND TAZOBACTAM 3.38 G: 3; .375 INJECTION, POWDER, FOR SOLUTION INTRAVENOUS at 10:36

## 2020-09-12 RX ADMIN — SODIUM CHLORIDE, PRESERVATIVE FREE 5 ML: 5 INJECTION INTRAVENOUS at 11:56

## 2020-09-12 RX ADMIN — SIMETHICONE 80 MG: 80 TABLET, CHEWABLE ORAL at 12:05

## 2020-09-12 RX ADMIN — PIPERACILLIN AND TAZOBACTAM 3.38 G: 3; .375 INJECTION, POWDER, FOR SOLUTION INTRAVENOUS at 22:29

## 2020-09-12 RX ADMIN — BUPROPION HYDROCHLORIDE 300 MG: 150 TABLET, FILM COATED, EXTENDED RELEASE ORAL at 08:12

## 2020-09-12 RX ADMIN — SIMETHICONE 80 MG: 80 TABLET, CHEWABLE ORAL at 08:13

## 2020-09-12 RX ADMIN — ASPIRIN 81 MG: 81 TABLET ORAL at 08:13

## 2020-09-12 ASSESSMENT — ACTIVITIES OF DAILY LIVING (ADL)
ADLS_ACUITY_SCORE: 13

## 2020-09-12 ASSESSMENT — MIFFLIN-ST. JEOR: SCORE: 1194.02

## 2020-09-12 NOTE — PLAN OF CARE
OD 10. Pt is A and O x 4. VSS on RA. Reports mild R buttocks/ LIZZETTE drain,  pain controlled with ibuprofen.  LIZZETTE putting out moderate serosanguinous drainage: Irrigated per POC. No nausea or SOB reported. Midline incision CDI, no redness, edema, or drainage noted.  Abdominal binder on. Had small loose BMs, Miralax held. Adequate urine output. Continent of both.  R port HL+ IV zosyn. Dressing CDI.  Plan for CT scan of the Pelvis on Monday. Discharge pending. Continue to monitor.

## 2020-09-12 NOTE — PLAN OF CARE
POD#10. Pt A/Ox4. VSS on RA. C/o mild discomfort from LIZZETTE drain placement on R buttocks, manage with repositioning, ice packs, tylenol, and ibuprofen. Denies nausea. Midline incision intact, abd binder in use. Continent of medium soft BM. R port HL, re-accessed with new dressing. Plan to stay until results back from abscess culture.

## 2020-09-12 NOTE — PROGRESS NOTES
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 GI function returning and tolerating regular diet.  Has had BM.  Will need outpatient chemotherapy with Carboplatin/Taxol.  Will arrange treatment planning visit when discharge date determined.     Bacteroides bacteremia:  On Zosyn.  Dr. Trent to determine when switch to oral antibiotic OK.     Pelvic fluid collection:  IR placed drain yesterday.  Gram stain with no organisms.  Culture negative thus far.  Per patient Dr. Trent wants repeat CT pelvis on Monday so will schedule     Diabetes:  Good control currently on Metformin.     Acute blood loss anemia:  Stable     H/o stress induced MI/CMP:  ASA restarted                     Interval History:   Tired of being in the hospital.  Reports BM in last 24 hours.  Tolerating diet              Medications:   I have reviewed this patient's current medications               Physical Exam:   Blood pressure (!) 149/63, pulse 58, temperature 95.9  F (35.5  C), temperature source Oral, resp. rate 16, height 1.575 m (5' 2\"), weight 72.1 kg (158 lb 14.4 oz), SpO2 96 %.        Vital Sign Ranges  Temperature Temp  Av  F (35.6  C)  Min: 95.8  F (35.4  C)  Max: 96.4  F (35.8  C)   Blood pressure Systolic (24hrs), Av , Min:149 , Max:150        Diastolic (24hrs), Av, Min:57, Max:66      Pulse Pulse  Av.8  Min: 54  Max: 59   Respirations Resp  Av  Min: 16  Max: 16   Pulse oximetry SpO2  Av.7 %  Min: 96 %  Max: 97 %         Intake/Output Summary (Last 24 hours) at 2020 1022  Last data filed at 2020 0637  Gross per 24 hour   Intake --   Output 125 ml   Net -125 ml       Abdomen:   Incision looks good.  Staples intact.  Abdomen non-distended with good BS     Musculoskeletal:   no lower " extremity pitting edema present                Data:   All laboratory data reviewed

## 2020-09-13 LAB
ANION GAP SERPL CALCULATED.3IONS-SCNC: 5 MMOL/L (ref 3–14)
BACTERIA SPEC CULT: ABNORMAL
BACTERIA SPEC CULT: ABNORMAL
BUN SERPL-MCNC: 16 MG/DL (ref 7–30)
CALCIUM SERPL-MCNC: 8.2 MG/DL (ref 8.5–10.1)
CHLORIDE SERPL-SCNC: 107 MMOL/L (ref 94–109)
CO2 SERPL-SCNC: 25 MMOL/L (ref 20–32)
CREAT SERPL-MCNC: 0.88 MG/DL (ref 0.52–1.04)
ERYTHROCYTE [DISTWIDTH] IN BLOOD BY AUTOMATED COUNT: 14.5 % (ref 10–15)
GFR SERPL CREATININE-BSD FRML MDRD: 66 ML/MIN/{1.73_M2}
GLUCOSE BLDC GLUCOMTR-MCNC: 110 MG/DL (ref 70–99)
GLUCOSE BLDC GLUCOMTR-MCNC: 111 MG/DL (ref 70–99)
GLUCOSE BLDC GLUCOMTR-MCNC: 111 MG/DL (ref 70–99)
GLUCOSE BLDC GLUCOMTR-MCNC: 112 MG/DL (ref 70–99)
GLUCOSE BLDC GLUCOMTR-MCNC: 122 MG/DL (ref 70–99)
GLUCOSE SERPL-MCNC: 115 MG/DL (ref 70–99)
HCT VFR BLD AUTO: 26.4 % (ref 35–47)
HGB BLD-MCNC: 8.2 G/DL (ref 11.7–15.7)
MCH RBC QN AUTO: 26.9 PG (ref 26.5–33)
MCHC RBC AUTO-ENTMCNC: 31.1 G/DL (ref 31.5–36.5)
MCV RBC AUTO: 87 FL (ref 78–100)
PLATELET # BLD AUTO: 527 10E9/L (ref 150–450)
POTASSIUM SERPL-SCNC: 4.4 MMOL/L (ref 3.4–5.3)
RBC # BLD AUTO: 3.05 10E12/L (ref 3.8–5.2)
SODIUM SERPL-SCNC: 137 MMOL/L (ref 133–144)
SPECIMEN SOURCE: ABNORMAL
WBC # BLD AUTO: 7.8 10E9/L (ref 4–11)

## 2020-09-13 PROCEDURE — 80048 BASIC METABOLIC PNL TOTAL CA: CPT | Performed by: OBSTETRICS & GYNECOLOGY

## 2020-09-13 PROCEDURE — 25000128 H RX IP 250 OP 636: Performed by: INTERNAL MEDICINE

## 2020-09-13 PROCEDURE — 99232 SBSQ HOSP IP/OBS MODERATE 35: CPT | Performed by: INTERNAL MEDICINE

## 2020-09-13 PROCEDURE — 85027 COMPLETE CBC AUTOMATED: CPT | Performed by: OBSTETRICS & GYNECOLOGY

## 2020-09-13 PROCEDURE — 25000128 H RX IP 250 OP 636: Performed by: NURSE PRACTITIONER

## 2020-09-13 PROCEDURE — 25000132 ZZH RX MED GY IP 250 OP 250 PS 637: Performed by: HOSPITALIST

## 2020-09-13 PROCEDURE — 00000146 ZZHCL STATISTIC GLUCOSE BY METER IP

## 2020-09-13 PROCEDURE — 25000132 ZZH RX MED GY IP 250 OP 250 PS 637: Performed by: OBSTETRICS & GYNECOLOGY

## 2020-09-13 PROCEDURE — 12000000 ZZH R&B MED SURG/OB

## 2020-09-13 PROCEDURE — 25000132 ZZH RX MED GY IP 250 OP 250 PS 637: Performed by: NURSE PRACTITIONER

## 2020-09-13 PROCEDURE — 25000132 ZZH RX MED GY IP 250 OP 250 PS 637: Performed by: PHYSICIAN ASSISTANT

## 2020-09-13 PROCEDURE — 25000128 H RX IP 250 OP 636: Performed by: STUDENT IN AN ORGANIZED HEALTH CARE EDUCATION/TRAINING PROGRAM

## 2020-09-13 RX ADMIN — SIMETHICONE 80 MG: 80 TABLET, CHEWABLE ORAL at 12:22

## 2020-09-13 RX ADMIN — SODIUM CHLORIDE, PRESERVATIVE FREE 5 ML: 5 INJECTION INTRAVENOUS at 11:19

## 2020-09-13 RX ADMIN — SODIUM CHLORIDE, PRESERVATIVE FREE 5 ML: 5 INJECTION INTRAVENOUS at 23:11

## 2020-09-13 RX ADMIN — Medication 1 CAPSULE: at 20:09

## 2020-09-13 RX ADMIN — METOPROLOL TARTRATE 50 MG: 50 TABLET, FILM COATED ORAL at 08:29

## 2020-09-13 RX ADMIN — PIPERACILLIN AND TAZOBACTAM 3.38 G: 3; .375 INJECTION, POWDER, FOR SOLUTION INTRAVENOUS at 10:28

## 2020-09-13 RX ADMIN — METOPROLOL TARTRATE 50 MG: 50 TABLET, FILM COATED ORAL at 20:09

## 2020-09-13 RX ADMIN — IBUPROFEN 600 MG: 400 TABLET ORAL at 15:23

## 2020-09-13 RX ADMIN — SODIUM CHLORIDE, PRESERVATIVE FREE 5 ML: 5 INJECTION INTRAVENOUS at 05:54

## 2020-09-13 RX ADMIN — SIMETHICONE 80 MG: 80 TABLET, CHEWABLE ORAL at 08:30

## 2020-09-13 RX ADMIN — SIMETHICONE 80 MG: 80 TABLET, CHEWABLE ORAL at 18:00

## 2020-09-13 RX ADMIN — SODIUM CHLORIDE, PRESERVATIVE FREE 5 ML: 5 INJECTION INTRAVENOUS at 16:49

## 2020-09-13 RX ADMIN — IBUPROFEN 600 MG: 400 TABLET ORAL at 22:23

## 2020-09-13 RX ADMIN — ROSUVASTATIN CALCIUM 20 MG: 20 TABLET, FILM COATED ORAL at 08:29

## 2020-09-13 RX ADMIN — AMLODIPINE BESYLATE 5 MG: 5 TABLET ORAL at 08:29

## 2020-09-13 RX ADMIN — PIPERACILLIN AND TAZOBACTAM 3.38 G: 3; .375 INJECTION, POWDER, FOR SOLUTION INTRAVENOUS at 04:24

## 2020-09-13 RX ADMIN — SIMETHICONE 80 MG: 80 TABLET, CHEWABLE ORAL at 22:13

## 2020-09-13 RX ADMIN — PIPERACILLIN AND TAZOBACTAM 3.38 G: 3; .375 INJECTION, POWDER, FOR SOLUTION INTRAVENOUS at 22:14

## 2020-09-13 RX ADMIN — DOCUSATE SODIUM 50 MG AND SENNOSIDES 8.6 MG 1 TABLET: 8.6; 5 TABLET, FILM COATED ORAL at 22:13

## 2020-09-13 RX ADMIN — ENOXAPARIN SODIUM 40 MG: 40 INJECTION SUBCUTANEOUS at 08:30

## 2020-09-13 RX ADMIN — BUPROPION HYDROCHLORIDE 300 MG: 150 TABLET, FILM COATED, EXTENDED RELEASE ORAL at 08:29

## 2020-09-13 RX ADMIN — ASPIRIN 81 MG: 81 TABLET ORAL at 08:29

## 2020-09-13 RX ADMIN — LISINOPRIL 10 MG: 10 TABLET ORAL at 08:29

## 2020-09-13 RX ADMIN — Medication 1 CAPSULE: at 08:30

## 2020-09-13 RX ADMIN — PIPERACILLIN AND TAZOBACTAM 3.38 G: 3; .375 INJECTION, POWDER, FOR SOLUTION INTRAVENOUS at 16:02

## 2020-09-13 ASSESSMENT — ACTIVITIES OF DAILY LIVING (ADL)
ADLS_ACUITY_SCORE: 13

## 2020-09-13 ASSESSMENT — MIFFLIN-ST. JEOR: SCORE: 1194.25

## 2020-09-13 NOTE — PLAN OF CARE
POD11. A/ox4. VSS on RA. C/o mild lower abdominal/groin pain, managed with ibuprofen. Denies nausea. R buttock LIZZETTE drain with mild output. Midline incision ERWIN, slight erythema around umbilicus. Abdominal binder in use. Continent of B/B. R port HL with int abx. Plan to repeat abd/pelvic CT per ID on Monday.

## 2020-09-13 NOTE — PROGRESS NOTES
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 GI function returning and tolerating regular diet.  Has had BM.  Will need outpatient chemotherapy with Carboplatin/Taxol.  Will arrange treatment planning visit when discharge date determined.     Bacteroides bacteremia:  On Zosyn.  Dr. Trent to determine when switch to oral antibiotic OK.     Pelvic fluid collection:  IR placed drain . Gram stain with no organisms.  Culture negative thus far.  Per patient Dr. Trent wants repeat CT pelvis on Monday so will schedule     Diabetes:  Good control currently on Metformin.     Acute blood loss anemia:  Stable     H/o stress induced MI/CMP:  ASA restarted      Sherita Cordova CNP  GYN ONC  Cell: [phone]                   Interval History:   Tired of being in the hospital.  Reports BM in last 24 hours.  Tolerating diet. Ambulating. Had some chest tightness upon waking this morning, this has resolved with IS and ambulation.               Medications:   I have reviewed this patient's current medications               Physical Exam:   Blood pressure (!) 144/60, pulse 56, temperature 96.2  F (35.7  C), temperature source Oral, resp. rate 16, height 1.575 m (5' 2\"), weight 72.1 kg (158 lb 15.2 oz), SpO2 97 %.        Vital Sign Ranges  Temperature Temp  Av  F (35.6  C)  Min: 95.8  F (35.4  C)  Max: 96.4  F (35.8  C)   Blood pressure Systolic (24hrs), Av , Min:149 , Max:150        Diastolic (24hrs), Av, Min:57, Max:66      Pulse Pulse  Av.8  Min: 54  Max: 59   Respirations Resp  Av  Min: 16  Max: 16   Pulse oximetry SpO2  Av.7 %  Min: 96 %  Max: 97 %         Intake/Output Summary (Last 24 hours) at 2020 1022  Last data filed at 2020 0637  Gross per 24 hour   Intake --   Output " 125 ml   Net -125 ml     Resp: clear throughout. No dyspnea.     Abdomen:   Incision looks good.  Staples intact.  Abdomen non-distended with good BS. Mild erythema around belly button.      Musculoskeletal:   no lower extremity pitting edema present                Data:   All laboratory data reviewed

## 2020-09-13 NOTE — PROGRESS NOTES
[facility]    Hospitalist Progress Note    Assessment & Plan   Julia Andre is a 69 year old female who was admitted on 9/2/2020.      Past medical history is significant for stress-induced MI with associated ischemic cardiomyopathy, hypertension, hyperlipidemia, type 2 diabetes, major depressive disorder with anxiety, osteopenia, GERD, obstructive sleep apnea, history of cervical cancer and recent diagnosis of ovarian mass/cancer with elevated CA-125 lab who underwent an elective exploratory laparotomy with total abdominal hysterectomy with bilateral salpingo-oophorectomy, omentectomy with tumor debulking and pelvic and paraaortic lymphadenectomy.  Hospitalist Service has been consulted to assist with medical management.     Ovarian mass/cancer with elevated CA-125 lab study, status post elective exploratory laparotomy with total abdominal hysterectomy with bilateral salpingo-oophorectomy, omentectomy with tumor debulking and pelvic and paraaortic lymphadenectomy  Bacteremia, 9/6/2020, GNR/Bacteroides    S/P port placement 9/4.     BC from port positive GNR, started on Zosyn,, BC x2 repeated 9/8/2020 including from port.  BC 9/6/20 = 2/2 Bacteroides, 9/8/2020 1/2 = Bacteroides    ID consult, appreciate input.  They felt that port can remain in place for now, follow-up BC    UC negative    Monitor temp profile, WBC.     Abdominal/pelvic CT with 8 x 5 cm fluid collection, LIZZETTE drain placed 9/10, 150 cc output over the last 24 hours.  Drain fluid is growing Bacteroides (same organism as blood)    plan is repeat abdominal/pelvic CT on Monday, reposition or remove drain as needed     Hyperkalemia:  Resolved  Noted potassium of 5.5 peak this admission. Improved to nl range with IVFs and reduction in Lisinopril dose    Blood pressure readings are at goal    Recheck BMP       Mild hyponatremia:  Resolved with NS fluids.     2/2 dehydration     History of stress-induced MI with associated ischemic  cardiomyopathy, hypertension and hyperlipidemia:    This appears to be stable at this point.  Upon review of electronic medical records, it appears coronary angiogram at time of MI had clean coronaries.  The patient has been holding baby aspirin daily for the last week.    Continue baby aspirin    Resumed prior to admit lisinopril 10 mg daily, with hold parameters in place    Resumed prior to admit Crestor 20 mg daily.     PRN IV hydralazine available for SBP > 180.        Type 2 diabetes:    The patient indicated that this is well controlled and she is currently on metformin 500 mg 2 times daily.       range. Not needing ISS    Hold PTA metformin.  restart several days after discharge once po intake at/nearer baseline    Glucoses have been running in the low 100 range off metformin without requiring SSI, for now continue hold metformin.    Hypoglycemic protocol in place.      Suspected CKD stage 2-3:  Review of EMR with noted creatinine between 1.0-1.3 and GFR in the 50-60 range and at times in the 40's.  Previous renal US 7/30/2020 with noted bilateral renal cysts.      KRISSY and hypovolemic hyponatremia resolved with NS fluids      History of iron deficiency anemia with acute blood loss anemia:    Per electronic medical record, the patient underwent a full GI workup and 2012 that indicated tiny erosions in the stomach.  The patient is no longer on iron supplements.   Hb 10 [id number]> 9.5---> 8.5 --> 7.6 ---> 7.9 Asymptomatic    every other day iron per surgery    Monitor per surgery     GERD:    The patient infrequently uses antacids.     PRN Tums are made available.      Obstructive sleep apnea:    The patient indicated that she has a diagnosis of moderate obstructive sleep apnea and has been working on trying to get a new CPAP machine; however, she would prefer not having this, as she did not tolerate it in the past.      Monitor O2 spot checks    Encourage incentive spirometer use     Major depressive  "disorder with anxiety:      Resume Wellbutrin 300 mg every morning.      History of cervical cancer:    The patient is status post surgical intervention.  Follow-up GYN.      DVT Prophylaxis: Lovenox.  Code Status: Full Code        Disposition Plan-  2-3 days pending culture results from LIZZETTE drain and ID recommendations.    Candie Lebron MD  Hospitalist  [facility]  Text Page (7am - 6pm, M-F)    Interval History   \"That pain is almost entirely gone.\"  Patient says pelvic pain she described yesterday has almost completely resolved.  She has no new respiratory or GI complaints. Says she slept well.      Physical Exam   Temp: 96.2  F (35.7  C) Temp src: Oral BP: (!) 144/60 Pulse: 56   Resp: 16 SpO2: 97 % O2 Device: None (Room air)    Vitals:    09/11/20 0657 09/12/20 0634 09/13/20 0715   Weight: 69.8 kg (153 lb 14.4 oz) 72.1 kg (158 lb 14.4 oz) 72.1 kg (158 lb 15.2 oz)     Vital Signs with Ranges  Temp:  [95.8  F (35.4  C)-96.8  F (36  C)] 96.2  F (35.7  C)  Pulse:  [56-65] 56  Resp:  [16] 16  BP: (132-144)/(53-60) 144/60  SpO2:  [96 %-97 %] 97 %  I/O last 3 completed shifts:  In: 200 [P.O.:200]  Out: 150 [Drains:150]    Constitutional: Alert, calm, cooperative, NAD.  Respiratory: CTAB, respirations nonlabored.  Chest: port right upper chest  Cardiovascular: Regular rhythm, no murmur appreciated.  GI: .  Nontender, nondistended.  No rebound, guarding or other peritoneal signs.  LIZZETTE drain in place posterior, minimal serosanguineous output  Skin/Integumen: no rash on gross exam., no calf pain  Neuro, nl speech and mentation  Psych: Affect is pleasant    Medications       amLODIPine  5 mg Oral Daily     aspirin  81 mg Oral Daily     buPROPion  300 mg Oral QAM     enoxaparin ANTICOAGULANT  40 mg Subcutaneous Q24H     heparin  5 mL Intracatheter Q28 Days     heparin lock flush  5-10 mL Intracatheter Q24H     insulin aspart  1-7 Units Subcutaneous TID AC     insulin aspart  1-5 Units Subcutaneous At Bedtime "     lactobacillus rhamnosus (GG)  1 capsule Oral BID     lidocaine  30 mL Subcutaneous Once     lisinopril  10 mg Oral Daily     metoprolol tartrate  50 mg Oral BID     piperacillin-tazobactam  3.375 g Intravenous Q6H     polyethylene glycol  17 g Oral Daily     rosuvastatin  20 mg Oral Daily     senna-docusate  1 tablet Oral At Bedtime     simethicone  80 mg Oral 4x Daily     sodium chloride (PF)  10 mL Irrigation Q8H       Data   Recent Labs   Lab 09/13/20  0600 09/12/20  0610 09/11/20  0600 09/08/20  0540   WBC 7.8 7.5 6.6   < >  --    HGB 8.2* 8.2* 8.1*   < >  --    MCV 87 86 85   < >  --    * 476* 460*   < > 338     --   --   --   --    POTASSIUM 4.4  --   --   --   --    CHLORIDE 107  --   --   --   --    CO2 25  --   --   --   --    BUN 16  --   --   --   --    CR 0.88  --  0.85  --  0.81   ANIONGAP 5  --   --   --   --    RADHA 8.2*  --   --   --   --    *  --   --   --   --     < > = values in this interval not displayed.

## 2020-09-13 NOTE — PLAN OF CARE
POD 11. Pt is A and O x 4. VSS on RA. Reports  LLQ pain with standing. Md aware.  Prn ibuprofen  effective. LIZZETTE drain with moderate serosanguinous drainage: Irrigated per POC. Denies nausea and SOB reported. Midline incision CDI, some erythema/scab mid incision.  Abdominal binder on. Refused morning Miralax. Adequate urine output. Continent of both. R port dressing CDI, HL+ IV zosyn. Ambulated multiple time in sidhu.  Plan for pelvic CT on Monday. Discharge most likely on Monday. Continue to monitor.

## 2020-09-14 ENCOUNTER — APPOINTMENT (OUTPATIENT)
Dept: CT IMAGING | Facility: CLINIC | Age: 70
DRG: 737 | End: 2020-09-14
Attending: OBSTETRICS & GYNECOLOGY
Payer: COMMERCIAL

## 2020-09-14 LAB
CREAT SERPL-MCNC: 1.02 MG/DL (ref 0.52–1.04)
GFR SERPL CREATININE-BSD FRML MDRD: 55 ML/MIN/{1.73_M2}
GLUCOSE BLDC GLUCOMTR-MCNC: 107 MG/DL (ref 70–99)
PLATELET # BLD AUTO: 468 10E9/L (ref 150–450)

## 2020-09-14 PROCEDURE — 85049 AUTOMATED PLATELET COUNT: CPT | Performed by: OBSTETRICS & GYNECOLOGY

## 2020-09-14 PROCEDURE — 00000146 ZZHCL STATISTIC GLUCOSE BY METER IP

## 2020-09-14 PROCEDURE — 25000132 ZZH RX MED GY IP 250 OP 250 PS 637: Performed by: NURSE PRACTITIONER

## 2020-09-14 PROCEDURE — 12000000 ZZH R&B MED SURG/OB

## 2020-09-14 PROCEDURE — 25000128 H RX IP 250 OP 636: Performed by: NURSE PRACTITIONER

## 2020-09-14 PROCEDURE — 25000128 H RX IP 250 OP 636: Performed by: STUDENT IN AN ORGANIZED HEALTH CARE EDUCATION/TRAINING PROGRAM

## 2020-09-14 PROCEDURE — 25000132 ZZH RX MED GY IP 250 OP 250 PS 637: Performed by: OBSTETRICS & GYNECOLOGY

## 2020-09-14 PROCEDURE — 72192 CT PELVIS W/O DYE: CPT

## 2020-09-14 PROCEDURE — 25000128 H RX IP 250 OP 636: Performed by: INTERNAL MEDICINE

## 2020-09-14 PROCEDURE — 82565 ASSAY OF CREATININE: CPT | Performed by: OBSTETRICS & GYNECOLOGY

## 2020-09-14 PROCEDURE — 25000132 ZZH RX MED GY IP 250 OP 250 PS 637: Performed by: HOSPITALIST

## 2020-09-14 PROCEDURE — 25000132 ZZH RX MED GY IP 250 OP 250 PS 637: Performed by: PHYSICIAN ASSISTANT

## 2020-09-14 PROCEDURE — 99232 SBSQ HOSP IP/OBS MODERATE 35: CPT | Performed by: INTERNAL MEDICINE

## 2020-09-14 RX ORDER — ERTAPENEM 1 G/1
1 INJECTION, POWDER, LYOPHILIZED, FOR SOLUTION INTRAMUSCULAR; INTRAVENOUS EVERY 24 HOURS
Status: DISCONTINUED | OUTPATIENT
Start: 2020-09-14 | End: 2020-09-15 | Stop reason: HOSPADM

## 2020-09-14 RX ADMIN — ASPIRIN 81 MG: 81 TABLET ORAL at 08:29

## 2020-09-14 RX ADMIN — METOPROLOL TARTRATE 50 MG: 50 TABLET, FILM COATED ORAL at 21:48

## 2020-09-14 RX ADMIN — BUPROPION HYDROCHLORIDE 300 MG: 150 TABLET, FILM COATED, EXTENDED RELEASE ORAL at 08:30

## 2020-09-14 RX ADMIN — SIMETHICONE 80 MG: 80 TABLET, CHEWABLE ORAL at 08:30

## 2020-09-14 RX ADMIN — LISINOPRIL 10 MG: 10 TABLET ORAL at 08:34

## 2020-09-14 RX ADMIN — Medication 1 CAPSULE: at 21:47

## 2020-09-14 RX ADMIN — SODIUM CHLORIDE, PRESERVATIVE FREE 5 ML: 5 INJECTION INTRAVENOUS at 15:12

## 2020-09-14 RX ADMIN — METOPROLOL TARTRATE 50 MG: 50 TABLET, FILM COATED ORAL at 08:29

## 2020-09-14 RX ADMIN — AMLODIPINE BESYLATE 5 MG: 5 TABLET ORAL at 08:29

## 2020-09-14 RX ADMIN — PIPERACILLIN AND TAZOBACTAM 3.38 G: 3; .375 INJECTION, POWDER, FOR SOLUTION INTRAVENOUS at 10:04

## 2020-09-14 RX ADMIN — ROSUVASTATIN CALCIUM 20 MG: 20 TABLET, FILM COATED ORAL at 08:29

## 2020-09-14 RX ADMIN — SODIUM CHLORIDE, PRESERVATIVE FREE 5 ML: 5 INJECTION INTRAVENOUS at 05:55

## 2020-09-14 RX ADMIN — DOCUSATE SODIUM 50 MG AND SENNOSIDES 8.6 MG 1 TABLET: 8.6; 5 TABLET, FILM COATED ORAL at 21:47

## 2020-09-14 RX ADMIN — Medication 1 CAPSULE: at 08:30

## 2020-09-14 RX ADMIN — SIMETHICONE 80 MG: 80 TABLET, CHEWABLE ORAL at 15:16

## 2020-09-14 RX ADMIN — ERTAPENEM SODIUM 1 G: 1 INJECTION, POWDER, LYOPHILIZED, FOR SOLUTION INTRAMUSCULAR; INTRAVENOUS at 15:12

## 2020-09-14 RX ADMIN — ENOXAPARIN SODIUM 40 MG: 40 INJECTION SUBCUTANEOUS at 08:30

## 2020-09-14 RX ADMIN — SIMETHICONE 80 MG: 80 TABLET, CHEWABLE ORAL at 21:47

## 2020-09-14 RX ADMIN — PIPERACILLIN AND TAZOBACTAM 3.38 G: 3; .375 INJECTION, POWDER, FOR SOLUTION INTRAVENOUS at 04:29

## 2020-09-14 ASSESSMENT — ACTIVITIES OF DAILY LIVING (ADL)
ADLS_ACUITY_SCORE: 13

## 2020-09-14 NOTE — PROGRESS NOTES
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 GI function returning and tolerating regular diet.  Has had BM.  Will need outpatient chemotherapy with Carboplatin/Taxol.  Will arrange treatment planning visit when discharge date determined.     Bacteroides bacteremia in blood and now fluid collection:  On Zosyn.  Dr. Trent switching to once a day Invanz upon discharge.      Pelvic fluid collection: slightly smaller on CT today. Gram stain with no organisms.  Culture with bacterioids. Invanz x 2 weeks. Repeat CT in 2 weeks. Order placed.      Diabetes:  Good control currently on Metformin.     Acute blood loss anemia:  Stable     H/o stress induced MI/CMP:  ASA restarted    Dispo: awaiting insurance approval for outpatient IV antibiotics. Likely tomorrow. All discharge orders complete.       Sherita Cordova CNP  GYN ONC  Cell: [phone]                   Interval History:   Tired of being in the hospital.  Reports BM in last 24 hours.  Tolerating diet. Ambulating.               Medications:   I have reviewed this patient's current medications               Physical Exam:   Blood pressure (!) 150/65, pulse 65, temperature 96.2  F (35.7  C), temperature source Axillary, resp. rate 16, height 1.575 m (5' 2\"), weight 72.1 kg (158 lb 15.2 oz), SpO2 96 %.        Vital Sign Ranges  Temperature Temp  Av  F (35.6  C)  Min: 95.8  F (35.4  C)  Max: 96.4  F (35.8  C)   Blood pressure Systolic (24hrs), Av , Min:149 , Max:150        Diastolic (24hrs), Av, Min:57, Max:66      Pulse Pulse  Av.8  Min: 54  Max: 59   Respirations Resp  Av  Min: 16  Max: 16   Pulse oximetry SpO2  Av.7 %  Min: 96 %  Max: 97 %         Intake/Output Summary (Last 24 hours) at 2020 1022  Last data filed at " 9/12/2020 0637  Gross per 24 hour   Intake --   Output 125 ml   Net -125 ml     Resp: clear throughout. No dyspnea.     Abdomen:   Incision looks good.  Staples intact. Every other staple removed.   Abdomen non-distended with good BS. Mild erythema around belly button.      Musculoskeletal:   no lower extremity pitting edema present                Data:   All laboratory data reviewed

## 2020-09-14 NOTE — PLAN OF CARE
POD12. A/Ox4. VSS on room air. Mild discomfort at LIZZETTE drain site - no PRNs given. Up independently. Tolerating regular diet, blood glucose checks WDL. Good urine output. BM x1. Midline incision - partial staple removal completed today, MANDY with no drainage. LIZZETTE on right buttocks, bulb suction. Flushes ordered q8hrs. Repeat CT done today, see results. IV zosyn discontinued, started on IV Invanz per ID recommendations. Continued Lovenox injections. Plan to discharge home tomorrow with course of IV antibiotic and follow-up CT scan in 2 weeks.

## 2020-09-14 NOTE — PLAN OF CARE
POD12. Pt A/Ox4. VSS on RA. C/o mild pain of LLQ/groin, managed with ibuprofen and tylenol. R buttock LIZZETTE drain with moderate serosanguinous drainage, orders to irrigate. Midline incision, slight erythema around umbilicus. Abdominal binder in use. R port HL. Pt refused 0200 BG check and AM weight. Plan for pelvic CT today to determine need for LIZZETTE, (ID will decide). Possible discharge post scan pending results.

## 2020-09-14 NOTE — PROGRESS NOTES
[facility]    Infectious Disease Progress Note    Date of Service (when I saw the patient): 09/14/2020     Assessment & Plan   Julia Andre is a 69 year old female who was admitted on 9/2/2020.     Impression:  1. 69 y.o female with stress-induced MI with associated ischemic cardiomyopathy, hypertension, hyperlipidemia.   2. Type 2 diabetes.   3. Major depressive disorder with anxiety.   4. History of cervical cancer and recent diagnosis of ovarian mass/cancer with elevated CA-125 lab who underwent an elective exploratory laparotomy with total abdominal hysterectomy with bilateral salpingo-oophorectomy, omentectomy with tumor debulking and pelvic and paraaortic lymphadenectomy on 9/2  5. Recent PORT placement on 9/4. No local pain   6. Post operative fevers leading to blood cultures, which are now positive for GNR, pending ID.      Recommendations:   Bacteroids in the blood cultures, Most cases bacteremia related to intraabdominal source, zosyn covers, CT abdomen and pelvis with :  partially encapsulated  fluid collection measuring 8.2 x 5.7 cm (series 3, image 163) with  multiple foci of gas within it concerning for infected postoperative  hematoma or developing abscess..   S/p  drain placement   Abdominal abscess cultures also with Bacteroids.     Repeat CT scan on 9/14    Plan:   IV abx at discharge can be switched to once a day ertapenem.   FOLLOW UP CT scan in 2 weeks to determine the length of treatment.         PORT OK TO STAY for now       Chas Trent MD    Interval History   Afebrile   S/p drain placement after CT scan from yesterday     Physical Exam   Temp: 96.2  F (35.7  C) Temp src: Axillary BP: (!) 150/65 Pulse: 65   Resp: 16 SpO2: 96 % O2 Device: None (Room air)    Vitals:    09/11/20 0657 09/12/20 0634 09/13/20 0715   Weight: 69.8 kg (153 lb 14.4 oz) 72.1 kg (158 lb 14.4 oz) 72.1 kg (158 lb 15.2 oz)     Vital Signs with Ranges  Temp:  [95.8  F (35.4  C)-96.3  F (35.7  C)] 96.2  F  (35.7  C)  Pulse:  [59-65] 65  Resp:  [16] 16  BP: (122-150)/(47-66) 150/65  SpO2:  [96 %-98 %] 96 %    Constitutional: Awake, alert, cooperative, no apparent distress  Lungs: Clear to auscultation bilaterally, no crackles or wheezing  Cardiovascular: Regular rate and rhythm, normal S1 and S2, and no murmur noted  Abdomen: Normal bowel sounds, soft, non-distended, non-tender  Skin: No rashes, no cyanosis, no edema  Other:    Medications       amLODIPine  5 mg Oral Daily     aspirin  81 mg Oral Daily     buPROPion  300 mg Oral QAM     enoxaparin ANTICOAGULANT  40 mg Subcutaneous Q24H     heparin  5 mL Intracatheter Q28 Days     heparin lock flush  5-10 mL Intracatheter Q24H     insulin aspart  1-7 Units Subcutaneous TID AC     insulin aspart  1-5 Units Subcutaneous At Bedtime     lactobacillus rhamnosus (GG)  1 capsule Oral BID     lidocaine  30 mL Subcutaneous Once     lisinopril  10 mg Oral Daily     metoprolol tartrate  50 mg Oral BID     piperacillin-tazobactam  3.375 g Intravenous Q6H     polyethylene glycol  17 g Oral Daily     rosuvastatin  20 mg Oral Daily     senna-docusate  1 tablet Oral At Bedtime     simethicone  80 mg Oral 4x Daily     sodium chloride (PF)  10 mL Irrigation Q8H       Data   All microbiology laboratory data reviewed.  Recent Labs   Lab Test 09/14/20  0600 09/13/20  0600 09/12/20  0610 09/11/20  0600   WBC  --  7.8 7.5 6.6   HGB  --  8.2* 8.2* 8.1*   HCT  --  26.4* 26.1* 25.5*   MCV  --  87 86 85   * 527* 476* 460*     Recent Labs   Lab Test 09/14/20  0600 09/13/20  0600 09/11/20  0600   CR 1.02 0.88 0.85     No lab results found.  Recent Labs   Lab Test 09/10/20  1450 09/08/20  1615 09/08/20  0420 09/06/20  2353 09/06/20  2315   CULT Light growth  Bacteroides thetaiotaomicron  Susceptibility testing not routinely done  *  Culture negative monitoring continues No growth Cultured on the 1st day of incubation:  Bacteroides thetaiotaomicron  *  Critical Value/Significant Value,  preliminary result only, called to and read back by  Caitlin Hatch RN at 1254 9.9.20 KZ    Susceptibility testing done on previous specimen Cultured on the 1st day of incubation:  Bacteroides thetaiotaomicron  Susceptibility testing done on previous specimen  *  Critical Value/Significant Value, preliminary result only, called to and read back by  Nela Catherine RN @ 0730 9.8.20 JE/LM   Cultured on the 1st day of incubation:  Bacteroides thetaiotaomicron  *  Critical Value/Significant Value, preliminary result only, called to and read back by  Dante Coffey RN at 0346 9.8.20. amd    (Note)  NEGATIVE for the following organisms and resistance markers:  Acinetobacter sp., Citrobacter sp., Enterobacter sp., Proteus sp., E.  coli, K. pneumoniae/oxytoca, P. aeruginosa, CTX-M, KPC, NDM, VIM, IMP  and OXA by [employer] multiplex nucleic acid test. Final identification  and antimicrobial susceptibility testing will be verified by standard  methods.    Critical Value/Significant Value called to and read back by  Clara Kwon RN 9/8/20 @ 0557 TF         Attestation:  Total time on the floor involved in the patient's care: 35 minutes. Total time spent in counseling/care coordination: >50%

## 2020-09-14 NOTE — PROGRESS NOTES
[facility]    Received referral for IV Ertepenem daily.  Benefits verified, Human Medicare plan, [facility] is not contracted with [facility].    Cost for Invanz 1gm Q24 is roughly $161.30 per day for drug and supplies if patient wants to self pay with [facility]..     Referral has been sent to [place] and awaiting benefits information.   Will meet with patient to discuss benefits and offer options once information is received.     Thank you for the referral.    Danielle Toro RN  [facility] Liaison  [phone] (Mon thru Fri 8am - 5pm)  [phone] Office

## 2020-09-14 NOTE — CONSULTS
Care Transition Initial Assessment - RN        Met with: Patient.  DATA   Principal Problem:    Pelvic mass  Active Problems:    Elevated CA-125    CAD (coronary artery disease)    Diabetes mellitus type II, controlled (H)    HTN (hypertension)    Hyperlipidemia    GERD (gastroesophageal reflux disease)    Anxiety and depression    Constipation    Iron deficiency anemia    Osteopenia    Psoriasis    Depression    Pelvic mass in female       Cognitive Status: awake.        Contact information and PCP information verified: Yes  Lives With: spouse   Living Arrangements: ([facility])                 Insurance concerns: No Insurance issues identified  ASSESSMENT  Patient currently receives the following services:  none        Identified issues/concerns regarding health management: Patient admitted on 9/2/2020 for a scheduled surgery-  Exploratory laparotomy, radical resection of pelvic tumor with total abdominal hysterectomy, bilateral salpingo-oophorectomy, bilateral ureterolysis, appendectomy, stripping of vesicouterine peritoneum and cul-de-sac peritoneum, omentectomy, right pelvic and paraaortic lymphadenectomy.  Patient developed a post-op abscess and required a drain to be placed.  Bateroids were found in BC and ID is recommending 28 days of IV abx at discharge.  Met with patient to discuss discharge plan.  Patient requesting to have home IV abx.  Referral was sent to [facility] to check insurance coverage for home IV abx.  Per [facility]-This patient has a Human Medicare plan, I is not contracted with [employer]. Per [facility], they will reached out to [employer] to see what coverage is through them since they are in network with [employer].  Discussed this with patient and she requested to look into out infusion center.  Called [facility] and they are full and not accepting patients until next Monday.  Called [facility] and they are not able to take patients on the weekends.  Informed  patient of this.  Patient agreed to wait and see what [place] [employer] is with her insurance.  Discussed with patient that discharge would need to wait until tomorrow to have a safe plan in place for IV abx at discharge.  Patient stated understanding.     PLAN  Financial costs for the patient include TBD.  Patient given options and choices for discharge yes.  Patient/family is agreeable to the plan?  Yes: home with IV home infusion.  Patient anticipates discharging to home.        Patient anticipates needs for home equipment: No  Transportation/person available to transport on day of discharge  is pt's spouse and have they been notified.  Plan/Disposition: Home   Appointments: Dr. Trent on 10/01/2020 at 11:00 AM.      Care  (CTS) will continue to follow as needed.    Kristine Garner RN, BSN, OCN   Inpatient Care Coordination [address]  Office: [phone]

## 2020-09-14 NOTE — PROGRESS NOTES
"Interventional Radiology Progress Note:  Inpatient at [facility]  Date: 2020   Patient name: Julia Andre  MRN:[id number]  :  1950    History: Recent KAYLEEN/BSO with tumor debulking .  Had fevers post operatively and became bacteremic.  Abdominal fluid collection noted on CT which was drained 9/10 and catheter left in place.    Interval History: Pain is improving, drain has been functioning well. Still having significant outputs. Had new CT scan this am which shows residual fluid collection, but decreased in size. Planning for discharge today with drain in place and follow up CT scan in 2 weeks per ID note and discussions with patient and nurse.    Physical Exam:   Vitals: BP (!) 150/65 (BP Location: Right arm)   Pulse 65   Temp 96.2  F (35.7  C) (Axillary)   Resp 16   Ht 1.575 m (5' 2\")   Wt 72.1 kg (158 lb 15.2 oz)   SpO2 96%   BMI 29.07 kg/m     General: Stable. In no acute distress.  Neuro: A&O x 3. Moves all extremities equally. Able to easily move from sit to stand and back to sitting again.  Abdomen: Soft, non-distended, non-tender,  Skin: Without excoriations, ecchymosis, erythema, lesions or open sores.    Drain: Rt buttock Dressing is C/D/I. Stayfix dressing changed to gauze and tape. Tube is draining thin yellow serosanguinous fluid.   Output by Drain (mL) 20 0700 - 20 1459 20 1500 - 20 2259 20 2300 - 20 0659 20 0700 - 20 1459 20 1500 - 20 2259 20 2300 - 20 0659 20 0700 - 20 1140   Closed/Suction Drain Right Buttock Bulb 12 Mauritian posterior pelvic 70 80  30 50  65      Labs:  Recent Labs   Lab 20  0600 20  0610 20  0600   HGB 8.2* 8.2* 8.1*   WBC 7.8 7.5 6.6     Cultures:  Recent Labs   Lab 09/10/20  1450 20  1615 20  0420   CULT Light growth  Bacteroides thetaiotaomicron  Susceptibility testing not routinely done  *  Culture negative " monitoring continues No growth Cultured on the 1st day of incubation:  Bacteroides thetaiotaomicron  *  Critical Value/Significant Value, preliminary result only, called to and read back by  Caitlin Hatch RN at 1254 9.9.20 KZ    Susceptibility testing done on previous specimen       Assessment: s/p Right transgluteal drain placement on 9/10/20 for pelvic fluid collection following KAYLEEN/BSO    Plan: CT scan today shows residual fluid collection, though decreased in size, with drain in good position. Still with significant outputs. Reviewed drain cares, flushing and dressing changes with patient and her  in preparation for discharge. Written materials also added to discharge instructions. Patient and her  verbalized understanding and agreement with the plan, and all questions were answered. Recommend tube remain in place until outputs less than 10-15mL/day and repeat imaging demonstrates resolution of fluid collection.     30 minutes were spent with patient during today's visit with greater than 50% of the time spent face to face with the patient, in reviewing medical record and images and in counseling and coordinating patient's care.    Kelsie Carter PA-C  Interventional Radiology

## 2020-09-14 NOTE — PROGRESS NOTES
"[facility]    Hospitalist Progress Note    Assessment & Plan   Julia Andre is a 69 year old female who was admitted on 9/2/2020.      Past medical history is significant for stress-induced MI with associated ischemic cardiomyopathy, hypertension, hyperlipidemia, type 2 diabetes, major depressive disorder with anxiety, osteopenia, GERD, obstructive sleep apnea, history of cervical cancer and recent diagnosis of ovarian mass/cancer with elevated CA-125 lab who underwent an elective exploratory laparotomy with total abdominal hysterectomy with bilateral salpingo-oophorectomy, omentectomy with tumor debulking and pelvic and paraaortic lymphadenectomy.  Hospitalist Service has been consulted to assist with medical management.     Ovarian mass/cancer with elevated CA-125 lab study, status post elective exploratory laparotomy with total abdominal hysterectomy with bilateral salpingo-oophorectomy, omentectomy with tumor debulking and pelvic and paraaortic lymphadenectomy  Bacteremia, 9/6/2020, GNR/Bacteroides    S/P port placement 9/4.     BC from port positive GNR, started on Zosyn,, BC x2 repeated 9/8/2020 including from port.  BC 9/6/20 = 2/2 Bacteroides, 9/8/2020 1/2 = Bacteroides    ID consult, appreciate input.  They felt that port can remain in place, follow-up BC    UC negative    Abdominal/pelvic CT with 8 x 5 cm fluid collection, LIZZETTE drain placed 9/10, 150 cc output over the last 24 hours.  Drain fluid is growing Bacteroides (same organism as blood)    repeat CT abdomen/pelvis done today shows decreased size of the pelvic fluid collection (was 8.4 x 5.7 x 7.7 cm, now 6.7 x 4.8 x 7.0 cm), drainage catheter is \"well positioned within it\"    Per ID, okay to discharge with IV ertapenem, repeat abdominal/pelvic CT in 2 weeks to reassess pelvic abscess    Hyperkalemia:  Resolved  Noted potassium of 5.5 peak this admission. Improved to nl range with IVFs and reduction in Lisinopril dose    Blood " pressure readings are at goal    Recheck BMP       Mild hyponatremia:  Resolved with NS fluids.     2/2 dehydration     History of stress-induced MI with associated ischemic cardiomyopathy, hypertension and hyperlipidemia:    This appears to be stable at this point.  Upon review of electronic medical records, it appears coronary angiogram at time of MI had clean coronaries.  The patient has been holding baby aspirin daily for the last week.    Continue baby aspirin    Resumed prior to admit lisinopril 10 mg daily, with hold parameters in place    Resumed prior to admit Crestor 20 mg daily.     PRN IV hydralazine available for SBP > 180.        Type 2 diabetes:    The patient indicated that this is well controlled and she is currently on metformin 500 mg 2 times daily.       range. Not needing ISS    Hold PTA metformin.  restart several days after discharge once po intake at/nearer baseline    Glucoses have been running in the low 100 range off metformin without requiring SSI, for now continue hold metformin.    Hypoglycemic protocol in place.      Suspected CKD stage 2-3:  Review of EMR with noted creatinine between 1.0-1.3 and GFR in the 50-60 range and at times in the 40's.  Previous renal US 7/30/2020 with noted bilateral renal cysts.      KRISSY and hypovolemic hyponatremia resolved with NS fluids      History of iron deficiency anemia with acute blood loss anemia:    Per electronic medical record, the patient underwent a full GI workup and 2012 that indicated tiny erosions in the stomach.  The patient is no longer on iron supplements.   Hb 10 digrd840> 9.5---> 8.5 --> 7.6 ---> 7.9 Asymptomatic    every other day iron per surgery    Monitor per surgery     GERD:    The patient infrequently uses antacids.     PRN Tums are made available.      Obstructive sleep apnea:    The patient indicated that she has a diagnosis of moderate obstructive sleep apnea and has been working on trying to get a new CPAP machine;  "however, she would prefer not having this, as she did not tolerate it in the past.      Monitor O2 spot checks    Encourage incentive spirometer use     Major depressive disorder with anxiety:      Resume Wellbutrin 300 mg every morning.      History of cervical cancer:    The patient is status post surgical intervention.  Follow-up GYN.      DVT Prophylaxis: Lovenox.  Code Status: Full Code        Disposition Plan-  2-3 days pending further decrease in size of pelvic abscess    Candie Lebron MD  Hospitalist  [facility]  Text Page (7am - 6pm, M-F)    Interval History   \" I am getting used to the drain.  Last night it did not bother me at all.\"  Patient says discomfort with her drain is minimal.  She has no new respiratory or GI issues.  She is looking forward to discharge.      Physical Exam   Temp: 96.2  F (35.7  C) Temp src: Axillary BP: (!) 150/65 Pulse: 65   Resp: 16 SpO2: 96 % O2 Device: None (Room air)    Vitals:    09/11/20 0657 09/12/20 0634 09/13/20 0715   Weight: 69.8 kg (153 lb 14.4 oz) 72.1 kg (158 lb 14.4 oz) 72.1 kg (158 lb 15.2 oz)     Vital Signs with Ranges  Temp:  [95.8  F (35.4  C)-96.3  F (35.7  C)] 96.2  F (35.7  C)  Pulse:  [59-65] 65  Resp:  [16] 16  BP: (122-150)/(47-66) 150/65  SpO2:  [96 %-98 %] 96 %  I/O last 3 completed shifts:  In: 420 [P.O.:420]  Out: 80 [Drains:80]    Constitutional: Alert, calm, cooperative, NAD.  Respiratory: CTAB, respirations nonlabored.  Chest: port right upper chest  Cardiovascular: Regular rhythm, no murmur appreciated.  GI: .  Nontender, nondistended.  No rebound, guarding or other peritoneal signs.  LIZZETTE drain in place posterior, minimal serosanguineous output  Skin/Integumen: no rash on exposed skin  Neuro, moves both arms and both legs, the neurologic examination is nonfocal  Psych: Affect is pleasant    Medications       amLODIPine  5 mg Oral Daily     aspirin  81 mg Oral Daily     buPROPion  300 mg Oral QAM     enoxaparin ANTICOAGULANT  " 40 mg Subcutaneous Q24H     heparin  5 mL Intracatheter Q28 Days     heparin lock flush  5-10 mL Intracatheter Q24H     insulin aspart  1-7 Units Subcutaneous TID AC     insulin aspart  1-5 Units Subcutaneous At Bedtime     lactobacillus rhamnosus (GG)  1 capsule Oral BID     lidocaine  30 mL Subcutaneous Once     lisinopril  10 mg Oral Daily     metoprolol tartrate  50 mg Oral BID     piperacillin-tazobactam  3.375 g Intravenous Q6H     polyethylene glycol  17 g Oral Daily     rosuvastatin  20 mg Oral Daily     senna-docusate  1 tablet Oral At Bedtime     simethicone  80 mg Oral 4x Daily     sodium chloride (PF)  10 mL Irrigation Q8H       Data   Recent Labs   Lab 09/14/20  0600 09/13/20  0600 09/12/20  0610 09/11/20  0600   WBC  --  7.8 7.5 6.6   HGB  --  8.2* 8.2* 8.1*   MCV  --  87 86 85   * 527* 476* 460*   NA  --  137  --   --    POTASSIUM  --  4.4  --   --    CHLORIDE  --  107  --   --    CO2  --  25  --   --    BUN  --  16  --   --    CR 1.02 0.88  --  0.85   ANIONGAP  --  5  --   --    RADHA  --  8.2*  --   --    GLC  --  115*  --   --

## 2020-09-15 VITALS
SYSTOLIC BLOOD PRESSURE: 140 MMHG | BODY MASS INDEX: 27.18 KG/M2 | TEMPERATURE: 96.7 F | WEIGHT: 147.7 LBS | HEIGHT: 62 IN | HEART RATE: 63 BPM | RESPIRATION RATE: 18 BRPM | OXYGEN SATURATION: 97 % | DIASTOLIC BLOOD PRESSURE: 76 MMHG

## 2020-09-15 PROCEDURE — 25000128 H RX IP 250 OP 636: Performed by: NURSE PRACTITIONER

## 2020-09-15 PROCEDURE — 25000128 H RX IP 250 OP 636: Performed by: INTERNAL MEDICINE

## 2020-09-15 PROCEDURE — 25000132 ZZH RX MED GY IP 250 OP 250 PS 637: Performed by: OBSTETRICS & GYNECOLOGY

## 2020-09-15 PROCEDURE — 99207 ZZC NON-BILLABLE SERV PER CHARTING: CPT | Performed by: INTERNAL MEDICINE

## 2020-09-15 PROCEDURE — 25000132 ZZH RX MED GY IP 250 OP 250 PS 637: Performed by: NURSE PRACTITIONER

## 2020-09-15 PROCEDURE — 25000132 ZZH RX MED GY IP 250 OP 250 PS 637: Performed by: PHYSICIAN ASSISTANT

## 2020-09-15 PROCEDURE — 25000132 ZZH RX MED GY IP 250 OP 250 PS 637: Performed by: HOSPITALIST

## 2020-09-15 RX ADMIN — AMLODIPINE BESYLATE 5 MG: 5 TABLET ORAL at 09:00

## 2020-09-15 RX ADMIN — ERTAPENEM SODIUM 1 G: 1 INJECTION, POWDER, LYOPHILIZED, FOR SOLUTION INTRAMUSCULAR; INTRAVENOUS at 11:41

## 2020-09-15 RX ADMIN — LISINOPRIL 10 MG: 10 TABLET ORAL at 09:00

## 2020-09-15 RX ADMIN — SIMETHICONE 80 MG: 80 TABLET, CHEWABLE ORAL at 09:00

## 2020-09-15 RX ADMIN — Medication 1 CAPSULE: at 09:00

## 2020-09-15 RX ADMIN — METOPROLOL TARTRATE 50 MG: 50 TABLET, FILM COATED ORAL at 09:05

## 2020-09-15 RX ADMIN — BUPROPION HYDROCHLORIDE 300 MG: 150 TABLET, FILM COATED, EXTENDED RELEASE ORAL at 08:59

## 2020-09-15 RX ADMIN — ENOXAPARIN SODIUM 40 MG: 40 INJECTION SUBCUTANEOUS at 09:01

## 2020-09-15 RX ADMIN — HEPARIN SODIUM (PORCINE) LOCK FLUSH IV SOLN 100 UNIT/ML 5 ML: 100 SOLUTION at 12:14

## 2020-09-15 RX ADMIN — ASPIRIN 81 MG: 81 TABLET ORAL at 09:00

## 2020-09-15 RX ADMIN — ROSUVASTATIN CALCIUM 20 MG: 20 TABLET, FILM COATED ORAL at 08:59

## 2020-09-15 ASSESSMENT — ACTIVITIES OF DAILY LIVING (ADL)
ADLS_ACUITY_SCORE: 13

## 2020-09-15 ASSESSMENT — MIFFLIN-ST. JEOR: SCORE: 1143.21

## 2020-09-15 NOTE — DISCHARGE INSTRUCTIONS
"You are scheduled for Outpatient IV Infusion at [facility] ([facility]) and [facility] ([facility]).  Please refer to the date/times listed on your discharge paperwork and for the locations of these Infusion Centers.    You are scheduled to have a CT scan to check on your LIZZETTE drain on Tuesday, September 29th at 11:40 AM at the [facility] ([facility]).  Please arrive to this appointment at 11:10 AM.           Care of Abscess Drain:  1) You should go home with the following supplies: gauze 4x4s and tape, alcohol wipes, 10mL saline flushes x 30, and a cup to measure output.    2) Change the gauze and tape dressing every 2-3 days. Wash the skin with soap and water and allow to air dry before re-dressing. Cover with plastic (saran wrap--the press and seal kind works great) prior to showering to keep it dry, but you will need to change the dressing if it gets wet. Do not soak or submerge in a pool or tub.      3) Flush the drain with 10  mL of normal saline (NS) twice daily. First, wash your hands, then wipe the empty end of the stopcock with an alcohol swab and connect a prefilled saline syringe by screwing it on. Turn the stopcock arm \"off\" to the bulb. Then flush (5-8 mL) saline into the body end of the tubing. Next, turn the stopcock arm \"off\" to the body end of the tubing and flush the rest down into the bulb to clear any debris from the tubing. Remember to turn the stopcock arm \"off\" to the empty end once finished.     4) To empty the bulb, simply open the stopper and dump the fluid into the measuring cup. Then squeeze the bulb before closing the stopped to reestablish suction. Measure and record the outputs each time you flush and/or empty the drain. Subtract the flush amount from the total.    At your follow-up with Dr. Trent, if she feels that your LIZZETTE drain can be " removed and if she is not able to remove this at her clinic, you will need to call Mari RN at IR at [facility] to schedule an appointment to have your LIZZETTE drain removed.    Please call Mari WEBER RN clinician at  or Sarita WEBER NP at  for questions or concerns about the tube. You can leave a voicemail. We work Monday through Friday [phone].

## 2020-09-15 NOTE — PLAN OF CARE
POD 13. A&Ox4. VSS on RA. C/o no pain overnight. Up independently. Refused to have BG's checked overnight. No BM overnight. Midline incision MANDY with mild erythema. Staples intact. ABD binder in place. No drainage. LIZZETTE on right buttock, dressing C/D/I. Unable to visualize site. Flushed per order on shift. Right port hep locked. Plan to discharge home today with course of IV antibiotic and follow-up CT scan in 2 weeks.

## 2020-09-15 NOTE — PROGRESS NOTES
Doing well today.  Eager to leave hospital.  Going to get antibiotics at infusion center.  Discussed treatment planning visit and removing remaining staples this Thursday.  My office will be in touch with her to confirm appointment.  Repeat CT will be arranged in 2 wks.

## 2020-09-15 NOTE — PROGRESS NOTES
Stopped by and visited briefly with Patient Julia and  to see if they had any questions. We reviewed flushing and aspirating if needed.     Number to call for tube questions along with tube instructions is already in the AVS to be reviewed closer to discharge today.     Thanks [facility] Interventional Radiology CNP ([phone]) (phone [phone])

## 2020-09-15 NOTE — PROGRESS NOTES
[facility]-Addendum 7501    Per Kristine RNCC, she spoke with patient about choice.  Patient has chosen to utilize outpatient infusion center services.  The RNCC will arrange for this for the patient.      Placed call to Ruby Melgar to update her on patient choice of infusion center.      [facility] liaison will sign-off following patient.  Will need to re-refer if patient does not go with outpatient services.      Thanks you for the referral.     Trish Robertson. WILLIAM JARVISN PHN CRNI  [facility]  [phone] Office  [phone] Fax  [phone] (Nurse Triage M-F 8-5)  -----------------------------------------------------  [facility]    I heard back from Ruby Melgar [phone].  Patient has some coverage for IV abx under her Part D [employer] plan.      Ruby's Cost for Ertapenem 1 GM Q24 is $340/week..  Supplies are $104/week.  Total for medication/supplies is $444/week or $62/day    Will need to offer options for patient (outpatient vs home infusion).  If patient chooses home infusion, Ruby is the in-network provider for the patient's prescription drug plan.     Trish Robertson. WILLIAM JARVISN PHN CRNI  [facility]  [phone] Office  [phone] Fax  [phone] (Nurse Triage M-F 8-5)  -----------------------------------------------------------  [facility]-Addendum 6525    I spoke with Ruby Melgar ([phone]) late yesterday afternoon to get an update on benefits.  She stated it is taking a little longer than usual to get benefits back but will follow up first thing this morning.      I updated Talia that patient is ready to discharge today. She asked that the patient to have a dose of the IV abx in the hospital before she is discharged home , so they can start services the next day.         Talia is planning to come to the hospital to meet with the patient once benefits are back.      Trish Robertson. RN BSN PHN [facility]  [phone]  Office  [phone] Fax  [phone] (Nurse Triage M-F 8-5)

## 2020-09-15 NOTE — PROGRESS NOTES
Per chart review, patient would have out of pocket expense for home IV abx with Ruby.  Discussed with patient that she has some coverage for IV abx under her Part D [employer] plan.       Ruby's Cost for Ertapenem 1 GM Q24 is $340/week..  Supplies are $104/week.  Total for medication/supplies is $444/week or $62/day.  Patient stated that she doesn't want to pay out of pocket expense and would like to go to an Outpatient IV Infusion Center.  Emailed [facility] to cancel home infusion referral.  Called [facility] and spoke to Terri.  Outpatient IV abx infusion was scheduled starting tomorrow 9/16/2020 at 1315.  Per Terri, this Outpatient Center is not open on the weekends and patient would need to be scheduled at [facility].  Called [facility] and was able to schedule patient with Saturday & Sunday Infusion times.  Attempted to call Imaging to schedule outpt CT and had to leave message.  Left message with [facility] regarding LIZZETTE drain and CT needing to be scheduled and paged Kelsie Carter PA-C for assist with scheduling.  Received call back from Kelsie, she recommending calling Mari RN in IR to assist with scheduling this CT Scan.  Spoke to Mari, and she is unable to schedule CT scan and recommending calling [facility] at [phone].  Called [facility] and waiting on hold.  Updated bedside RN on IV abx plan and awaiting CT to be scheduled, Dr. Hickey informed patient that her office  will call pt and inform her of follow-up with Dr. Hickey.       Addendum:  Outpatient CT scan scheduled and placed on AVS.  Patient was informed of IV abx outpt plan, CT Scan & follow-up appointments.    Kristine Garner RN, BSN, OCN   Inpatient Care Coordination [address]  Office: [phone]

## 2020-09-15 NOTE — PROGRESS NOTES
Patient discharged at 1:15 PM to home. Port was de-accessed. No pain at time of discharge. Belongings returned to patient.  Discharge instructions and medications reviewed with patient. Patient's  demonstrated lovenox injection, education covered regarding lovenox injection. All other medications reviewed with patient and spouse. LIZZETTE home care instructions reviewed with patient by IR. All follow-up appointments discussed and confirmed with patient. Given print out of infusion scheduled for outpatient IV antibiotics.  Patient verbalized understanding and all questions were answered.  Prescriptions given to patient.  At time of discharge, patient condition was stable and left the unit in wheelchair escorted by RN.

## 2020-09-16 ENCOUNTER — ALLIED HEALTH/NURSE VISIT (OUTPATIENT)
Dept: INFUSION THERAPY | Facility: CLINIC | Age: 70
End: 2020-09-16
Attending: INTERNAL MEDICINE
Payer: COMMERCIAL

## 2020-09-16 VITALS
RESPIRATION RATE: 16 BRPM | OXYGEN SATURATION: 100 % | DIASTOLIC BLOOD PRESSURE: 74 MMHG | HEART RATE: 58 BPM | TEMPERATURE: 97.8 F | SYSTOLIC BLOOD PRESSURE: 104 MMHG

## 2020-09-16 DIAGNOSIS — R78.81 BACTEREMIA: ICD-10-CM

## 2020-09-16 DIAGNOSIS — B99.9 ACUTE INFECTIOUS DISEASE: Primary | ICD-10-CM

## 2020-09-16 DIAGNOSIS — B99.9 ACUTE INFECTIOUS DISEASE: ICD-10-CM

## 2020-09-16 PROCEDURE — 25000128 H RX IP 250 OP 636: Performed by: INTERNAL MEDICINE

## 2020-09-16 PROCEDURE — 25000125 ZZHC RX 250: Performed by: INTERNAL MEDICINE

## 2020-09-16 PROCEDURE — 96374 THER/PROPH/DIAG INJ IV PUSH: CPT

## 2020-09-16 RX ORDER — HEPARIN SODIUM,PORCINE 10 UNIT/ML
5 VIAL (ML) INTRAVENOUS
Status: DISCONTINUED | OUTPATIENT
Start: 2020-09-16 | End: 2020-09-16 | Stop reason: HOSPADM

## 2020-09-16 RX ORDER — HEPARIN SODIUM (PORCINE) LOCK FLUSH IV SOLN 100 UNIT/ML 100 UNIT/ML
5 SOLUTION INTRAVENOUS
Status: CANCELLED | OUTPATIENT
Start: 2020-09-16

## 2020-09-16 RX ORDER — HEPARIN SODIUM,PORCINE 10 UNIT/ML
5 VIAL (ML) INTRAVENOUS
Status: CANCELLED | OUTPATIENT
Start: 2020-09-23

## 2020-09-16 RX ORDER — HEPARIN SODIUM,PORCINE 10 UNIT/ML
5 VIAL (ML) INTRAVENOUS
Status: CANCELLED | OUTPATIENT
Start: 2020-09-16

## 2020-09-16 RX ORDER — HEPARIN SODIUM (PORCINE) LOCK FLUSH IV SOLN 100 UNIT/ML 100 UNIT/ML
5 SOLUTION INTRAVENOUS
Status: CANCELLED | OUTPATIENT
Start: 2020-09-23

## 2020-09-16 RX ADMIN — WATER 1 G: 1 INJECTION INTRAMUSCULAR; INTRAVENOUS; SUBCUTANEOUS at 13:43

## 2020-09-16 RX ADMIN — Medication 5 ML: at 13:48

## 2020-09-16 ASSESSMENT — PAIN SCALES - GENERAL: PAINLEVEL: NO PAIN (0)

## 2020-09-16 NOTE — PROGRESS NOTES
Infusion Nursing Note:  Julia Andre presents today for Invanz.   Patient seen by provider today: No   present during visit today: Not Applicable.    Note: Patient stated she had abdominal surgery a few weeks ago and developed a blood infection. Abdominal incision with staples intact with no signs of infection per patient report.     Patient starting chemotherapy soon at [facility]. Therefore port placed, will be using it for antibiotic therapy. Dr. Burt office paged and order given to leave port needle in and change weekly per protocol.    Intravenous Access:  Implanted Port.    Treatment Conditions:  Not Applicable.      Post Infusion Assessment:  Patient tolerated infusion without incident.  Blood return noted pre and post infusion.  Site patent and intact, free from redness, edema or discomfort.  No evidence of extravasations.       Discharge Plan:    Patient will return 9/17 for next appointment.  Patient discharged in stable condition accompanied by: self.  Departure Mode: Ambulatory.    Sandrita Ferguson RN

## 2020-09-17 ENCOUNTER — INFUSION THERAPY VISIT (OUTPATIENT)
Dept: INFUSION THERAPY | Facility: CLINIC | Age: 70
End: 2020-09-17
Attending: INTERNAL MEDICINE
Payer: COMMERCIAL

## 2020-09-17 VITALS
TEMPERATURE: 97.1 F | DIASTOLIC BLOOD PRESSURE: 82 MMHG | RESPIRATION RATE: 16 BRPM | OXYGEN SATURATION: 100 % | HEART RATE: 60 BPM | SYSTOLIC BLOOD PRESSURE: 159 MMHG

## 2020-09-17 DIAGNOSIS — B99.9 ACUTE INFECTIOUS DISEASE: Primary | ICD-10-CM

## 2020-09-17 DIAGNOSIS — R78.81 BACTEREMIA: ICD-10-CM

## 2020-09-17 LAB
BACTERIA SPEC CULT: ABNORMAL
BACTERIA SPEC CULT: ABNORMAL
Lab: ABNORMAL
SPECIMEN SOURCE: ABNORMAL
SPECIMEN SOURCE: ABNORMAL

## 2020-09-17 PROCEDURE — 25000125 ZZHC RX 250: Performed by: INTERNAL MEDICINE

## 2020-09-17 PROCEDURE — 96374 THER/PROPH/DIAG INJ IV PUSH: CPT

## 2020-09-17 PROCEDURE — 25000128 H RX IP 250 OP 636: Performed by: INTERNAL MEDICINE

## 2020-09-17 RX ORDER — HEPARIN SODIUM,PORCINE 10 UNIT/ML
5 VIAL (ML) INTRAVENOUS
Status: DISCONTINUED | OUTPATIENT
Start: 2020-09-17 | End: 2020-09-17 | Stop reason: HOSPADM

## 2020-09-17 RX ORDER — HEPARIN SODIUM (PORCINE) LOCK FLUSH IV SOLN 100 UNIT/ML 100 UNIT/ML
5 SOLUTION INTRAVENOUS
Status: CANCELLED | OUTPATIENT
Start: 2020-09-20

## 2020-09-17 RX ORDER — HEPARIN SODIUM,PORCINE 10 UNIT/ML
5 VIAL (ML) INTRAVENOUS
Status: CANCELLED | OUTPATIENT
Start: 2020-09-20

## 2020-09-17 RX ADMIN — Medication 5 ML: at 13:45

## 2020-09-17 RX ADMIN — WATER 1 G: 1 INJECTION INTRAMUSCULAR; INTRAVENOUS; SUBCUTANEOUS at 13:41

## 2020-09-17 NOTE — PROGRESS NOTES
Infusion Nursing Note:  Julia Andre presents today for Invanz.    Patient seen by provider today: No   present during visit today: Not Applicable.    Note: N/A.    Intravenous Access:  Implanted Port.    Treatment Conditions:  Not Applicable.      Post Infusion Assessment:  Patient tolerated infusion without incident.  Blood return noted pre and post infusion.  Site patent and intact, free from redness, edema or discomfort.  No evidence of extravasations.       Discharge Plan:   Discharge instructions reviewed with: Patient.  Patient and/or family verbalized understanding of discharge instructions and all questions answered.  AVS to patient via [employer].  Patient will return 9/18/2020 for next appointment.   Patient discharged in stable condition accompanied by: self.  Departure Mode: Ambulatory.    Sadaf Longoria RN

## 2020-09-18 ENCOUNTER — TELEPHONE (OUTPATIENT)
Dept: INTERVENTIONAL RADIOLOGY/VASCULAR | Facility: CLINIC | Age: 70
End: 2020-09-18

## 2020-09-18 ENCOUNTER — ALLIED HEALTH/NURSE VISIT (OUTPATIENT)
Dept: INFUSION THERAPY | Facility: CLINIC | Age: 70
End: 2020-09-18
Attending: INTERNAL MEDICINE
Payer: COMMERCIAL

## 2020-09-18 VITALS
HEART RATE: 65 BPM | TEMPERATURE: 97.6 F | OXYGEN SATURATION: 99 % | DIASTOLIC BLOOD PRESSURE: 73 MMHG | RESPIRATION RATE: 16 BRPM | SYSTOLIC BLOOD PRESSURE: 132 MMHG

## 2020-09-18 DIAGNOSIS — R78.81 BACTEREMIA: ICD-10-CM

## 2020-09-18 DIAGNOSIS — B99.9 ACUTE INFECTIOUS DISEASE: Primary | ICD-10-CM

## 2020-09-18 PROCEDURE — 96374 THER/PROPH/DIAG INJ IV PUSH: CPT

## 2020-09-18 PROCEDURE — 25000125 ZZHC RX 250: Performed by: INTERNAL MEDICINE

## 2020-09-18 PROCEDURE — 25000128 H RX IP 250 OP 636: Performed by: INTERNAL MEDICINE

## 2020-09-18 RX ORDER — HEPARIN SODIUM,PORCINE 10 UNIT/ML
5 VIAL (ML) INTRAVENOUS
Status: DISCONTINUED | OUTPATIENT
Start: 2020-09-18 | End: 2020-09-18 | Stop reason: HOSPADM

## 2020-09-18 RX ORDER — HEPARIN SODIUM,PORCINE 10 UNIT/ML
5 VIAL (ML) INTRAVENOUS
Status: CANCELLED | OUTPATIENT
Start: 2020-09-20

## 2020-09-18 RX ORDER — HEPARIN SODIUM (PORCINE) LOCK FLUSH IV SOLN 100 UNIT/ML 100 UNIT/ML
5 SOLUTION INTRAVENOUS
Status: CANCELLED | OUTPATIENT
Start: 2020-09-20

## 2020-09-18 RX ADMIN — Medication 5 ML: at 12:54

## 2020-09-18 RX ADMIN — WATER 1 G: 1 INJECTION INTRAMUSCULAR; INTRAVENOUS; SUBCUTANEOUS at 12:48

## 2020-09-18 ASSESSMENT — PAIN SCALES - GENERAL: PAINLEVEL: NO PAIN (0)

## 2020-09-18 NOTE — PROGRESS NOTES
Infusion Nursing Note:  Julia Andre presents today for Invanz.    Patient seen by provider today: No   present during visit today: Not Applicable.    Note: Denies SE from Invanz.    Intravenous Access:  Implanted Port remains accessed from earlier in the wk.    Treatment Conditions:  Not Applicable.      Post Infusion Assessment:  Patient tolerated infusion without incident.  Blood return noted pre and post infusion.  Site patent and intact, free from redness, edema or discomfort.  No evidence of extravasations.       Discharge Plan:   Discharge instructions reviewed with: Patient.  Patient discharged in stable condition accompanied by: self.  Departure Mode: Ambulatory.  Next infusion scheduled for tomorrow [facility].  Instructions given to patient on where infusion center was located.    ZURI ALVAREZ RN

## 2020-09-18 NOTE — TELEPHONE ENCOUNTER
Interventional Radiology     Received a call from Sh[facility]Julia[facility]  with questions about the abscess drain.     Julia Andre is a 69 year old woman with a history of Stage III ovarian cancer X-lap s/p  KAYLEEN BSO,  omentectomy, tumor debulking, right pelvic and para-aortic lymphadenectomy on 9/2. She had a posterior pelvic drain placed on 9/10 for a pelvic fluid collection. Initial outputs were 140 mL on 9/10, 150 mL 9/11, 125 mL, 9/12 65 mL (difficult to find IOs at this time)     Julia was discharged from the hospital on 9/16. Outputs since she has been home have decreased. On 9/16  50 mL out; 9/17 35 mL out; 9/18 7 mL out so far today. She is afebrile, tired, going to [facility] daily for IV antibiotics, flushing easily with 10 mL NS and he also aspirates part of the flush back.     Julia is scheduled for a CT on 9/29. We discussed that if her outputs continue to stay low (outputs 10 mL or less) we can move up the date of the CT scan. He will call on Monday with a progress report.     [facility] Interventional Radiology CNP ([phone]) (phone [phone])

## 2020-09-19 ENCOUNTER — INFUSION THERAPY VISIT (OUTPATIENT)
Dept: INFUSION THERAPY | Facility: CLINIC | Age: 70
End: 2020-09-19
Attending: INTERNAL MEDICINE
Payer: COMMERCIAL

## 2020-09-19 VITALS
TEMPERATURE: 97.8 F | HEART RATE: 67 BPM | SYSTOLIC BLOOD PRESSURE: 147 MMHG | DIASTOLIC BLOOD PRESSURE: 78 MMHG | RESPIRATION RATE: 16 BRPM

## 2020-09-19 DIAGNOSIS — B99.9 ACUTE INFECTIOUS DISEASE: Primary | ICD-10-CM

## 2020-09-19 DIAGNOSIS — R78.81 BACTEREMIA: ICD-10-CM

## 2020-09-19 PROCEDURE — 96374 THER/PROPH/DIAG INJ IV PUSH: CPT

## 2020-09-19 PROCEDURE — 25000125 ZZHC RX 250: Performed by: INTERNAL MEDICINE

## 2020-09-19 PROCEDURE — 25000128 H RX IP 250 OP 636: Performed by: INTERNAL MEDICINE

## 2020-09-19 RX ORDER — HEPARIN SODIUM,PORCINE 10 UNIT/ML
5 VIAL (ML) INTRAVENOUS
Status: CANCELLED | OUTPATIENT
Start: 2020-09-20

## 2020-09-19 RX ORDER — HEPARIN SODIUM,PORCINE 10 UNIT/ML
5 VIAL (ML) INTRAVENOUS
Status: DISCONTINUED | OUTPATIENT
Start: 2020-09-19 | End: 2020-09-19 | Stop reason: HOSPADM

## 2020-09-19 RX ORDER — HEPARIN SODIUM (PORCINE) LOCK FLUSH IV SOLN 100 UNIT/ML 100 UNIT/ML
5 SOLUTION INTRAVENOUS
Status: CANCELLED | OUTPATIENT
Start: 2020-09-20

## 2020-09-19 RX ADMIN — Medication 5 ML: at 12:10

## 2020-09-19 RX ADMIN — ERTAPENEM SODIUM 1 G: 1 INJECTION, POWDER, LYOPHILIZED, FOR SOLUTION INTRAMUSCULAR; INTRAVENOUS at 12:05

## 2020-09-19 NOTE — PROGRESS NOTES
Infusion Nursing Note:  Julia Andre presents today for Invanz.    Patient seen by provider today: No   present during visit today: Not Applicable.    Note: N/A.    Intravenous Access:  Implanted Port.  Previously accessed and excellent blood return noted.    Treatment Conditions:  Not Applicable.      Post Infusion Assessment:  Patient tolerated infusion without incident.  Blood return noted pre and post infusion.  Site patent and intact, free from redness, edema or discomfort.  No evidence of extravasations.   Port flushed and left accessed for infusion tomorrow.      Discharge Plan:   Discharge instructions reviewed with: Patient.  Patient and/or family verbalized understanding of discharge instructions and all questions answered.  Copy of AVS reviewed with patient and/or family.  Patient will return 9/20/20 for next appointment.  Patient discharged in stable condition accompanied by: self.  Departure Mode: Ambulatory.    Arian Meadows RN

## 2020-09-20 ENCOUNTER — HOSPITAL ENCOUNTER (OUTPATIENT)
Facility: CLINIC | Age: 70
Setting detail: SPECIMEN
Discharge: HOME | End: 2020-09-20
Attending: INTERNAL MEDICINE | Admitting: INTERNAL MEDICINE
Payer: COMMERCIAL

## 2020-09-20 ENCOUNTER — INFUSION THERAPY VISIT (OUTPATIENT)
Dept: INFUSION THERAPY | Facility: CLINIC | Age: 70
End: 2020-09-20
Attending: INTERNAL MEDICINE
Payer: COMMERCIAL

## 2020-09-20 VITALS
HEART RATE: 59 BPM | RESPIRATION RATE: 16 BRPM | TEMPERATURE: 98.1 F | SYSTOLIC BLOOD PRESSURE: 138 MMHG | DIASTOLIC BLOOD PRESSURE: 73 MMHG

## 2020-09-20 DIAGNOSIS — B99.9 ACUTE INFECTIOUS DISEASE: Primary | ICD-10-CM

## 2020-09-20 DIAGNOSIS — R78.81 BACTEREMIA: ICD-10-CM

## 2020-09-20 LAB
AST SERPL W P-5'-P-CCNC: 12 U/L (ref 0–45)
BASOPHILS # BLD AUTO: 0.1 10E9/L (ref 0–0.2)
BASOPHILS NFR BLD AUTO: 0.5 %
CREAT SERPL-MCNC: 1.02 MG/DL (ref 0.52–1.04)
DIFFERENTIAL METHOD BLD: ABNORMAL
EOSINOPHIL # BLD AUTO: 0.6 10E9/L (ref 0–0.7)
EOSINOPHIL NFR BLD AUTO: 6.1 %
ERYTHROCYTE [DISTWIDTH] IN BLOOD BY AUTOMATED COUNT: 14.4 % (ref 10–15)
GFR SERPL CREATININE-BSD FRML MDRD: 55 ML/MIN/{1.73_M2}
HCT VFR BLD AUTO: 30.1 % (ref 35–47)
HGB BLD-MCNC: 9.3 G/DL (ref 11.7–15.7)
IMM GRANULOCYTES # BLD: 0 10E9/L (ref 0–0.4)
IMM GRANULOCYTES NFR BLD: 0.3 %
LYMPHOCYTES # BLD AUTO: 1.9 10E9/L (ref 0.8–5.3)
LYMPHOCYTES NFR BLD AUTO: 20.9 %
MCH RBC QN AUTO: 26.6 PG (ref 26.5–33)
MCHC RBC AUTO-ENTMCNC: 30.9 G/DL (ref 31.5–36.5)
MCV RBC AUTO: 86 FL (ref 78–100)
MONOCYTES # BLD AUTO: 0.6 10E9/L (ref 0–1.3)
MONOCYTES NFR BLD AUTO: 6.6 %
NEUTROPHILS # BLD AUTO: 6 10E9/L (ref 1.6–8.3)
NEUTROPHILS NFR BLD AUTO: 65.6 %
NRBC # BLD AUTO: 0 10*3/UL
NRBC BLD AUTO-RTO: 0 /100
PLATELET # BLD AUTO: 560 10E9/L (ref 150–450)
RBC # BLD AUTO: 3.5 10E12/L (ref 3.8–5.2)
WBC # BLD AUTO: 9.1 10E9/L (ref 4–11)

## 2020-09-20 PROCEDURE — 82565 ASSAY OF CREATININE: CPT | Performed by: INTERNAL MEDICINE

## 2020-09-20 PROCEDURE — 85025 COMPLETE CBC W/AUTO DIFF WBC: CPT | Performed by: INTERNAL MEDICINE

## 2020-09-20 PROCEDURE — 25000128 H RX IP 250 OP 636: Performed by: INTERNAL MEDICINE

## 2020-09-20 PROCEDURE — 96374 THER/PROPH/DIAG INJ IV PUSH: CPT

## 2020-09-20 PROCEDURE — 84450 TRANSFERASE (AST) (SGOT): CPT | Performed by: INTERNAL MEDICINE

## 2020-09-20 PROCEDURE — 25000125 ZZHC RX 250: Performed by: INTERNAL MEDICINE

## 2020-09-20 RX ORDER — HEPARIN SODIUM,PORCINE 10 UNIT/ML
5 VIAL (ML) INTRAVENOUS
Status: DISCONTINUED | OUTPATIENT
Start: 2020-09-20 | End: 2020-09-20 | Stop reason: HOSPADM

## 2020-09-20 RX ORDER — HEPARIN SODIUM (PORCINE) LOCK FLUSH IV SOLN 100 UNIT/ML 100 UNIT/ML
5 SOLUTION INTRAVENOUS
Status: CANCELLED | OUTPATIENT
Start: 2020-09-23

## 2020-09-20 RX ORDER — HEPARIN SODIUM,PORCINE 10 UNIT/ML
5 VIAL (ML) INTRAVENOUS
Status: CANCELLED | OUTPATIENT
Start: 2020-09-23

## 2020-09-20 RX ADMIN — ERTAPENEM SODIUM 1 G: 1 INJECTION, POWDER, LYOPHILIZED, FOR SOLUTION INTRAMUSCULAR; INTRAVENOUS at 13:10

## 2020-09-20 RX ADMIN — Medication 5 ML: at 13:14

## 2020-09-20 ASSESSMENT — PAIN SCALES - GENERAL: PAINLEVEL: NO PAIN (0)

## 2020-09-20 NOTE — PROGRESS NOTES
Infusion Nursing Note:  Julia Andre presents today for Invanz.    Patient seen by provider today: No   present during visit today: Not Applicable.    Note: N/A.    Intravenous Access:  Labs drawn without difficulty.  Implanted Port.  Previously accessed with excellent blood return noted.    Treatment Conditions:  Not Applicable.      Post Infusion Assessment:  Patient tolerated infusion without incident.  Blood return noted pre and post infusion.  Site patent and intact, free from redness, edema or discomfort.  No evidence of extravasations.       Discharge Plan:   Discharge instructions reviewed with: Patient.  Patient and/or family verbalized understanding of discharge instructions and all questions answered.  AVS to patient via [employer].  Patient will return 9/21/20 at [facility] for next appointment.   Patient discharged in stable condition accompanied by: self.  Departure Mode: Ambulatory.    Arian Meadows RN

## 2020-09-21 ENCOUNTER — ALLIED HEALTH/NURSE VISIT (OUTPATIENT)
Dept: INFUSION THERAPY | Facility: CLINIC | Age: 70
End: 2020-09-21
Attending: INTERNAL MEDICINE
Payer: COMMERCIAL

## 2020-09-21 VITALS
SYSTOLIC BLOOD PRESSURE: 133 MMHG | DIASTOLIC BLOOD PRESSURE: 71 MMHG | RESPIRATION RATE: 16 BRPM | HEART RATE: 60 BPM | TEMPERATURE: 98 F | OXYGEN SATURATION: 97 %

## 2020-09-21 DIAGNOSIS — R78.81 BACTEREMIA: ICD-10-CM

## 2020-09-21 DIAGNOSIS — B99.9 ACUTE INFECTIOUS DISEASE: Primary | ICD-10-CM

## 2020-09-21 PROCEDURE — 25000128 H RX IP 250 OP 636: Performed by: INTERNAL MEDICINE

## 2020-09-21 PROCEDURE — 96374 THER/PROPH/DIAG INJ IV PUSH: CPT

## 2020-09-21 PROCEDURE — 25000125 ZZHC RX 250: Performed by: INTERNAL MEDICINE

## 2020-09-21 RX ORDER — HEPARIN SODIUM,PORCINE 10 UNIT/ML
5 VIAL (ML) INTRAVENOUS
Status: CANCELLED | OUTPATIENT
Start: 2020-09-23

## 2020-09-21 RX ORDER — HEPARIN SODIUM (PORCINE) LOCK FLUSH IV SOLN 100 UNIT/ML 100 UNIT/ML
5 SOLUTION INTRAVENOUS
Status: CANCELLED | OUTPATIENT
Start: 2020-09-23

## 2020-09-21 RX ADMIN — WATER 1 G: 1 INJECTION INTRAMUSCULAR; INTRAVENOUS; SUBCUTANEOUS at 13:01

## 2020-09-21 NOTE — PROGRESS NOTES
Infusion Nursing Note:  Julia Andre presents today for Invanz.    Patient seen by provider today: No   present during visit today: Not Applicable.    Note: Patient had question about how often to change LIZZETTE dressing. LIZZETTE drain placed 9/10 and has never had dressing change. Chart reviewed from recently hospitalization, but no instructions about frequency noted.  RN contacted IR and PACU at [facility]. Both departments recommended daily dressing changes.  Patient education about LIZZETTE drain care printed and reviewed with patient from [facility].  Short term supplies given.     Intravenous Access:  Implanted Port left accessed.  Flushed with 10 unit/mL 5 ML heparin.    Treatment Conditions:  Not Applicable.      Post Infusion Assessment:  Patient tolerated infusion without incident.  Site patent and intact, free from redness, edema or discomfort.  No evidence of extravasations.       Discharge Plan:   [facility]  Patient will return 9/22 for next appointment.   Patient discharged in stable condition accompanied by: self.  Departure Mode: Ambulatory.    Talia Ortega RN

## 2020-09-22 ENCOUNTER — ALLIED HEALTH/NURSE VISIT (OUTPATIENT)
Dept: INFUSION THERAPY | Facility: CLINIC | Age: 70
End: 2020-09-22
Attending: INTERNAL MEDICINE
Payer: COMMERCIAL

## 2020-09-22 VITALS
HEART RATE: 60 BPM | OXYGEN SATURATION: 99 % | DIASTOLIC BLOOD PRESSURE: 68 MMHG | RESPIRATION RATE: 16 BRPM | SYSTOLIC BLOOD PRESSURE: 134 MMHG | TEMPERATURE: 99.1 F

## 2020-09-22 DIAGNOSIS — B99.9 ACUTE INFECTIOUS DISEASE: Primary | ICD-10-CM

## 2020-09-22 DIAGNOSIS — R78.81 BACTEREMIA: ICD-10-CM

## 2020-09-22 PROCEDURE — 25000125 ZZHC RX 250: Performed by: INTERNAL MEDICINE

## 2020-09-22 PROCEDURE — 25000128 H RX IP 250 OP 636: Performed by: INTERNAL MEDICINE

## 2020-09-22 PROCEDURE — 96374 THER/PROPH/DIAG INJ IV PUSH: CPT

## 2020-09-22 RX ORDER — HEPARIN SODIUM,PORCINE 10 UNIT/ML
5 VIAL (ML) INTRAVENOUS
Status: CANCELLED | OUTPATIENT
Start: 2020-09-23

## 2020-09-22 RX ORDER — HEPARIN SODIUM (PORCINE) LOCK FLUSH IV SOLN 100 UNIT/ML 100 UNIT/ML
5 SOLUTION INTRAVENOUS
Status: CANCELLED | OUTPATIENT
Start: 2020-09-23

## 2020-09-22 RX ADMIN — WATER 1 G: 1 INJECTION INTRAMUSCULAR; INTRAVENOUS; SUBCUTANEOUS at 12:37

## 2020-09-22 NOTE — PROGRESS NOTES
Infusion Nursing Note:  Julia Andre presents today for Invanz.    Patient seen by provider today: No   present during visit today: Not Applicable.    Note: N/A.    Intravenous Access:  Implanted Port.    Treatment Conditions:  Not Applicable.      Post Infusion Assessment:  Patient tolerated infusion without incident.  Blood return noted pre and post infusion.  Site patent and intact, free from redness, edema or discomfort.  No evidence of extravasations.       Discharge Plan:   Discharge instructions reviewed with: Patient.  Patient and/or family verbalized understanding of discharge instructions and all questions answered.  AVS to patient via [employer].  Patient will return 9/23/20 for next appointment.   Patient discharged in stable condition accompanied by: self.  Departure Mode: Ambulatory.    Sadaf Longoria RN

## 2020-09-23 ENCOUNTER — ALLIED HEALTH/NURSE VISIT (OUTPATIENT)
Dept: INFUSION THERAPY | Facility: CLINIC | Age: 70
End: 2020-09-23
Attending: INTERNAL MEDICINE
Payer: COMMERCIAL

## 2020-09-23 VITALS
TEMPERATURE: 98.5 F | HEART RATE: 63 BPM | OXYGEN SATURATION: 100 % | DIASTOLIC BLOOD PRESSURE: 80 MMHG | RESPIRATION RATE: 16 BRPM | SYSTOLIC BLOOD PRESSURE: 138 MMHG

## 2020-09-23 DIAGNOSIS — R78.81 BACTEREMIA: ICD-10-CM

## 2020-09-23 DIAGNOSIS — B99.9 ACUTE INFECTIOUS DISEASE: Primary | ICD-10-CM

## 2020-09-23 PROCEDURE — 25000128 H RX IP 250 OP 636: Performed by: INTERNAL MEDICINE

## 2020-09-23 PROCEDURE — 96374 THER/PROPH/DIAG INJ IV PUSH: CPT

## 2020-09-23 PROCEDURE — 25000125 ZZHC RX 250: Performed by: INTERNAL MEDICINE

## 2020-09-23 RX ORDER — HEPARIN SODIUM,PORCINE 10 UNIT/ML
5 VIAL (ML) INTRAVENOUS
Status: CANCELLED | OUTPATIENT
Start: 2020-09-27

## 2020-09-23 RX ORDER — HEPARIN SODIUM (PORCINE) LOCK FLUSH IV SOLN 100 UNIT/ML 100 UNIT/ML
5 SOLUTION INTRAVENOUS
Status: CANCELLED | OUTPATIENT
Start: 2020-09-27

## 2020-09-23 RX ADMIN — WATER 1 G: 1 INJECTION INTRAMUSCULAR; INTRAVENOUS; SUBCUTANEOUS at 13:11

## 2020-09-23 NOTE — PROGRESS NOTES
Nursing Note:  Julia Andre presents today for invanz.    Patient seen by provider today: No   present during visit today: Not Applicable.    Note: sterile needle change.    Intravenous Access:  Implanted Port.    Discharge Plan:   Patient was sent home    Susan Shipman RN

## 2020-09-24 ENCOUNTER — ALLIED HEALTH/NURSE VISIT (OUTPATIENT)
Dept: INFUSION THERAPY | Facility: CLINIC | Age: 70
End: 2020-09-24
Attending: INTERNAL MEDICINE
Payer: COMMERCIAL

## 2020-09-24 VITALS
TEMPERATURE: 98.1 F | OXYGEN SATURATION: 99 % | DIASTOLIC BLOOD PRESSURE: 69 MMHG | HEART RATE: 58 BPM | SYSTOLIC BLOOD PRESSURE: 116 MMHG

## 2020-09-24 DIAGNOSIS — B99.9 ACUTE INFECTIOUS DISEASE: Primary | ICD-10-CM

## 2020-09-24 DIAGNOSIS — R78.81 BACTEREMIA: ICD-10-CM

## 2020-09-24 PROCEDURE — 96374 THER/PROPH/DIAG INJ IV PUSH: CPT

## 2020-09-24 PROCEDURE — 25000125 ZZHC RX 250: Performed by: INTERNAL MEDICINE

## 2020-09-24 PROCEDURE — 25000128 H RX IP 250 OP 636: Performed by: INTERNAL MEDICINE

## 2020-09-24 RX ORDER — HEPARIN SODIUM (PORCINE) LOCK FLUSH IV SOLN 100 UNIT/ML 100 UNIT/ML
5 SOLUTION INTRAVENOUS
Status: CANCELLED | OUTPATIENT
Start: 2020-09-27

## 2020-09-24 RX ORDER — HEPARIN SODIUM,PORCINE 10 UNIT/ML
5 VIAL (ML) INTRAVENOUS
Status: CANCELLED | OUTPATIENT
Start: 2020-09-27

## 2020-09-24 RX ORDER — HEPARIN SODIUM,PORCINE 10 UNIT/ML
5 VIAL (ML) INTRAVENOUS
Status: DISCONTINUED | OUTPATIENT
Start: 2020-09-24 | End: 2020-09-24 | Stop reason: HOSPADM

## 2020-09-24 RX ADMIN — Medication 5 ML: at 12:49

## 2020-09-24 RX ADMIN — WATER 1 G: 1 INJECTION INTRAMUSCULAR; INTRAVENOUS; SUBCUTANEOUS at 12:44

## 2020-09-24 NOTE — PROGRESS NOTES
Infusion Nursing Note:  Juliavivian Andre presents today for Invanz.    Patient seen by provider today: No   present during visit today: Not Applicable.    Note: N/A.    Intravenous Access:  Implanted Port.    Treatment Conditions:  Not Applicable.      Post Infusion Assessment:  Patient tolerated infusion without incident.  Blood return noted pre and post infusion.  Site patent and intact, free from redness, edema or discomfort.  No evidence of extravasations.       Discharge Plan:   Discharge instructions reviewed with: Patient.  Patient and/or family verbalized understanding of discharge instructions and all questions answered.  AVS to patient via [employer].  Patient will return 9/25/20 for next dose of Invanz for next appointment.   Patient discharged in stable condition accompanied by: self.  Departure Mode: Ambulatory.    Sandrita Kwon RN

## 2020-09-25 ENCOUNTER — ALLIED HEALTH/NURSE VISIT (OUTPATIENT)
Dept: INFUSION THERAPY | Facility: CLINIC | Age: 70
End: 2020-09-25
Attending: INTERNAL MEDICINE
Payer: COMMERCIAL

## 2020-09-25 VITALS
OXYGEN SATURATION: 100 % | TEMPERATURE: 97.2 F | SYSTOLIC BLOOD PRESSURE: 115 MMHG | DIASTOLIC BLOOD PRESSURE: 64 MMHG | HEART RATE: 58 BPM | RESPIRATION RATE: 16 BRPM

## 2020-09-25 DIAGNOSIS — B99.9 ACUTE INFECTIOUS DISEASE: Primary | ICD-10-CM

## 2020-09-25 DIAGNOSIS — R78.81 BACTEREMIA: ICD-10-CM

## 2020-09-25 PROCEDURE — 96374 THER/PROPH/DIAG INJ IV PUSH: CPT

## 2020-09-25 PROCEDURE — 25000128 H RX IP 250 OP 636: Performed by: INTERNAL MEDICINE

## 2020-09-25 PROCEDURE — 25000125 ZZHC RX 250: Performed by: INTERNAL MEDICINE

## 2020-09-25 RX ORDER — HEPARIN SODIUM,PORCINE 10 UNIT/ML
5 VIAL (ML) INTRAVENOUS
Status: DISCONTINUED | OUTPATIENT
Start: 2020-09-25 | End: 2020-09-25 | Stop reason: HOSPADM

## 2020-09-25 RX ORDER — HEPARIN SODIUM,PORCINE 10 UNIT/ML
5 VIAL (ML) INTRAVENOUS
Status: CANCELLED | OUTPATIENT
Start: 2020-09-27

## 2020-09-25 RX ORDER — HEPARIN SODIUM (PORCINE) LOCK FLUSH IV SOLN 100 UNIT/ML 100 UNIT/ML
5 SOLUTION INTRAVENOUS
Status: CANCELLED | OUTPATIENT
Start: 2020-09-27

## 2020-09-25 RX ADMIN — WATER 1 G: 1 INJECTION INTRAMUSCULAR; INTRAVENOUS; SUBCUTANEOUS at 13:10

## 2020-09-25 RX ADMIN — Medication 5 ML: at 13:11

## 2020-09-25 ASSESSMENT — PAIN SCALES - GENERAL: PAINLEVEL: NO PAIN (0)

## 2020-09-25 NOTE — PROGRESS NOTES
Infusion Nursing Note:  Julia Andre presents today for Invanz.    Patient seen by provider today: No   present during visit today: Not Applicable.    Note: N/A.    Intravenous Access:  Implanted Port.    Treatment Conditions:  Not Applicable.      Post Infusion Assessment:  Patient tolerated infusion without incident.  Blood return noted pre and post infusion.  Site patent and intact, free from redness, edema or discomfort.  No evidence of extravasations.       Discharge Plan:   Patient will return 9/26 to [facility] for next appointment.  Patient discharged in stable condition accompanied by: self.  Departure Mode: Ambulatory.    Sandrita Ferguson RN

## 2020-09-26 ENCOUNTER — INFUSION THERAPY VISIT (OUTPATIENT)
Dept: INFUSION THERAPY | Facility: CLINIC | Age: 70
End: 2020-09-26
Attending: INTERNAL MEDICINE
Payer: COMMERCIAL

## 2020-09-26 VITALS
TEMPERATURE: 98.3 F | DIASTOLIC BLOOD PRESSURE: 73 MMHG | HEART RATE: 56 BPM | SYSTOLIC BLOOD PRESSURE: 137 MMHG | RESPIRATION RATE: 16 BRPM

## 2020-09-26 DIAGNOSIS — B99.9 ACUTE INFECTIOUS DISEASE: Primary | ICD-10-CM

## 2020-09-26 DIAGNOSIS — R78.81 BACTEREMIA: ICD-10-CM

## 2020-09-26 PROCEDURE — 25000125 ZZHC RX 250: Performed by: INTERNAL MEDICINE

## 2020-09-26 PROCEDURE — 25000128 H RX IP 250 OP 636: Performed by: INTERNAL MEDICINE

## 2020-09-26 PROCEDURE — 96374 THER/PROPH/DIAG INJ IV PUSH: CPT

## 2020-09-26 RX ORDER — HEPARIN SODIUM (PORCINE) LOCK FLUSH IV SOLN 100 UNIT/ML 100 UNIT/ML
5 SOLUTION INTRAVENOUS
Status: CANCELLED | OUTPATIENT
Start: 2020-09-27

## 2020-09-26 RX ORDER — HEPARIN SODIUM,PORCINE 10 UNIT/ML
5 VIAL (ML) INTRAVENOUS
Status: CANCELLED | OUTPATIENT
Start: 2020-09-27

## 2020-09-26 RX ORDER — HEPARIN SODIUM,PORCINE 10 UNIT/ML
5 VIAL (ML) INTRAVENOUS
Status: DISCONTINUED | OUTPATIENT
Start: 2020-09-26 | End: 2020-09-26 | Stop reason: HOSPADM

## 2020-09-26 RX ADMIN — Medication 5 ML: at 13:10

## 2020-09-26 RX ADMIN — WATER 1 G: 1 INJECTION INTRAMUSCULAR; INTRAVENOUS; SUBCUTANEOUS at 13:05

## 2020-09-26 ASSESSMENT — PAIN SCALES - GENERAL: PAINLEVEL: NO PAIN (0)

## 2020-09-27 ENCOUNTER — INFUSION THERAPY VISIT (OUTPATIENT)
Dept: INFUSION THERAPY | Facility: CLINIC | Age: 70
End: 2020-09-27
Attending: INTERNAL MEDICINE
Payer: COMMERCIAL

## 2020-09-27 ENCOUNTER — HOSPITAL ENCOUNTER (OUTPATIENT)
Facility: CLINIC | Age: 70
Setting detail: SPECIMEN
Discharge: HOME | End: 2020-09-27
Attending: INTERNAL MEDICINE | Admitting: INTERNAL MEDICINE
Payer: COMMERCIAL

## 2020-09-27 VITALS
DIASTOLIC BLOOD PRESSURE: 84 MMHG | HEART RATE: 65 BPM | TEMPERATURE: 97.5 F | RESPIRATION RATE: 16 BRPM | SYSTOLIC BLOOD PRESSURE: 135 MMHG

## 2020-09-27 DIAGNOSIS — R78.81 BACTEREMIA: ICD-10-CM

## 2020-09-27 DIAGNOSIS — B99.9 ACUTE INFECTIOUS DISEASE: Primary | ICD-10-CM

## 2020-09-27 LAB
AST SERPL W P-5'-P-CCNC: 13 U/L (ref 0–45)
BASOPHILS # BLD AUTO: 0 10E9/L (ref 0–0.2)
BASOPHILS NFR BLD AUTO: 0.3 %
CREAT SERPL-MCNC: 0.82 MG/DL (ref 0.52–1.04)
DIFFERENTIAL METHOD BLD: ABNORMAL
EOSINOPHIL # BLD AUTO: 0.8 10E9/L (ref 0–0.7)
EOSINOPHIL NFR BLD AUTO: 12 %
ERYTHROCYTE [DISTWIDTH] IN BLOOD BY AUTOMATED COUNT: 14.2 % (ref 10–15)
GFR SERPL CREATININE-BSD FRML MDRD: 72 ML/MIN/{1.73_M2}
HCT VFR BLD AUTO: 28.3 % (ref 35–47)
HGB BLD-MCNC: 8.8 G/DL (ref 11.7–15.7)
IMM GRANULOCYTES # BLD: 0 10E9/L (ref 0–0.4)
IMM GRANULOCYTES NFR BLD: 0.2 %
LYMPHOCYTES # BLD AUTO: 1.7 10E9/L (ref 0.8–5.3)
LYMPHOCYTES NFR BLD AUTO: 26.9 %
MCH RBC QN AUTO: 26.7 PG (ref 26.5–33)
MCHC RBC AUTO-ENTMCNC: 31.1 G/DL (ref 31.5–36.5)
MCV RBC AUTO: 86 FL (ref 78–100)
MONOCYTES # BLD AUTO: 0.6 10E9/L (ref 0–1.3)
MONOCYTES NFR BLD AUTO: 8.9 %
NEUTROPHILS # BLD AUTO: 3.3 10E9/L (ref 1.6–8.3)
NEUTROPHILS NFR BLD AUTO: 51.7 %
NRBC # BLD AUTO: 0 10*3/UL
NRBC BLD AUTO-RTO: 0 /100
PLATELET # BLD AUTO: 399 10E9/L (ref 150–450)
RBC # BLD AUTO: 3.29 10E12/L (ref 3.8–5.2)
WBC # BLD AUTO: 6.4 10E9/L (ref 4–11)

## 2020-09-27 PROCEDURE — 25000125 ZZHC RX 250: Performed by: INTERNAL MEDICINE

## 2020-09-27 PROCEDURE — 84450 TRANSFERASE (AST) (SGOT): CPT | Performed by: INTERNAL MEDICINE

## 2020-09-27 PROCEDURE — 25000128 H RX IP 250 OP 636: Performed by: INTERNAL MEDICINE

## 2020-09-27 PROCEDURE — 85025 COMPLETE CBC W/AUTO DIFF WBC: CPT | Performed by: INTERNAL MEDICINE

## 2020-09-27 PROCEDURE — 96374 THER/PROPH/DIAG INJ IV PUSH: CPT

## 2020-09-27 PROCEDURE — 82565 ASSAY OF CREATININE: CPT | Performed by: INTERNAL MEDICINE

## 2020-09-27 RX ORDER — HEPARIN SODIUM,PORCINE 10 UNIT/ML
5 VIAL (ML) INTRAVENOUS
Status: CANCELLED | OUTPATIENT
Start: 2020-09-30

## 2020-09-27 RX ORDER — HEPARIN SODIUM,PORCINE 10 UNIT/ML
5 VIAL (ML) INTRAVENOUS
Status: DISCONTINUED | OUTPATIENT
Start: 2020-09-27 | End: 2020-09-27 | Stop reason: HOSPADM

## 2020-09-27 RX ORDER — HEPARIN SODIUM (PORCINE) LOCK FLUSH IV SOLN 100 UNIT/ML 100 UNIT/ML
5 SOLUTION INTRAVENOUS
Status: CANCELLED | OUTPATIENT
Start: 2020-09-30

## 2020-09-27 RX ADMIN — Medication 5 ML: at 13:33

## 2020-09-27 RX ADMIN — WATER 1 G: 1 INJECTION INTRAMUSCULAR; INTRAVENOUS; SUBCUTANEOUS at 13:27

## 2020-09-27 ASSESSMENT — PAIN SCALES - GENERAL: PAINLEVEL: NO PAIN (0)

## 2020-09-27 NOTE — PROGRESS NOTES
Infusion Nursing Note:  Julia Andre presents today for labs, Invanz.    Patient seen by provider today: No   present during visit today: Not Applicable.    Note: N/A.    Intravenous Access:  Labs drawn without difficulty.  Implanted Port.    Treatment Conditions:  Not Applicable.      Post Infusion Assessment:  Patient tolerated infusion without incident.  Blood return noted pre and post infusion.  Site patent and intact, free from redness, edema or discomfort.  No evidence of extravasations.       Discharge Plan:   Patient declined prescription refills.  Discharge instructions reviewed with: Patient.  Patient and/or family verbalized understanding of discharge instructions and all questions answered.  Copy of AVS reviewed with patient and/or family.  Patient will return 9/28/2020 for next appointment.  Patient discharged in stable condition accompanied by: self.  Departure Mode: Ambulatory.    Leidy Lundy RN

## 2020-09-28 ENCOUNTER — ALLIED HEALTH/NURSE VISIT (OUTPATIENT)
Dept: INFUSION THERAPY | Facility: CLINIC | Age: 70
End: 2020-09-28
Attending: INTERNAL MEDICINE
Payer: COMMERCIAL

## 2020-09-28 VITALS
DIASTOLIC BLOOD PRESSURE: 75 MMHG | RESPIRATION RATE: 16 BRPM | HEART RATE: 56 BPM | TEMPERATURE: 96 F | OXYGEN SATURATION: 99 % | SYSTOLIC BLOOD PRESSURE: 156 MMHG

## 2020-09-28 DIAGNOSIS — R78.81 BACTEREMIA: ICD-10-CM

## 2020-09-28 DIAGNOSIS — B99.9 ACUTE INFECTIOUS DISEASE: Primary | ICD-10-CM

## 2020-09-28 PROCEDURE — 96374 THER/PROPH/DIAG INJ IV PUSH: CPT

## 2020-09-28 PROCEDURE — 25000128 H RX IP 250 OP 636: Performed by: INTERNAL MEDICINE

## 2020-09-28 PROCEDURE — 25000125 ZZHC RX 250: Performed by: INTERNAL MEDICINE

## 2020-09-28 RX ORDER — HEPARIN SODIUM,PORCINE 10 UNIT/ML
5 VIAL (ML) INTRAVENOUS
Status: DISCONTINUED | OUTPATIENT
Start: 2020-09-28 | End: 2020-09-28 | Stop reason: HOSPADM

## 2020-09-28 RX ORDER — HEPARIN SODIUM (PORCINE) LOCK FLUSH IV SOLN 100 UNIT/ML 100 UNIT/ML
5 SOLUTION INTRAVENOUS
Status: CANCELLED | OUTPATIENT
Start: 2020-09-30

## 2020-09-28 RX ORDER — HEPARIN SODIUM,PORCINE 10 UNIT/ML
5 VIAL (ML) INTRAVENOUS
Status: CANCELLED | OUTPATIENT
Start: 2020-09-30

## 2020-09-28 RX ADMIN — Medication 5 ML: at 14:03

## 2020-09-28 RX ADMIN — WATER 1 G: 1 INJECTION INTRAMUSCULAR; INTRAVENOUS; SUBCUTANEOUS at 13:57

## 2020-09-28 NOTE — PROGRESS NOTES
Infusion Nursing Note:  Juliavivian Andre presents today for Invanz.    Patient seen by provider today: No   present during visit today: Not Applicable.    Note: Patient states she is supposed to have a CT scan tomorrow and is hoping to get the drain out and switch to oral antibiotics soon.    Intravenous Access:  Implanted Port.    Treatment Conditions:  Not Applicable.      Post Infusion Assessment:  Patient tolerated infusion without incident.  Blood return noted pre and post infusion.  Site patent and intact, free from redness, edema or discomfort.  No evidence of extravasations.       Discharge Plan:   Copy of AVS reviewed with patient and/or family.  Patient will return 9/29/20 for next appointment.  Patient discharged in stable condition accompanied by: self.  Departure Mode: Ambulatory.    Talia Yu RN

## 2020-09-29 ENCOUNTER — HOSPITAL ENCOUNTER (OUTPATIENT)
Dept: CT IMAGING | Facility: CLINIC | Age: 70
Discharge: HOME | End: 2020-09-29
Attending: NURSE PRACTITIONER | Admitting: NURSE PRACTITIONER
Payer: COMMERCIAL

## 2020-09-29 ENCOUNTER — ALLIED HEALTH/NURSE VISIT (OUTPATIENT)
Dept: INFUSION THERAPY | Facility: CLINIC | Age: 70
End: 2020-09-29
Attending: INTERNAL MEDICINE
Payer: COMMERCIAL

## 2020-09-29 VITALS
HEART RATE: 58 BPM | OXYGEN SATURATION: 99 % | SYSTOLIC BLOOD PRESSURE: 135 MMHG | TEMPERATURE: 98.5 F | DIASTOLIC BLOOD PRESSURE: 72 MMHG | RESPIRATION RATE: 16 BRPM

## 2020-09-29 DIAGNOSIS — R78.81 BACTEREMIA: ICD-10-CM

## 2020-09-29 DIAGNOSIS — R19.00 PELVIC MASS: ICD-10-CM

## 2020-09-29 DIAGNOSIS — B99.9 ACUTE INFECTIOUS DISEASE: Primary | ICD-10-CM

## 2020-09-29 PROCEDURE — 25000128 H RX IP 250 OP 636: Performed by: NURSE PRACTITIONER

## 2020-09-29 PROCEDURE — 25000128 H RX IP 250 OP 636: Performed by: INTERNAL MEDICINE

## 2020-09-29 PROCEDURE — 74177 CT ABD & PELVIS W/CONTRAST: CPT

## 2020-09-29 PROCEDURE — 96374 THER/PROPH/DIAG INJ IV PUSH: CPT

## 2020-09-29 PROCEDURE — 25000125 ZZHC RX 250: Performed by: INTERNAL MEDICINE

## 2020-09-29 PROCEDURE — 25000125 ZZHC RX 250: Performed by: NURSE PRACTITIONER

## 2020-09-29 RX ORDER — HEPARIN SODIUM (PORCINE) LOCK FLUSH IV SOLN 100 UNIT/ML 100 UNIT/ML
5 SOLUTION INTRAVENOUS
Status: CANCELLED | OUTPATIENT
Start: 2020-09-30

## 2020-09-29 RX ORDER — IOPAMIDOL 755 MG/ML
500 INJECTION, SOLUTION INTRAVASCULAR ONCE
Status: COMPLETED | OUTPATIENT
Start: 2020-09-29 | End: 2020-09-29

## 2020-09-29 RX ORDER — HEPARIN SODIUM,PORCINE 10 UNIT/ML
5 VIAL (ML) INTRAVENOUS
Status: DISCONTINUED | OUTPATIENT
Start: 2020-09-29 | End: 2020-09-29 | Stop reason: HOSPADM

## 2020-09-29 RX ORDER — HEPARIN SODIUM,PORCINE 10 UNIT/ML
5 VIAL (ML) INTRAVENOUS
Status: CANCELLED | OUTPATIENT
Start: 2020-09-30

## 2020-09-29 RX ADMIN — IOPAMIDOL 74 ML: 755 INJECTION, SOLUTION INTRAVENOUS at 11:56

## 2020-09-29 RX ADMIN — Medication 5 ML: at 13:35

## 2020-09-29 RX ADMIN — SODIUM CHLORIDE 59 ML: 9 INJECTION, SOLUTION INTRAVENOUS at 11:56

## 2020-09-29 RX ADMIN — WATER 1 G: 1 INJECTION INTRAMUSCULAR; INTRAVENOUS; SUBCUTANEOUS at 13:29

## 2020-09-29 ASSESSMENT — PAIN SCALES - GENERAL: PAINLEVEL: NO PAIN (0)

## 2020-09-29 NOTE — PROGRESS NOTES
"Infusion Nursing Note:  Julia LUIS Staleyleida presents today for Invanz.    Patient seen by provider today: No   present during visit today: Not Applicable.    Note: Patient had CT today that still showed \"pocket\". Having surgical drain adjusted tomorrow at hospital. Patient will continue antibiotics until 10/12.    Intravenous Access:  Implanted Port.    Treatment Conditions:  Not Applicable.      Post Infusion Assessment:  Patient tolerated infusion without incident.  Blood return noted pre and post infusion.  Site patent and intact, free from redness, edema or discomfort.  No evidence of extravasations.       Discharge Plan:    Patient will return 9/30 for next appointment.  Patient discharged in stable condition accompanied by: self.  Departure Mode: Ambulatory.    Sandrita Ferguson RN                        "

## 2020-09-30 ENCOUNTER — MEDICAL CORRESPONDENCE (OUTPATIENT)
Dept: HEALTH INFORMATION MANAGEMENT | Facility: CLINIC | Age: 70
End: 2020-09-30

## 2020-09-30 ENCOUNTER — HOSPITAL ENCOUNTER (OUTPATIENT)
Dept: GENERAL RADIOLOGY | Facility: CLINIC | Age: 70
Discharge: HOME | End: 2020-09-30
Attending: RADIOLOGY | Admitting: RADIOLOGY
Payer: COMMERCIAL

## 2020-09-30 ENCOUNTER — ALLIED HEALTH/NURSE VISIT (OUTPATIENT)
Dept: INFUSION THERAPY | Facility: CLINIC | Age: 70
End: 2020-09-30
Attending: INTERNAL MEDICINE
Payer: COMMERCIAL

## 2020-09-30 VITALS
RESPIRATION RATE: 16 BRPM | DIASTOLIC BLOOD PRESSURE: 66 MMHG | OXYGEN SATURATION: 98 % | HEART RATE: 56 BPM | TEMPERATURE: 97.9 F | SYSTOLIC BLOOD PRESSURE: 114 MMHG

## 2020-09-30 VITALS
OXYGEN SATURATION: 100 % | DIASTOLIC BLOOD PRESSURE: 67 MMHG | HEART RATE: 58 BPM | RESPIRATION RATE: 16 BRPM | SYSTOLIC BLOOD PRESSURE: 122 MMHG

## 2020-09-30 DIAGNOSIS — Z11.59 ENCOUNTER FOR SCREENING FOR OTHER VIRAL DISEASES: Primary | ICD-10-CM

## 2020-09-30 DIAGNOSIS — R18.8 PELVIC FLUID COLLECTION: ICD-10-CM

## 2020-09-30 DIAGNOSIS — R78.81 BACTEREMIA: ICD-10-CM

## 2020-09-30 DIAGNOSIS — B99.9 ACUTE INFECTIOUS DISEASE: Primary | ICD-10-CM

## 2020-09-30 PROCEDURE — 25000125 ZZHC RX 250: Performed by: INTERNAL MEDICINE

## 2020-09-30 PROCEDURE — 96374 THER/PROPH/DIAG INJ IV PUSH: CPT

## 2020-09-30 PROCEDURE — 25500064 ZZH RX 255 OP 636: Performed by: RADIOLOGY

## 2020-09-30 PROCEDURE — 25000125 ZZHC RX 250

## 2020-09-30 PROCEDURE — 25000128 H RX IP 250 OP 636: Performed by: INTERNAL MEDICINE

## 2020-09-30 PROCEDURE — 25000128 H RX IP 250 OP 636

## 2020-09-30 PROCEDURE — 99207 ZZC NO CHARGE NURSE ONLY: CPT

## 2020-09-30 PROCEDURE — 27210742 XR ABSCESS DRAIN TUBE CHANGE

## 2020-09-30 PROCEDURE — 25000128 H RX IP 250 OP 636: Performed by: RADIOLOGY

## 2020-09-30 RX ORDER — FENTANYL CITRATE 50 UG/ML
100 INJECTION, SOLUTION INTRAMUSCULAR; INTRAVENOUS
Status: DISCONTINUED | OUTPATIENT
Start: 2020-09-30 | End: 2020-10-01 | Stop reason: HOSPADM

## 2020-09-30 RX ORDER — IOPAMIDOL 612 MG/ML
50 INJECTION, SOLUTION INTRAVASCULAR ONCE
Status: COMPLETED | OUTPATIENT
Start: 2020-09-30 | End: 2020-09-30

## 2020-09-30 RX ORDER — DEXTROSE MONOHYDRATE 25 G/50ML
25-50 INJECTION, SOLUTION INTRAVENOUS
Status: DISCONTINUED | OUTPATIENT
Start: 2020-09-30 | End: 2020-10-01 | Stop reason: HOSPADM

## 2020-09-30 RX ORDER — HEPARIN SODIUM (PORCINE) LOCK FLUSH IV SOLN 100 UNIT/ML 100 UNIT/ML
5 SOLUTION INTRAVENOUS
Status: CANCELLED | OUTPATIENT
Start: 2020-10-04

## 2020-09-30 RX ORDER — LIDOCAINE HYDROCHLORIDE 10 MG/ML
INJECTION, SOLUTION INFILTRATION; PERINEURAL
Status: COMPLETED
Start: 2020-09-30 | End: 2020-09-30

## 2020-09-30 RX ORDER — NICOTINE POLACRILEX 4 MG
15-30 LOZENGE BUCCAL
Status: DISCONTINUED | OUTPATIENT
Start: 2020-09-30 | End: 2020-10-01 | Stop reason: HOSPADM

## 2020-09-30 RX ORDER — HEPARIN SODIUM,PORCINE 10 UNIT/ML
5 VIAL (ML) INTRAVENOUS
Status: CANCELLED | OUTPATIENT
Start: 2020-10-04

## 2020-09-30 RX ORDER — HEPARIN SODIUM,PORCINE 10 UNIT/ML
5 VIAL (ML) INTRAVENOUS
Status: DISCONTINUED | OUTPATIENT
Start: 2020-09-30 | End: 2020-09-30 | Stop reason: HOSPADM

## 2020-09-30 RX ORDER — FENTANYL CITRATE 50 UG/ML
INJECTION, SOLUTION INTRAMUSCULAR; INTRAVENOUS
Status: COMPLETED
Start: 2020-09-30 | End: 2020-09-30

## 2020-09-30 RX ADMIN — Medication 5 ML: at 14:40

## 2020-09-30 RX ADMIN — MIDAZOLAM 1 MG: 1 INJECTION INTRAMUSCULAR; INTRAVENOUS at 08:29

## 2020-09-30 RX ADMIN — FENTANYL CITRATE 50 MCG: 0.05 INJECTION, SOLUTION INTRAMUSCULAR; INTRAVENOUS at 08:39

## 2020-09-30 RX ADMIN — FENTANYL CITRATE 50 MCG: 0.05 INJECTION, SOLUTION INTRAMUSCULAR; INTRAVENOUS at 08:29

## 2020-09-30 RX ADMIN — IOPAMIDOL 50 ML: 612 INJECTION, SOLUTION INTRAVENOUS at 08:50

## 2020-09-30 RX ADMIN — LIDOCAINE HYDROCHLORIDE 10 ML: 10 INJECTION, SOLUTION INFILTRATION; PERINEURAL at 08:30

## 2020-09-30 RX ADMIN — WATER 1 G: 1 INJECTION INTRAMUSCULAR; INTRAVENOUS; SUBCUTANEOUS at 14:33

## 2020-09-30 RX ADMIN — MIDAZOLAM 1 MG: 1 INJECTION INTRAMUSCULAR; INTRAVENOUS at 08:30

## 2020-09-30 NOTE — PROCEDURES
RADIOLOGY POST PROCEDURE NOTE WITH SEDATION  Patient name: Julia Andre  MRN: [id number]  : 1950    Pre-procedure diagnosis: Pelvic abscess  Post-procedure diagnosis: Same    Procedure Date/Time: 2020  9:30 AM    Procedure: Abscess drain check and change  Estimated blood loss: None  Sedation: Moderate sedation was employed. The patient was monitored by a nurse at all times during the procedure under my direct supervision.    Specimen(s) collected with description: none    I determined this patient to be an appropriate candidate for the planned sedation and procedure and reassessed the patient IMMEDIATELY PRIOR to sedation and procedure.     The patient tolerated the procedure well with no immediate complications.    Significant findings:none    See imaging dictation for procedural details.    Provider name: Anil Hess MD  Assistant(s):None

## 2020-09-30 NOTE — PROGRESS NOTES
Infusion Nursing Note:  Juliavivian Andre presents today for Invanz.    Patient seen by provider today: No   present during visit today: Not Applicable.    Note:  Patient reports is feeling fine and offers no new issues or concerns today.  Patient denies fevers or chills.    Intravenous Access:  Implanted Port.  Port needle and dressing changed today.  Port site C/D/I.  Port flushes well + excellent blood return.    Treatment Conditions:  Not Applicable.      Post Infusion Assessment:  Patient tolerated infusion without incident.  Blood return noted pre and post infusion.  Site patent and intact, free from redness, edema or discomfort.  No evidence of extravasations.       Discharge Plan:   Discharge instructions reviewed with: Patient.  Patient and/or family verbalized understanding of discharge instructions and all questions answered.  Patient discharged in stable condition accompanied by: self.  Departure Mode: Ambulatory.  Patient is scheduled through 10/13/20 for [facility].    Macrina Garcia RN, BSN, OCN on 9/30/2020 at 3:38 PM

## 2020-09-30 NOTE — PROGRESS NOTES
Patient tolerated placement of 12 Yoruba drain to bulb suction to right buttock area well by Dr Hess.  2 mg of versed and 100 mcg of fenytnal given for sedation. Vitals remain stable through procedure.  Patient states understanding of discharge instructions.  Ordered to flush with 10 mls normal saline once daily.  Dressing clean dry and intact at time of discharge to home with spouse.  Stefan Laughlin, RN, BSN

## 2020-10-01 ENCOUNTER — ALLIED HEALTH/NURSE VISIT (OUTPATIENT)
Dept: INFUSION THERAPY | Facility: CLINIC | Age: 70
End: 2020-10-01
Attending: INTERNAL MEDICINE
Payer: COMMERCIAL

## 2020-10-01 VITALS
RESPIRATION RATE: 16 BRPM | OXYGEN SATURATION: 100 % | TEMPERATURE: 98.1 F | HEART RATE: 62 BPM | SYSTOLIC BLOOD PRESSURE: 135 MMHG | DIASTOLIC BLOOD PRESSURE: 73 MMHG

## 2020-10-01 DIAGNOSIS — B99.9 ACUTE INFECTIOUS DISEASE: Primary | ICD-10-CM

## 2020-10-01 DIAGNOSIS — R78.81 BACTEREMIA: ICD-10-CM

## 2020-10-01 PROCEDURE — 250N000011 HC RX IP 250 OP 636: Performed by: INTERNAL MEDICINE

## 2020-10-01 PROCEDURE — 250N000009 HC RX 250: Performed by: INTERNAL MEDICINE

## 2020-10-01 PROCEDURE — 96374 THER/PROPH/DIAG INJ IV PUSH: CPT

## 2020-10-01 RX ORDER — HEPARIN SODIUM,PORCINE 10 UNIT/ML
5 VIAL (ML) INTRAVENOUS
Status: DISCONTINUED | OUTPATIENT
Start: 2020-10-01 | End: 2020-10-01 | Stop reason: HOSPADM

## 2020-10-01 RX ORDER — HEPARIN SODIUM,PORCINE 10 UNIT/ML
5 VIAL (ML) INTRAVENOUS
Status: CANCELLED | OUTPATIENT
Start: 2020-10-04

## 2020-10-01 RX ORDER — HEPARIN SODIUM (PORCINE) LOCK FLUSH IV SOLN 100 UNIT/ML 100 UNIT/ML
5 SOLUTION INTRAVENOUS
Status: CANCELLED | OUTPATIENT
Start: 2020-10-04

## 2020-10-01 RX ADMIN — Medication 5 ML: at 14:08

## 2020-10-01 RX ADMIN — WATER 1 G: 1 INJECTION INTRAMUSCULAR; INTRAVENOUS; SUBCUTANEOUS at 14:01

## 2020-10-01 ASSESSMENT — PAIN SCALES - GENERAL: PAINLEVEL: MILD PAIN (3)

## 2020-10-01 NOTE — PROGRESS NOTES
Infusion Nursing Note:  Julia Andre presents today for Invanz.    Patient seen by provider today: No   present during visit today: Not Applicable.    Note: Patient indicates they needed to reposition her drain and now she is having a little more drainage.  No change in constipation.    Intravenous Access:  Implanted Port already accessed    Treatment Conditions:  Not Applicable.      Post Infusion Assessment:  Patient tolerated infusion without incident.  Blood return noted pre and post infusion.  Site patent and intact, free from redness, edema or discomfort.  No evidence of extravasations.  Access discontinued per protocol.       Discharge Plan:   Discharge instructions reviewed with: Patient.  Patient discharged in stable condition accompanied by: self.  Departure Mode: Ambulatory.  Scheduled for ABX again tomorrow.    ZURI ALVAREZ RN

## 2020-10-02 ENCOUNTER — ALLIED HEALTH/NURSE VISIT (OUTPATIENT)
Dept: INFUSION THERAPY | Facility: CLINIC | Age: 70
End: 2020-10-02
Attending: INTERNAL MEDICINE
Payer: COMMERCIAL

## 2020-10-02 VITALS
HEART RATE: 60 BPM | TEMPERATURE: 98.9 F | OXYGEN SATURATION: 99 % | DIASTOLIC BLOOD PRESSURE: 67 MMHG | RESPIRATION RATE: 16 BRPM | SYSTOLIC BLOOD PRESSURE: 122 MMHG

## 2020-10-02 DIAGNOSIS — B99.9 ACUTE INFECTIOUS DISEASE: Primary | ICD-10-CM

## 2020-10-02 DIAGNOSIS — R78.81 BACTEREMIA: ICD-10-CM

## 2020-10-02 PROCEDURE — 250N000009 HC RX 250: Performed by: INTERNAL MEDICINE

## 2020-10-02 PROCEDURE — 250N000011 HC RX IP 250 OP 636: Performed by: INTERNAL MEDICINE

## 2020-10-02 PROCEDURE — 96374 THER/PROPH/DIAG INJ IV PUSH: CPT

## 2020-10-02 RX ORDER — HEPARIN SODIUM,PORCINE 10 UNIT/ML
5 VIAL (ML) INTRAVENOUS
Status: CANCELLED | OUTPATIENT
Start: 2020-10-04

## 2020-10-02 RX ORDER — HEPARIN SODIUM,PORCINE 10 UNIT/ML
5 VIAL (ML) INTRAVENOUS
Status: DISCONTINUED | OUTPATIENT
Start: 2020-10-02 | End: 2020-10-02 | Stop reason: HOSPADM

## 2020-10-02 RX ORDER — HEPARIN SODIUM (PORCINE) LOCK FLUSH IV SOLN 100 UNIT/ML 100 UNIT/ML
5 SOLUTION INTRAVENOUS
Status: CANCELLED | OUTPATIENT
Start: 2020-10-04

## 2020-10-02 RX ADMIN — Medication 5 ML: at 13:35

## 2020-10-02 RX ADMIN — WATER 1 G: 1 INJECTION INTRAMUSCULAR; INTRAVENOUS; SUBCUTANEOUS at 13:27

## 2020-10-02 NOTE — PROGRESS NOTES
Infusion Nursing Note:  Julia Andre presents today for Invanz.    Patient seen by provider today: No   present during visit today: Not Applicable.    Note: N/A.    Intravenous Access:  Implanted Port.    Treatment Conditions:  Not Applicable.      Post Infusion Assessment:  Patient tolerated infusion without incident.  Blood return noted pre and post infusion.  Site patent and intact, free from redness, edema or discomfort.  No evidence of extravasations.       Discharge Plan:   Copy of AVS reviewed with patient and/or family.  Patient will return 10/3 at [facility] for next appointment.  Patient discharged in stable condition accompanied by: self.  Departure Mode: Ambulatory.    Talia Yu RN

## 2020-10-03 ENCOUNTER — INFUSION THERAPY VISIT (OUTPATIENT)
Dept: INFUSION THERAPY | Facility: CLINIC | Age: 70
End: 2020-10-03
Attending: INTERNAL MEDICINE
Payer: COMMERCIAL

## 2020-10-03 VITALS
RESPIRATION RATE: 18 BRPM | SYSTOLIC BLOOD PRESSURE: 138 MMHG | DIASTOLIC BLOOD PRESSURE: 74 MMHG | TEMPERATURE: 98 F | HEART RATE: 69 BPM

## 2020-10-03 DIAGNOSIS — R78.81 BACTEREMIA: ICD-10-CM

## 2020-10-03 DIAGNOSIS — B99.9 ACUTE INFECTIOUS DISEASE: Primary | ICD-10-CM

## 2020-10-03 PROCEDURE — 250N000011 HC RX IP 250 OP 636: Performed by: INTERNAL MEDICINE

## 2020-10-03 PROCEDURE — 96374 THER/PROPH/DIAG INJ IV PUSH: CPT

## 2020-10-03 PROCEDURE — 250N000009 HC RX 250: Performed by: INTERNAL MEDICINE

## 2020-10-03 RX ORDER — HEPARIN SODIUM,PORCINE 10 UNIT/ML
5 VIAL (ML) INTRAVENOUS
Status: CANCELLED | OUTPATIENT
Start: 2020-10-04

## 2020-10-03 RX ORDER — HEPARIN SODIUM (PORCINE) LOCK FLUSH IV SOLN 100 UNIT/ML 100 UNIT/ML
5 SOLUTION INTRAVENOUS
Status: DISCONTINUED | OUTPATIENT
Start: 2020-10-03 | End: 2020-10-03 | Stop reason: HOSPADM

## 2020-10-03 RX ORDER — HEPARIN SODIUM (PORCINE) LOCK FLUSH IV SOLN 100 UNIT/ML 100 UNIT/ML
5 SOLUTION INTRAVENOUS
Status: CANCELLED | OUTPATIENT
Start: 2020-10-04

## 2020-10-03 RX ORDER — HEPARIN SODIUM,PORCINE 10 UNIT/ML
5 VIAL (ML) INTRAVENOUS
Status: DISCONTINUED | OUTPATIENT
Start: 2020-10-03 | End: 2020-10-03 | Stop reason: HOSPADM

## 2020-10-03 RX ADMIN — Medication 5 ML: at 10:39

## 2020-10-03 RX ADMIN — WATER 1 G: 1 INJECTION INTRAMUSCULAR; INTRAVENOUS; SUBCUTANEOUS at 10:37

## 2020-10-03 ASSESSMENT — PAIN SCALES - GENERAL: PAINLEVEL: NO PAIN (0)

## 2020-10-03 NOTE — PROGRESS NOTES
Infusion Nursing Note:  Julia Andre presents today for invanz.    Patient seen by provider today: No   present during visit today: Not Applicable.    Note: N/A.    Intravenous Access:  Implanted Port.    Treatment Conditions:  Not Applicable.      Post Infusion Assessment:  Patient tolerated infusion without incident.  Blood return noted pre and post infusion.  Site patent and intact, free from redness, edema or discomfort.  No evidence of extravasations.       Discharge Plan:   Discharge instructions reviewed with: Patient.  Patient and/or family verbalized understanding of discharge instructions and all questions answered.  Patient discharged in stable condition accompanied by: self.  Departure Mode: Ambulatory.    Harper Crandall RN

## 2020-10-04 ENCOUNTER — INFUSION THERAPY VISIT (OUTPATIENT)
Dept: INFUSION THERAPY | Facility: CLINIC | Age: 70
End: 2020-10-04
Attending: INTERNAL MEDICINE
Payer: COMMERCIAL

## 2020-10-04 ENCOUNTER — HOSPITAL ENCOUNTER (OUTPATIENT)
Facility: CLINIC | Age: 70
Setting detail: SPECIMEN
Discharge: HOME | End: 2020-10-04
Attending: INTERNAL MEDICINE | Admitting: INTERNAL MEDICINE
Payer: COMMERCIAL

## 2020-10-04 VITALS
HEART RATE: 62 BPM | DIASTOLIC BLOOD PRESSURE: 85 MMHG | SYSTOLIC BLOOD PRESSURE: 139 MMHG | TEMPERATURE: 97.7 F | RESPIRATION RATE: 18 BRPM

## 2020-10-04 DIAGNOSIS — B99.9 ACUTE INFECTIOUS DISEASE: Primary | ICD-10-CM

## 2020-10-04 DIAGNOSIS — R78.81 BACTEREMIA: ICD-10-CM

## 2020-10-04 LAB
AST SERPL W P-5'-P-CCNC: 13 U/L (ref 0–45)
BASOPHILS # BLD AUTO: 0 10E9/L (ref 0–0.2)
BASOPHILS NFR BLD AUTO: 0.2 %
CREAT SERPL-MCNC: 0.94 MG/DL (ref 0.52–1.04)
DIFFERENTIAL METHOD BLD: ABNORMAL
EOSINOPHIL # BLD AUTO: 0.5 10E9/L (ref 0–0.7)
EOSINOPHIL NFR BLD AUTO: 5.3 %
ERYTHROCYTE [DISTWIDTH] IN BLOOD BY AUTOMATED COUNT: 14.5 % (ref 10–15)
GFR SERPL CREATININE-BSD FRML MDRD: 61 ML/MIN/{1.73_M2}
HCT VFR BLD AUTO: 29.6 % (ref 35–47)
HGB BLD-MCNC: 9.2 G/DL (ref 11.7–15.7)
IMM GRANULOCYTES # BLD: 0 10E9/L (ref 0–0.4)
IMM GRANULOCYTES NFR BLD: 0.2 %
LYMPHOCYTES # BLD AUTO: 1.4 10E9/L (ref 0.8–5.3)
LYMPHOCYTES NFR BLD AUTO: 16.7 %
MCH RBC QN AUTO: 26.7 PG (ref 26.5–33)
MCHC RBC AUTO-ENTMCNC: 31.1 G/DL (ref 31.5–36.5)
MCV RBC AUTO: 86 FL (ref 78–100)
MONOCYTES # BLD AUTO: 0.8 10E9/L (ref 0–1.3)
MONOCYTES NFR BLD AUTO: 9.2 %
NEUTROPHILS # BLD AUTO: 5.8 10E9/L (ref 1.6–8.3)
NEUTROPHILS NFR BLD AUTO: 68.4 %
NRBC # BLD AUTO: 0 10*3/UL
NRBC BLD AUTO-RTO: 0 /100
PLATELET # BLD AUTO: 403 10E9/L (ref 150–450)
RBC # BLD AUTO: 3.44 10E12/L (ref 3.8–5.2)
WBC # BLD AUTO: 8.4 10E9/L (ref 4–11)

## 2020-10-04 PROCEDURE — 85025 COMPLETE CBC W/AUTO DIFF WBC: CPT | Performed by: INTERNAL MEDICINE

## 2020-10-04 PROCEDURE — 250N000009 HC RX 250: Performed by: INTERNAL MEDICINE

## 2020-10-04 PROCEDURE — 82565 ASSAY OF CREATININE: CPT | Performed by: INTERNAL MEDICINE

## 2020-10-04 PROCEDURE — 96374 THER/PROPH/DIAG INJ IV PUSH: CPT

## 2020-10-04 PROCEDURE — 250N000011 HC RX IP 250 OP 636: Performed by: INTERNAL MEDICINE

## 2020-10-04 PROCEDURE — 84450 TRANSFERASE (AST) (SGOT): CPT | Performed by: INTERNAL MEDICINE

## 2020-10-04 RX ORDER — HEPARIN SODIUM (PORCINE) LOCK FLUSH IV SOLN 100 UNIT/ML 100 UNIT/ML
5 SOLUTION INTRAVENOUS
Status: CANCELLED | OUTPATIENT
Start: 2020-10-07

## 2020-10-04 RX ORDER — HEPARIN SODIUM,PORCINE 10 UNIT/ML
5 VIAL (ML) INTRAVENOUS
Status: CANCELLED | OUTPATIENT
Start: 2020-10-07

## 2020-10-04 RX ORDER — HEPARIN SODIUM (PORCINE) LOCK FLUSH IV SOLN 100 UNIT/ML 100 UNIT/ML
5 SOLUTION INTRAVENOUS
Status: DISCONTINUED | OUTPATIENT
Start: 2020-10-04 | End: 2020-10-04 | Stop reason: HOSPADM

## 2020-10-04 RX ADMIN — Medication 5 ML: at 13:21

## 2020-10-04 RX ADMIN — WATER 1 G: 1 INJECTION INTRAMUSCULAR; INTRAVENOUS; SUBCUTANEOUS at 13:20

## 2020-10-04 ASSESSMENT — PAIN SCALES - GENERAL: PAINLEVEL: NO PAIN (0)

## 2020-10-04 NOTE — PROGRESS NOTES
"Infusion Nursing Note:  Julia Andre presents today for ***.    Patient seen by provider today: {YES (EXPLAIN)/NO:462828}   present during visit today: {UMHLANGUAGE:709055}    Note: {Not Applicable or free text:581057:s:\"N/A\"}.    Intravenous Access:  {UMHIVACCESS:229386}    Treatment Conditions:  {UMHTXCONDITIONS:630621}      Post Infusion Assessment:  {UMHPOSTINFUSION:345751}       Discharge Plan:   {UMHDISCHARGE:724253}    Harper Crandall RN                        "

## 2020-10-04 NOTE — PROGRESS NOTES
Infusion Nursing Note:  Julia Andre presents today for invanz.   Patient seen by provider today: No   present during visit today: Not Applicable.    Note: N/A.    Intravenous Access:  Implanted Port.    Treatment Conditions:  Not Applicable.      Post Infusion Assessment:  Patient tolerated infusion without incident.  Blood return noted pre and post infusion.  Site patent and intact, free from redness, edema or discomfort.  No evidence of extravasations.  Access discontinued per protocol.       Discharge Plan:   Discharge instructions reviewed with: Patient.  Patient and/or family verbalized understanding of discharge instructions and all questions answered.  Patient discharged in stable condition accompanied by: self.  Departure Mode: Ambulatory.    Harper Crandall RN

## 2020-10-05 ENCOUNTER — ALLIED HEALTH/NURSE VISIT (OUTPATIENT)
Dept: INFUSION THERAPY | Facility: CLINIC | Age: 70
End: 2020-10-05
Attending: INTERNAL MEDICINE
Payer: COMMERCIAL

## 2020-10-05 VITALS
HEART RATE: 61 BPM | DIASTOLIC BLOOD PRESSURE: 64 MMHG | TEMPERATURE: 98.1 F | RESPIRATION RATE: 18 BRPM | SYSTOLIC BLOOD PRESSURE: 115 MMHG | OXYGEN SATURATION: 97 %

## 2020-10-05 DIAGNOSIS — R78.81 BACTEREMIA: ICD-10-CM

## 2020-10-05 DIAGNOSIS — B99.9 ACUTE INFECTIOUS DISEASE: Primary | ICD-10-CM

## 2020-10-05 PROCEDURE — 96374 THER/PROPH/DIAG INJ IV PUSH: CPT

## 2020-10-05 PROCEDURE — 250N000009 HC RX 250: Performed by: INTERNAL MEDICINE

## 2020-10-05 PROCEDURE — 250N000011 HC RX IP 250 OP 636: Performed by: INTERNAL MEDICINE

## 2020-10-05 RX ORDER — HEPARIN SODIUM,PORCINE 10 UNIT/ML
5 VIAL (ML) INTRAVENOUS
Status: CANCELLED | OUTPATIENT
Start: 2020-10-07

## 2020-10-05 RX ORDER — HEPARIN SODIUM (PORCINE) LOCK FLUSH IV SOLN 100 UNIT/ML 100 UNIT/ML
5 SOLUTION INTRAVENOUS
Status: CANCELLED | OUTPATIENT
Start: 2020-10-07

## 2020-10-05 RX ADMIN — WATER 1 G: 1 INJECTION INTRAMUSCULAR; INTRAVENOUS; SUBCUTANEOUS at 13:21

## 2020-10-05 NOTE — PROGRESS NOTES
Infusion Nursing Note:  Julia Andre presents today for Invanz.    Patient seen by provider today: No   present during visit today: Not Applicable.    Note: N/A.    Intravenous Access:  Implanted Port.    Treatment Conditions:  Not Applicable.      Post Infusion Assessment:  Patient tolerated infusion without incident.  Blood return noted pre and post infusion.  Site patent and intact, free from redness, edema or discomfort.  No evidence of extravasations.       Discharge Plan:   Discharge instructions reviewed with: Patient.  Patient and/or family verbalized understanding of discharge instructions and all questions answered.  AVS to patient via [email or web address].  Patient will return 10/6/2020 for next appointment.   Patient discharged in stable condition accompanied by: self.  Departure Mode: Ambulatory.    Sadaf Longoria RN

## 2020-10-06 ENCOUNTER — ALLIED HEALTH/NURSE VISIT (OUTPATIENT)
Dept: INFUSION THERAPY | Facility: CLINIC | Age: 70
End: 2020-10-06
Attending: INTERNAL MEDICINE
Payer: COMMERCIAL

## 2020-10-06 ENCOUNTER — HOSPITAL ENCOUNTER (OUTPATIENT)
Dept: CT IMAGING | Facility: CLINIC | Age: 70
Discharge: HOME | End: 2020-10-06
Attending: OBSTETRICS & GYNECOLOGY | Admitting: OBSTETRICS & GYNECOLOGY
Payer: COMMERCIAL

## 2020-10-06 VITALS
DIASTOLIC BLOOD PRESSURE: 72 MMHG | OXYGEN SATURATION: 100 % | HEART RATE: 64 BPM | SYSTOLIC BLOOD PRESSURE: 132 MMHG | TEMPERATURE: 98.7 F

## 2020-10-06 DIAGNOSIS — R18.8 PELVIC FLUID COLLECTION: ICD-10-CM

## 2020-10-06 DIAGNOSIS — R78.81 BACTEREMIA: ICD-10-CM

## 2020-10-06 DIAGNOSIS — B99.9 ACUTE INFECTIOUS DISEASE: Primary | ICD-10-CM

## 2020-10-06 PROCEDURE — 250N000009 HC RX 250: Performed by: INTERNAL MEDICINE

## 2020-10-06 PROCEDURE — 250N000009 HC RX 250: Performed by: OBSTETRICS & GYNECOLOGY

## 2020-10-06 PROCEDURE — 72194 CT PELVIS W/O & W/DYE: CPT

## 2020-10-06 PROCEDURE — 96374 THER/PROPH/DIAG INJ IV PUSH: CPT

## 2020-10-06 PROCEDURE — 250N000011 HC RX IP 250 OP 636: Performed by: INTERNAL MEDICINE

## 2020-10-06 RX ORDER — HEPARIN SODIUM (PORCINE) LOCK FLUSH IV SOLN 100 UNIT/ML 100 UNIT/ML
5 SOLUTION INTRAVENOUS
Status: CANCELLED | OUTPATIENT
Start: 2020-10-07

## 2020-10-06 RX ORDER — HEPARIN SODIUM,PORCINE 10 UNIT/ML
5 VIAL (ML) INTRAVENOUS
Status: DISCONTINUED | OUTPATIENT
Start: 2020-10-06 | End: 2020-10-06 | Stop reason: HOSPADM

## 2020-10-06 RX ORDER — HEPARIN SODIUM,PORCINE 10 UNIT/ML
5 VIAL (ML) INTRAVENOUS
Status: CANCELLED | OUTPATIENT
Start: 2020-10-07

## 2020-10-06 RX ORDER — HEPARIN SODIUM (PORCINE) LOCK FLUSH IV SOLN 100 UNIT/ML 100 UNIT/ML
5 SOLUTION INTRAVENOUS
Status: DISCONTINUED | OUTPATIENT
Start: 2020-10-06 | End: 2020-10-06 | Stop reason: HOSPADM

## 2020-10-06 RX ADMIN — WATER 1 G: 1 INJECTION INTRAMUSCULAR; INTRAVENOUS; SUBCUTANEOUS at 13:17

## 2020-10-06 RX ADMIN — Medication 5 ML: at 13:22

## 2020-10-06 RX ADMIN — SODIUM CHLORIDE 30 ML: 9 INJECTION, SOLUTION INTRAVENOUS at 10:22

## 2020-10-06 NOTE — PROGRESS NOTES
Pt here for CT Sinogram.  Dr. Foss reviewed and removed drain that was in Right Buttock Area.  Site no redness or swelling noted.  Dressing applied.

## 2020-10-06 NOTE — PROGRESS NOTES
Infusion Nursing Note:  Julia LUIS Staleyleida presents today for Invanz.    Patient seen by provider today: No   present during visit today: Not Applicable.    Note: Pt had R buttock drain removed today by Dr Foss. She is wondering if she needs to continue on with her IV antibiotics. Currently scheduled through 10/12/20.  She states that she tried calling Dr Trent's office twice and hasn't heard back.  Gave pt her dose of Invanz today.  Left message for WILLIAM Fiore (covering for WILLIAM Garcia) regarding above.  Awaiting call back    Intravenous Access:  Implanted Port.    Treatment Conditions:  Not Applicable.      Post Infusion Assessment:  Patient tolerated infusion without incident.  Blood return noted pre and post infusion.  Site patent and intact, free from redness, edema or discomfort.  No evidence of extravasations.  Access discontinued per protocol.       Discharge Plan:   Discharge instructions reviewed with: Patient.  Patient and/or family verbalized understanding of discharge instructions and all questions answered.  AVS to patient via [employer].  Patient will return 10/7/20 for Invanz for next appointment.   Patient discharged in stable condition accompanied by: self.  Departure Mode: Ambulatory.    Sandrita Kwon RN

## 2020-10-07 NOTE — PROGRESS NOTES
Spoke with WILLIAM Garcia/Dr Trent.  Will cancel remaining antibiotic infusions now that R buttock drain has been removed.  Notified pt.  Notified scheduling to cancel remaining appts.    Sandrita Kwon RN

## 2020-10-08 ASSESSMENT — ENCOUNTER SYMPTOMS: FEVER: 1

## 2020-12-08 ENCOUNTER — TRANSFERRED RECORDS (OUTPATIENT)
Dept: HEALTH INFORMATION MANAGEMENT | Facility: CLINIC | Age: 70
End: 2020-12-08

## 2020-12-08 ENCOUNTER — HOSPITAL ENCOUNTER (OUTPATIENT)
Dept: LAB | Facility: CLINIC | Age: 70
Discharge: HOME | End: 2020-12-08
Attending: OBSTETRICS & GYNECOLOGY | Admitting: CHIROPRACTOR
Payer: COMMERCIAL

## 2020-12-08 DIAGNOSIS — D50.9 IRON DEFICIENCY ANEMIA: ICD-10-CM

## 2020-12-08 LAB
ABO + RH BLD: NORMAL
ABO + RH BLD: NORMAL
BLD GP AB SCN SERPL QL: NORMAL
BLD PROD TYP BPU: NORMAL
BLOOD BANK CMNT PATIENT-IMP: NORMAL
NUM BPU REQUESTED: 2
SPECIMEN EXP DATE BLD: NORMAL

## 2020-12-08 PROCEDURE — 86901 BLOOD TYPING SEROLOGIC RH(D): CPT | Performed by: CHIROPRACTOR

## 2020-12-08 PROCEDURE — 86850 RBC ANTIBODY SCREEN: CPT | Performed by: CHIROPRACTOR

## 2020-12-08 PROCEDURE — 36415 COLL VENOUS BLD VENIPUNCTURE: CPT | Performed by: CHIROPRACTOR

## 2020-12-08 PROCEDURE — 86923 COMPATIBILITY TEST ELECTRIC: CPT | Performed by: CHIROPRACTOR

## 2020-12-08 PROCEDURE — 86900 BLOOD TYPING SEROLOGIC ABO: CPT | Performed by: CHIROPRACTOR

## 2020-12-08 RX ORDER — HEPARIN SODIUM,PORCINE 10 UNIT/ML
5 VIAL (ML) INTRAVENOUS
Status: CANCELLED | OUTPATIENT
Start: 2020-12-08

## 2020-12-08 RX ORDER — HEPARIN SODIUM (PORCINE) LOCK FLUSH IV SOLN 100 UNIT/ML 100 UNIT/ML
5 SOLUTION INTRAVENOUS
Status: CANCELLED | OUTPATIENT
Start: 2020-12-08

## 2020-12-09 ENCOUNTER — INFUSION THERAPY VISIT (OUTPATIENT)
Dept: INFUSION THERAPY | Facility: CLINIC | Age: 70
End: 2020-12-09
Attending: CHIROPRACTOR
Payer: COMMERCIAL

## 2020-12-09 VITALS
RESPIRATION RATE: 16 BRPM | OXYGEN SATURATION: 100 % | SYSTOLIC BLOOD PRESSURE: 148 MMHG | DIASTOLIC BLOOD PRESSURE: 85 MMHG | HEART RATE: 64 BPM | TEMPERATURE: 98.4 F

## 2020-12-09 DIAGNOSIS — D50.9 IRON DEFICIENCY ANEMIA: Primary | ICD-10-CM

## 2020-12-09 DIAGNOSIS — R19.00 PELVIC MASS: ICD-10-CM

## 2020-12-09 LAB
BLD PROD TYP BPU: NORMAL
BLD PROD TYP BPU: NORMAL
BLD UNIT ID BPU: 0
BLD UNIT ID BPU: 0
BLOOD PRODUCT CODE: NORMAL
BLOOD PRODUCT CODE: NORMAL
BPU ID: NORMAL
BPU ID: NORMAL
TRANSFUSION STATUS PATIENT QL: NORMAL

## 2020-12-09 PROCEDURE — P9016 RBC LEUKOCYTES REDUCED: HCPCS | Performed by: CHIROPRACTOR

## 2020-12-09 PROCEDURE — 36430 TRANSFUSION BLD/BLD COMPNT: CPT

## 2020-12-09 NOTE — PROGRESS NOTES
Infusion Nursing Note:  Julia Andre presents today for 2 units PRBC.    Patient seen by provider today: No   present during visit today: Not Applicable.    Note: N/A.    Intravenous Access:  Implanted Port.    Treatment Conditions:  Blood transfusion consent signed 12/8/2020.      Post Infusion Assessment:  Patient tolerated infusion without incident.  Blood return noted pre and post infusion.  Site patent and intact, free from redness, edema or discomfort.  No evidence of extravasations.  Access discontinued per protocol.       Discharge Plan:   Discharge instructions reviewed with: Patient.  Patient and/or family verbalized understanding of discharge instructions and all questions answered.  Copy of AVS reviewed with patient and/or family.    Patient discharged in stable condition accompanied by: self.  Departure Mode: Ambulatory.    Polly Martinez RN

## 2021-01-04 ENCOUNTER — HEALTH MAINTENANCE LETTER (OUTPATIENT)
Age: 71
End: 2021-01-04

## 2021-01-06 ENCOUNTER — TRANSFERRED RECORDS (OUTPATIENT)
Dept: MULTI SPECIALTY CLINIC | Facility: CLINIC | Age: 71
End: 2021-01-06

## 2021-01-06 LAB
ALBUMIN (URINE) MG/SPEC: 35.8 MG/L
ALBUMIN/CREATININE RATIO: 19 MG/G
CREATININE (URINE): 191 MG/DL
HBA1C MFR BLD: 5.6 %

## 2021-05-01 ENCOUNTER — HEALTH MAINTENANCE LETTER (OUTPATIENT)
Age: 71
End: 2021-05-01

## 2021-07-06 ENCOUNTER — TRANSFERRED RECORDS (OUTPATIENT)
Dept: MULTI SPECIALTY CLINIC | Facility: CLINIC | Age: 71
End: 2021-07-06

## 2021-08-10 ENCOUNTER — TELEPHONE (OUTPATIENT)
Dept: INTERVENTIONAL RADIOLOGY/VASCULAR | Facility: CLINIC | Age: 71
End: 2021-08-10

## 2021-08-10 NOTE — TELEPHONE ENCOUNTER
Patient is approved for CT guided periaortic LN biopsy by Dr. Lima.  This is a sedation procedure.  History and physical ar NOT done but pt is aware that she needs it to be done ASAP  done.  Patient does need labs.  Patient takes aspirin, she is aware of the 5 day hold. Clarence stated that due to positioning there is a chance we will not be able to get to the area, and it is very small. Pt was also told the procedure could be cancelled.

## 2021-08-11 DIAGNOSIS — Z11.59 ENCOUNTER FOR SCREENING FOR OTHER VIRAL DISEASES: ICD-10-CM

## 2021-08-15 ENCOUNTER — HEALTH MAINTENANCE LETTER (OUTPATIENT)
Age: 71
End: 2021-08-15

## 2021-08-16 ENCOUNTER — OFFICE VISIT (OUTPATIENT)
Dept: FAMILY MEDICINE | Facility: CLINIC | Age: 71
End: 2021-08-16
Payer: COMMERCIAL

## 2021-08-16 VITALS
TEMPERATURE: 98.1 F | OXYGEN SATURATION: 96 % | BODY MASS INDEX: 29.87 KG/M2 | SYSTOLIC BLOOD PRESSURE: 130 MMHG | DIASTOLIC BLOOD PRESSURE: 82 MMHG | RESPIRATION RATE: 17 BRPM | HEART RATE: 68 BPM | WEIGHT: 163.3 LBS

## 2021-08-16 DIAGNOSIS — E11.22 TYPE 2 DIABETES MELLITUS WITH CHRONIC KIDNEY DISEASE, WITHOUT LONG-TERM CURRENT USE OF INSULIN, UNSPECIFIED CKD STAGE (H): ICD-10-CM

## 2021-08-16 DIAGNOSIS — Z01.818 PREOP GENERAL PHYSICAL EXAM: Primary | ICD-10-CM

## 2021-08-16 DIAGNOSIS — C56.1 MALIGNANT NEOPLASM OF RIGHT OVARY (H): ICD-10-CM

## 2021-08-16 PROCEDURE — 99204 OFFICE O/P NEW MOD 45 MIN: CPT | Performed by: FAMILY MEDICINE

## 2021-08-16 RX ORDER — SENNOSIDES A AND B 8.6 MG/1
1-2 TABLET, FILM COATED ORAL
COMMUNITY
End: 2022-03-24

## 2021-08-16 RX ORDER — TRAZODONE HYDROCHLORIDE 50 MG/1
50 TABLET, FILM COATED ORAL
COMMUNITY
End: 2021-12-07

## 2021-08-16 RX ORDER — NIRAPARIB 100 MG/1
100 CAPSULE ORAL EVERY MORNING
COMMUNITY
Start: 2021-08-16 | End: 2022-03-24

## 2021-08-16 NOTE — PROGRESS NOTES
[facility]  [id number] [place] [id number]  Phone: [phone]  Primary Provider: Linda Martinez MD  Pre-op Performing Provider: JULIÁN FOX      PREOPERATIVE EVALUATION:  Today's date: 8/16/2021    Julia Andre is a 70 year old female who presents for a preoperative evaluation.      Surgical Information:  Surgery/Procedure: biopsy Abdominal lymph node   Surgery Location: [facility]   Surgeon: Cristiane Hickey/[facility]  Surgery Date: 8/23/21  Time of Surgery: 1:00 pm  Where patient plans to recover: At home with family  Fax number for surgical facility:  Note does not need to be faxed, will be available electronically in Epic.    Type of Anesthesia Anticipated: General and to be determined    Assessment & Plan     The proposed surgical procedure is considered INTERMEDIATE risk.    Preop general physical exam    Malignant neoplasm of right ovary (H)  - niraparib (ZEJULA) 100 MG capsule; Take 1 capsule (100 mg) by mouth every morning    Type 2 diabetes mellitus with chronic kidney disease, without long-term current use of insulin, unspecified CKD stage (H)  - last A1c stable. No changes to current regimen            Risks and Recommendations:  The patient has the following additional risks and recommendations for perioperative complications:   - Consult Hospitalist / IM to assist with post-op medical management    Medication Instructions:   - metformin: HOLD day of surgery.    RECOMMENDATION:  APPROVAL GIVEN to proceed with proposed procedure, without further diagnostic evaluation.    Review of prior external note(s) from - [facility]where information from [facility]  reviewed              Subjective     HPI related to upcoming procedure: 71 y/o female with history of right ovarian cancer with rising CA-125 and a suspicious lymph node from recent PET scan. A lymph node biopsy is deemed the next best step in management.       Preop Questions  8/14/2021   1. Have you ever had a heart attack or stroke? YES    2. Have you ever had surgery on your heart or blood vessels, such as a stent placement, a coronary artery bypass, or surgery on an artery in your head, neck, heart, or legs? No   3. Do you have chest pain with activity? No   4. Do you have a history of  heart failure? No   5. Do you currently have a cold, bronchitis or symptoms of other infection? No   6. Do you have a cough, shortness of breath, or wheezing? No   7. Do you or anyone in your family have previous history of blood clots? No   8. Do you or does anyone in your family have a serious bleeding problem such as prolonged bleeding following surgeries or cuts? No   9. Have you ever had problems with anemia or been told to take iron pills? YES    10. Have you had any abnormal blood loss such as black, tarry or bloody stools, or abnormal vaginal bleeding? No   11. Have you ever had a blood transfusion? YES    11a. Have you ever had a transfusion reaction? No   12. Are you willing to have a blood transfusion if it is medically needed before, during, or after your surgery? Yes   13. Have you or any of your relatives ever had problems with anesthesia? No   14. Do you have sleep apnea, excessive snoring or daytime drowsiness? YES    14a. Do you have a CPAP machine? No   15. Do you have any artifical heart valves or other implanted medical devices like a pacemaker, defibrillator, or continuous glucose monitor? No   16. Do you have artificial joints? No   17. Are you allergic to latex? No       Wt Readings from Last 4 Encounters:   08/16/21 74.1 kg (163 lb 4.8 oz)   09/15/20 67 kg (147 lb 11.2 oz)       Health Care Directive:  Patient does not have a Health Care Directive or Living Will: Patient states has Advance Directive and will bring in a copy to clinic.    Preoperative Review of :   reviewed - no record of controlled substances prescribed.      Status of Chronic Conditions:  See problem list  for active medical problems.  Problems all longstanding and stable, except as noted/documented.  See ROS for pertinent symptoms related to these conditions.      Review of Systems  Constitutional, neuro, ENT, endocrine, pulmonary, cardiac, gastrointestinal, genitourinary, musculoskeletal, integument and psychiatric systems are negative, except as otherwise noted.    Patient Active Problem List    Diagnosis Date Noted     Acute infectious disease 09/16/2020     Priority: Medium     Bacteremia 09/16/2020     Priority: Medium     Pelvic mass in female 09/02/2020     Priority: Medium     Iron deficiency anemia      Priority: Medium     Osteopenia      Priority: Medium     Psoriasis      Priority: Medium     Depression      Priority: Medium     Pelvic mass 08/21/2020     Priority: Medium     Elevated CA-125 08/21/2020     Priority: Medium     CAD (coronary artery disease) 08/21/2020     Priority: Medium     Type 2 diabetes mellitus with chronic kidney disease, without long-term current use of insulin, unspecified CKD stage (H) 08/21/2020     Priority: Medium     HTN (hypertension) 08/21/2020     Priority: Medium     Hyperlipidemia 08/21/2020     Priority: Medium     GERD (gastroesophageal reflux disease) 08/21/2020     Priority: Medium     Anxiety and depression 08/21/2020     Priority: Medium     Constipation 08/21/2020     Priority: Medium      Past Medical History:   Diagnosis Date     Anxiety      Cervical cancer (H)      Coronary artery disease 2009    angina. elevated troponins, normal coronary arteries found     Depression      Depressive disorder      Diabetes (H)     type 2     Gastroesophageal reflux disease      Heart attack (H)      Hyperlipidemia      Hypertension      Iron deficiency anemia      Osteopenia      Psoriasis      Sleep apnea      Past Surgical History:   Procedure Laterality Date     EYE SURGERY      strabismus     GI SURGERY  1996    cystoscopy     GYN SURGERY      bladder suspension     GYN  SURGERY  1974    cone biopsy     HYSTERECTOMY TOTAL ABD, BRANDI SALPINGO-OOPHORECTOMY, NODE DISSECTION, TUMOR DEBULKING, COMBINED Bilateral 9/2/2020    Procedure: TOTAL ABDOMINAL HYSTERECTOMY WITH BILATERAL SALPINGO-OOPHORECTOMY; WASHINGS; OMENTECTOMY TUMOR DEBULKING; PELVIC AND PARA-AORTIC LYMPHADENECTOMY;  Surgeon: Emily Hickey MD;  Location: [facility] OR     IR CHEST PORT PLACEMENT > 5 YRS OF AGE  9/4/2020     LAPAROTOMY EXPLORATORY N/A 9/2/2020    Procedure: EXPLORATORY LAPAROTOMY;  Surgeon: Emily Hickey MD;  Location: [facility] OR     SOFT TISSUE SURGERY      knee liposuction     Current Outpatient Medications   Medication Sig Dispense Refill     niraparib (ZEJULA) 100 MG capsule Take 1 capsule (100 mg) by mouth every morning       amLODIPine (NORVASC) 5 MG tablet Take 1 tablet (5 mg) by mouth daily 30 tablet 0     aspirin 81 MG EC tablet Take 81 mg by mouth daily       BIOTIN PO Take 1 tablet by mouth every morning       buPROPion (WELLBUTRIN XL) 300 MG 24 hr tablet Take 300 mg by mouth every morning       lisinopril (ZESTRIL) 5 MG tablet Take 1 tablet (5 mg) by mouth daily 30 tablet 0     metFORMIN (GLUCOPHAGE) 500 MG tablet Take 500 mg by mouth 2 times daily (with meals)       metoprolol tartrate (LOPRESSOR) 50 MG tablet Take 50 mg by mouth 2 times daily       Multiple Vitamin (MULTIVITAMIN PO) Take 1 tablet by mouth every morning       oxymetazoline (AFRIN) 0.05 % nasal spray Spray 2 sprays into both nostrils daily as needed for congestion       rosuvastatin (CRESTOR) 20 MG tablet Take 20 mg by mouth daily       senna (SENOKOT) 8.6 MG tablet Take 1-2 tablets by mouth       traZODone (DESYREL) 50 MG tablet Take 50 mg by mouth         Allergies   Allergen Reactions     Atorvastatin Muscle Pain (Myalgia)     Simvastatin      Sulfa Drugs         Social History     Tobacco Use     Smoking status: Former Smoker     Packs/day: 2.50     Years: 27.00     Pack years: 67.50     Quit date: 12/16/1993     Years since  quittin.6     Smokeless tobacco: Never Used   Substance Use Topics     Alcohol use: Not Currently     Comment: sober for 32 years     History reviewed. No pertinent family history.  History   Drug Use Unknown         Objective     /82 (BP Location: Right arm, Patient Position: Chair, Cuff Size: Adult Regular)   Pulse 68   Temp 98.1  F (36.7  C) (Oral)   Resp 17   Wt 74.1 kg (163 lb 4.8 oz)   SpO2 96%   BMI 29.87 kg/m      Physical Exam    GENERAL APPEARANCE: healthy, alert and no distress     EYES: EOMI, PERRL     HENT: ear canals and TM's normal and nose and mouth without ulcers or lesions     NECK: no adenopathy, no asymmetry, masses, or scars and thyroid normal to palpation     RESP: lungs clear to auscultation - no rales, rhonchi or wheezes     CV: regular rates and rhythm, normal S1 S2, no S3 or S4 and no murmur, click or rub     ABDOMEN:  soft, nontender, no HSM or masses and bowel sounds normal     MS: extremities normal- no gross deformities noted, no evidence of inflammation in joints, FROM in all extremities.     SKIN: no suspicious lesions or rashes     NEURO: Normal strength and tone, sensory exam grossly normal, mentation intact and speech normal     PSYCH: mentation appears normal. and affect normal/bright     LYMPHATICS: No cervical adenopathy    Recent Labs   Lab Test 10/04/20  1320 20  1320 20  0600 20  0600 20  0600 20  0600 20  0708 20  1053 20  0611   HGB 9.2* 8.8*   < > 8.2*  --  7.6*  --    < > 10.7*    399  --  527*   < > 279  --    < > 393   INR  --   --   --   --   --   --  1.13  --   --    NA  --   --   --  137  --  135 137   < >  --    POTASSIUM  --   --   --  4.4  --  3.8 4.7   < > 4.6   CR 0.94 0.82  --  0.88   < > 0.84 0.93  --   --    A1C  --   --   --   --   --   --   --   --  6.1*    < > = values in this interval not displayed.     2021  RBC- 2.96  HGB- 10.2  hematocrit- 30.4  Platelet- 157     -  20.70 units/ml       [facility]- 2/4/21    Diagnostics:  No labs were ordered during this visit.   No EKG required for low risk surgery (cataract, skin procedure, breast biopsy, etc).    Revised Cardiac Risk Index (RCRI):  The patient has the following serious cardiovascular risks for perioperative complications:   - No serious cardiac risks = 0 points     RCRI Interpretation: 0 points: Class I (very low risk - 0.4% complication rate)           Signed Electronically by: Coco Diaz MD  Copy of this evaluation report is provided to requesting physician.

## 2021-08-16 NOTE — PATIENT INSTRUCTIONS
Hold aspirin and multivitamin starting tomorrow   Tylenol only as needed for pain/fever  On morning of procedure hold metformin morning dose, ok to take BP meds and wellbutrin morning of procedure           Preparing for Your Surgery  Getting started  A nurse will call you to review your health history and instructions. They will give you an arrival time based on your scheduled surgery time.  Please be ready to share the following:    Your doctor's clinic name and phone number    Your medical, surgical and anesthesia history    A list of allergies and sensitivities    A list of medicines, including herbal treatments and over-the-counter drugs    Whether the patient has a legal guardian (ask how to send us the papers in advance)  If you have a child who's having surgery, please ask for a copy of Preparing for Your Child's Surgery.    Preparing for surgery    Within 30 days of surgery: Have a pre-op exam (sometimes called an H&P, or History and Physical). This can be done at a clinic or pre-operative center.  ? If you're having a , you may not need this exam. Talk to your care team    At your pre-op exam, talk to your care team about all medicines you take. If you need to stop any medicines before surgery, ask when to start taking them again.  ? We do this for your safety. Many medicines can make you bleed too much during surgery. Some change how well surgery (anesthesia) drugs work.    Call your insurance company to let them know you're having surgery. (If you don't have insurance, call [phone].)    Call your clinic if there's any change in your health. This includes signs of a cold or flu (sore throat, runny nose, cough, rash, fever). It also includes a scrape or scratch near the surgery site.    If you have questions on the day of surgery, call your hospital or surgery center.  Eating and drinking guidelines  For your safety: Unless your surgeon tells you otherwise, follow the guidelines below.    Eat  and drink as usual until 8 hours before surgery. After that, no food or milk.    Drink clear liquids until 2 hours before surgery. These are liquids you can see through, like water, Gatorade and Propel Water. You may also have black coffee and tea (no cream or milk).    Nothing by mouth within 2 hours of surgery. This includes gum, candy and breath mints.    If you drink, stop drinking alcohol the night before surgery.    If your care team tells you to take medicine on the morning of surgery, it's okay to take it with a sip of water.  Preventing infection    Shower or bathe the night before and morning of your surgery. Follow the instructions your clinic gave you. (If no instructions, use regular soap.)    Don't shave or clip hair near your surgery site. We'll remove the hair if needed.    Don't smoke or vape the morning of surgery. You may chew nicotine gum up to 2 hours before surgery. A nicotine patch is okay.  ? Note: Some surgeries require you to completely quit smoking and nicotine. Check with your surgeon.    Your care team will make every effort to keep you safe from infection. We will:  ? Clean our hands often with soap and water (or an alcohol-based hand rub).  ? Clean the skin at your surgery site with a special soap that kills germs.  ? Give you a special gown to keep you warm. (Cold raises the risk of infection.)  ? Wear special hair covers, masks, gowns and gloves during surgery.  ? Give antibiotic medicine, if prescribed. Not all surgeries need antibiotics.  What to bring on the day of surgery    Photo ID and insurance card    Copy of your health care directive, if you have one    Glasses and hearing aides (bring cases)  ? You can't wear contacts during surgery    Inhaler and eye drops, if you use them (tell us about these when you arrive)    CPAP machine or breathing device, if you use them    A few personal items, if spending the night    If you have . . .  ? A pacemaker or ICD (cardiac  defibrillator): Bring the ID card.  ? An implanted stimulator: Bring the remote control.  ? A legal guardian: Bring a copy of the certified (court-stamped) guardianship papers.  Please remove any jewelry, including body piercings. Leave jewelry and other valuables at home.  If you're going home the day of surgery  Important: If you don't follow the rules below, we must cancel your surgery.     Arrange for someone to drive you home after surgery. You may not drive, take a taxi or take public transportation by yourself (unless you'll have local anesthesia only).    Arrange for a responsible adult to stay with you overnight. If you don't, we may keep you in the hospital overnight, and you may need to pay the costs yourself.  Questions?   If you have any questions for your care team, list them here: _________________________________________________________________________________________________________________________________________________________________________________________________________________________________________________________________________________________________________________________  For informational purposes only. Not to replace the advice of your health care provider. Copyright   2003, 2019 [facility]. All rights reserved. Clinically reviewed by Merline Henao MD. [employer] [id number] - REV 4/20.

## 2021-08-20 ENCOUNTER — LAB (OUTPATIENT)
Dept: URGENT CARE | Facility: URGENT CARE | Age: 71
End: 2021-08-20
Attending: NURSE PRACTITIONER
Payer: COMMERCIAL

## 2021-08-20 DIAGNOSIS — Z11.59 ENCOUNTER FOR SCREENING FOR OTHER VIRAL DISEASES: ICD-10-CM

## 2021-08-20 LAB — SARS-COV-2 RNA RESP QL NAA+PROBE: NEGATIVE

## 2021-08-20 PROCEDURE — U0005 INFEC AGEN DETEC AMPLI PROBE: HCPCS

## 2021-08-20 PROCEDURE — U0003 INFECTIOUS AGENT DETECTION BY NUCLEIC ACID (DNA OR RNA); SEVERE ACUTE RESPIRATORY SYNDROME CORONAVIRUS 2 (SARS-COV-2) (CORONAVIRUS DISEASE [COVID-19]), AMPLIFIED PROBE TECHNIQUE, MAKING USE OF HIGH THROUGHPUT TECHNOLOGIES AS DESCRIBED BY CMS-2020-01-R: HCPCS

## 2021-08-23 ENCOUNTER — HOSPITAL ENCOUNTER (OUTPATIENT)
Dept: CT IMAGING | Facility: CLINIC | Age: 71
End: 2021-08-23
Attending: NURSE PRACTITIONER
Payer: COMMERCIAL

## 2021-08-23 ENCOUNTER — HOSPITAL ENCOUNTER (OUTPATIENT)
Facility: CLINIC | Age: 71
Discharge: HOME | End: 2021-08-23
Admitting: RADIOLOGY
Payer: COMMERCIAL

## 2021-08-23 VITALS
TEMPERATURE: 98.6 F | HEART RATE: 62 BPM | OXYGEN SATURATION: 99 % | SYSTOLIC BLOOD PRESSURE: 126 MMHG | RESPIRATION RATE: 18 BRPM | DIASTOLIC BLOOD PRESSURE: 55 MMHG

## 2021-08-23 DIAGNOSIS — C56.1: ICD-10-CM

## 2021-08-23 LAB
INR PPP: 0.94 (ref 0.85–1.15)
PLATELET # BLD AUTO: 159 10E3/UL (ref 150–450)

## 2021-08-23 PROCEDURE — 38505 NEEDLE BIOPSY LYMPH NODES: CPT

## 2021-08-23 PROCEDURE — 99153 MOD SED SAME PHYS/QHP EA: CPT

## 2021-08-23 PROCEDURE — 88305 TISSUE EXAM BY PATHOLOGIST: CPT | Mod: TC | Performed by: NURSE PRACTITIONER

## 2021-08-23 PROCEDURE — 250N000011 HC RX IP 250 OP 636: Performed by: PHYSICIAN ASSISTANT

## 2021-08-23 PROCEDURE — 85610 PROTHROMBIN TIME: CPT | Mod: GZ | Performed by: PHYSICIAN ASSISTANT

## 2021-08-23 PROCEDURE — 36591 DRAW BLOOD OFF VENOUS DEVICE: CPT | Performed by: PHYSICIAN ASSISTANT

## 2021-08-23 PROCEDURE — 999N000154 HC STATISTIC RADIOLOGY XRAY, US, CT, MAR, NM

## 2021-08-23 PROCEDURE — 99152 MOD SED SAME PHYS/QHP 5/>YRS: CPT

## 2021-08-23 PROCEDURE — 250N000009 HC RX 250: Performed by: PHYSICIAN ASSISTANT

## 2021-08-23 PROCEDURE — 272N000518 CT LYMPH NODE BIOPSY

## 2021-08-23 PROCEDURE — 85049 AUTOMATED PLATELET COUNT: CPT | Performed by: PHYSICIAN ASSISTANT

## 2021-08-23 RX ORDER — LIDOCAINE 40 MG/G
CREAM TOPICAL
Status: DISCONTINUED | OUTPATIENT
Start: 2021-08-23 | End: 2021-08-23 | Stop reason: HOSPADM

## 2021-08-23 RX ORDER — NALOXONE HYDROCHLORIDE 0.4 MG/ML
0.2 INJECTION, SOLUTION INTRAMUSCULAR; INTRAVENOUS; SUBCUTANEOUS
Status: DISCONTINUED | OUTPATIENT
Start: 2021-08-23 | End: 2021-08-23 | Stop reason: HOSPADM

## 2021-08-23 RX ORDER — FLUMAZENIL 0.1 MG/ML
0.2 INJECTION, SOLUTION INTRAVENOUS
Status: DISCONTINUED | OUTPATIENT
Start: 2021-08-23 | End: 2021-08-23 | Stop reason: HOSPADM

## 2021-08-23 RX ORDER — FENTANYL CITRATE 50 UG/ML
25-50 INJECTION, SOLUTION INTRAMUSCULAR; INTRAVENOUS EVERY 5 MIN PRN
Status: DISCONTINUED | OUTPATIENT
Start: 2021-08-23 | End: 2021-08-23 | Stop reason: HOSPADM

## 2021-08-23 RX ORDER — NALOXONE HYDROCHLORIDE 0.4 MG/ML
0.4 INJECTION, SOLUTION INTRAMUSCULAR; INTRAVENOUS; SUBCUTANEOUS
Status: DISCONTINUED | OUTPATIENT
Start: 2021-08-23 | End: 2021-08-23 | Stop reason: HOSPADM

## 2021-08-23 RX ADMIN — LIDOCAINE HYDROCHLORIDE 10 ML: 10 INJECTION, SOLUTION EPIDURAL; INFILTRATION; INTRACAUDAL; PERINEURAL at 14:04

## 2021-08-23 RX ADMIN — FENTANYL CITRATE 50 MCG: 50 INJECTION, SOLUTION INTRAMUSCULAR; INTRAVENOUS at 13:46

## 2021-08-23 RX ADMIN — MIDAZOLAM HYDROCHLORIDE 1 MG: 1 INJECTION, SOLUTION INTRAMUSCULAR; INTRAVENOUS at 13:47

## 2021-08-23 NOTE — PROCEDURES
[facility]    Procedure: IR Procedure Note    Date/Time: 8/23/2021 2:05 PM  Performed by: Sahara Kim MD  Authorized by: Sahara Kim MD     UNIVERSAL PROTOCOL   Site Marked: NA  Prior Images Obtained and Reviewed:  Yes  Required items: Required blood products, implants, devices and special equipment available    Patient identity confirmed:  Verbally with patient, arm band, provided demographic data and hospital-assigned identification number  Patient was reevaluated immediately before administering moderate or deep sedation or anesthesia  Confirmation Checklist:  Patient's identity using two indicators, relevant allergies, procedure was appropriate and matched the consent or emergent situation and correct equipment/implants were available  Time out: Immediately prior to the procedure a time out was called    Universal Protocol: the Joint Commission Universal Protocol was followed    Preparation: Patient was prepped and draped in usual sterile fashion           ANESTHESIA    Anesthesia: Local infiltration  Local Anesthetic:  Lidocaine 1% without epinephrine  Anesthetic Total (mL):  10      SEDATION    Patient Sedated: Yes    Sedation Type:  Moderate (conscious) sedation  Sedation:  Fentanyl and midazolam  Vital signs: Vital signs monitored during sedation    See dictated procedure note for full details.  Findings: Left retroperitoneal lymphadenopathy    Specimens: core needle biopsy specimens sent for pathological analysis    Complications: None    Condition: Stable    PROCEDURE   Patient Tolerance:  Patient tolerated the procedure well with no immediate complications    Length of time physician/provider present for 1:1 monitoring during sedation: 15

## 2021-08-23 NOTE — PROGRESS NOTES
"PATIENT/VISITOR WELLNESS SCREENING    Step 1 Patient Screening    1. In the last month, have you been in contact with someone who was confirmed or suspected to have Coronavirus/COVID-19? No    2. Do you have the following symptoms?  Fever/Chills? No   Cough? No   Shortness of breath? No   New loss of taste or smell? No  Sore throat? No  Muscle or body aches? No  Headaches? No  Fatigue? No  Vomiting or diarrhea? No    Step 2 Visitor Screening    1. Name of Visitor (1 visitor per patient):   Shashi ()    2. In the last month, have you been in contact with someone who was confirmed or suspected to have Coronavirus/COVID-19? {YES/NO:60::\"No\"    3. Do you have the following symptoms?  Fever/Chills? No   Cough? No   Shortness of breath? No   Skin rash? No   Loss of taste or smell? No  Sore throat? No  Runny or stuffy nose? No  Muscle or body aches? No  Headaches? No  Fatigue? No      If the visitor has positive symptoms, notify supervisor/manger  Per policy, the visitor will need to leave the facility     Step 3 Refer to logic grid below for actions    NO SYMPTOM(S)    ACTIONS:  1. Standard rooming process  2. Provider to assess per normal protocol  3. Implement precautions as needed and per guidelines     POSITIVE SYMPTOM(S)  If positive for ANY of the following symptoms: fever, cough, shortness of breath, rash    ACTION:  1. Continue to have the patient wear a mask   2. Room patient as soon as possible  3. Don appropriate PPE when entering room  4. Provider evaluation    "

## 2021-08-23 NOTE — PROGRESS NOTES
Care Suites Admission Nursing Note    Patient Information  Name: Julia Andre  Age: 70 year old  Reason for admission: CT lung bx  Care Suites arrival time: 1130    Visitor Information  Name:Don  Informed of visitor restrictions: Yes  1 visitor allowed per patient   Visitor must screen negative for COVID symptoms   Visitor must wear a mask  Waiting rooms closed to visitors    Patient Admission/Assessment   Pre-procedure assessment complete: Yes  If abnormal assessment/labs, provider notified: N/A  NPO: Yes  Medications held per instructions/orders: Yes  Consent: deferred  If applicable, pregnancy test status: deferred  Patient oriented to room: Yes  Education/questions answered: Yes  Plan/other: discharge post procedure    Discharge Planning  Discharge name/phone  Overnight post sedation caregiver:   Discharge location: home    Smiley Mohr RN

## 2021-08-23 NOTE — DISCHARGE SUMMARY
Care Suites Discharge Nursing Note    Patient Information  Name: Julia Andre  Age: 70 year old    Discharge Education:  Discharge instructions reviewed: Yes  Additional education/resources provided: AVS  Patient/patient representative verbalizes understanding: Yes  Patient discharging on new medications: No  Medication education completed: N/A    Discharge Plans:   Discharge location: home  Discharge ride contacted: Yes  Approximate discharge time: 1610    Discharge Criteria:  Discharge criteria met and vital signs stable: Yes    Patient Belongs:  Patient belongings returned to patient: Yes    Vivek Zelaya RN

## 2021-08-23 NOTE — DISCHARGE INSTRUCTIONS
Lung Biopsy Discharge Instructions     After you go home:      You may resume your normal diet    Have an adult stay with you for 6 hours if you received sedation       For 24 hours - due to the sedation you received:    Relax and take it easy    Do NOT make any important or legal decisions    Do NOT drive or operate machines at home or at work    Do NOT drink alcohol    Care of Puncture Site:      For the first 48 hrs, check your puncture site every couple hours while you are awake     You may remove/change the bandaid tomorrow    You may shower tomorrow    No tub baths, whirlpools or swimming until your puncture site has fully healed    Activity       You may go back to normal activity in 24 hours     Wait 48 hours before lifting, straining, exercise or other strenuous activity    Medicines:      You may resume all medications    Resume your Platelet Inhibitors and Aspirin tomorrow at your regular dose    For minor pain, you may take Acetaminophen (Tylenol) or Ibuprofen (Advil)            Call the provider who ordered this test if:      Increased pain or a large or growing hard lump around the site    Blood or fluid is draining from the site    The site is red, swollen, hot or tender    Chills or a fever greater than 101 F (38 C)    Pain that is getting worse    Shortness of breath    Any questions or concerns    Call  911 or go to the Emergency Room if:      Severe chest pain or trouble breathing    Increased blood in your sputum (phlegm)    Bleeding that you cannot control        If you have questions call:          [facility] Radiology Dept @ [phone]      The provider who performed your procedure was _________________.

## 2021-08-23 NOTE — PROGRESS NOTES
Care Suites Procedure Nursing Note    Patient Information  Name: Julia Ander  Age: 70 year old    Procedure: Biopsy: patient taken via cart to CT for image guided biopsy. MD arrived to explain procedure and obtain consent. MD Sedation exam completed. Time out done then sedation was given. Pt tolerated well. Pt was monitored with BP and O2 sats throughout procedure. Vital signs were stable throughout procedure. Total sedation given - 50 mcg Fentanyl and 1 mg Versed. O2 2L NC in place while sedated. Multiple cores obtained & sent to lab for Quick Path results. Bandaid applied to site after cleaning- C/D/I.     Procedure start time: 1330  Procedure complete time: 1400  Concerns/abnormal assessment: No  If abnormal assessment, provider notified: N/A  Plan/Other: bedrest two hours per Dr Kim. Up at 1600.  Change of shift report given to Vivek Lazaro, RN

## 2021-08-24 LAB
PATH REPORT.COMMENTS IMP SPEC: NORMAL
PATH REPORT.FINAL DX SPEC: NORMAL
PATH REPORT.GROSS SPEC: NORMAL
PATH REPORT.MICROSCOPIC SPEC OTHER STN: NORMAL
PATH REPORT.RELEVANT HX SPEC: NORMAL
PHOTO IMAGE: NORMAL

## 2021-08-24 PROCEDURE — 88305 TISSUE EXAM BY PATHOLOGIST: CPT | Mod: 26 | Performed by: PATHOLOGY

## 2021-10-02 NOTE — PROGRESS NOTES
"[facility]    Hospitalist Progress Note    Assessment & Plan   Julia Andre is a 69 year old female who was admitted on 9/2/2020.      Past medical history is significant for stress-induced MI with associated ischemic cardiomyopathy, hypertension, hyperlipidemia, type 2 diabetes, major depressive disorder with anxiety, osteopenia, GERD, obstructive sleep apnea, history of cervical cancer and recent diagnosis of ovarian mass/cancer with elevated CA-125 lab who underwent an elective exploratory laparotomy with total abdominal hysterectomy with bilateral salpingo-oophorectomy, omentectomy with tumor debulking and pelvic and paraaortic lymphadenectomy.  Hospitalist Service has been consulted to assist with medical management.     Ovarian mass/cancer with elevated CA-125 lab study, status post elective exploratory laparotomy with total abdominal hysterectomy with bilateral salpingo-oophorectomy, omentectomy with tumor debulking and pelvic and paraaortic lymphadenectomy  Bacteremia, 9/6/2020, GNR/Bacteroides    S/P port placement 9/4.     BC from port positive GNR, started on Zosyn,, BC x2 repeated 9/8/2020 including from port.  BC 9/6/20 = 2/2 Bacteroides, 9/8/2020 1/2 = Bacteroides    ID consult, appreciate input.  They felt that port can remain in place, follow-up BC    UC negative    Abdominal/pelvic CT with 8 x 5 cm fluid collection, LIZZETTE drain placed 9/10, 150 cc output over the last 24 hours.  Drain fluid is growing Bacteroides (same organism as blood)    repeat CT abdomen/pelvis done today shows decreased size of the pelvic fluid collection (was 8.4 x 5.7 x 7.7 cm, now 6.7 x 4.8 x 7.0 cm), drainage catheter is \"well positioned within it\"    Per ID, okay to discharge with IV ertapenem, repeat abdominal/pelvic CT in 2 weeks to reassess pelvic abscess      Patient is stable for discharge, needs to meet with [facility] (vs. Other) home infusion to discuss benefits.      Her medical problems are " stable.  Hospitalist service will sign off.  Please re-consult if new medical problems arise.  Thanks!    Candie Lebron MD  Hospitalist  [facility]  Text Page (7am - 6pm, M-F)       Intact

## 2021-10-11 ENCOUNTER — HEALTH MAINTENANCE LETTER (OUTPATIENT)
Age: 71
End: 2021-10-11

## 2021-10-14 ENCOUNTER — MYC MEDICAL ADVICE (OUTPATIENT)
Dept: FAMILY MEDICINE | Facility: CLINIC | Age: 71
End: 2021-10-14

## 2021-10-20 ENCOUNTER — TRANSFERRED RECORDS (OUTPATIENT)
Dept: HEALTH INFORMATION MANAGEMENT | Facility: CLINIC | Age: 71
End: 2021-10-20
Payer: COMMERCIAL

## 2021-10-21 NOTE — TELEPHONE ENCOUNTER
Summary:    Patient is due/failing the following:   Flu vaccine    Reviewed:  [] CARE EVERYWHERE  [] LAST OV NOTE INCLUDING ENDO  [] FYI TAB  [] MYCHART ACTIVE?  [] LAST PANEL ENCOUNTER  [] FUTURE APPTS  [] IMMUNIZATIONS          Action needed:   Patient needs nurse only appointment.    Type of outreach:    Sent [employer] message.                                                                               Nikky Gray/BETTY  [place]

## 2021-10-22 ENCOUNTER — TRANSFERRED RECORDS (OUTPATIENT)
Dept: HEALTH INFORMATION MANAGEMENT | Facility: CLINIC | Age: 71
End: 2021-10-22
Payer: COMMERCIAL

## 2021-11-17 ENCOUNTER — HOSPITAL ENCOUNTER (OUTPATIENT)
Dept: GENERAL RADIOLOGY | Facility: CLINIC | Age: 71
End: 2021-11-17
Attending: RADIOLOGY
Payer: COMMERCIAL

## 2021-11-17 DIAGNOSIS — C56.9 OVARIAN CANCER (H): ICD-10-CM

## 2021-11-17 PROCEDURE — 999N000065 XR PELVIS PORT 1/2 VIEWS: Mod: TC

## 2021-11-18 ENCOUNTER — MEDICAL CORRESPONDENCE (OUTPATIENT)
Dept: HEALTH INFORMATION MANAGEMENT | Facility: CLINIC | Age: 71
End: 2021-11-18

## 2021-11-18 ENCOUNTER — LAB (OUTPATIENT)
Dept: LAB | Facility: CLINIC | Age: 71
End: 2021-11-18
Payer: COMMERCIAL

## 2021-11-18 DIAGNOSIS — C56.9 OVARIAN CANCER, UNSPECIFIED LATERALITY (H): ICD-10-CM

## 2021-11-18 DIAGNOSIS — D64.9 ANEMIA: ICD-10-CM

## 2021-11-18 DIAGNOSIS — C56.1 MALIGNANT NEOPLASM OF RIGHT OVARY (H): ICD-10-CM

## 2021-11-18 DIAGNOSIS — C56.1 MALIGNANT NEOPLASM OF RIGHT OVARY (H): Primary | ICD-10-CM

## 2021-11-18 LAB
ABO/RH(D): NORMAL
ANTIBODY SCREEN: NEGATIVE
BLD PROD TYP BPU: NORMAL
BLOOD COMPONENT TYPE: NORMAL
CODING SYSTEM: NORMAL
CROSSMATCH: NORMAL
ISSUE DATE AND TIME: NORMAL
SPECIMEN EXPIRATION DATE: NORMAL
UNIT ABO/RH: NORMAL
UNIT NUMBER: NORMAL
UNIT STATUS: NORMAL
UNIT TYPE ISBT: 5100

## 2021-11-18 PROCEDURE — 86923 COMPATIBILITY TEST ELECTRIC: CPT | Performed by: OBSTETRICS & GYNECOLOGY

## 2021-11-18 PROCEDURE — 36415 COLL VENOUS BLD VENIPUNCTURE: CPT

## 2021-11-18 PROCEDURE — 86900 BLOOD TYPING SEROLOGIC ABO: CPT

## 2021-11-18 RX ORDER — HEPARIN SODIUM,PORCINE 10 UNIT/ML
5 VIAL (ML) INTRAVENOUS
Status: CANCELLED | OUTPATIENT
Start: 2021-11-18

## 2021-11-18 RX ORDER — HEPARIN SODIUM (PORCINE) LOCK FLUSH IV SOLN 100 UNIT/ML 100 UNIT/ML
5 SOLUTION INTRAVENOUS
Status: CANCELLED | OUTPATIENT
Start: 2021-11-18

## 2021-11-19 ENCOUNTER — INFUSION THERAPY VISIT (OUTPATIENT)
Dept: INFUSION THERAPY | Facility: CLINIC | Age: 71
End: 2021-11-19
Attending: OBSTETRICS & GYNECOLOGY
Payer: COMMERCIAL

## 2021-11-19 ENCOUNTER — HOSPITAL ENCOUNTER (OUTPATIENT)
Dept: GENERAL RADIOLOGY | Facility: CLINIC | Age: 71
End: 2021-11-19
Attending: RADIOLOGY
Payer: COMMERCIAL

## 2021-11-19 VITALS
DIASTOLIC BLOOD PRESSURE: 73 MMHG | RESPIRATION RATE: 20 BRPM | SYSTOLIC BLOOD PRESSURE: 132 MMHG | TEMPERATURE: 98.2 F | OXYGEN SATURATION: 100 % | HEART RATE: 75 BPM

## 2021-11-19 DIAGNOSIS — C56.9 OVARIAN CANCER (H): ICD-10-CM

## 2021-11-19 DIAGNOSIS — C56.9 OVARIAN CANCER, UNSPECIFIED LATERALITY (H): Primary | ICD-10-CM

## 2021-11-19 DIAGNOSIS — D64.9 ANEMIA: ICD-10-CM

## 2021-11-19 PROCEDURE — 36430 TRANSFUSION BLD/BLD COMPNT: CPT

## 2021-11-19 PROCEDURE — 999N000065 XR PELVIS PORT 1/2 VIEWS: Mod: TC

## 2021-11-19 PROCEDURE — 250N000011 HC RX IP 250 OP 636: Performed by: OBSTETRICS & GYNECOLOGY

## 2021-11-19 PROCEDURE — P9016 RBC LEUKOCYTES REDUCED: HCPCS | Performed by: OBSTETRICS & GYNECOLOGY

## 2021-11-19 RX ORDER — HEPARIN SODIUM (PORCINE) LOCK FLUSH IV SOLN 100 UNIT/ML 100 UNIT/ML
5 SOLUTION INTRAVENOUS
Status: DISCONTINUED | OUTPATIENT
Start: 2021-11-19 | End: 2021-11-19 | Stop reason: HOSPADM

## 2021-11-19 RX ADMIN — Medication 5 ML: at 12:26

## 2021-11-19 ASSESSMENT — PAIN SCALES - GENERAL: PAINLEVEL: NO PAIN (0)

## 2021-11-19 NOTE — PROGRESS NOTES
Infusion Nursing Note:  Julia Andre presents today for 1 unit PRBCs.    Patient seen by provider today: No   present during visit today: Not Applicable.    Note: N/A.      Intravenous Access:  Implanted Port.    Treatment Conditions:  Blood transfusion consent signed 11/19/21.  HGB 7.1 - drawn at [facility].      Post Infusion Assessment:  Patient tolerated infusion without incident.  Blood return noted pre and post infusion.  Site patent and intact, free from redness, edema or discomfort.  No evidence of extravasations.  Access discontinued per protocol.       Discharge Plan:   Patient declined prescription refills.  Discharge instructions reviewed with: Patient.  Patient verbalized understanding of discharge instructions and all questions answered.  AVS to patient via [employer].  Patient will return as needed for next appointment.   Patient discharged in stable condition accompanied by: self.  Departure Mode: Ambulatory.      Melina Christopher RN

## 2021-11-26 ENCOUNTER — MEDICAL CORRESPONDENCE (OUTPATIENT)
Dept: HEALTH INFORMATION MANAGEMENT | Facility: CLINIC | Age: 71
End: 2021-11-26
Payer: COMMERCIAL

## 2021-11-29 ENCOUNTER — MEDICAL CORRESPONDENCE (OUTPATIENT)
Dept: HEALTH INFORMATION MANAGEMENT | Facility: CLINIC | Age: 71
End: 2021-11-29
Payer: COMMERCIAL

## 2021-11-29 ENCOUNTER — TRANSFERRED RECORDS (OUTPATIENT)
Dept: HEALTH INFORMATION MANAGEMENT | Facility: CLINIC | Age: 71
End: 2021-11-29
Payer: COMMERCIAL

## 2021-11-30 ENCOUNTER — HOSPITAL ENCOUNTER (OUTPATIENT)
Facility: CLINIC | Age: 71
Discharge: HOME | End: 2021-11-30
Attending: OBSTETRICS & GYNECOLOGY | Admitting: OBSTETRICS & GYNECOLOGY
Payer: COMMERCIAL

## 2021-11-30 VITALS
HEART RATE: 61 BPM | TEMPERATURE: 97.5 F | DIASTOLIC BLOOD PRESSURE: 64 MMHG | RESPIRATION RATE: 16 BRPM | OXYGEN SATURATION: 97 % | SYSTOLIC BLOOD PRESSURE: 126 MMHG

## 2021-11-30 LAB
ABO/RH(D): NORMAL
ANTIBODY SCREEN: NEGATIVE
BLD PROD TYP BPU: NORMAL
BLD PROD TYP BPU: NORMAL
BLOOD COMPONENT TYPE: NORMAL
BLOOD COMPONENT TYPE: NORMAL
CODING SYSTEM: NORMAL
CODING SYSTEM: NORMAL
CROSSMATCH: NORMAL
CROSSMATCH: NORMAL
ISSUE DATE AND TIME: NORMAL
SPECIMEN EXPIRATION DATE: NORMAL
UNIT ABO/RH: NORMAL
UNIT ABO/RH: NORMAL
UNIT NUMBER: NORMAL
UNIT NUMBER: NORMAL
UNIT STATUS: NORMAL
UNIT STATUS: NORMAL
UNIT TYPE ISBT: 5100
UNIT TYPE ISBT: 5100

## 2021-11-30 PROCEDURE — 36430 TRANSFUSION BLD/BLD COMPNT: CPT

## 2021-11-30 PROCEDURE — 250N000011 HC RX IP 250 OP 636: Performed by: OBSTETRICS & GYNECOLOGY

## 2021-11-30 PROCEDURE — 86900 BLOOD TYPING SEROLOGIC ABO: CPT | Performed by: OBSTETRICS & GYNECOLOGY

## 2021-11-30 PROCEDURE — 36415 COLL VENOUS BLD VENIPUNCTURE: CPT | Performed by: OBSTETRICS & GYNECOLOGY

## 2021-11-30 PROCEDURE — 86923 COMPATIBILITY TEST ELECTRIC: CPT | Performed by: OBSTETRICS & GYNECOLOGY

## 2021-11-30 PROCEDURE — P9016 RBC LEUKOCYTES REDUCED: HCPCS | Performed by: OBSTETRICS & GYNECOLOGY

## 2021-11-30 RX ORDER — HEPARIN SODIUM (PORCINE) LOCK FLUSH IV SOLN 100 UNIT/ML 100 UNIT/ML
5-10 SOLUTION INTRAVENOUS
Status: DISCONTINUED | OUTPATIENT
Start: 2021-11-30 | End: 2021-11-30 | Stop reason: HOSPADM

## 2021-11-30 RX ORDER — HEPARIN SODIUM,PORCINE 10 UNIT/ML
5-10 VIAL (ML) INTRAVENOUS EVERY 24 HOURS
Status: DISCONTINUED | OUTPATIENT
Start: 2021-11-30 | End: 2021-11-30 | Stop reason: HOSPADM

## 2021-11-30 RX ORDER — HEPARIN SODIUM,PORCINE 10 UNIT/ML
5-10 VIAL (ML) INTRAVENOUS
Status: DISCONTINUED | OUTPATIENT
Start: 2021-11-30 | End: 2021-11-30 | Stop reason: HOSPADM

## 2021-11-30 RX ORDER — HEPARIN SODIUM (PORCINE) LOCK FLUSH IV SOLN 100 UNIT/ML 100 UNIT/ML
5-10 SOLUTION INTRAVENOUS
Status: DISCONTINUED | OUTPATIENT
Start: 2021-11-30 | End: 2021-11-30

## 2021-11-30 RX ADMIN — Medication 5 ML: at 12:54

## 2021-11-30 RX ADMIN — Medication 5 ML: at 17:03

## 2021-11-30 NOTE — PROGRESS NOTES
Patient received 1 unit PRBC for low hgb. VSS on RA, no transfusion reaction noted. Patient is to follow up with oncology clinic in 1 week. Patient verbalized understanding.  providing ride home this evening.

## 2021-12-05 ENCOUNTER — HEALTH MAINTENANCE LETTER (OUTPATIENT)
Age: 71
End: 2021-12-05

## 2021-12-06 SDOH — HEALTH STABILITY: PHYSICAL HEALTH: ON AVERAGE, HOW MANY DAYS PER WEEK DO YOU ENGAGE IN MODERATE TO STRENUOUS EXERCISE (LIKE A BRISK WALK)?: 0 DAYS

## 2021-12-06 SDOH — ECONOMIC STABILITY: FOOD INSECURITY: WITHIN THE PAST 12 MONTHS, THE FOOD YOU BOUGHT JUST DIDN'T LAST AND YOU DIDN'T HAVE MONEY TO GET MORE.: NEVER TRUE

## 2021-12-06 SDOH — ECONOMIC STABILITY: TRANSPORTATION INSECURITY
IN THE PAST 12 MONTHS, HAS LACK OF TRANSPORTATION KEPT YOU FROM MEETINGS, WORK, OR FROM GETTING THINGS NEEDED FOR DAILY LIVING?: NO

## 2021-12-06 SDOH — ECONOMIC STABILITY: INCOME INSECURITY: HOW HARD IS IT FOR YOU TO PAY FOR THE VERY BASICS LIKE FOOD, HOUSING, MEDICAL CARE, AND HEATING?: NOT HARD AT ALL

## 2021-12-06 SDOH — HEALTH STABILITY: PHYSICAL HEALTH: ON AVERAGE, HOW MANY MINUTES DO YOU ENGAGE IN EXERCISE AT THIS LEVEL?: 0 MIN

## 2021-12-06 SDOH — ECONOMIC STABILITY: FOOD INSECURITY: WITHIN THE PAST 12 MONTHS, YOU WORRIED THAT YOUR FOOD WOULD RUN OUT BEFORE YOU GOT MONEY TO BUY MORE.: NEVER TRUE

## 2021-12-06 SDOH — ECONOMIC STABILITY: INCOME INSECURITY: IN THE LAST 12 MONTHS, WAS THERE A TIME WHEN YOU WERE NOT ABLE TO PAY THE MORTGAGE OR RENT ON TIME?: NO

## 2021-12-06 SDOH — ECONOMIC STABILITY: TRANSPORTATION INSECURITY
IN THE PAST 12 MONTHS, HAS THE LACK OF TRANSPORTATION KEPT YOU FROM MEDICAL APPOINTMENTS OR FROM GETTING MEDICATIONS?: NO

## 2021-12-06 ASSESSMENT — ENCOUNTER SYMPTOMS
HEMATURIA: 0
WEAKNESS: 1
JOINT SWELLING: 0
NERVOUS/ANXIOUS: 0
COUGH: 0
BREAST MASS: 0
FREQUENCY: 0
DIZZINESS: 0
ABDOMINAL PAIN: 0
CONSTIPATION: 1
EYE PAIN: 0
DYSURIA: 1
HEADACHES: 0
HEARTBURN: 0
FEVER: 0
CHILLS: 1
DIARRHEA: 0
ARTHRALGIAS: 0
HEMATOCHEZIA: 0
MYALGIAS: 1
PARESTHESIAS: 0
SHORTNESS OF BREATH: 1
NAUSEA: 0
PALPITATIONS: 0
SORE THROAT: 0

## 2021-12-06 ASSESSMENT — LIFESTYLE VARIABLES
HOW OFTEN DO YOU HAVE A DRINK CONTAINING ALCOHOL: NEVER
HOW OFTEN DO YOU HAVE SIX OR MORE DRINKS ON ONE OCCASION: NEVER
HOW MANY STANDARD DRINKS CONTAINING ALCOHOL DO YOU HAVE ON A TYPICAL DAY: PATIENT DECLINED

## 2021-12-06 ASSESSMENT — SOCIAL DETERMINANTS OF HEALTH (SDOH)
DO YOU BELONG TO ANY CLUBS OR ORGANIZATIONS SUCH AS CHURCH GROUPS UNIONS, FRATERNAL OR ATHLETIC GROUPS, OR SCHOOL GROUPS?: YES
IN A TYPICAL WEEK, HOW MANY TIMES DO YOU TALK ON THE PHONE WITH FAMILY, FRIENDS, OR NEIGHBORS?: MORE THAN THREE TIMES A WEEK
HOW OFTEN DO YOU ATTEND CHURCH OR RELIGIOUS SERVICES?: 1 TO 4 TIMES PER YEAR
HOW OFTEN DO YOU GET TOGETHER WITH FRIENDS OR RELATIVES?: MORE THAN THREE TIMES A WEEK

## 2021-12-06 ASSESSMENT — ACTIVITIES OF DAILY LIVING (ADL): CURRENT_FUNCTION: NO ASSISTANCE NEEDED

## 2021-12-07 ENCOUNTER — OFFICE VISIT (OUTPATIENT)
Dept: PEDIATRICS | Facility: CLINIC | Age: 71
End: 2021-12-07
Payer: COMMERCIAL

## 2021-12-07 VITALS
OXYGEN SATURATION: 99 % | BODY MASS INDEX: 31.28 KG/M2 | RESPIRATION RATE: 16 BRPM | HEIGHT: 62 IN | DIASTOLIC BLOOD PRESSURE: 74 MMHG | SYSTOLIC BLOOD PRESSURE: 128 MMHG | WEIGHT: 170 LBS | HEART RATE: 64 BPM | TEMPERATURE: 97.4 F

## 2021-12-07 DIAGNOSIS — F33.42 RECURRENT MAJOR DEPRESSIVE DISORDER, IN FULL REMISSION (H): ICD-10-CM

## 2021-12-07 DIAGNOSIS — E78.2 MIXED HYPERLIPIDEMIA: ICD-10-CM

## 2021-12-07 DIAGNOSIS — D50.9 IRON DEFICIENCY ANEMIA, UNSPECIFIED IRON DEFICIENCY ANEMIA TYPE: ICD-10-CM

## 2021-12-07 DIAGNOSIS — G89.29 CHRONIC PAIN OF RIGHT KNEE: ICD-10-CM

## 2021-12-07 DIAGNOSIS — Z12.11 SCREEN FOR COLON CANCER: ICD-10-CM

## 2021-12-07 DIAGNOSIS — M25.561 CHRONIC PAIN OF RIGHT KNEE: ICD-10-CM

## 2021-12-07 DIAGNOSIS — M85.80 OSTEOPENIA, UNSPECIFIED LOCATION: ICD-10-CM

## 2021-12-07 DIAGNOSIS — Z11.59 NEED FOR HEPATITIS C SCREENING TEST: ICD-10-CM

## 2021-12-07 DIAGNOSIS — F51.02 ADJUSTMENT INSOMNIA: ICD-10-CM

## 2021-12-07 DIAGNOSIS — E11.22 TYPE 2 DIABETES MELLITUS WITH CHRONIC KIDNEY DISEASE, WITHOUT LONG-TERM CURRENT USE OF INSULIN, UNSPECIFIED CKD STAGE (H): ICD-10-CM

## 2021-12-07 DIAGNOSIS — Z00.00 ENCOUNTER FOR MEDICARE ANNUAL WELLNESS EXAM: Primary | ICD-10-CM

## 2021-12-07 DIAGNOSIS — I10 ESSENTIAL HYPERTENSION: ICD-10-CM

## 2021-12-07 DIAGNOSIS — Z78.0 POST-MENOPAUSAL: ICD-10-CM

## 2021-12-07 LAB
ERYTHROCYTE [DISTWIDTH] IN BLOOD BY AUTOMATED COUNT: 19 % (ref 10–15)
HBA1C MFR BLD: 5.8 % (ref 0–5.6)
HCT VFR BLD AUTO: 30.2 % (ref 35–47)
HGB BLD-MCNC: 10.1 G/DL (ref 11.7–15.7)
MCH RBC QN AUTO: 35.7 PG (ref 26.5–33)
MCHC RBC AUTO-ENTMCNC: 33.4 G/DL (ref 31.5–36.5)
MCV RBC AUTO: 107 FL (ref 78–100)
PLATELET # BLD AUTO: 106 10E3/UL (ref 150–450)
RBC # BLD AUTO: 2.83 10E6/UL (ref 3.8–5.2)
WBC # BLD AUTO: 3.8 10E3/UL (ref 4–11)

## 2021-12-07 PROCEDURE — 82728 ASSAY OF FERRITIN: CPT | Performed by: INTERNAL MEDICINE

## 2021-12-07 PROCEDURE — 36415 COLL VENOUS BLD VENIPUNCTURE: CPT | Performed by: INTERNAL MEDICINE

## 2021-12-07 PROCEDURE — 80048 BASIC METABOLIC PNL TOTAL CA: CPT | Performed by: INTERNAL MEDICINE

## 2021-12-07 PROCEDURE — 82043 UR ALBUMIN QUANTITATIVE: CPT | Performed by: INTERNAL MEDICINE

## 2021-12-07 PROCEDURE — 80061 LIPID PANEL: CPT | Performed by: INTERNAL MEDICINE

## 2021-12-07 PROCEDURE — 83550 IRON BINDING TEST: CPT | Performed by: INTERNAL MEDICINE

## 2021-12-07 PROCEDURE — 85027 COMPLETE CBC AUTOMATED: CPT | Performed by: INTERNAL MEDICINE

## 2021-12-07 PROCEDURE — 99397 PER PM REEVAL EST PAT 65+ YR: CPT | Performed by: INTERNAL MEDICINE

## 2021-12-07 PROCEDURE — 99214 OFFICE O/P EST MOD 30 MIN: CPT | Mod: 25 | Performed by: INTERNAL MEDICINE

## 2021-12-07 PROCEDURE — 86803 HEPATITIS C AB TEST: CPT | Performed by: INTERNAL MEDICINE

## 2021-12-07 PROCEDURE — 83036 HEMOGLOBIN GLYCOSYLATED A1C: CPT | Performed by: INTERNAL MEDICINE

## 2021-12-07 RX ORDER — METOPROLOL TARTRATE 50 MG
50 TABLET ORAL 2 TIMES DAILY
Qty: 180 TABLET | Refills: 3 | Status: SHIPPED | OUTPATIENT
Start: 2021-12-07 | End: 2023-01-11

## 2021-12-07 RX ORDER — LISINOPRIL 5 MG/1
5 TABLET ORAL DAILY
Qty: 90 TABLET | Refills: 3 | Status: SHIPPED | OUTPATIENT
Start: 2021-12-07 | End: 2023-01-11

## 2021-12-07 RX ORDER — BUPROPION HYDROCHLORIDE 300 MG/1
300 TABLET ORAL EVERY MORNING
Qty: 90 TABLET | Refills: 3 | Status: SHIPPED | OUTPATIENT
Start: 2021-12-07 | End: 2022-12-16

## 2021-12-07 RX ORDER — TRAZODONE HYDROCHLORIDE 50 MG/1
50-100 TABLET, FILM COATED ORAL AT BEDTIME
Qty: 90 TABLET | Refills: 1 | Status: SHIPPED | OUTPATIENT
Start: 2021-12-07 | End: 2023-03-17

## 2021-12-07 RX ORDER — ROSUVASTATIN CALCIUM 20 MG/1
20 TABLET, COATED ORAL DAILY
Qty: 90 TABLET | Refills: 3 | Status: SHIPPED | OUTPATIENT
Start: 2021-12-07 | End: 2022-12-16

## 2021-12-07 RX ORDER — METFORMIN HCL 500 MG
500 TABLET, EXTENDED RELEASE 24 HR ORAL 2 TIMES DAILY WITH MEALS
Qty: 180 TABLET | Refills: 3 | Status: SHIPPED | OUTPATIENT
Start: 2021-12-07 | End: 2022-12-16

## 2021-12-07 RX ORDER — AMLODIPINE BESYLATE 5 MG/1
5 TABLET ORAL DAILY
Qty: 90 TABLET | Refills: 3 | Status: SHIPPED | OUTPATIENT
Start: 2021-12-07 | End: 2022-12-16

## 2021-12-07 ASSESSMENT — ENCOUNTER SYMPTOMS
DYSURIA: 1
MYALGIAS: 1
DIARRHEA: 0
CONSTIPATION: 1
FEVER: 0
HEMATURIA: 0
HEMATOCHEZIA: 0
FREQUENCY: 0
DIZZINESS: 0
JOINT SWELLING: 0
HEARTBURN: 0
PARESTHESIAS: 0
SORE THROAT: 0
SHORTNESS OF BREATH: 1
CHILLS: 1
BREAST MASS: 0
PALPITATIONS: 0
ABDOMINAL PAIN: 0
EYE PAIN: 0
WEAKNESS: 1
HEADACHES: 0
NAUSEA: 0
NERVOUS/ANXIOUS: 0
ARTHRALGIAS: 0
COUGH: 0

## 2021-12-07 ASSESSMENT — MIFFLIN-ST. JEOR: SCORE: 1234.36

## 2021-12-07 ASSESSMENT — ACTIVITIES OF DAILY LIVING (ADL): CURRENT_FUNCTION: NO ASSISTANCE NEEDED

## 2021-12-07 NOTE — PATIENT INSTRUCTIONS
Itchy back - use thick moisturizer immediately after drying off from  Your bath. You can use thick cream or ointment such as vaseline or aquaphor.  You can also try a lotion called Sarna.     Fish oil - look for a well established brand that tests for toxins (such as PCBs and mercury). [employer] is a good brand, and there are others too.     You are due for the shingles and Tdap vaccines.           Patient Education   Personalized Prevention Plan  You are due for the preventive services outlined below.  Your care team is available to assist you in scheduling these services.  If you have already completed any of these items, please share that information with your care team to update in your medical record.  Health Maintenance Due   Topic Date Due     Osteoporosis Screening  Never done     Cholesterol Lab  Never done     Diabetic Foot Exam  Never done     ANNUAL REVIEW OF HM ORDERS  Never done     Discuss Advance Care Planning  Never done     Eye Exam  Never done     Colorectal Cancer Screening  Never done     Hepatitis C Screening  Never done     Diptheria Tetanus Pertussis (DTAP/TDAP/TD) Vaccine (1 - Tdap) Never done     Zoster (Shingles) Vaccine (2 of 3) 11/20/2013     Annual Wellness Visit  Never done     A1C Lab  04/06/2021     Basic Metabolic Panel  09/13/2021     Kidney Microalbumin Urine Test  01/06/2022

## 2021-12-07 NOTE — PROGRESS NOTES
"SUBJECTIVE:   Julia Andre is a 71 year old female who presents for Preventive Visit.      Patient has been advised of split billing requirements and indicates understanding: Yes   Are you in the first 12 months of your Medicare coverage?  No    Healthy Habits:     In general, how would you rate your overall health?  Good    Frequency of exercise:  None    Do you usually eat at least 4 servings of fruit and vegetables a day, include whole grains    & fiber and avoid regularly eating high fat or \"junk\" foods?  Yes    Taking medications regularly:  Yes    Medication side effects:  Other    Ability to successfully perform activities of daily living:  No assistance needed    Home Safety:  No safety concerns identified    Hearing Impairment:  No hearing concerns    In the past 6 months, have you been bothered by leaking of urine? Yes    In general, how would you rate your overall mental or emotional health?  Excellent      PHQ-2 Total Score: 0    Additional concerns today:  No    Julia is here for a preventive health visit.  Overall, she is doing well with the following concerns:      1. History of anemia - has been worse with chemo and radiation.  Had PRBC 1 unit on 11/30, feels much better. Did have two iron infusions prior to that with no improvement. Iron studies were done prior to iron infusions.      2. Right knee has been bad for a long time, feeling better with recent limited exercise (since her ovarian cancer diagnosis Aug 2020). Finished chemo Jan 29, has had two recurrences (vaginal and lymph node) both treated with radiation.  Finished most recent radiation two weeks ago.     3. Back is itchy, every winter. Happens every winter. Got a cream called Derm something - ordered from TV and is not helpful.     Had an eye exam for a floater last week, will be back there in a month. Sees [facility].          Do you feel safe in your environment? Yes    Have you ever done Advance Care Planning? (For example, a " Health Directive, POLST, or a discussion with a medical provider or your loved ones about your wishes): Yes, advance care planning is on file.       Fall risk  Fallen 2 or more times in the past year?: No  Any fall with injury in the past year?: No    Cognitive Screening   1) Repeat 3 items (Leader, Season, Table)    2) Clock draw: NORMAL  3) 3 item recall: Recalls 3 objects  Results: 3 items recalled: COGNITIVE IMPAIRMENT LESS LIKELY    Mini-CogTM Copyright FIORELLA Escalante. Licensed by the author for use in [facility] [employer]; reprinted with permission ([email or web address]). All rights reserved.      Do you have sleep apnea, excessive snoring or daytime drowsiness?: no    Reviewed and updated as needed this visit by clinical staff  Tobacco  Allergies  Meds  Problems  Med Hx  Surg Hx  Fam Hx  Soc Hx         Reviewed and updated as needed this visit by Provider  Tobacco  Allergies  Meds  Problems  Med Hx  Surg Hx  Fam Hx        Social History     Tobacco Use     Smoking status: Former Smoker     Packs/day: 2.50     Years: 27.00     Pack years: 67.50     Quit date: 1993     Years since quittin.9     Smokeless tobacco: Never Used   Substance Use Topics     Alcohol use: Not Currently     Comment: sober for 32 years         Alcohol Use 2021   Prescreen: >3 drinks/day or >7 drinks/week? Not Applicable               Current providers sharing in care for this patient include:   Patient Care Team:  Linda Martinez MD, MD as PCP - General ColindresmotCoco Cardoza MD as Assigned PCP    The following health maintenance items are reviewed in Epic and correct as of today:  Health Maintenance Due   Topic Date Due     DEXA  Never done     LIPID  Never done     DIABETIC FOOT EXAM  Never done     EYE EXAM  Never done     COLORECTAL CANCER SCREENING  Never done     HEPATITIS C SCREENING  Never done     DTAP/TDAP/TD IMMUNIZATION (1 - Tdap) Never done     ZOSTER IMMUNIZATION (2 of 3) 2013     A1C   "04/06/2021     BMP  09/13/2021     MICROALBUMIN  01/06/2022               Review of Systems   Constitutional: Positive for chills. Negative for fever.   HENT: Negative for congestion, ear pain, hearing loss and sore throat.    Eyes: Positive for visual disturbance. Negative for pain.   Respiratory: Positive for shortness of breath. Negative for cough.    Cardiovascular: Negative for chest pain, palpitations and peripheral edema.   Gastrointestinal: Positive for constipation. Negative for abdominal pain, diarrhea, heartburn, hematochezia and nausea.   Breasts:  Negative for tenderness, breast mass and discharge.   Genitourinary: Positive for dysuria and vaginal discharge. Negative for frequency, genital sores, hematuria, pelvic pain, urgency and vaginal bleeding.   Musculoskeletal: Positive for myalgias. Negative for arthralgias and joint swelling.   Skin: Negative for rash.   Neurological: Positive for weakness. Negative for dizziness, headaches and paresthesias.   Psychiatric/Behavioral: Negative for mood changes. The patient is not nervous/anxious.          OBJECTIVE:   /74 (BP Location: Right arm, Patient Position: Chair, Cuff Size: Adult Large)   Pulse 64   Temp 97.4  F (36.3  C) (Temporal)   Resp 16   Ht 1.575 m (5' 2\")   Wt 77.1 kg (170 lb)   SpO2 99%   BMI 31.09 kg/m   Estimated body mass index is 31.09 kg/m  as calculated from the following:    Height as of this encounter: 1.575 m (5' 2\").    Weight as of this encounter: 77.1 kg (170 lb).  Physical Exam  GENERAL: healthy, alert and no distress  EYES: Eyes grossly normal to inspection, PERRL and conjunctivae and sclerae normal  HENT: ear canals and TM's normal, nose and mouth without ulcers or lesions  NECK: no adenopathy, no asymmetry, masses, or scars and thyroid normal to palpation  RESP: lungs clear to auscultation - no rales, rhonchi or wheezes  CV: regular rate and rhythm, normal S1 S2, no S3 or S4, no murmur, click or rub, no peripheral " edema and peripheral pulses strong  MS: no gross musculoskeletal defects noted, no edema  SKIN: no suspicious lesions or rashes  NEURO: Normal strength and tone, mentation intact and speech normal  PSYCH: mentation appears normal, affect normal/bright    Diagnostic Test Results:  Labs reviewed in Epic    ASSESSMENT / PLAN:   (Z00.00) Encounter for Medicare annual wellness exam  (primary encounter diagnosis)  Comment: up to date on screening. Doing well. Going to [place] soon for the winter.   Plan: REVIEW OF HEALTH MAINTENANCE PROTOCOL ORDERS,         BASIC METABOLIC PANEL            (Z12.11) Screen for colon cancer  Comment: routine screening.   Plan: Adult Gastro Ref - Procedure Only            (Z11.59) Need for hepatitis C screening test  Comment:  Routine screening.   Plan: Hepatitis C Screen Reflex to HCV RNA Quant and         Genotype            (Z78.0) Post-menopausal  Comment: previous dx of osteopenia.   Plan: DEXA HIP/PELVIS/SPINE - Future            (I10) Essential hypertension  Comment: well controlled. Lisinopril dose was reduced recently due to kidney function, amlodipine added.   Plan: amLODIPine (NORVASC) 5 MG tablet, metoprolol         tartrate (LOPRESSOR) 50 MG tablet, lisinopril         (ZESTRIL) 5 MG tablet            (F33.42) Recurrent major depressive disorder, in full remission (H)  Comment: well controlled.   Plan: buPROPion (WELLBUTRIN XL) 300 MG 24 hr tablet            (E78.2) Mixed hyperlipidemia  Comment: well controlled.   Plan: Lipid panel reflex to direct LDL Fasting,         rosuvastatin (CRESTOR) 20 MG tablet            (E11.22) Type 2 diabetes mellitus with chronic kidney disease, without long-term current use of insulin, unspecified CKD stage (H)  Comment: well controlled. Switch from regular metformin to XR due to GI upset.   Plan: HEMOGLOBIN A1C, BASIC METABOLIC PANEL, Albumin         Random Urine Quantitative with Creat Ratio,         metFORMIN (GLUCOPHAGE-XR) 500 MG 24 hr tablet      "       (M85.80) Osteopenia, unspecified location  Comment: due for monitoring.   Plan: DEXA HIP/PELVIS/SPINE - Future            (F51.02) Adjustment insomnia  Comment: can increase dose from  mg nightly as needed.   Plan: traZODone (DESYREL) 50 MG tablet            (D50.9) Iron deficiency anemia, unspecified iron deficiency anemia type  Comment: recently had 1 unit(s) PRBC, feeling much better   Plan: CBC with platelets, Iron and iron binding         capacity, Ferritin            (M25.561,  G89.29) Chronic pain of right knee  Comment: more bothersome with increased physical activity.   Plan: Physical Therapy Referral              Patient has been advised of split billing requirements and indicates understanding: Yes  COUNSELING:  Reviewed preventive health counseling, as reflected in patient instructions       Regular exercise       Healthy diet/nutrition       Osteoporosis prevention/bone health       Hepatitis C screening    Estimated body mass index is 31.09 kg/m  as calculated from the following:    Height as of this encounter: 1.575 m (5' 2\").    Weight as of this encounter: 77.1 kg (170 lb).    Weight management plan: Discussed healthy diet and exercise guidelines    She reports that she quit smoking about 27 years ago. She has a 67.50 pack-year smoking history. She has never used smokeless tobacco.      Appropriate preventive services were discussed with this patient, including applicable screening as appropriate for cardiovascular disease, diabetes, osteopenia/osteoporosis, and glaucoma.  As appropriate for age/gender, discussed screening for colorectal cancer, prostate cancer, breast cancer, and cervical cancer. Checklist reviewing preventive services available has been given to the patient.    Reviewed patients plan of care and provided an AVS. The Basic Care Plan (routine screening as documented in Health Maintenance) for Julia meets the Care Plan requirement. This Care Plan has been established and " reviewed with the Patient.    Counseling Resources:  ATP IV Guidelines  Pooled Cohorts Equation Calculator  Breast Cancer Risk Calculator  Breast Cancer: Medication to Reduce Risk  FRAX Risk Assessment  ICSI Preventive Guidelines  Dietary Guidelines for Americans, 2010  BATTERIES & BANDS's MyPlate  ASA Prophylaxis  Lung CA Screening    Cecile De La Rosa MD  [facility]    Identified Health Risks:

## 2021-12-08 LAB
ANION GAP SERPL CALCULATED.3IONS-SCNC: 4 MMOL/L (ref 3–14)
BUN SERPL-MCNC: 26 MG/DL (ref 7–30)
CALCIUM SERPL-MCNC: 9.4 MG/DL (ref 8.5–10.1)
CHLORIDE BLD-SCNC: 109 MMOL/L (ref 94–109)
CO2 SERPL-SCNC: 26 MMOL/L (ref 20–32)
CREAT SERPL-MCNC: 0.93 MG/DL (ref 0.52–1.04)
CREAT UR-MCNC: 160 MG/DL
FERRITIN SERPL-MCNC: 517 NG/ML (ref 8–252)
GFR SERPL CREATININE-BSD FRML MDRD: 62 ML/MIN/1.73M2
GLUCOSE BLD-MCNC: 110 MG/DL (ref 70–99)
HCV AB SERPL QL IA: NONREACTIVE
IRON SATN MFR SERPL: 33 % (ref 15–46)
IRON SERPL-MCNC: 105 UG/DL (ref 35–180)
MICROALBUMIN UR-MCNC: 46 MG/L
MICROALBUMIN/CREAT UR: 28.75 MG/G CR (ref 0–25)
POTASSIUM BLD-SCNC: 4.9 MMOL/L (ref 3.4–5.3)
SODIUM SERPL-SCNC: 139 MMOL/L (ref 133–144)
TIBC SERPL-MCNC: 318 UG/DL (ref 240–430)

## 2021-12-16 LAB
CHOLEST SERPL-MCNC: 157 MG/DL
FASTING STATUS PATIENT QL REPORTED: YES
HDLC SERPL-MCNC: 45 MG/DL
LDLC SERPL CALC-MCNC: 53 MG/DL
NONHDLC SERPL-MCNC: 112 MG/DL
TRIGL SERPL-MCNC: 295 MG/DL

## 2021-12-27 ENCOUNTER — TRANSFERRED RECORDS (OUTPATIENT)
Dept: HEALTH INFORMATION MANAGEMENT | Facility: CLINIC | Age: 71
End: 2021-12-27
Payer: COMMERCIAL

## 2021-12-27 LAB — RETINOPATHY: NEGATIVE

## 2022-02-21 DIAGNOSIS — Z11.59 ENCOUNTER FOR SCREENING FOR OTHER VIRAL DISEASES: Primary | ICD-10-CM

## 2022-02-28 ENCOUNTER — TELEPHONE (OUTPATIENT)
Dept: PHYSICAL THERAPY | Facility: CLINIC | Age: 72
End: 2022-02-28

## 2022-02-28 ENCOUNTER — THERAPY VISIT (OUTPATIENT)
Dept: PHYSICAL THERAPY | Facility: CLINIC | Age: 72
End: 2022-02-28
Attending: INTERNAL MEDICINE
Payer: COMMERCIAL

## 2022-02-28 DIAGNOSIS — G89.29 CHRONIC PAIN OF RIGHT KNEE: ICD-10-CM

## 2022-02-28 DIAGNOSIS — M25.511 RIGHT SHOULDER PAIN, UNSPECIFIED CHRONICITY: Primary | ICD-10-CM

## 2022-02-28 DIAGNOSIS — M25.561 CHRONIC PAIN OF RIGHT KNEE: ICD-10-CM

## 2022-02-28 PROCEDURE — 97110 THERAPEUTIC EXERCISES: CPT | Mod: GP | Performed by: PHYSICAL THERAPIST

## 2022-02-28 PROCEDURE — 97161 PT EVAL LOW COMPLEX 20 MIN: CPT | Mod: GP | Performed by: PHYSICAL THERAPIST

## 2022-02-28 ASSESSMENT — ACTIVITIES OF DAILY LIVING (ADL)
WALK: ACTIVITY IS SOMEWHAT DIFFICULT
KNEEL ON THE FRONT OF YOUR KNEE: ACTIVITY IS SOMEWHAT DIFFICULT
KNEE_ACTIVITY_OF_DAILY_LIVING_SUM: 49
HOW_WOULD_YOU_RATE_THE_OVERALL_FUNCTION_OF_YOUR_KNEE_DURING_YOUR_USUAL_DAILY_ACTIVITIES?: NEARLY NORMAL
SIT WITH YOUR KNEE BENT: ACTIVITY IS NOT DIFFICULT
SQUAT: I AM UNABLE TO DO THE ACTIVITY
PAIN: I HAVE THE SYMPTOM BUT IT DOES NOT AFFECT MY ACTIVITY
STAND: ACTIVITY IS NOT DIFFICULT
GIVING WAY, BUCKLING OR SHIFTING OF KNEE: THE SYMPTOM AFFECTS MY ACTIVITY SLIGHTLY
RAW_SCORE: 49
LIMPING: I DO NOT HAVE THE SYMPTOM
GO UP STAIRS: ACTIVITY IS FAIRLY DIFFICULT
WEAKNESS: THE SYMPTOM AFFECTS MY ACTIVITY SLIGHTLY
KNEE_ACTIVITY_OF_DAILY_LIVING_SCORE: 70
STIFFNESS: I DO NOT HAVE THE SYMPTOM
GO DOWN STAIRS: ACTIVITY IS SOMEWHAT DIFFICULT
SWELLING: I DO NOT HAVE THE SYMPTOM
RISE FROM A CHAIR: ACTIVITY IS SOMEWHAT DIFFICULT
AS_A_RESULT_OF_YOUR_KNEE_INJURY,_HOW_WOULD_YOU_RATE_YOUR_CURRENT_LEVEL_OF_DAILY_ACTIVITY?: ABNORMAL

## 2022-02-28 NOTE — PROGRESS NOTES
"Physical Therapy Initial Evaluation  Subjective:  Onset of R anterior knee pain \"for years\". Referred to PT 12/07/21. Currently undergoing treatment for ovarian CA, diagnosed Aug of 2020, full hysterectomy Nov 2021.  2 rounds of radiation since then due to reoccurence in vaginal/lymph areas. Prior to CA was very active but is currently dealing with shortness of breath/fatigues quickly.  Lots of crepitus. WORSE with driving moving from gas to brake, stairs ascending, kneeling R lateral pain, sit to stand, stiffness.  BETTER with ibuprofen daily. Goals are to get more flexible, be able to have less pain.       Patient Health History  Julia Andre being seen for Knee pain.     Problem began: 10/4/2020.   Problem occurred: Age   Pain is reported as 2/10 on pain scale.  General health as reported by patient is good.  Pertinent medical history includes: other. Other medical history details: History has already been reported.     Medical allergies: none.   Surgeries include:  Cancer surgery.    Current medications:  Other. Other medications details: Med list already confirmed.    Current occupation is Retired.                                       Objective:  Standing Alignment:              Knee:  Genu valgus L and genu valgus R                                                            Knee Evaluation:  ROM:  PROM: normal  Strength wnl knee: R hip abductor strength 3-/5, L 4+/5. SLR flexion R 3+, L 4. Fatigues after ~ 5-7 reps on R.         Endfeel: WNL B  Strength:         Quad Set Left:  Good    Pain: -   Quad Set Right:  Fair    Pain: -  Ligament Testing:  Not Assessed                Special Tests:     Left knee negative for the following special tests:  Meninscal; Patellar compression; Patellar Tracking-Abduction Medial and Patellar Tracking-Abduction Lateral  Right knee positive for the following tests:  Patellar Compression  Right knee negative for the following special tests:  Meniscal; Patellar " Tracking-Abduction Medial and Patellar Tracking-Abduction Lateral  Palpation:      Right knee tenderness present at:  IT Band  Edema:  Edema of the knee: slight swelling R lateral superior knee.     Mobility Testing:      Patellofemoral Medial:  Left: normal    Right: hypomobile        Functional Testing:  : ascend 6 inch step R pain and crepitus.        Quad:    Single Leg Squat:  Left:      Right:        Bilateral Leg Squat:  Pain with squat below ~45 deg       Proprioception:   [employer] Balance Test:  Left:  5 seconds  Right:  5 seconds  % of Uninvolved:           General     ROS    Assessment/Plan:    Patient is a 71 year old female with right side knee complaints.    Patient has the following significant findings with corresponding treatment plan.                Diagnosis 1:  R knee pain  Pain -  hot/cold therapy, manual therapy, splint/taping/bracing/orthotics, self management, education and home program  Decreased ROM/flexibility - manual therapy and therapeutic exercise  Decreased joint mobility - manual therapy and therapeutic exercise  Decreased strength - therapeutic exercise and therapeutic activities  Decreased proprioception - neuro re-education and therapeutic activities  Inflammation - self management/home program  Edema - self management/home program  Impaired gait - gait training  Impaired muscle performance - neuro re-education  Decreased function - therapeutic activities    Therapy Evaluation Codes:   1) History comprised of:   Personal factors that impact the plan of care:      Time since onset of symptoms.    Comorbidity factors that impact the plan of care are:      Cancer, Diabetes, Depression, High blood pressure, Menopausal and Osteoarthritis.     Medications impacting care: Anti-depressant, Anti-inflammatory, Bone density and High blood pressure.  2) Examination of Body Systems comprised of:   Body structures and functions that impact the plan of care:      Knee.   Activity limitations that  impact the plan of care are:      Driving, Squatting/kneeling, Stairs and Walking.  3) Clinical presentation characteristics are:   Stable/Uncomplicated.  4) Decision-Making    Low complexity using standardized patient assessment instrument and/or measureable assessment of functional outcome.  Cumulative Therapy Evaluation is: Low complexity.    Previous and current functional limitations:  (See Goal Flow Sheet for this information)    Short term and Long term goals: (See Goal Flow Sheet for this information)     Communication ability:  Patient appears to be able to clearly communicate and understand verbal and written communication and follow directions correctly.  Treatment Explanation - The following has been discussed with the patient:   RX ordered/plan of care  Anticipated outcomes  Possible risks and side effects  This patient would benefit from PT intervention to resume normal activities.   Rehab potential is good.    Frequency:  1 X week, once daily  Duration:  for 6 weeks  Discharge Plan:  Achieve all LTG.  Independent in home treatment program.  Reach maximal therapeutic benefit.    Please refer to the daily flowsheet for treatment today, total treatment time and time spent performing 1:1 timed codes.

## 2022-02-28 NOTE — TELEPHONE ENCOUNTER
I am one of Dr. De La Rosa's partners and am covering for her while she is out of the office.    Referral for physical therapy for right shoulder pain signed.    Gabby Bueno MD  Internal Medicine & Pediatrics  [facility] [place]

## 2022-02-28 NOTE — PROGRESS NOTES
[facility]    OUTPATIENT Physical Therapy ORTHOPEDIC EVALUATION  PLAN OF TREATMENT FOR OUTPATIENT REHABILITATION  (COMPLETE FOR INITIAL CLAIMS ONLY)  Patient's Last Name, First Name, M.I.  YOB: 1950  Julia Andre    Provider s Name:  [facility]   Medical Record No.  [id number]   Start of Care Date:  02/28/22   Onset Date:   12/07/21 (order date)   Type:     _X__PT   ___OT Medical Diagnosis:    Encounter Diagnosis   Name Primary?     Chronic pain of right knee         Treatment Diagnosis:  R knee pain        Goals:     02/28/22 0500   Body Part   Goals listed below are for R knee   Goal #1   Goal #1 stairs   Previous Functional Level No restrictions   Current Functional Level Ascend/descend stairs   Performance Level 1 at a time   STG [employer] Performance Ascend stairs;in a normal reciprocal pattern;with a railing   Rationale to enter/leave the house safely;to reach upper level of home safely;to reach lower level of home safely;for safe community access to buildings   Due Date 03/21/22   LTG [employer] Performance Ascend/descend stairs;in a normal reciprocal pattern;with railing   Rationale to enter/leave the house safely;to reach upper level of home safely;to reach lower level of home safely;for safe community access to buildings   Due Date 04/11/22       Therapy Frequency:  1x week  Predicted Duration of Therapy Intervention:  6 weeks    Alberto Stokes, PT                 I CERTIFY THE NEED FOR THESE SERVICES FURNISHED UNDER        THIS PLAN OF TREATMENT AND WHILE UNDER MY CARE     (Physician attestation of this document indicates review and certification of the therapy plan).                       Certification Date From:  02/28/22   Certification Date To:  05/28/22    Referring Provider:  Cecile De La Rosa    Initial Assessment        See Epic Evaluation SOC  Date: 02/28/22

## 2022-03-07 ENCOUNTER — THERAPY VISIT (OUTPATIENT)
Dept: PHYSICAL THERAPY | Facility: CLINIC | Age: 72
End: 2022-03-07
Attending: INTERNAL MEDICINE
Payer: COMMERCIAL

## 2022-03-07 DIAGNOSIS — G89.29 CHRONIC PAIN OF RIGHT KNEE: Primary | ICD-10-CM

## 2022-03-07 DIAGNOSIS — M25.511 RIGHT SHOULDER PAIN, UNSPECIFIED CHRONICITY: ICD-10-CM

## 2022-03-07 DIAGNOSIS — G89.29 CHRONIC PAIN OF RIGHT KNEE: ICD-10-CM

## 2022-03-07 DIAGNOSIS — M25.561 CHRONIC PAIN OF RIGHT KNEE: ICD-10-CM

## 2022-03-07 DIAGNOSIS — M25.561 CHRONIC PAIN OF RIGHT KNEE: Primary | ICD-10-CM

## 2022-03-07 PROCEDURE — 97110 THERAPEUTIC EXERCISES: CPT | Mod: GP | Performed by: PHYSICAL THERAPIST

## 2022-03-07 PROCEDURE — 97112 NEUROMUSCULAR REEDUCATION: CPT | Mod: GP | Performed by: PHYSICAL THERAPIST

## 2022-03-14 ENCOUNTER — THERAPY VISIT (OUTPATIENT)
Dept: PHYSICAL THERAPY | Facility: CLINIC | Age: 72
End: 2022-03-14
Attending: INTERNAL MEDICINE
Payer: COMMERCIAL

## 2022-03-14 DIAGNOSIS — M25.561 CHRONIC PAIN OF RIGHT KNEE: ICD-10-CM

## 2022-03-14 DIAGNOSIS — M25.511 RIGHT SHOULDER PAIN, UNSPECIFIED CHRONICITY: ICD-10-CM

## 2022-03-14 DIAGNOSIS — G89.29 CHRONIC PAIN OF RIGHT KNEE: ICD-10-CM

## 2022-03-14 PROCEDURE — 97110 THERAPEUTIC EXERCISES: CPT | Mod: GP | Performed by: PHYSICAL THERAPIST

## 2022-03-14 PROCEDURE — 97161 PT EVAL LOW COMPLEX 20 MIN: CPT | Mod: GP | Performed by: PHYSICAL THERAPIST

## 2022-03-14 NOTE — PROGRESS NOTES
"Physical Therapy Initial Evaluation    Subjective:  Onset of R anterior knee pain \"for years\". Referred to PT 12/07/21. Currently undergoing treatment for ovarian CA, diagnosed Aug of 2020, full hysterectomy Nov 2021.  2 rounds of radiation since then due to reoccurence in vaginal/lymph areas. Prior to CA was very active but is currently dealing with shortness of breath/fatigues quickly.  Lots of crepitus. WORSE with driving moving from gas to brake, stairs ascending, kneeling R lateral pain, sit to stand, stiffness.  BETTER with ibuprofen daily. Goals are to get more flexible, be able to have less pain.     Shoulder (right) painful; chronic in nature. Dressing is painful (reaching behind back). Sleeping on the right side also painful. No history or injury or surgery on right shoulder. Worse 5/10 pain. L hand dominant.        Patient Health History  Julia Andre being seen for Knee pain and Shoulder pain     Problem began: 10/4/2020.   Problem occurred: Age   Pain is reported as 2/10 on pain scale.  General health as reported by patient is good.  Pertinent medical history includes: other. Other medical history details: History has already been reported.      Medical allergies: none.   Surgeries include:  Cancer surgery.    Current medications:  Other. Other medications details: Med list already confirmed.    Current occupation is Retired.        HPI                    Objective:  SHOULDER:    Standing Posture: slight forward head, rounded shoulders    Shoulder: (* indicates patient's pain)   AROM L AROM R MMT L MMT R   Flex 120 103* 4/5 3+/5**   Abd 148 98*     IR   4/5 3+/5*   ER 65 40* 4/5 3+/5*   Ext/IR T11 L1*           KNEE:     Standing Alignment:       Knee:  Genu valgus L and genu valgus R     Knee Evaluation:  ROM:  PROM: normal  Strength wnl knee: R hip abductor strength 3-/5, L 4+/5. SLR flexion R 3+, L 4. Fatigues after ~ 5-7 reps on R.         Endfeel: WNL B  Strength:         Quad Set Left:  Good    " Pain: -   Quad Set Right:  Fair    Pain: -  Ligament Testing:  Not Assessed     Special Tests:     Left knee negative for the following special tests:  Meninscal; Patellar compression; Patellar Tracking-Abduction Medial and Patellar Tracking-Abduction Lateral  Right knee positive for the following tests:  Patellar Compression  Right knee negative for the following special tests:  Meniscal; Patellar Tracking-Abduction Medial and Patellar Tracking-Abduction Lateral  Palpation:       Right knee tenderness present at:  IT Band  Edema:  Edema of the knee: slight swelling R lateral superior knee.      Mobility Testing:       Patellofemoral Medial:  Left: normal    Right: hypomobile           Functional Testing:  : ascend 6 inch step R pain and crepitus.           Quad:    Single Leg Squat:  Left:      Right:        Bilateral Leg Squat:  Pain with squat below ~45 deg        Proprioception:   [employer] Balance Test:  Left:  5 seconds  Right:  5 seconds  % of Uninvolved  System    Physical Exam    General     ROS    Assessment/Plan:    Patient is a 71 year old female with right side shoulder and right side knee complaints.    Patient has the following significant findings with corresponding treatment plan.                Diagnosis 1:  R shoulder and R knee pain  Pain -  hot/cold therapy, manual therapy, self management, education and home program  Decreased ROM/flexibility - manual therapy and therapeutic exercise  Decreased joint mobility - manual therapy and therapeutic exercise  Decreased strength - therapeutic exercise and therapeutic activities  Impaired balance - neuro re-education and therapeutic activities  Decreased proprioception - neuro re-education and therapeutic activities  Inflammation - cold therapy and self management/home program  Impaired muscle performance - neuro re-education  Decreased function - therapeutic activities  Impaired posture - neuro re-education  Instability -  Therapeutic Activity  Therapeutic  Exercise    Therapy Evaluation Codes:     Cumulative Therapy Evaluation is: Low complexity.    Previous and current functional limitations:  (See Goal Flow Sheet for this information)    Short term and Long term goals: (See Goal Flow Sheet for this information)     Communication ability:  Patient appears to be able to clearly communicate and understand verbal and written communication and follow directions correctly.  Treatment Explanation - The following has been discussed with the patient:   RX ordered/plan of care  Anticipated outcomes  Possible risks and side effects  This patient would benefit from PT intervention to resume normal activities.   Rehab potential is good.    Frequency:  1 X week for 6 weeks, tappering to every other week, once daily  Duration:  for 12 weeks  Discharge Plan:  Achieve all LTG.  Independent in home treatment program.  Reach maximal therapeutic benefit.    Please refer to the daily flowsheet for treatment today, total treatment time and time spent performing 1:1 timed codes.

## 2022-03-14 NOTE — PROGRESS NOTES
[facility]    OUTPATIENT Physical Therapy ORTHOPEDIC EVALUATION  PLAN OF TREATMENT FOR OUTPATIENT REHABILITATION  (COMPLETE FOR INITIAL CLAIMS ONLY)  Patient's Last Name, First Name, M.I.  YOB: 1950  Julia Andre    Provider s Name:  [facility]   Medical Record No.  [id number]   Start of Care Date:  03/14/22   Onset Date:   03/08/22   Type:     _X__PT   ___OT Medical Diagnosis:    Encounter Diagnoses   Name Primary?     Chronic pain of right knee      Right shoulder pain, unspecified chronicity         Treatment Diagnosis:  R knee pain/ R shoulder pain        Goals:     03/14/22 0500   Body Part   Goals listed below are for R knee/ R shoulder   Goal #1   Goal #1 stairs   Previous Functional Level No restrictions   Current Functional Level Ascend/descend stairs   Performance Level 1 at a time   STG [employer] Performance Ascend stairs;in a normal reciprocal pattern;with a railing   Rationale to enter/leave the house safely;to reach upper level of home safely;to reach lower level of home safely;for safe community access to buildings   Due Date 03/21/22   LTG [employer] Ascend/descend stairs;in a normal reciprocal pattern;with railing   Rationale to enter/leave the house safely;to reach upper level of home safely;to reach lower level of home safely;for safe community access to buildings   Due Date 04/11/22   Goal #2   Goal #2 reaching   Previous Functional Level No restrictions   Current Functional Level Can reach;behind back   Performance level pain 6/10   STG [employer] Performance Reach;behind back   Performance level 3/10 pain   Rationale for dressing   Due date 04/25/22   LTG [employer] Performance Reach;behind back   Performance Level painfree   Rationale for dressing   Due date 06/06/22       Therapy Frequency:  1X/week for 6 weeks, tappering to every  other  Predicted Duration of Therapy Intervention:  up to 12 weeks    Taniya Moreau, PT                 I CERTIFY THE NEED FOR THESE SERVICES FURNISHED UNDER        THIS PLAN OF TREATMENT AND WHILE UNDER MY CARE     (Physician attestation of this document indicates review and certification of the therapy plan).                       Certification Date From:  03/14/22   Certification Date To:  06/11/22    Referring Provider:  Gabby Bueno    Initial Assessment        See Epic Evaluation SOC Date: 03/14/22

## 2022-03-25 ENCOUNTER — LAB (OUTPATIENT)
Dept: LAB | Facility: CLINIC | Age: 72
End: 2022-03-25
Payer: COMMERCIAL

## 2022-03-25 ENCOUNTER — THERAPY VISIT (OUTPATIENT)
Dept: PHYSICAL THERAPY | Facility: CLINIC | Age: 72
End: 2022-03-25
Payer: COMMERCIAL

## 2022-03-25 DIAGNOSIS — G89.29 CHRONIC PAIN OF RIGHT KNEE: Primary | ICD-10-CM

## 2022-03-25 DIAGNOSIS — Z11.59 ENCOUNTER FOR SCREENING FOR OTHER VIRAL DISEASES: ICD-10-CM

## 2022-03-25 DIAGNOSIS — M25.511 RIGHT SHOULDER PAIN, UNSPECIFIED CHRONICITY: ICD-10-CM

## 2022-03-25 DIAGNOSIS — M25.561 CHRONIC PAIN OF RIGHT KNEE: Primary | ICD-10-CM

## 2022-03-25 PROCEDURE — U0005 INFEC AGEN DETEC AMPLI PROBE: HCPCS

## 2022-03-25 PROCEDURE — 97110 THERAPEUTIC EXERCISES: CPT | Mod: GP | Performed by: PHYSICAL THERAPIST

## 2022-03-26 LAB — SARS-COV-2 RNA RESP QL NAA+PROBE: NEGATIVE

## 2022-03-27 ENCOUNTER — HEALTH MAINTENANCE LETTER (OUTPATIENT)
Age: 72
End: 2022-03-27

## 2022-03-29 DIAGNOSIS — Z11.59 ENCOUNTER FOR SCREENING FOR OTHER VIRAL DISEASES: Primary | ICD-10-CM

## 2022-03-31 ENCOUNTER — LAB (OUTPATIENT)
Dept: LAB | Facility: CLINIC | Age: 72
End: 2022-03-31

## 2022-03-31 ENCOUNTER — E-VISIT (OUTPATIENT)
Dept: PEDIATRICS | Facility: CLINIC | Age: 72
End: 2022-03-31
Payer: COMMERCIAL

## 2022-03-31 DIAGNOSIS — E11.22 TYPE 2 DIABETES MELLITUS WITH CHRONIC KIDNEY DISEASE, WITHOUT LONG-TERM CURRENT USE OF INSULIN, UNSPECIFIED CKD STAGE (H): Primary | ICD-10-CM

## 2022-03-31 DIAGNOSIS — K59.00 CONSTIPATION, UNSPECIFIED CONSTIPATION TYPE: Primary | ICD-10-CM

## 2022-03-31 LAB — HBA1C MFR BLD: 5.8 % (ref 0–5.6)

## 2022-03-31 PROCEDURE — 99421 OL DIG E/M SVC 5-10 MIN: CPT | Performed by: STUDENT IN AN ORGANIZED HEALTH CARE EDUCATION/TRAINING PROGRAM

## 2022-03-31 PROCEDURE — 83036 HEMOGLOBIN GLYCOSYLATED A1C: CPT

## 2022-03-31 PROCEDURE — 36415 COLL VENOUS BLD VENIPUNCTURE: CPT

## 2022-04-04 ENCOUNTER — THERAPY VISIT (OUTPATIENT)
Dept: PHYSICAL THERAPY | Facility: CLINIC | Age: 72
End: 2022-04-04
Payer: COMMERCIAL

## 2022-04-04 DIAGNOSIS — M25.561 CHRONIC PAIN OF RIGHT KNEE: Primary | ICD-10-CM

## 2022-04-04 DIAGNOSIS — M25.511 RIGHT SHOULDER PAIN, UNSPECIFIED CHRONICITY: ICD-10-CM

## 2022-04-04 DIAGNOSIS — G89.29 CHRONIC PAIN OF RIGHT KNEE: Primary | ICD-10-CM

## 2022-04-04 PROCEDURE — 97110 THERAPEUTIC EXERCISES: CPT | Mod: GP | Performed by: PHYSICAL THERAPIST

## 2022-04-15 ENCOUNTER — OFFICE VISIT (OUTPATIENT)
Dept: URGENT CARE | Facility: URGENT CARE | Age: 72
End: 2022-04-15
Payer: COMMERCIAL

## 2022-04-15 VITALS
RESPIRATION RATE: 16 BRPM | DIASTOLIC BLOOD PRESSURE: 78 MMHG | OXYGEN SATURATION: 100 % | SYSTOLIC BLOOD PRESSURE: 130 MMHG | TEMPERATURE: 98.2 F | HEART RATE: 62 BPM

## 2022-04-15 DIAGNOSIS — R07.0 THROAT PAIN: Primary | ICD-10-CM

## 2022-04-15 LAB — DEPRECATED S PYO AG THROAT QL EIA: NEGATIVE

## 2022-04-15 PROCEDURE — 87651 STREP A DNA AMP PROBE: CPT | Performed by: PHYSICIAN ASSISTANT

## 2022-04-15 PROCEDURE — 99213 OFFICE O/P EST LOW 20 MIN: CPT | Performed by: PHYSICIAN ASSISTANT

## 2022-04-15 NOTE — PROGRESS NOTES
Assessment & Plan     Throat pain  Rapid strep is negative today.  Vitals are stable.  No concern for peritonsillar abscess. Throat culture is pending.  Supportive care measures advised.  We will communicate any positive finding on the throat culture result.  Follow-up if any worsening symptoms.  Patient understands and agrees with the plan.    - Streptococcus A Rapid Screen w/Reflex to PCR  - Group A Streptococcus PCR Throat Swab       Return in about 1 week (around 4/22/2022) for Symptoms failing to improve.    Irene Stubbs PA-C  [facility] [place] is a 71 year old female who presents to clinic today for the following health issues:  Chief Complaint   Patient presents with     Pharyngitis     Yesterday gum, and tongue hurt, and today throat hurt.      HPI      Pharyngitis    Onset of symptoms was 1 day(s) ago.  Course of illness is same.    Severity mild  Current and Associated symptoms: sore throat  Denies: fever/chills/cough  Treatment measures tried include None tried.  Predisposing factors include None.        Review of Systems  Constitutional, HEENT, cardiovascular, pulmonary, GI, , musculoskeletal, neuro, skin, endocrine and psych systems are negative, except as otherwise noted.      Objective    /78 (BP Location: Right arm, Patient Position: Sitting, Cuff Size: Adult Regular)   Pulse 62   Temp 98.2  F (36.8  C) (Tympanic)   Resp 16   SpO2 100%   Physical Exam   GENERAL: healthy, alert and no distress  HENT: ear canals and TM's normal,  mouth without ulcers or lesions, uvula is midline, throat is moist and pink  RESP: lungs clear to auscultation - no rales, rhonchi or wheezes  CV: regular rate and rhythm, normal S1 S2  MS: no gross musculoskeletal defects noted, no edema  SKIN: no suspicious lesions or rashes    Results for orders placed or performed in visit on 04/15/22 (from the past 24 hour(s))   Streptococcus A Rapid Screen w/Reflex to PCR     Specimen: Throat; Swab   Result Value Ref Range    Group A Strep antigen Negative Negative

## 2022-04-16 LAB — GROUP A STREP BY PCR: NOT DETECTED

## 2022-04-22 ENCOUNTER — THERAPY VISIT (OUTPATIENT)
Dept: PHYSICAL THERAPY | Facility: CLINIC | Age: 72
End: 2022-04-22
Payer: COMMERCIAL

## 2022-04-22 DIAGNOSIS — G89.29 CHRONIC PAIN OF RIGHT KNEE: Primary | ICD-10-CM

## 2022-04-22 DIAGNOSIS — M25.561 CHRONIC PAIN OF RIGHT KNEE: Primary | ICD-10-CM

## 2022-04-22 DIAGNOSIS — M25.511 RIGHT SHOULDER PAIN, UNSPECIFIED CHRONICITY: ICD-10-CM

## 2022-04-22 PROCEDURE — 97110 THERAPEUTIC EXERCISES: CPT | Mod: GP | Performed by: PHYSICAL THERAPIST

## 2022-05-02 ENCOUNTER — OFFICE VISIT (OUTPATIENT)
Dept: ORTHOPEDICS | Facility: CLINIC | Age: 72
End: 2022-05-02
Payer: COMMERCIAL

## 2022-05-02 VITALS
DIASTOLIC BLOOD PRESSURE: 84 MMHG | WEIGHT: 175 LBS | SYSTOLIC BLOOD PRESSURE: 118 MMHG | HEIGHT: 62 IN | BODY MASS INDEX: 32.2 KG/M2

## 2022-05-02 DIAGNOSIS — M17.11 PRIMARY OSTEOARTHRITIS OF RIGHT KNEE: ICD-10-CM

## 2022-05-02 DIAGNOSIS — G89.29 CHRONIC RIGHT SHOULDER PAIN: Primary | ICD-10-CM

## 2022-05-02 DIAGNOSIS — M25.561 CHRONIC PAIN OF RIGHT KNEE: ICD-10-CM

## 2022-05-02 DIAGNOSIS — M25.511 CHRONIC RIGHT SHOULDER PAIN: Primary | ICD-10-CM

## 2022-05-02 DIAGNOSIS — M19.011 ARTHRITIS OF RIGHT GLENOHUMERAL JOINT: ICD-10-CM

## 2022-05-02 DIAGNOSIS — G89.29 CHRONIC PAIN OF RIGHT KNEE: ICD-10-CM

## 2022-05-02 PROCEDURE — 99204 OFFICE O/P NEW MOD 45 MIN: CPT | Performed by: STUDENT IN AN ORGANIZED HEALTH CARE EDUCATION/TRAINING PROGRAM

## 2022-05-02 NOTE — PROGRESS NOTES
ASSESSMENT & PLAN    1. Chronic right shoulder pain    2. Arthritis of right glenohumeral joint    3. Chronic pain of right knee    4. Primary osteoarthritis of right knee      Julia Andre is a 71 year old left-hand dominant female presenting for evaluation of right knee and right shoulder pain which have persisted despite 8 weeks of formal physical therapy. History, examination and imaging findings were reviewed today, consistent with advanced osteoarthritis of the right shoulder (glenohumeral joint) and tricompartmental osteoarthritis of the right knee, most severe in the patellofemoral compartment. Differential for the shoulder pain and limited motion includes concomittant adhesive capsulitis although moreso suspect arthritis flare. We discussed management options including activity modifications, physical therapy, injections and possible joint replacement in the future.    Detailed plan as follows:  - I recommend asking your Oncologist about cortisone and hyaluronic acid (gel) injections for the joints during your May 17th Oncology appointment.   - We have scheduled an injection visit to go ahead with cortisone injections of the right shoulder/glenohumeral joint and the right knee on May 20th at 10 am. Please arrive at 9:40am. These injections will be done under ultrasound guidance.   - Plan to take it easy for the week or two after the shots then restart physical therapy about 2 weeks later.  - In the meantime, continue consistent home exercise program and gentle range of motion exercises for the shoulders and knees.    - We discussed the importance of lifestyle behaviors including activity modifications (avoiding repetitive deep knee flexion and high impact exercises) and healthy diet/exercise.   - Heat and compression can be very helpful for arthritis.    Over-the-counter options to try:   - Tylenol, 2 tablets (1,000 mg) three times per day, not to exceed 3,000 mg per day.  - Voltaren (diclofenac) gel is  "a topical NSAID medication (like ibuprofen) available over-the-counter that can be very effective for arthritis pain and inflammation. This may be applied to the painful joint 4 times per day.  - Supplements to discuss with your primary and/or oncology team: Glucosamine/chondroitin, fish oil and turmeric.    You may call our direct clinic number ([phone]) at any time with questions or concerns.    Elizabeth Gomez MD, [facility]    -----    SUBJECTIVE  Julia Andre is a/an 71 year old Left handed female who is seen in consultation at the request of Kina Moreau DPT for evaluation of right shoulder and right knee pain. The patient is seen by themselves.    RIGHT SHOULDER  Onset: On and off \"for a long time with pain\" worsening since December 2021. Reports insidious onset without acute precipitating event.  Location of Pain: right superior shoulder with pain radiating into right upper arm  Rating of Pain at worst: 6/10  Rating of Pain Currently: 2/10  Worsened by: reaching overhead, putting on jacket, sleeping on right side  Better with: activity avoidance  Treatments tried: rest/activity avoidance, ibuprofen, ice, topical analgesics, home exercises and physical therapy (6 visits Feb-April 2022)  Associated symptoms: decreased ROM, denies numbness / tingling    RIGHT KNEE  Onset: 3-4 years(s) ago. Reports insidious onset without acute precipitating event.  Location of Pain: right anterior knee  Rating of Pain at worst: 6/10  Rating of Pain Currently: 2/10  Worsened by: transitioning from sit to stand, going up and down stairs  Better with: activity avoidance  Treatments tried: rest/activity avoidance, ibuprofen, ice, topical analgesics, home exercises and physical therapy (6 visits Feb-April 2022)  Associated symptoms: swelling, catching (not frequently); no locking or giving out.    Orthopedic history: YES - patient was diagnosed with ovarian cancer 2 years ago - " has not been able to be as active since this diagnosis  Relevant surgical history: NO  Social history: social history: retired    Past Medical History:   Diagnosis Date     Anxiety      Cervical cancer (H)      Coronary artery disease 2009    angina. elevated troponins, normal coronary arteries found     Depression      Depressive disorder      Diabetes (H)     type 2     Gastroesophageal reflux disease      Heart attack (H)      Hyperlipidemia      Hypertension      Iron deficiency anemia      Osteopenia      Ovarian cancer, unspecified laterality (H) 2021     Psoriasis      Sleep apnea      Social History     Socioeconomic History     Marital status:    Tobacco Use     Smoking status: Former Smoker     Packs/day: 2.50     Years: 27.00     Pack years: 67.50     Quit date: 1993     Years since quittin.3     Smokeless tobacco: Never Used   Substance and Sexual Activity     Alcohol use: Not Currently     Comment: sober for 32 years     Drug use: Never     Social Determinants of Health     Financial Resource Strain: Low Risk      Difficulty of Paying Living Expenses: Not hard at all   Food Insecurity: No Food Insecurity     Worried About Running Out of Food in the Last Year: Never true     Ran Out of Food in the Last Year: Never true   Transportation Needs: No Transportation Needs     Lack of Transportation (Medical): No     Lack of Transportation (Non-Medical): No   Physical Activity: Inactive     Days of Exercise per Week: 0 days     Minutes of Exercise per Session: 0 min   Stress: No Stress Concern Present     Feeling of Stress : Not at all   Social Connections: Socially Integrated     Frequency of Communication with Friends and Family: More than three times a week     Frequency of Social Gatherings with Friends and Family: More than three times a week     Attends Caodaism Services: 1 to 4 times per year     Active Member of Clubs or Organizations: Yes     Marital Status:    Housing  "Stability: Unknown     Unable to Pay for Housing in the Last Year: No     Unstable Housing in the Last Year: No         Patient's past medical, surgical, social, and family histories were reviewed today and no changes are noted.    REVIEW OF SYSTEMS:  10 point ROS is negative other than symptoms noted above in HPI, Past Medical History or as stated below  Constitutional: NEGATIVE for fever, chills, change in weight  Skin: NEGATIVE for worrisome rashes, moles or lesions  GI/: NEGATIVE for bowel or bladder changes  Neuro: NEGATIVE for weakness, dizziness or paresthesias    OBJECTIVE:  /84   Ht 1.575 m (5' 2\")   Wt 79.4 kg (175 lb)   BMI 32.01 kg/m     General: healthy, alert and in no distress  HEENT: no scleral icterus or conjunctival erythema  Skin: no suspicious lesions or rash. No jaundice.  CV: regular rhythm by palpation  Resp: normal respiratory effort without conversational dyspnea   Psych: normal mood and affect  Gait: normal steady gait with appropriate coordination and balance  Neuro: normal light touch sensory exam of the bilateral upper extremities.    MSK:  RIGHT SHOULDER  Inspection:    no swelling, bruising, discoloration, or obvious deformity or asymmetry  Palpation:    Tender about the anterior capsule, greater tuberosity and posterior capsule. Remainder of bony and tendinous landmarks are nontender.  Active Range of Motion:     Abduction 90A/100P0, FF 90A/100P0, , IR L1.    Strength:    Scapular plane abduction 4/5,  ER 4+/5, IR 4/5  Special Tests:    Positive: Neer's, Torres', supraspinatus (empty can) and painful arc    Negative: lift-off, apprehension/relocation, Speed's and Yergason's    RIGHT KNEE  Inspection:    Genu valgum bilaterally  Palpation:    Tender about the lateral patellar facet and lateral joint line. Remainder of bony and ligamentous landmarks are nontender.    Small effusion is present    Patellofemoral crepitus is Present  Range of Motion:     00 extension to " 1100 flexion  Strength:    Quadriceps 4+/5 and hamstrings 4+/5    Gluteus medius 3+/5    Extensor mechanism intact  Special Tests:    Positive: Patellar grind, compression, inhibition    Negative: MCL/valgus stress (0 & 30 deg), LCL/varus stress (0 & 30 deg), Lachman's, anterior drawer, posterior drawer, Fatoumata's    Independent visualization of the below image:  Recent Results (from the past 24 hour(s))   XR Shoulder Right G/E 3 Views    Narrative    RIGHT SHOULDER THREE OR MORE VIEWS   5/2/2022 11:17 AM     HISTORY: Chronic right shoulder pain.    COMPARISON: None.      Impression    IMPRESSION: Advanced glenohumeral degenerative changes. 1.4 cm loose  body inferior to the coracoid. Moderate acromioclavicular degenerative  changes. No acute fracture or dislocation. MediPort type catheter in  place.   XR Knee Standing AP Bilat [place] Bilat Lat Right    Narrative    KNEE STANDING AP BILATERAL SUNRISE BILATERAL LATERAL RIGHT  5/2/2022  11:23 AM     HISTORY: Chronic pain of right knee.    COMPARISON: None.      Impression    IMPRESSION: Advanced lateral patellofemoral degenerative changes.  Medial and lateral compartment joint spaces appear preserved. No acute  fracture. Minimal joint effusion. Soft tissue calcification appears to  be in the subcutaneous tissues of the posterior knee.    Imaged portions of the left knee demonstrate advanced lateral  patellofemoral degenerative changes.       Elizabeth Gomez MD, [facility]

## 2022-05-02 NOTE — LETTER
5/2/2022         RE: Julia Andre  [id number] [address] [id number]        Dear Colleague,    Thank you for referring your patient, Julia Andre, to the [facility] [place]. Please see a copy of my visit note below.    ASSESSMENT & PLAN    1. Chronic right shoulder pain    2. Arthritis of right glenohumeral joint    3. Chronic pain of right knee    4. Primary osteoarthritis of right knee      Julia Andre is a 71 year old left-hand dominant female presenting for evaluation of right knee and right shoulder pain which have persisted despite 8 weeks of formal physical therapy. History, examination and imaging findings were reviewed today, consistent with advanced osteoarthritis of the right shoulder (glenohumeral joint) and tricompartmental osteoarthritis of the right knee, most severe in the patellofemoral compartment. Differential for the shoulder pain and limited motion includes concomittant adhesive capsulitis although moreso suspect arthritis flare. We discussed management options including activity modifications, physical therapy, injections and possible joint replacement in the future.    Detailed plan as follows:  - I recommend asking your Oncologist about cortisone and hyaluronic acid (gel) injections for the joints during your May 17th Oncology appointment.   - We have scheduled an injection visit to go ahead with cortisone injections of the right shoulder/glenohumeral joint and the right knee on May 20th at 10 am. Please arrive at 9:40am. These injections will be done under ultrasound guidance.   - Plan to take it easy for the week or two after the shots then restart physical therapy about 2 weeks later.  - In the meantime, continue consistent home exercise program and gentle range of motion exercises for the shoulders and knees.    - We discussed the importance of lifestyle behaviors including activity modifications (avoiding repetitive deep knee  "flexion and high impact exercises) and healthy diet/exercise.   - Heat and compression can be very helpful for arthritis.    Over-the-counter options to try:   - Tylenol, 2 tablets (1,000 mg) three times per day, not to exceed 3,000 mg per day.  - Voltaren (diclofenac) gel is a topical NSAID medication (like ibuprofen) available over-the-counter that can be very effective for arthritis pain and inflammation. This may be applied to the painful joint 4 times per day.  - Supplements to discuss with your primary and/or oncology team: Glucosamine/chondroitin, fish oil and turmeric.    You may call our direct clinic number ([phone]) at any time with questions or concerns.    Elizabeth Gomez MD, [facility]    -----    SUBJECTIVE  Julia Andre is a/an 71 year old Left handed female who is seen in consultation at the request of Kina Moreau DPT for evaluation of right shoulder and right knee pain. The patient is seen by themselves.    RIGHT SHOULDER  Onset: On and off \"for a long time with pain\" worsening since December 2021. Reports insidious onset without acute precipitating event.  Location of Pain: right superior shoulder with pain radiating into right upper arm  Rating of Pain at worst: 6/10  Rating of Pain Currently: 2/10  Worsened by: reaching overhead, putting on jacket, sleeping on right side  Better with: activity avoidance  Treatments tried: rest/activity avoidance, ibuprofen, ice, topical analgesics, home exercises and physical therapy (6 visits Feb-April 2022)  Associated symptoms: decreased ROM, denies numbness / tingling    RIGHT KNEE  Onset: 3-4 years(s) ago. Reports insidious onset without acute precipitating event.  Location of Pain: right anterior knee  Rating of Pain at worst: 6/10  Rating of Pain Currently: 2/10  Worsened by: transitioning from sit to stand, going up and down stairs  Better with: activity avoidance  Treatments tried: rest/activity " avoidance, ibuprofen, ice, topical analgesics, home exercises and physical therapy (6 visits Feb-2022)  Associated symptoms: swelling, catching (not frequently); no locking or giving out.    Orthopedic history: YES - patient was diagnosed with ovarian cancer 2 years ago - has not been able to be as active since this diagnosis  Relevant surgical history: NO  Social history: social history: retired    Past Medical History:   Diagnosis Date     Anxiety      Cervical cancer (H)      Coronary artery disease 2009    angina. elevated troponins, normal coronary arteries found     Depression      Depressive disorder      Diabetes (H)     type 2     Gastroesophageal reflux disease      Heart attack (H)      Hyperlipidemia      Hypertension      Iron deficiency anemia      Osteopenia      Ovarian cancer, unspecified laterality (H) 2021     Psoriasis      Sleep apnea      Social History     Socioeconomic History     Marital status:    Tobacco Use     Smoking status: Former Smoker     Packs/day: 2.50     Years: 27.00     Pack years: 67.50     Quit date: 1993     Years since quittin.3     Smokeless tobacco: Never Used   Substance and Sexual Activity     Alcohol use: Not Currently     Comment: sober for 32 years     Drug use: Never     Social Determinants of Health     Financial Resource Strain: Low Risk      Difficulty of Paying Living Expenses: Not hard at all   Food Insecurity: No Food Insecurity     Worried About Running Out of Food in the Last Year: Never true     Ran Out of Food in the Last Year: Never true   Transportation Needs: No Transportation Needs     Lack of Transportation (Medical): No     Lack of Transportation (Non-Medical): No   Physical Activity: Inactive     Days of Exercise per Week: 0 days     Minutes of Exercise per Session: 0 min   Stress: No Stress Concern Present     Feeling of Stress : Not at all   Social Connections: Socially Integrated     Frequency of Communication with  "Friends and Family: More than three times a week     Frequency of Social Gatherings with Friends and Family: More than three times a week     Attends Taoism Services: 1 to 4 times per year     Active Member of Clubs or Organizations: Yes     Marital Status:    Housing Stability: Unknown     Unable to Pay for Housing in the Last Year: No     Unstable Housing in the Last Year: No         Patient's past medical, surgical, social, and family histories were reviewed today and no changes are noted.    REVIEW OF SYSTEMS:  10 point ROS is negative other than symptoms noted above in HPI, Past Medical History or as stated below  Constitutional: NEGATIVE for fever, chills, change in weight  Skin: NEGATIVE for worrisome rashes, moles or lesions  GI/: NEGATIVE for bowel or bladder changes  Neuro: NEGATIVE for weakness, dizziness or paresthesias    OBJECTIVE:  /84   Ht 1.575 m (5' 2\")   Wt 79.4 kg (175 lb)   BMI 32.01 kg/m     General: healthy, alert and in no distress  HEENT: no scleral icterus or conjunctival erythema  Skin: no suspicious lesions or rash. No jaundice.  CV: regular rhythm by palpation  Resp: normal respiratory effort without conversational dyspnea   Psych: normal mood and affect  Gait: normal steady gait with appropriate coordination and balance  Neuro: normal light touch sensory exam of the bilateral upper extremities.    MSK:  RIGHT SHOULDER  Inspection:    no swelling, bruising, discoloration, or obvious deformity or asymmetry  Palpation:    Tender about the anterior capsule, greater tuberosity and posterior capsule. Remainder of bony and tendinous landmarks are nontender.  Active Range of Motion:     Abduction 90A/100P0, FF 90A/100P0, , IR L1.    Strength:    Scapular plane abduction 4/5,  ER 4+/5, IR 4/5  Special Tests:    Positive: Neer's, Torres', supraspinatus (empty can) and painful arc    Negative: lift-off, apprehension/relocation, Speed's and Yergason's    RIGHT " KNEE  Inspection:    Genu valgum bilaterally  Palpation:    Tender about the lateral patellar facet and lateral joint line. Remainder of bony and ligamentous landmarks are nontender.    Small effusion is present    Patellofemoral crepitus is Present  Range of Motion:     00 extension to 1100 flexion  Strength:    Quadriceps 4+/5 and hamstrings 4+/5    Gluteus medius 3+/5    Extensor mechanism intact  Special Tests:    Positive: Patellar grind, compression, inhibition    Negative: MCL/valgus stress (0 & 30 deg), LCL/varus stress (0 & 30 deg), Lachman's, anterior drawer, posterior drawer, Fatoumata's    Independent visualization of the below image:  Recent Results (from the past 24 hour(s))   XR Shoulder Right G/E 3 Views    Narrative    RIGHT SHOULDER THREE OR MORE VIEWS   5/2/2022 11:17 AM     HISTORY: Chronic right shoulder pain.    COMPARISON: None.      Impression    IMPRESSION: Advanced glenohumeral degenerative changes. 1.4 cm loose  body inferior to the coracoid. Moderate acromioclavicular degenerative  changes. No acute fracture or dislocation. MediPort type catheter in  place.   XR Knee Standing AP Bilat [place] Bilat Lat Right    Narrative    KNEE STANDING AP BILATERAL SUNRISE BILATERAL LATERAL RIGHT  5/2/2022  11:23 AM     HISTORY: Chronic pain of right knee.    COMPARISON: None.      Impression    IMPRESSION: Advanced lateral patellofemoral degenerative changes.  Medial and lateral compartment joint spaces appear preserved. No acute  fracture. Minimal joint effusion. Soft tissue calcification appears to  be in the subcutaneous tissues of the posterior knee.    Imaged portions of the left knee demonstrate advanced lateral  patellofemoral degenerative changes.       Elizabeth Gomez MD, [facility]        Again, thank you for allowing me to participate in the care of your patient.        Sincerely,        Elizabeth Gomez MD

## 2022-05-02 NOTE — PATIENT INSTRUCTIONS
1. Chronic right shoulder pain    2. Arthritis of right glenohumeral joint    3. Chronic pain of right knee    4. Primary osteoarthritis of right knee      Julia Andre is a 71 year old left-hand dominant female presenting for evaluation of right knee and right shoulder pain which have persisted despite 8 weeks of formal physical therapy. History, examination and imaging findings were reviewed today, consistent with advanced arthritis of the right shoulder (glenohumeral joint) and advanced arthritis of the right knee (most severe in the patellofemoral compartment). We discussed management options including activity modifications, physical therapy, injections and possible joint replacement in the future.    Detailed plan as follows:  - I recommend asking your Oncologist about cortisone and hyaluronic acid (gel) injections for the joints during your May 17th Oncology appointment.   - We have scheduled an injection visit to go ahead with cortisone injections of the right shoulder/glenohumeral joint and the right knee on May 20th at 10 am. Please arrive at 9:40am. These injections will be done under ultrasound guidance.   - Plan to take it easy for the week or two after the shots then restart physical therapy about 2 weeks later.  - In the meantime, continue consistent home exercise program and gentle range of motion exercises for the shoulders and knees.    - We discussed the importance of lifestyle behaviors including activity modifications (avoiding repetitive deep knee flexion and high impact exercises) and healthy diet/exercise.   - Heat and compression can be very helpful for arthritis.    Over-the-counter options to try:   - Tylenol, 2 tablets (1,000 mg) three times per day, not to exceed 3,000 mg per day.  - Voltaren (diclofenac) gel is a topical NSAID medication (like ibuprofen) available over-the-counter that can be very effective for arthritis pain and inflammation. This may be applied to the painful joint  4 times per day.  - Supplements to discuss with your primary and/or oncology team: Glucosamine/chondroitin, fish oil and turmeric.    You may call our direct clinic number ([phone]) at any time with questions or concerns.    Elizabeth Gomez MD, [facility]

## 2022-05-17 ENCOUNTER — TRANSFERRED RECORDS (OUTPATIENT)
Dept: HEALTH INFORMATION MANAGEMENT | Facility: CLINIC | Age: 72
End: 2022-05-17
Payer: COMMERCIAL

## 2022-05-20 ENCOUNTER — OFFICE VISIT (OUTPATIENT)
Dept: ORTHOPEDICS | Facility: CLINIC | Age: 72
End: 2022-05-20
Payer: COMMERCIAL

## 2022-05-20 DIAGNOSIS — G89.29 CHRONIC PAIN OF RIGHT KNEE: ICD-10-CM

## 2022-05-20 DIAGNOSIS — M17.11 PRIMARY OSTEOARTHRITIS OF RIGHT KNEE: ICD-10-CM

## 2022-05-20 DIAGNOSIS — G89.29 CHRONIC RIGHT SHOULDER PAIN: Primary | ICD-10-CM

## 2022-05-20 DIAGNOSIS — M25.561 CHRONIC PAIN OF RIGHT KNEE: ICD-10-CM

## 2022-05-20 DIAGNOSIS — M25.511 CHRONIC RIGHT SHOULDER PAIN: Primary | ICD-10-CM

## 2022-05-20 DIAGNOSIS — M19.011 ARTHRITIS OF RIGHT GLENOHUMERAL JOINT: ICD-10-CM

## 2022-05-20 PROCEDURE — 20611 DRAIN/INJ JOINT/BURSA W/US: CPT | Mod: RT | Performed by: STUDENT IN AN ORGANIZED HEALTH CARE EDUCATION/TRAINING PROGRAM

## 2022-05-20 PROCEDURE — 20611 DRAIN/INJ JOINT/BURSA W/US: CPT | Mod: 59 | Performed by: STUDENT IN AN ORGANIZED HEALTH CARE EDUCATION/TRAINING PROGRAM

## 2022-05-20 RX ORDER — TRIAMCINOLONE ACETONIDE 40 MG/ML
40 INJECTION, SUSPENSION INTRA-ARTICULAR; INTRAMUSCULAR
Status: DISCONTINUED | OUTPATIENT
Start: 2022-05-20 | End: 2022-12-14

## 2022-05-20 RX ORDER — LIDOCAINE HYDROCHLORIDE 10 MG/ML
4 INJECTION, SOLUTION INFILTRATION; PERINEURAL
Status: DISCONTINUED | OUTPATIENT
Start: 2022-05-20 | End: 2022-12-14

## 2022-05-20 RX ADMIN — TRIAMCINOLONE ACETONIDE 40 MG: 40 INJECTION, SUSPENSION INTRA-ARTICULAR; INTRAMUSCULAR at 09:56

## 2022-05-20 RX ADMIN — LIDOCAINE HYDROCHLORIDE 4 ML: 10 INJECTION, SOLUTION INFILTRATION; PERINEURAL at 09:56

## 2022-05-20 NOTE — PATIENT INSTRUCTIONS
1. Chronic right shoulder pain    2. Arthritis of right glenohumeral joint    3. Chronic pain of right knee    4. Primary osteoarthritis of right knee      Ultrasound-guided cortisone injections of the right glenohumeral joint and right knee joint were performed in clinic today without complication.    Please see post-injection instructions below. Try to take it easy for the next 1-2 weeks, then gradually increase activity as tolerated based on pain. You may return to the gym in 2 weeks, increasing activity gradually.    Return to clinic as needed for persistence or worsening of pain.    You may call our direct clinic number ([phone]) at any time with questions or concerns.    Elizabeth Gomez MD, [facility]    JOINT INJECTION AFTERCARE    After any kind of joint injection, it is not uncommon to experience:  - Soreness, swelling or bruising around the injection site.  - Mild numbness, tingling or weakness around the injection site caused by the numbing medicine used before or with the injection.     It is also possible to experience the following effects associated with the specific agent after injection:  - Allergic reaction.  - Increased blood sugar levels for 10-14 days following cortisone injection. If you have diabetes and notice that your blood sugar levels have increased, notify your primary care physician.  - Increased blood pressure levels.  - Mood swings.  - Increased fluid accumulation in the injected joint.    These effects all should resolve within a day or two of your procedure.    Please note that it may take up to 14 days for the steroid (cortisone) medication to start working.     HOME CARE INSTRUCTIONS    - Limit yourself to light activity activity on the day of your procedure. Avoid lifting heavy objects, bending, stooping or twisting.   - You may shower, but please avoid swimming, hot tubs or tub baths for 24 hours following the procedure.   -  Take over-the-counter pain medication (NSAIDS or Tylenol) for pain control after your procedure as needed.    - You may use ice packs for 10-15 minutes 3-4 times per day at the injection site to reduce pain and swelling after your procedure.   + Put ice in a plastic bag  + Place a towel between your skin and the ice bag  + Leave the ice on for no longer than 20 minutes each time to avoid injuring your skin or nerves  + This can be repeated 3-4 times per day for a few days after the injection    SEEK IMMEDIATE MEDICAL CARE IF:    - Pain and swelling get worse rather than better or extend beyond the injection site  - Numbness does not go away after a few hours  - Blood or fluid continues to leak from the injection site  - You experience chest pain  - You have swelling of your face or tongue  - You have trouble breathing or become dizzy  - You develop fever, chills or severe tenderness at the injection site that lasts longer than 1 day    MAKE SURE YOU:    - Understand these instructions  - Watch your condition  - Get help right away if you are not doing well or if you get worse

## 2022-05-20 NOTE — LETTER
5/20/2022         RE: Julia Andre  [id number] [address] [address]        Dear Colleague,    Thank you for referring your patient, Julia Andre, to the [facility] [place]. Please see a copy of my visit note below.    ASSESSMENT & PLAN    1. Chronic right shoulder pain    2. Arthritis of right glenohumeral joint    3. Chronic pain of right knee    4. Primary osteoarthritis of right knee      Ultrasound-guided cortisone injections of the right glenohumeral joint and right knee joint were performed in clinic today without complication.    Please see post-injection instructions below. Try to take it easy for the next 1-2 weeks, then gradually increase activity as tolerated based on pain. You may return to the gym in 2 weeks, increasing activity gradually.    Return to clinic as needed for persistence or worsening of pain.    You may call our direct clinic number ([phone]) at any time with questions or concerns.    Elizabeth Gomez MD, [facility]    -----    SUBJECTIVE:  Julia Andre is a 71 year old female who is seen for US guided right shoulder glenohumeral joint and right intra articular knee cortisone injections.    Large Joint Injection/Arthocentesis: R glenohumeral joint    Date/Time: 5/20/2022 9:56 AM  Performed by: Elizabeth Monteiro MD  Authorized by: Elizabeth Monteiro MD     Indications:  Pain and osteoarthritis  Needle Size:  22 G  Guidance: ultrasound    Approach:  Posterior  Location:  Shoulder      Site:  R glenohumeral joint  Medications:  40 mg triamcinolone 40 MG/ML; 4 mL lidocaine 1 %  Outcome:  Tolerated well, no immediate complications  Procedure discussed: discussed risks, benefits, and alternatives    Consent Given by:  Patient  Timeout: timeout called immediately prior to procedure    Prep: patient was prepped and draped in usual sterile fashion     Ultrasound was used  to ensure safe and accurate needle placement and injection. Ultrasound images of the procedure were permanently stored.    Large Joint Injection/Arthocentesis: R knee joint    Date/Time: 5/20/2022 9:56 AM  Performed by: Elizabeth Monteiro MD  Authorized by: Elizabeth Monteiro MD     Indications:  Pain and osteoarthritis  Needle Size:  25 G  Guidance: ultrasound    Approach:  Anterolateral  Location:  Knee      Medications:  40 mg triamcinolone 40 MG/ML; 4 mL lidocaine 1 %  Outcome:  Tolerated well, no immediate complications  Procedure discussed: discussed risks, benefits, and alternatives    Consent Given by:  Patient  Timeout: timeout called immediately prior to procedure    Prep: patient was prepped and draped in usual sterile fashion     Ultrasound was used to ensure safe and accurate needle placement and injection. Ultrasound images of the procedure were permanently stored.        Patient rates pain as 4-5/10 pre-procedure. Patient rates pain as gradually improving post-procedure.    Elizabeth Gomez MD, [facility]          Again, thank you for allowing me to participate in the care of your patient.        Sincerely,        Elizabeth Gomez MD

## 2022-05-20 NOTE — PROGRESS NOTES
ASSESSMENT & PLAN    1. Chronic right shoulder pain    2. Arthritis of right glenohumeral joint    3. Chronic pain of right knee    4. Primary osteoarthritis of right knee      Ultrasound-guided cortisone injections of the right glenohumeral joint and right knee joint were performed in clinic today without complication.    Please see post-injection instructions below. Try to take it easy for the next 1-2 weeks, then gradually increase activity as tolerated based on pain. You may return to the gym in 2 weeks, increasing activity gradually.    Return to clinic as needed for persistence or worsening of pain.    You may call our direct clinic number ([phone]) at any time with questions or concerns.    Elizabeth Gomez MD, [facility]    -----    SUBJECTIVE:  Julia Andre is a 71 year old female who is seen for US guided right shoulder glenohumeral joint and right intra articular knee cortisone injections.    Large Joint Injection/Arthocentesis: R glenohumeral joint    Date/Time: 5/20/2022 9:56 AM  Performed by: Elizabeth Monteiro MD  Authorized by: Elizabeth Monteiro MD     Indications:  Pain and osteoarthritis  Needle Size:  22 G  Guidance: ultrasound    Approach:  Posterior  Location:  Shoulder      Site:  R glenohumeral joint  Medications:  40 mg triamcinolone 40 MG/ML; 4 mL lidocaine 1 %  Outcome:  Tolerated well, no immediate complications  Procedure discussed: discussed risks, benefits, and alternatives    Consent Given by:  Patient  Timeout: timeout called immediately prior to procedure    Prep: patient was prepped and draped in usual sterile fashion     Ultrasound was used to ensure safe and accurate needle placement and injection. Ultrasound images of the procedure were permanently stored.    Large Joint Injection/Arthocentesis: R knee joint    Date/Time: 5/20/2022 9:56 AM  Performed by: Elizabeth Monteiro MD  Authorized by: Kayley  Elizabeth Gomez MD     Indications:  Pain and osteoarthritis  Needle Size:  25 G  Guidance: ultrasound    Approach:  Anterolateral  Location:  Knee      Medications:  40 mg triamcinolone 40 MG/ML; 4 mL lidocaine 1 %  Outcome:  Tolerated well, no immediate complications  Procedure discussed: discussed risks, benefits, and alternatives    Consent Given by:  Patient  Timeout: timeout called immediately prior to procedure    Prep: patient was prepped and draped in usual sterile fashion     Ultrasound was used to ensure safe and accurate needle placement and injection. Ultrasound images of the procedure were permanently stored.        Patient rates pain as 4-5/10 pre-procedure. Patient rates pain as gradually improving post-procedure.    Elizabeth oGmez MD, [facility]       Zoie Betancourt(Attending)

## 2022-05-31 ENCOUNTER — E-VISIT (OUTPATIENT)
Dept: PEDIATRICS | Facility: CLINIC | Age: 72
End: 2022-05-31
Payer: COMMERCIAL

## 2022-05-31 DIAGNOSIS — R11.0 NAUSEA: Primary | ICD-10-CM

## 2022-05-31 PROCEDURE — 99421 OL DIG E/M SVC 5-10 MIN: CPT | Performed by: INTERNAL MEDICINE

## 2022-05-31 RX ORDER — ONDANSETRON 4 MG/1
4-8 TABLET, ORALLY DISINTEGRATING ORAL EVERY 8 HOURS PRN
Qty: 10 TABLET | Refills: 0 | Status: SHIPPED | OUTPATIENT
Start: 2022-05-31 | End: 2022-12-16

## 2022-06-06 ENCOUNTER — LAB (OUTPATIENT)
Dept: LAB | Facility: CLINIC | Age: 72
End: 2022-06-06
Attending: INTERNAL MEDICINE
Payer: COMMERCIAL

## 2022-06-06 DIAGNOSIS — Z11.59 ENCOUNTER FOR SCREENING FOR OTHER VIRAL DISEASES: ICD-10-CM

## 2022-06-06 PROCEDURE — U0003 INFECTIOUS AGENT DETECTION BY NUCLEIC ACID (DNA OR RNA); SEVERE ACUTE RESPIRATORY SYNDROME CORONAVIRUS 2 (SARS-COV-2) (CORONAVIRUS DISEASE [COVID-19]), AMPLIFIED PROBE TECHNIQUE, MAKING USE OF HIGH THROUGHPUT TECHNOLOGIES AS DESCRIBED BY CMS-2020-01-R: HCPCS

## 2022-06-07 LAB — SARS-COV-2 RNA RESP QL NAA+PROBE: NEGATIVE

## 2022-06-08 ENCOUNTER — HOSPITAL ENCOUNTER (OUTPATIENT)
Facility: CLINIC | Age: 72
Discharge: HOME | End: 2022-06-08
Attending: INTERNAL MEDICINE | Admitting: INTERNAL MEDICINE
Payer: COMMERCIAL

## 2022-06-08 VITALS
OXYGEN SATURATION: 91 % | WEIGHT: 175 LBS | SYSTOLIC BLOOD PRESSURE: 114 MMHG | BODY MASS INDEX: 32.2 KG/M2 | HEART RATE: 64 BPM | DIASTOLIC BLOOD PRESSURE: 60 MMHG | RESPIRATION RATE: 16 BRPM | HEIGHT: 62 IN | TEMPERATURE: 98.3 F

## 2022-06-08 DIAGNOSIS — Z12.11 SPECIAL SCREENING FOR MALIGNANT NEOPLASMS, COLON: Primary | ICD-10-CM

## 2022-06-08 LAB
COLONOSCOPY: NORMAL
GLUCOSE BLDC GLUCOMTR-MCNC: 102 MG/DL (ref 70–99)

## 2022-06-08 PROCEDURE — G0500 MOD SEDAT ENDO SERVICE >5YRS: HCPCS | Performed by: INTERNAL MEDICINE

## 2022-06-08 PROCEDURE — 82962 GLUCOSE BLOOD TEST: CPT

## 2022-06-08 PROCEDURE — 45378 DIAGNOSTIC COLONOSCOPY: CPT | Performed by: INTERNAL MEDICINE

## 2022-06-08 PROCEDURE — G0105 COLORECTAL SCRN; HI RISK IND: HCPCS | Performed by: INTERNAL MEDICINE

## 2022-06-08 PROCEDURE — 250N000011 HC RX IP 250 OP 636: Performed by: INTERNAL MEDICINE

## 2022-06-08 RX ORDER — LIDOCAINE 40 MG/G
CREAM TOPICAL
Status: DISCONTINUED | OUTPATIENT
Start: 2022-06-08 | End: 2022-06-08 | Stop reason: HOSPADM

## 2022-06-08 RX ORDER — FENTANYL CITRATE 0.05 MG/ML
50-100 INJECTION, SOLUTION INTRAMUSCULAR; INTRAVENOUS EVERY 5 MIN PRN
Status: DISCONTINUED | OUTPATIENT
Start: 2022-06-08 | End: 2022-06-08 | Stop reason: HOSPADM

## 2022-06-08 RX ORDER — NALOXONE HYDROCHLORIDE 0.4 MG/ML
0.2 INJECTION, SOLUTION INTRAMUSCULAR; INTRAVENOUS; SUBCUTANEOUS
Status: DISCONTINUED | OUTPATIENT
Start: 2022-06-08 | End: 2022-06-08 | Stop reason: HOSPADM

## 2022-06-08 RX ORDER — SIMETHICONE 40MG/0.6ML
133 SUSPENSION, DROPS(FINAL DOSAGE FORM)(ML) ORAL
Status: DISCONTINUED | OUTPATIENT
Start: 2022-06-08 | End: 2022-06-08 | Stop reason: HOSPADM

## 2022-06-08 RX ORDER — PROCHLORPERAZINE MALEATE 5 MG
5 TABLET ORAL EVERY 6 HOURS PRN
Status: DISCONTINUED | OUTPATIENT
Start: 2022-06-08 | End: 2022-06-08 | Stop reason: HOSPADM

## 2022-06-08 RX ORDER — NALOXONE HYDROCHLORIDE 0.4 MG/ML
0.4 INJECTION, SOLUTION INTRAMUSCULAR; INTRAVENOUS; SUBCUTANEOUS
Status: DISCONTINUED | OUTPATIENT
Start: 2022-06-08 | End: 2022-06-08 | Stop reason: HOSPADM

## 2022-06-08 RX ORDER — ONDANSETRON 2 MG/ML
4 INJECTION INTRAMUSCULAR; INTRAVENOUS EVERY 6 HOURS PRN
Status: DISCONTINUED | OUTPATIENT
Start: 2022-06-08 | End: 2022-06-08 | Stop reason: HOSPADM

## 2022-06-08 RX ORDER — FLUMAZENIL 0.1 MG/ML
0.2 INJECTION, SOLUTION INTRAVENOUS
Status: DISCONTINUED | OUTPATIENT
Start: 2022-06-08 | End: 2022-06-08 | Stop reason: HOSPADM

## 2022-06-08 RX ORDER — ONDANSETRON 2 MG/ML
4 INJECTION INTRAMUSCULAR; INTRAVENOUS
Status: DISCONTINUED | OUTPATIENT
Start: 2022-06-08 | End: 2022-06-08 | Stop reason: HOSPADM

## 2022-06-08 RX ORDER — ATROPINE SULFATE 0.1 MG/ML
1 INJECTION INTRAVENOUS
Status: DISCONTINUED | OUTPATIENT
Start: 2022-06-08 | End: 2022-06-08 | Stop reason: HOSPADM

## 2022-06-08 RX ORDER — DIPHENHYDRAMINE HYDROCHLORIDE 50 MG/ML
25-50 INJECTION INTRAMUSCULAR; INTRAVENOUS
Status: DISCONTINUED | OUTPATIENT
Start: 2022-06-08 | End: 2022-06-08 | Stop reason: HOSPADM

## 2022-06-08 RX ORDER — EPINEPHRINE 1 MG/ML
0.1 INJECTION, SOLUTION INTRAMUSCULAR; SUBCUTANEOUS
Status: DISCONTINUED | OUTPATIENT
Start: 2022-06-08 | End: 2022-06-08 | Stop reason: HOSPADM

## 2022-06-08 RX ORDER — ONDANSETRON 4 MG/1
4 TABLET, ORALLY DISINTEGRATING ORAL EVERY 6 HOURS PRN
Status: DISCONTINUED | OUTPATIENT
Start: 2022-06-08 | End: 2022-06-08 | Stop reason: HOSPADM

## 2022-06-08 RX ADMIN — MIDAZOLAM 1 MG: 1 INJECTION INTRAMUSCULAR; INTRAVENOUS at 08:07

## 2022-06-08 RX ADMIN — FENTANYL CITRATE 50 MCG: 50 INJECTION INTRAMUSCULAR; INTRAVENOUS at 08:03

## 2022-06-08 RX ADMIN — MIDAZOLAM 1 MG: 1 INJECTION INTRAMUSCULAR; INTRAVENOUS at 08:03

## 2022-06-08 RX ADMIN — FENTANYL CITRATE 50 MCG: 50 INJECTION INTRAMUSCULAR; INTRAVENOUS at 08:06

## 2022-06-08 NOTE — DISCHARGE INSTRUCTIONS
The patient has received a copy of the Provation  report the doctor has written and discharge instructions have been discussed with the patient and responsible adult.  All questions were addressed and answered prior to patient discharge.    Understanding Diverticulosis and Diverticulitis     Pouches or diverticula usually occur in the lower part of the colon called the sigmoid.      Diverticulitis occurs when the pouches become inflamed.     The colon (large intestine) is the last part of the digestive tract. It absorbs water from stool and changes it from a liquid to a solid. In certain cases, small pouches called diverticula can form in the colon wall. This condition is called diverticulosis. The pouches can become infected. If this happens, it becomes a more serious problem called diverticulitis. These problems can be painful. But they can be managed.   Managing Your Condition  Diet changes or taking medications are often tried first. These may be enough to bring relief. If the case is bad, surgery may be done. You and your doctor can discuss the plan that is best for you.  If You Have Diverticulosis  Diet changes are often enough to control symptoms. The main changes are adding fiber (roughage) and drinking more water. Fiber absorbs water as it travels through your colon. This helps your stool stay soft and move smoothly. Water helps this process. If needed, you may be told to take over-the-counter stool softeners. To help relieve pain, antispasmodic medications may be prescribed.  If You Have Diverticulitis  Treatment depends on how bad your symptoms are.  For mild symptoms: You may be put on a liquid diet for a short time. You may also be prescribed antibiotics. If these two steps relieve your symptoms, you may then be prescribed a high-fiber diet. If you still have symptoms, your doctor will discuss further treatment options with you.  For severe symptoms: You may need to be admitted to the hospital. There,  you can be given IV antibiotics and fluids. Once symptoms are under control, the above treatments may be tried. If these don t control your condition, your doctor may discuss the option of having surgery with you.  Toad Hop to Colon Health  Help keep your colon healthy with a diet that includes plenty of high-fiber fruits, vegetables, and whole grains. Drink plenty of liquids like water and juice. Your doctor may also recommend avoiding seeds and nuts.          [id number] [facility], [address] [place] [address], [place] [address]. All rights reserved. This information is not intended as a substitute for professional medical care. Always follow your healthcare professional's instructions.

## 2022-06-08 NOTE — LETTER
May 24, 2022      Julia Andre  [id number] [facility] [address] [facility] [address]      Thank you for choosing [facility] [place] [facility]. You are scheduled for the following service(s).   Please be aware that coverage of these services is subject to the terms and limitations of your health insurance plan.  Call member services at your health plan with any benefit or coverage questions.  Date:  6/08/2022       Procedure: COLONOSCOPY  Doctor:   Dr. Pena         Arrival Time:  8:45 am  *Enter and check in at the Main Hospital Entrance  Procedure Time:  9:30 am    Location:   [facility]        Endoscopy Department, First Floor *         [address] [place], [place] [address]      [phone] or [phone] ([facility]) to reschedule        2- Day NuLYTELY  DIVIDED PREP  Colonoscopy is the most accurate test to detect colon polyps and colon cancer; and the only test where polyps can be removed. During this procedure, a doctor examines the lining of your large intestine and rectum through a flexible tube.           Transportation  You must arrange for a ride for the day of your procedure with a responsible adult. A taxi , Uber, etc, is not an option unless you are accompanied by a responsible adult. If you fail to arrange transportation with a responsible adult, your procedure will be cancelled and rescheduled.    We have sent your prescription to the pharmacy we have on file. Please call our office at [phone] if you have questions.    COLONOSCOPY PRE-PROCEDURE CHECKLIST  If you have diabetes, ask your regular doctor for diet and medication restrictions.  If you take an anticoagulant or anti-platelet medication (such as Coumadin , Lovenox , Pradaxa , Xarelto , Eliquis , etc.), please call your primary doctor for advice on holding this medication.  If you take aspirin you may continue to do so.  If you are or may be pregnant, please discuss the risks and benefits of this  procedure with your doctor.    PREPARATION FOR COLONOSCOPY    7 days before:    Discontinue fiber supplements and medications containing iron. This includes Metamucil  and Fibercon ; and multivitamins with iron.  4 days before:    Begin a low-fiber diet. A low-fiber diet helps making the cleanout more effective. For additional details on low-fiber diet, please refer to the table on the last page.  3 days before:    Continue the low-fiber diet.     Drink at least 8 glasses of water throughout the day.     Stop eating solid foods at 11:45 pm.  2 days before:    In the morning: begin a clear liquid diet (liquids you can see through).     Examples of a clear liquid diet include: water, clear broth or bouillon, Gatorade, Pedialyte or Powerade, carbonated and non-carbonated soft drinks (Sprite , 7-Up , ginger ale), strained fruit juices without pulp (apple, white grape, white cranberry), Jell-O  and popsicles.     The following are not allowed on a clear liquid diet: red liquids, alcoholic beverages,  dairy products (milk, creamer, and yogurt), protein shakes,  juice with pulp and chewing tobacco.    At 4pm: drink 1 (one) 8 oz glass of NuLYTELY  solution every 15 minutes (approximately 8 glasses of 8 oz or half the bottle). Keep the solution refrigerated. Do not drink any other liquids while you are drinking the NuLYTELY  solution.    Over the course of the evening, drink an additional   liter of clear liquids and continue clear liquid diet.  1 Day before: Continue a clear liquid diet.              At 8:00am: drink 1 (one) 8 oz glass of NuLYTELY  solution every 15 minutes (approximately 8 glasses of 8 oz or half the bottle). Keep the solution refrigerated. Do not drink any other liquids while you are drinking the NuLYTELY  solution.                At 4:00pm drink 1 (one) 8 oz glass of NuLYTELY  solution every 15 minutes (approximately 8 glasses of 8 oz or half the bottle). Keep the solution refrigerated. Do not drink any  other liquids while you are drinking the NuLYTELY  solution.    Day of procedure:    6 hours before the procedure: drink 1 (one) 8 oz glass of NuLYTELY  solution every 15 minutes until the last half of the bottle (approximately 8 glasses of 8 oz) is gone. Keep the solution refrigerated. Do not drink any other liquids while you are drinking the NuLYTELY  solution. After that, you may continue the clear liquid diet until 4 hours before the procedure.  * If the color of the liquid from your colon is completely clear yellow, you may stop drinking the solution. It must be completely clear.    You may take all of your morning medications including blood pressure medications, methadone, and anti-seizure medications with sips of water 4 hours prior to your procedure or earlier. Do not take insulin, blood thinners (unless specifically told to do so by your primary care provider), or vitamins prior to your procedure.       4 hours prior to your procedure:   o STOP consuming all liquids   o Do not take anything by mouth during this time.   o Allow extra time to travel to your procedure as you may need to stop and use a restroom along the way.  You are ready for the procedure, if you followed all instructions and your stool is no longer formed, but clear or yellow liquid. If you are unsure whether your colon is clean, please call our office at [phone] before you leave for your appointment.    COLON CLEANSING TIPS: drink adequate amounts of fluids before and after your colon cleansing to prevent dehydration. Stay near a toilet because you will have diarrhea. Even if you are sitting on the toilet, continue to drink the cleansing solution every 15 minutes. If you feel nauseous or vomit, rinse your mouth with water, take a 15 to 30-minute-break and then continue drinking the solution. You will be uncomfortable until the stool has flushed from your colon (in about 2 to 4 hours). You may feel chilled.    Bring the following to  your procedure:  - Insurance Card/Photo ID.   - List of current medications including over-the-counter medications and supplements.   - Your rescue inhaler if you currently use one to control asthma.    Canceling or rescheduling your appointment:   If you must cancel or reschedule your appointment, please call [phone] as soon as possible. If you are canceling with 24 hours please call [phone]    What happens during a colonoscopy?    Plan to spend up to two hours, starting at registration time, at the endoscopy center the day of your procedure. The colonoscopy takes an average of 15 to 30 minutes. Recovery time is about 30 minutes.    Before the exam:    You will change into a gown.    Your medical history and medication list will be reviewed with you, unless that has been done over the phone prior to the procedure.     A nurse will insert an intravenous (IV) line into your hand or arm.    The doctor will meet with you and will give you a consent form to sign.    During the exam:     Medicine will be given through the IV line to help you relax.     Your heart rate and oxygen levels will be monitored. If your blood pressure is low, you may be given fluids through the IV line.     The doctor will insert a flexible hollow tube, called a colonoscope, into your rectum. The scope will be advanced slowly through the large intestine (colon).    You may have a feeling of fullness or pressure.     If an abnormal tissue or a polyp is found, the doctor may remove it through the endoscope for closer examination, or biopsy. Tissue removal is painless.    After the exam:           Any tissue samples removed during the exam will be sent to a lab for evaluation. It may take 5-7 working days for you to be notified of the results.     A nurse will provide you with complete discharge instructions before you leave the endoscopy center. Be sure to ask the nurse for specific instructions if you take blood thinners such as  Aspirin, Coumadin or Plavix.     The doctor will prepare a full report for you and for the physician who referred you for the procedure.     Your doctor will talk with you about the initial results of your exam.      Medication given during the exam will prohibit you from driving for the rest of the day.     Following the exam, you may resume your normal diet. Your first meal should be light, no greasy foods. Avoid alcohol until the next day.     You may resume your regular activities the day after the procedure.     LOW-FIBER DIET  Foods RECOMMENDED Foods to AVOID   Breads, Cereal, Rice and Pasta:   White bread, rolls, biscuits, croissant and ty toast.   Waffles, Turkmen toast and pancakes.   White rice, noodles, pasta, macaroni and peeled cooked potatoes.   Plain crackers and saltines.   Cooked cereals: farina, cream of rice.   Cold cereals: Puffed Rice , Rice Krispies , Corn Flakes  and Special K    Breads, Cereal, Rice and Pasta:   Breads or rolls with nuts, seeds or fruit.   Whole wheat, pumpernickel, rye breads and cornbread.   Potatoes with skin, brown or wild rice, and kasha (buckwheat).     Vegetables:   Tender cooked and canned vegetables without seeds: carrots, asparagus tips, green or wax beans, pumpkin, spinach, lima beans. Vegetables:   Raw or steamed vegetables.   Vegetables with seeds.   Sauerkraut.   Winter squash, peas, broccoli, Brussel sprouts, cabbage, onions, cauliflower, baked beans, peas and corn.   Fruits:   Strained fruit juice.   Canned fruit, except pineapple.   Ripe bananas and melon. Fruits:   Prunes and prune juice.   Raw fruits.   Dried fruits: figs, dates and raisins.   Milk/Dairy:   Milk: plain or flavored.   Yogurt, custard and ice cream.   Cheese and cottage cheese Milk/Dairy:     Meat and other proteins:   ground, well-cooked tender beef, lamb, ham, veal, pork, fish, poultry and organ meats.   Eggs.   Peanut butter without nuts. Meat and other proteins:   Tough, fibrous meats  with gristle.   Dry beans, peas and lentils.   Peanut butter with nuts.   Tofu.   Fats, Snack, Sweets, Condiments and Beverages:   Margarine, butter, oils, mayonnaise, sour cream and salad dressing, plain gravy.   Sugar, hard candy, clear jelly, honey and syrup.   Spices, cooked herbs, bouillon, broth and soups made with allowed vegetable, ketchup and mustard.   Coffee, tea and carbonated drinks.   Plain cakes, cookies and pretzels.   Gelatin, plain puddings, custard, ice cream, sherbet and popsicles. Fats, Snack, Sweets, Condiments and Beverages:   Nuts, seeds and coconut.   Jam, marmalade and preserves.   Pickles, olives, relish and horseradish.   All desserts containing nuts, seeds, dried fruit and coconut; or made from whole grains or bran.   Candy made with nuts or seeds.   Popcorn.     DIRECTIONS TO THE ENDOSCOPY DEPARTMENT    From the north ([facility])  Take [address] South, exit on [address] [address]. Get into the left hand jeremy, turn left (east), go one-half mile to [address] and turn left. Take [address] to the Main Entrance  From the south ([facility])  Take [address]N to the [address]E split and exit on [address] [address]. On [address] [address], turn left (west) to [address]. Turn right (north) on [address]. Go north to the second  stoplight, take a right and follow [address] to the Main Entrance.  From the east via 35E ([facility])  Take [address] south to [address] [address] exit. Turn right on [address] [address]. Go west to [address]. Turn right (north) on [address]. Go to the first stoplight, take a right on [address]  to the Main Entrance   From the east via [address] ([facility])ollow on [address]  to the Main Entrance  Take [address] to [address]. Turn left (south) on [address] to [place]. Turn left (east) on on [address]  to the Main Entrance  From the west via [address] ([place], [place])  Take [address] east to [address]. Turn right  (south) on [address] to [facility] [facility]. Turn left (east) on [address]  to the Main Entrance

## 2022-06-08 NOTE — LETTER
May 24, 2022      Julia Andre  [address] [facility] [address]      Thank you for choosing [facility]. You are scheduled for the following service(s).   Please be aware that coverage of these services is subject to the terms and limitations of your health insurance plan.  Call member services at your health plan with any benefit or coverage questions.  Date:  6/08/2022       Procedure: COLONOSCOPY  Doctor:   Dr. Pena         Arrival Time:  8:45 am  *Enter and check in at the Main Hospital Entrance  Procedure Time:  9:30 am    Location:   [facility]        Endoscopy Department, First Floor *         [address] [place], [place] [address]      [phone] or [phone] ([facility]) to reschedule        2- Day NuLYTELY  DIVIDED PREP  Colonoscopy is the most accurate test to detect colon polyps and colon cancer; and the only test where polyps can be removed. During this procedure, a doctor examines the lining of your large intestine and rectum through a flexible tube.           Transportation  You must arrange for a ride for the day of your procedure with a responsible adult. A taxi , Uber, etc, is not an option unless you are accompanied by a responsible adult. If you fail to arrange transportation with a responsible adult, your procedure will be cancelled and rescheduled.    We have sent your prescription to the pharmacy we have on file. Please call our office at [phone] if you have questions.    COLONOSCOPY PRE-PROCEDURE CHECKLIST  If you have diabetes, ask your regular doctor for diet and medication restrictions.  If you take an anticoagulant or anti-platelet medication (such as Coumadin , Lovenox , Pradaxa , Xarelto , Eliquis , etc.), please call your primary doctor for advice on holding this medication.  If you take aspirin you may continue to do so.  If you are or may be pregnant, please discuss the risks and benefits of this  procedure with your doctor.    PREPARATION FOR COLONOSCOPY    7 days before:    Discontinue fiber supplements and medications containing iron. This includes Metamucil  and Fibercon ; and multivitamins with iron.  4 days before:    Begin a low-fiber diet. A low-fiber diet helps making the cleanout more effective. For additional details on low-fiber diet, please refer to the table on the last page.  3 days before:    Continue the low-fiber diet.     Drink at least 8 glasses of water throughout the day.     Stop eating solid foods at 11:45 pm.  2 days before:    In the morning: begin a clear liquid diet (liquids you can see through).     Examples of a clear liquid diet include: water, clear broth or bouillon, Gatorade, Pedialyte or Powerade, carbonated and non-carbonated soft drinks (Sprite , 7-Up , ginger ale), strained fruit juices without pulp (apple, white grape, white cranberry), Jell-O  and popsicles.     The following are not allowed on a clear liquid diet: red liquids, alcoholic beverages,  dairy products (milk, creamer, and yogurt), protein shakes,  juice with pulp and chewing tobacco.    At 4pm: drink 1 (one) 8 oz glass of NuLYTELY  solution every 15 minutes (approximately 8 glasses of 8 oz or half the bottle). Keep the solution refrigerated. Do not drink any other liquids while you are drinking the NuLYTELY  solution.    Over the course of the evening, drink an additional   liter of clear liquids and continue clear liquid diet.  1 Day before: Continue a clear liquid diet.              At 8:00am: drink 1 (one) 8 oz glass of NuLYTELY  solution every 15 minutes (approximately 8 glasses of 8 oz or half the bottle). Keep the solution refrigerated. Do not drink any other liquids while you are drinking the NuLYTELY  solution.                At 4:00pm drink 1 (one) 8 oz glass of NuLYTELY  solution every 15 minutes (approximately 8 glasses of 8 oz or half the bottle). Keep the solution refrigerated. Do not drink any  other liquids while you are drinking the NuLYTELY  solution.    Day of procedure:    6 hours before the procedure: drink 1 (one) 8 oz glass of NuLYTELY  solution every 15 minutes until the last half of the bottle (approximately 8 glasses of 8 oz) is gone. Keep the solution refrigerated. Do not drink any other liquids while you are drinking the NuLYTELY  solution. After that, you may continue the clear liquid diet until 4 hours before the procedure.  * If the color of the liquid from your colon is completely clear yellow, you may stop drinking the solution. It must be completely clear.    You may take all of your morning medications including blood pressure medications, methadone, and anti-seizure medications with sips of water 4 hours prior to your procedure or earlier. Do not take insulin, blood thinners (unless specifically told to do so by your primary care provider), or vitamins prior to your procedure.       4 hours prior to your procedure:   o STOP consuming all liquids   o Do not take anything by mouth during this time.   o Allow extra time to travel to your procedure as you may need to stop and use a restroom along the way.  You are ready for the procedure, if you followed all instructions and your stool is no longer formed, but clear or yellow liquid. If you are unsure whether your colon is clean, please call our office at [phone] before you leave for your appointment.    COLON CLEANSING TIPS: drink adequate amounts of fluids before and after your colon cleansing to prevent dehydration. Stay near a toilet because you will have diarrhea. Even if you are sitting on the toilet, continue to drink the cleansing solution every 15 minutes. If you feel nauseous or vomit, rinse your mouth with water, take a 15 to 30-minute-break and then continue drinking the solution. You will be uncomfortable until the stool has flushed from your colon (in about 2 to 4 hours). You may feel chilled.    Bring the following to  your procedure:  - Insurance Card/Photo ID.   - List of current medications including over-the-counter medications and supplements.   - Your rescue inhaler if you currently use one to control asthma.    Canceling or rescheduling your appointment:   If you must cancel or reschedule your appointment, please call [phone] as soon as possible. If you are canceling with 24 hours please call [phone]    What happens during a colonoscopy?    Plan to spend up to two hours, starting at registration time, at the endoscopy center the day of your procedure. The colonoscopy takes an average of 15 to 30 minutes. Recovery time is about 30 minutes.    Before the exam:    You will change into a gown.    Your medical history and medication list will be reviewed with you, unless that has been done over the phone prior to the procedure.     A nurse will insert an intravenous (IV) line into your hand or arm.    The doctor will meet with you and will give you a consent form to sign.    During the exam:     Medicine will be given through the IV line to help you relax.     Your heart rate and oxygen levels will be monitored. If your blood pressure is low, you may be given fluids through the IV line.     The doctor will insert a flexible hollow tube, called a colonoscope, into your rectum. The scope will be advanced slowly through the large intestine (colon).    You may have a feeling of fullness or pressure.     If an abnormal tissue or a polyp is found, the doctor may remove it through the endoscope for closer examination, or biopsy. Tissue removal is painless.    After the exam:           Any tissue samples removed during the exam will be sent to a lab for evaluation. It may take 5-7 working days for you to be notified of the results.     A nurse will provide you with complete discharge instructions before you leave the endoscopy center. Be sure to ask the nurse for specific instructions if you take blood thinners such as  Aspirin, Coumadin or Plavix.     The doctor will prepare a full report for you and for the physician who referred you for the procedure.     Your doctor will talk with you about the initial results of your exam.      Medication given during the exam will prohibit you from driving for the rest of the day.     Following the exam, you may resume your normal diet. Your first meal should be light, no greasy foods. Avoid alcohol until the next day.     You may resume your regular activities the day after the procedure.     LOW-FIBER DIET  Foods RECOMMENDED Foods to AVOID   Breads, Cereal, Rice and Pasta:   White bread, rolls, biscuits, croissant and ty toast.   Waffles, Spanish toast and pancakes.   White rice, noodles, pasta, macaroni and peeled cooked potatoes.   Plain crackers and saltines.   Cooked cereals: farina, cream of rice.   Cold cereals: Puffed Rice , Rice Krispies , Corn Flakes  and Special K    Breads, Cereal, Rice and Pasta:   Breads or rolls with nuts, seeds or fruit.   Whole wheat, pumpernickel, rye breads and cornbread.   Potatoes with skin, brown or wild rice, and kasha (buckwheat).     Vegetables:   Tender cooked and canned vegetables without seeds: carrots, asparagus tips, green or wax beans, pumpkin, spinach, lima beans. Vegetables:   Raw or steamed vegetables.   Vegetables with seeds.   Sauerkraut.   Winter squash, peas, broccoli, Brussel sprouts, cabbage, onions, cauliflower, baked beans, peas and corn.   Fruits:   Strained fruit juice.   Canned fruit, except pineapple.   Ripe bananas and melon. Fruits:   Prunes and prune juice.   Raw fruits.   Dried fruits: figs, dates and raisins.   Milk/Dairy:   Milk: plain or flavored.   Yogurt, custard and ice cream.   Cheese and cottage cheese Milk/Dairy:     Meat and other proteins:   ground, well-cooked tender beef, lamb, ham, veal, pork, fish, poultry and organ meats.   Eggs.   Peanut butter without nuts. Meat and other proteins:   Tough, fibrous meats  with gristle.   Dry beans, peas and lentils.   Peanut butter with nuts.   Tofu.   Fats, Snack, Sweets, Condiments and Beverages:   Margarine, butter, oils, mayonnaise, sour cream and salad dressing, plain gravy.   Sugar, hard candy, clear jelly, honey and syrup.   Spices, cooked herbs, bouillon, broth and soups made with allowed vegetable, ketchup and mustard.   Coffee, tea and carbonated drinks.   Plain cakes, cookies and pretzels.   Gelatin, plain puddings, custard, ice cream, sherbet and popsicles. Fats, Snack, Sweets, Condiments and Beverages:   Nuts, seeds and coconut.   Jam, marmalade and preserves.   Pickles, olives, relish and horseradish.   All desserts containing nuts, seeds, dried fruit and coconut; or made from whole grains or bran.   Candy made with nuts or seeds.   Popcorn.     DIRECTIONS TO THE ENDOSCOPY DEPARTMENT    From the north ([facility])  Take [address] South, exit on [address] [address]. Get into the left hand jeremy, turn left (east), go one-half mile to [address] and turn left. Take [address] to the Main Entrance  From the south ([facility])  Take 35N to the 35E split and exit on [address] [address]. On [address] [address], turn left (west) to [address]. Turn right (north) on [address]. Go north to the second  stoplight, take a right and follow [address] to the Main Entrance.  From the east via 35E ([facility])  Take [address]E south to [address] [address] exit. Turn right on [address] [address]. Go west to [address]. Turn right (north) on [address]. Go to the first stoplight, take a right on [address]  to the Main Entrance   From the east via [address] ([facility])ollow on [address]  to the Main Entrance  Take [address] to [address]. Turn left (south) on [address] to [address]. Turn left (east) on on [address]  to the Main Entrance  From the west via [address] ([place], [place])  Take [address] east to [address]. Turn right  (south) on [address] to [place]. Turn left (east) on [address]  to the Main Entrance

## 2022-06-08 NOTE — LETTER
February 22, 2022      Julia Andre  [address] [facility] [address]        Dear Julia,     Please be aware that coverage of these services is subject to the terms and limitations of your health insurance plan.  Call member services at your health plan with any benefit or coverage questions.    Thank you for choosing [facility]. You are scheduled for the following service(s):    Date:  3/29/2022            Procedure:  COLONOSCOPY  Doctor:        Jean   Arrival Time:  8:45 am *Enter and check in at the Main Hospital Entrance*  Procedure Time:  9:30 am     Location:   [facility]        Endoscopy Department, First Floor         [address] [place], [place] [address]      [phone] or [phone] ([facility]) to reschedule        MIRALAX -GATORADE  PREP  Colonoscopy is the most accurate test to detect colon polyps and colon cancer; and the only test where polyps can be removed. During this procedure, a doctor examines the lining of your large intestine and rectum through a flexible tube.   Transportation  You must arrange for a ride for the day of your procedure with a responsible adult. A taxi , Uber, etc, is not an option unless you are accompanied by a responsible adult. If you fail to arrange transportation with a responsible adult, your procedure will be cancelled and rescheduled.    Purchase the  following supplies at your local pharmacy:  - 2 (two) bisacodyl tablets: each tablet contains 5 mg.  (Dulcolax  laxative NOT Dulcolax  stool softener)   - 1 (one) 8.3 oz bottle of Polyethylene Glycol (PEG) 3350 Powder   (MiraLAX , Smooth LAX , ClearLAX  or equivalent)  - 64 oz Gatorade    Regular Gatorade, Gatorade G2 , Powerade , Powerade Zero  or Pedialyte  is acceptable. Red colored flavors are not allowed; all other colors (yellow, green, orange, purple and blue) are okay. It is also okay to buy two 2.12 oz packets of powdered Gatorade that  can be mixed with water to a total volume of 64 oz of liquid.  - 1 (one) 10 oz bottle of Magnesium Citrate (Red colored flavors are not allowed)  It is also okay for you to use a 0.5 oz package of powdered magnesium citrate (17 g) mixed with 10 oz of water.      PREPARATION FOR COLONOSCOPY    7 days before:    Discontinue fiber supplements and medications containing iron. This includes Metamucil  and Fibercon ; and multivitamins with iron.    3 days before:    Begin a low-fiber diet. A low-fiber diet helps making the cleanout more effective.     Examples of a low-fiber diet include (but are not limited to): white bread, white rice, pasta, crackers, fish, chicken, eggs, ground beef, creamy peanut butter, cooked/steamed/boiled vegetables, canned fruit, bananas, melons, milk, plain yogurt cheese, salad dressing and other condiments.     The following are not allowed on a low-fiber diet: seeds, nuts, popcorn, bran, whole wheat, corn, quinoa, raw fruits and vegetables, berries and dried fruit, beans and lentils.    For additional details on low-fiber diet, please refer to the table on the last page.    2 days before:    Continue the low-fiber diet.     Drink at least 8 glasses of water throughout the day.     Stop eating solid foods at 11:45 pm.    1 day before:    In the morning: begin a clear liquid diet (liquids you can see through).     Examples of a clear liquid diet include: water, clear broth or bouillon, Gatorade, Pedialyte or Powerade, carbonated and non-carbonated soft drinks (Sprite , 7-Up , ginger ale), strained fruit juices without pulp (apple, white grape, white cranberry), Jell-O  and popsicles.     The following are not allowed on a clear liquid diet: red liquids, alcoholic beverages, dairy products (milk, creamer, and yogurt), protein shakes, creamy broths, juice with pulp and chewing tobacco.    At noon: take 2 (two) bisacodyl tablets     At 4 (and no later than 6pm): start drinking the Miralax-Gatorade  preparation (8.3 oz of Miralax mixed with 64 oz of Gatorade in a large pitcher). Drink 1(one) 8 oz glass every 15 minutes thereafter, until the mixture is gone.    COLON CLEANSING TIPS: drink adequate amounts of fluids before and after your colon cleansing to prevent dehydration. Stay near a toilet because you will have diarrhea. Even if you are sitting on the toilet, continue to drink the cleansing solution every 15 minutes. If you feel nauseous or vomit, rinse your mouth with water, take a 15 to 30-minute-break and then continue drinking the solution. You will be uncomfortable until the stool has flushed from your colon (in about 2 to 4 hours). You may feel chilled.    Day of your procedure  You may take all of your morning medications including blood pressure medications, blood thinners (if you have not been instructed to stop these by our office), methadone, anti-seizure medications with sips of water 3 hours prior to your procedure or earlier. Do not take insulin or vitamins prior to your procedure. Continue the clear liquid diet.       4 hours prior: drink 10 oz of magnesium citrate. It may be easier to drink it with a straw.    STOP consuming all liquids after that.     Do not take anything by mouth during this time.     Allow extra time to travel to your procedure as you may need to stop and use a restroom along the way.    You are ready for the procedure, if you followed all instructions and your stool is no longer formed, but clear or yellow liquid. If you are unsure whether your colon is clean, please call our office at [phone] before you leave for your appointment.    Bring the following to your procedure:  - Insurance Card/Photo ID.   - List of current medications including over-the-counter medications and supplements.   - Your rescue inhaler if you currently use one to control asthma.    Canceling or rescheduling your appointment:   If you must cancel or reschedule your appointment, please call  [phone] as soon as possible.      COLONOSCOPY PRE-PROCEDURE CHECKLIST    If you have diabetes, ask your regular doctor for diet and medication restrictions.  If you take an anticoagulant or anti-platelet medication (such as Coumadin , Lovenox , Pradaxa , Xarelto , Eliquis , etc.), please call your primary doctor for advice on holding this medication.  If you take aspirin you may continue to do so.  If you are or may be pregnant, please discuss the risks and benefits of this procedure with your doctor.        What happens during a colonoscopy?    Plan to spend up to two hours, starting at registration time, at the endoscopy center the day of your procedure. The colonoscopy takes an average of 15 to 30 minutes. Recovery time is about 30 minutes.      Before the exam:    You will change into a gown.    Your medical history and medication list will be reviewed with you, unless that has been done over the phone prior to the procedure.     A nurse will insert an intravenous (IV) line into your hand or arm.    The doctor will meet with you and will give you a consent form to sign.  During the exam:     Medicine will be given through the IV line to help you relax.     Your heart rate and oxygen levels will be monitored. If your blood pressure is low, you may be given fluids through the IV line.     The doctor will insert a flexible hollow tube, called a colonoscope, into your rectum. The scope will be advanced slowly through the large intestine (colon).    You may have a feeling of fullness or pressure.     If an abnormal tissue or a polyp is found, the doctor may remove it through the endoscope for closer examination, or biopsy. Tissue removal is painless    After the exam:           Any tissue samples removed during the exam will be sent to a lab for evaluation. It may take 5-7 working days for you to be notified of the results.     A nurse will provide you with complete discharge instructions before you leave the  endoscopy center. Be sure to ask the nurse for specific instructions if you take blood thinners such as Aspirin, Coumadin or Plavix.     The doctor will prepare a full report for you and for the physician who referred you for the procedure.     Your doctor will talk with you about the initial results of your exam.      Medication given during the exam will prohibit you from driving for the rest of the day.     Following the exam, you may resume your normal diet. Your first meal should be light, no greasy foods. Avoid alcohol until the next day.     You may resume your regular activities the day after the procedure.         LOW-FIBER DIET    Foods RECOMMENDED Foods to AVOID   Breads, Cereal, Rice and Pasta:   White bread, rolls, biscuits, croissant and ty toast.   Waffles, Kosovan toast and pancakes.   White rice, noodles, pasta, macaroni and peeled cooked potatoes.   Plain crackers and saltines.   Cooked cereals: farina, cream of rice.   Cold cereals: Puffed Rice , Rice Krispies , Corn Flakes  and Special K    Breads, Cereal, Rice and Pasta:   Breads or rolls with nuts, seeds or fruit.   Whole wheat, pumpernickel, rye breads and cornbread.   Potatoes with skin, brown or wild rice, and kasha (buckwheat).     Vegetables:   Tender cooked and canned vegetables without seeds: carrots, asparagus tips, green or wax beans, pumpkin, spinach, lima beans. Vegetables:   Raw or steamed vegetables.   Vegetables with seeds.   Sauerkraut.   Winter squash, peas, broccoli, Brussel sprouts, cabbage, onions, cauliflower, baked beans, peas and corn.   Fruits:   Strained fruit juice.   Canned fruit, except pineapple.   Ripe bananas and melon. Fruits:   Prunes and prune juice.   Raw fruits.   Dried fruits: figs, dates and raisins.   Milk/Dairy:   Milk: plain or flavored.   Yogurt, custard and ice cream.   Cheese and cottage cheese Milk/Dairy:     Meat and other proteins:   ground, well-cooked tender beef, lamb, ham, veal, pork, fish,  poultry and organ meats.   Eggs.   Peanut butter without nuts. Meat and other proteins:   Tough, fibrous meats with gristle.   Dry beans, peas and lentils.   Peanut butter with nuts.   Tofu.   Fats, Snack, Sweets, Condiments and Beverages:   Margarine, butter, oils, mayonnaise, sour cream and salad dressing, plain gravy.   Sugar, hard candy, clear jelly, honey and syrup.   Spices, cooked herbs, bouillon, broth and soups made with allowed vegetable, ketchup and mustard.   Coffee, tea and carbonated drinks.   Plain cakes, cookies and pretzels.   Gelatin, plain puddings, custard, ice cream, sherbet and popsicles. Fats, Snack, Sweets, Condiments and Beverages:   Nuts, seeds and coconut.   Jam, marmalade and preserves.   Pickles, olives, relish and horseradish.   All desserts containing nuts, seeds, dried fruit and coconut; or made from whole grains or bran.   Candy made with nuts or seeds.   Popcorn.     DIRECTIONS TO THE ENDOSCOPY DEPARTMENT  From the north ([facility])  Take [address] South, exit on [address] [address]. Get into the left hand jeremy, turn left (east), go one-half mile to [address] and turn left. Go north to the second stoplight, take a right on [address] [place] and follow it to the Main Hospital entrance.  From the south ([facility])  Take 35N to the 35E split and exit on [address] [address]. On [address] [address], turn left (west) to [address]. Turn right (north) on [address]. Go north to the second stoplight, take a right on [address] [place] and follow it to the Main Hospital entrance.  From the east via 35E ([facility])  Take 35E south to [address] [address] exit. Turn right on [address] [address]. Go west to [address]. Turn right (north) on [address]. Go to the second stoplight, take a right on [address] [place] to the Main Hospital entrance.  From the east via [address] ([facility])  Take [address] to [address]. Turn left (south) on  [address] to [address], turn left (east) on [address] and follow it to the Main Hospital entrance.    From the west via [address] ([place], [place])  Take [address] east to [address]. Turn right (south) on [address] to [address], turn left (east) on [address] and follow it to the Main Hospital entrance.

## 2022-06-08 NOTE — LETTER
May 24, 2022      Julia Andre  [address] [facility] [address]      Thank you for choosing [facility]. You are scheduled for the following service(s).   Please be aware that coverage of these services is subject to the terms and limitations of your health insurance plan.  Call member services at your health plan with any benefit or coverage questions.  Date:  ***       Procedure: COLONOSCOPY  Doctor:   ***         Arrival Time:  ***   *Enter and check in at the Main Hospital Entrance  Procedure Time:  ***    Location:   [facility]        Endoscopy Department, First Floor *         [address] [place], [place] [address]      [phone] or [phone] () to reschedule        2- Day NuLYTELY  DIVIDED PREP  Colonoscopy is the most accurate test to detect colon polyps and colon cancer; and the only test where polyps can be removed. During this procedure, a doctor examines the lining of your large intestine and rectum through a flexible tube.           Transportation  You must arrange for a ride for the day of your procedure with a responsible adult. A taxi , Uber, etc, is not an option unless you are accompanied by a responsible adult. If you fail to arrange transportation with a responsible adult, your procedure will be cancelled and rescheduled.    We have sent your prescription to the pharmacy we have on file. Please call our office at [phone] if you have questions.    COLONOSCOPY PRE-PROCEDURE CHECKLIST  If you have diabetes, ask your regular doctor for diet and medication restrictions.  If you take an anticoagulant or anti-platelet medication (such as Coumadin , Lovenox , Pradaxa , Xarelto , Eliquis , etc.), please call your primary doctor for advice on holding this medication.  If you take aspirin you may continue to do so.  If you are or may be pregnant, please discuss the risks and benefits of this procedure with your  doctor.    PREPARATION FOR COLONOSCOPY    7 days before:    Discontinue fiber supplements and medications containing iron. This includes Metamucil  and Fibercon ; and multivitamins with iron.  4 days before:    Begin a low-fiber diet. A low-fiber diet helps making the cleanout more effective. For additional details on low-fiber diet, please refer to the table on the last page.  3 days before:    Continue the low-fiber diet.     Drink at least 8 glasses of water throughout the day.     Stop eating solid foods at 11:45 pm.  2 days before:    In the morning: begin a clear liquid diet (liquids you can see through).     Examples of a clear liquid diet include: water, clear broth or bouillon, Gatorade, Pedialyte or Powerade, carbonated and non-carbonated soft drinks (Sprite , 7-Up , ginger ale), strained fruit juices without pulp (apple, white grape, white cranberry), Jell-O  and popsicles.     The following are not allowed on a clear liquid diet: red liquids, alcoholic beverages,  dairy products (milk, creamer, and yogurt), protein shakes,  juice with pulp and chewing tobacco.    At 4pm: drink 1 (one) 8 oz glass of NuLYTELY  solution every 15 minutes (approximately 8 glasses of 8 oz or half the bottle). Keep the solution refrigerated. Do not drink any other liquids while you are drinking the NuLYTELY  solution.    Over the course of the evening, drink an additional   liter of clear liquids and continue clear liquid diet.  1 Day before: Continue a clear liquid diet.              At 8:00am: drink 1 (one) 8 oz glass of NuLYTELY  solution every 15 minutes (approximately 8 glasses of 8 oz or half the bottle). Keep the solution refrigerated. Do not drink any other liquids while you are drinking the NuLYTELY  solution.                At 6:00pm drink 1 (one) 8 oz glass of NuLYTELY  solution every 15 minutes (approximately 8 glasses of 8 oz or half the bottle). Keep the solution refrigerated. Do not drink any other liquids while  you are drinking the NuLYTELY  solution.    Day of procedure:    6 hours before the procedure: drink 1 (one) 8 oz glass of NuLYTELY  solution every 15 minutes until the last half of the bottle (approximately 8 glasses of 8 oz) is gone. Keep the solution refrigerated. Do not drink any other liquids while you are drinking the NuLYTELY  solution. After that, you may continue the clear liquid diet until 4 hours before the procedure.      You may take all of your morning medications including blood pressure medications, methadone, and anti-seizure medications with sips of water 4 hours prior to your procedure or earlier. Do not take insulin, blood thinners (unless specifically told to do so by your primary care provider), or vitamins prior to your procedure.       4 hours prior:   o STOP consuming all liquids   o Do not take anything by mouth during this time.   o Allow extra time to travel to your procedure as you may need to stop and use a restroom along the way.  You are ready for the procedure, if you followed all instructions and your stool is no longer formed, but clear or yellow liquid. If you are unsure whether your colon is clean, please call our office at [phone] before you leave for your appointment.    COLON CLEANSING TIPS: drink adequate amounts of fluids before and after your colon cleansing to prevent dehydration. Stay near a toilet because you will have diarrhea. Even if you are sitting on the toilet, continue to drink the cleansing solution every 15 minutes. If you feel nauseous or vomit, rinse your mouth with water, take a 15 to 30-minute-break and then continue drinking the solution. You will be uncomfortable until the stool has flushed from your colon (in about 2 to 4 hours). You may feel chilled.    Bring the following to your procedure:  - Insurance Card/Photo ID.   - List of current medications including over-the-counter medications and supplements.   - Your rescue inhaler if you currently use  one to control asthma.    Canceling or rescheduling your appointment:   If you must cancel or reschedule your appointment, please call [phone] as soon as possible. If you are canceling with 24 hours please call [phone]      What happens during a colonoscopy?    Plan to spend up to two hours, starting at registration time, at the endoscopy center the day of your procedure. The colonoscopy takes an average of 15 to 30 minutes. Recovery time is about 30 minutes.    Before the exam:    You will change into a gown.    Your medical history and medication list will be reviewed with you, unless that has been done over the phone prior to the procedure.     A nurse will insert an intravenous (IV) line into your hand or arm.    The doctor will meet with you and will give you a consent form to sign.    During the exam:     Medicine will be given through the IV line to help you relax.     Your heart rate and oxygen levels will be monitored. If your blood pressure is low, you may be given fluids through the IV line.     The doctor will insert a flexible hollow tube, called a colonoscope, into your rectum. The scope will be advanced slowly through the large intestine (colon).    You may have a feeling of fullness or pressure.     If an abnormal tissue or a polyp is found, the doctor may remove it through the endoscope for closer examination, or biopsy. Tissue removal is painless.    After the exam:           Any tissue samples removed during the exam will be sent to a lab for evaluation. It may take 5-7 working days for you to be notified of the results.     A nurse will provide you with complete discharge instructions before you leave the endoscopy center. Be sure to ask the nurse for specific instructions if you take blood thinners such as Aspirin, Coumadin or Plavix.     The doctor will prepare a full report for you and for the physician who referred you for the procedure.     Your doctor will talk with you about the  initial results of your exam.      Medication given during the exam will prohibit you from driving for the rest of the day.     Following the exam, you may resume your normal diet. Your first meal should be light, no greasy foods. Avoid alcohol until the next day.     You may resume your regular activities the day after the procedure.     LOW-FIBER DIET  Foods RECOMMENDED Foods to AVOID   Breads, Cereal, Rice and Pasta:   White bread, rolls, biscuits, croissant and ty toast.   Waffles, Bengali toast and pancakes.   White rice, noodles, pasta, macaroni and peeled cooked potatoes.   Plain crackers and saltines.   Cooked cereals: farina, cream of rice.   Cold cereals: Puffed Rice , Rice Krispies , Corn Flakes  and Special K    Breads, Cereal, Rice and Pasta:   Breads or rolls with nuts, seeds or fruit.   Whole wheat, pumpernickel, rye breads and cornbread.   Potatoes with skin, brown or wild rice, and kasha (buckwheat).     Vegetables:   Tender cooked and canned vegetables without seeds: carrots, asparagus tips, green or wax beans, pumpkin, spinach, lima beans. Vegetables:   Raw or steamed vegetables.   Vegetables with seeds.   Sauerkraut.   Winter squash, peas, broccoli, Brussel sprouts, cabbage, onions, cauliflower, baked beans, peas and corn.   Fruits:   Strained fruit juice.   Canned fruit, except pineapple.   Ripe bananas and melon. Fruits:   Prunes and prune juice.   Raw fruits.   Dried fruits: figs, dates and raisins.   Milk/Dairy:   Milk: plain or flavored.   Yogurt, custard and ice cream.   Cheese and cottage cheese Milk/Dairy:     Meat and other proteins:   ground, well-cooked tender beef, lamb, ham, veal, pork, fish, poultry and organ meats.   Eggs.   Peanut butter without nuts. Meat and other proteins:   Tough, fibrous meats with gristle.   Dry beans, peas and lentils.   Peanut butter with nuts.   Tofu.   Fats, Snack, Sweets, Condiments and Beverages:   Margarine, butter, oils, mayonnaise, sour cream and  salad dressing, plain gravy.   Sugar, hard candy, clear jelly, honey and syrup.   Spices, cooked herbs, bouillon, broth and soups made with allowed vegetable, ketchup and mustard.   Coffee, tea and carbonated drinks.   Plain cakes, cookies and pretzels.   Gelatin, plain puddings, custard, ice cream, sherbet and popsicles. Fats, Snack, Sweets, Condiments and Beverages:   Nuts, seeds and coconut.   Jam, marmalade and preserves.   Pickles, olives, relish and horseradish.   All desserts containing nuts, seeds, dried fruit and coconut; or made from whole grains or bran.   Candy made with nuts or seeds.   Popcorn.     DIRECTIONS TO THE ENDOSCOPY DEPARTMENT    From the north ([facility])  Take [address] South, exit on [address] [address]. Get into the left hand jeremy, turn left (east), go one-half mile to [address] and turn left. Take [address] to the Main Entrance  From the south ([facility])  Take [address]N to the [address] split and exit on [address] [address]. On [address] , turn left (west) to [address]. Turn right (north) on [address]. Go north to the second  stoplight, take a right and follow [address] to the Main Entrance.  From the east via 35[address] ([facility])  Take [address] south to [address] [address] exit. Turn right on [address] [address]. Go west to [address]. Turn right (north) on [address]. Go to the first stoplight, take a right on [address]  to the Main Entrance   From the east via [address] ([facility])ollow on [address]  to the Main Entrance  Take [address] to [address]. Turn left (south) on [address] to [address]. Turn left (east) on on [address]  to the Main Entrance  From the west via [address] ([place], [place])  Take [address] east to [address]. Turn right (south) on [address] to [address]. Turn left (east) on [address]  to the Main Entrance

## 2022-06-08 NOTE — H&P
Pre-Endoscopy History and Physical     Julia Andre MRN# [id number]   YOB: 1950 Age: 71 year old     Date of Procedure: 6/8/2022  Primary care provider: Cecile De La Rosa  Type of Endoscopy: Colonoscopy with possible biopsy, possible polypectomy  Reason for Procedure: screen  Type of Anesthesia Anticipated: Conscious Sedation    HPI:    Julia is a 71 year old female who will be undergoing the above procedure.      A history and physical has been performed. The patient's medications and allergies have been reviewed. The risks and benefits of the procedure and the sedation options and risks were discussed with the patient.  All questions were answered and informed consent was obtained.      She denies a personal or family history of anesthesia complications or bleeding disorders.     Patient Active Problem List   Diagnosis     Pelvic mass     Elevated CA-125     CAD (coronary artery disease)     Type 2 diabetes mellitus with chronic kidney disease, without long-term current use of insulin, unspecified CKD stage (H)     HTN (hypertension)     Hyperlipidemia     GERD (gastroesophageal reflux disease)     Anxiety and depression     Constipation     Iron deficiency anemia     Osteopenia     Psoriasis     Depression     Pelvic mass in female     Acute infectious disease     Bacteremia     Ovarian cancer, unspecified laterality (H)     Anemia        Past Medical History:   Diagnosis Date     Anxiety      Cervical cancer (H)      Coronary artery disease 2009    angina. elevated troponins, normal coronary arteries found     Depression      Depressive disorder      Diabetes (H)     type 2     Gastroesophageal reflux disease      Heart attack (H)      Hyperlipidemia      Hypertension      Iron deficiency anemia      Osteopenia      Ovarian cancer, unspecified laterality (H) 11/18/2021     Psoriasis      Sleep apnea         Past Surgical History:   Procedure Laterality Date     EYE SURGERY      strabismus     GI  SURGERY      cystoscopy     GYN SURGERY      bladder suspension     GYN SURGERY  1974    cone biopsy     HYSTERECTOMY TOTAL ABD, BRANDI SALPINGO-OOPHORECTOMY, NODE DISSECTION, TUMOR DEBULKING, COMBINED Bilateral 2020    Procedure: TOTAL ABDOMINAL HYSTERECTOMY WITH BILATERAL SALPINGO-OOPHORECTOMY; WASHINGS; OMENTECTOMY TUMOR DEBULKING; PELVIC AND PARA-AORTIC LYMPHADENECTOMY;  Surgeon: Emily Hickey MD;  Location: [facility] OR     IR CHEST PORT PLACEMENT > 5 YRS OF AGE  2020     LAPAROTOMY EXPLORATORY N/A 2020    Procedure: EXPLORATORY LAPAROTOMY;  Surgeon: Emily Hickey MD;  Location: [facility] OR     SOFT TISSUE SURGERY      knee liposuction       Social History     Tobacco Use     Smoking status: Former Smoker     Packs/day: 2.50     Years: 27.00     Pack years: 67.50     Quit date: 1993     Years since quittin.4     Smokeless tobacco: Never Used   Substance Use Topics     Alcohol use: Not Currently     Comment: sober for 32 years       Family History   Problem Relation Age of Onset     Colon Cancer No family hx of        Prior to Admission medications    Medication Sig Start Date End Date Taking? Authorizing Provider   amLODIPine (NORVASC) 5 MG tablet Take 1 tablet (5 mg) by mouth daily 21 Yes Cecile De La Rosa MD   aspirin 81 MG EC tablet Take 81 mg by mouth daily   Yes Reported, Patient   BIOTIN PO Take 1 tablet by mouth every morning   Yes Reported, Patient   buPROPion (WELLBUTRIN XL) 300 MG 24 hr tablet Take 1 tablet (300 mg) by mouth every morning 21 Yes Cecile De La Rosa MD   lisinopril (ZESTRIL) 5 MG tablet Take 1 tablet (5 mg) by mouth daily 21 Yes Cecile De La Rosa MD   metFORMIN (GLUCOPHAGE-XR) 500 MG 24 hr tablet Take 1 tablet (500 mg) by mouth 2 times daily (with meals) 21 Yes Cecile De La Rosa MD   metoprolol tartrate (LOPRESSOR) 50 MG tablet Take 1 tablet (50 mg) by mouth 2 times daily 21 Yes Cecile De La Rosa MD  "  Multiple Vitamin (MULTIVITAMIN PO) Take 1 tablet by mouth every morning   Yes Reported, Patient   ondansetron (ZOFRAN ODT) 4 MG ODT tab Take 1-2 tablets (4-8 mg) by mouth every 8 hours as needed for nausea 5/31/22  Yes Cecile De La Rosa MD   oxymetazoline (AFRIN) 0.05 % nasal spray Spray 2 sprays into both nostrils daily as needed for congestion   Yes Reported, Patient   polyethylene glycol (GOLYTELY) 236 g suspension Take 4,000 mLs by mouth daily Take as directed for a 2-day colonoscopy prep Please dispense 2 - 4000ml prescriptions. 5/24/22  Yes Dixon Mcdowell MD   rosuvastatin (CRESTOR) 20 MG tablet Take 1 tablet (20 mg) by mouth daily 12/7/21 12/2/22 Yes Cecile De La Rosa MD   traZODone (DESYREL) 50 MG tablet Take 1-2 tablets ( mg) by mouth At Bedtime 12/7/21  Yes Cecile De La Rosa MD       Allergies   Allergen Reactions     Atorvastatin Muscle Pain (Myalgia)     Simvastatin      Sulfa Drugs         REVIEW OF SYSTEMS:   5 point ROS negative except as noted above in HPI, including Gen., Resp., CV, GI &  system review.    PHYSICAL EXAM:   BP (!) 149/66   Pulse 63   Temp 98.3  F (36.8  C) (Temporal)   Resp 15   Ht 1.575 m (5' 2\")   Wt 79.4 kg (175 lb)   SpO2 96%   BMI 32.01 kg/m   Estimated body mass index is 32.01 kg/m  as calculated from the following:    Height as of this encounter: 1.575 m (5' 2\").    Weight as of this encounter: 79.4 kg (175 lb).   GENERAL APPEARANCE: alert, and oriented  MENTAL STATUS: alert  AIRWAY EXAM: Mallampatti Class I (visualization of the soft palate, fauces, uvula, anterior and posterior pillars)  RESP: lungs clear to auscultation - no rales, rhonchi or wheezes  CV: regular rates and rhythm  DIAGNOSTICS:    Not indicated    IMPRESSION   ASA Class 2 - Mild systemic disease    PLAN:   Plan for Colonoscopy with possible biopsy, possible polypectomy. We discussed the risks, benefits and alternatives and the patient wished to proceed.    The above has been forwarded to " the consulting provider.      Signed Electronically by: Otf Pena MD  June 8, 2022

## 2022-07-17 ENCOUNTER — HEALTH MAINTENANCE LETTER (OUTPATIENT)
Age: 72
End: 2022-07-17

## 2022-08-07 ENCOUNTER — E-VISIT (OUTPATIENT)
Dept: PEDIATRICS | Facility: CLINIC | Age: 72
End: 2022-08-07
Payer: COMMERCIAL

## 2022-08-07 DIAGNOSIS — N39.0 ACUTE UTI (URINARY TRACT INFECTION): Primary | ICD-10-CM

## 2022-08-07 PROCEDURE — 99421 OL DIG E/M SVC 5-10 MIN: CPT | Performed by: INTERNAL MEDICINE

## 2022-08-08 RX ORDER — NITROFURANTOIN 25; 75 MG/1; MG/1
100 CAPSULE ORAL 2 TIMES DAILY
Qty: 10 CAPSULE | Refills: 0 | Status: SHIPPED | OUTPATIENT
Start: 2022-08-08 | End: 2022-08-13

## 2022-08-08 NOTE — PATIENT INSTRUCTIONS
Dear Julia Andre    After reviewing your responses, I've been able to diagnose you with a urinary tract infection, which is a common infection of the bladder with bacteria.  This is not a sexually transmitted infection, though urinating immediately after intercourse can help prevent infections.  Drinking lots of fluids is also helpful to clear your current infection and prevent the next one.      I have sent a prescription for antibiotics to your pharmacy to treat this infection.    It is important that you take all of your prescribed medication even if your symptoms are improving after a few doses.  Taking all of your medicine helps prevent the symptoms from returning.     If your symptoms worsen, you develop pain in your back or stomach, develop fevers, or are not improving in 5 days, please contact your primary care provider for an appointment or visit any of our convenient Walk-in or Urgent Care Centers to be seen, which can be found on our website here.    Thanks again for choosing us as your health care partner,    Cecile De La Rosa MD    Urinary Tract Infections in Women  Urinary tract infections (UTIs) are most often caused by bacteria. These bacteria enter the urinary tract. The bacteria may come from inside the body. Or they may travel from the skin outside the rectum or vagina into the urethra. Female anatomy makes it easy for bacteria from the bowel to enter a woman s urinary tract, which is the most common source of UTI. This means women develop UTIs more often than men. Pain in or around the urinary tract is a common UTI symptom. But the only way to know for sure if you have a UTI for the healthcare provider to test your urine. The two tests that may be done are the urinalysis and urine culture.     Types of UTIs    Cystitis. A bladder infection (cystitis) is the most common UTI in women. You may have urgent or frequent need to pee. You may also have pain, burning when you pee, and bloody  urine.    Urethritis. This is an inflamed urethra, which is the tube that carries urine from the bladder to outside the body. You may have lower stomach or back pain. You may also have urgent or frequent need to pee.    Pyelonephritis. This is a kidney infection. If not treated, it can be serious and damage your kidneys. In severe cases, you may need to stay in the hospital. You may have a fever and lower back pain.    Medicines to treat a UTI  Most UTIs are treated with antibiotics. These kill the bacteria. The length of time you need to take them depends on the type of infection. It may be as short as 3 days. If you have repeated UTIs, you may need a low-dose antibiotic for several months. Take antibiotics exactly as directed. Don t stop taking them until all of the medicine is gone. If you stop taking the antibiotic too soon, the infection may not go away. You may also develop a resistance to the antibiotic. This can make it much harder to treat.   Lifestyle changes to treat and prevent UTIs   The lifestyle changes below will help get rid of your UTI. They may also help prevent future UTIs.     Drink plenty of fluids. This includes water, juice, or other caffeine-free drinks. Fluids help flush bacteria out of your body.    Empty your bladder. Always empty your bladder when you feel the urge to pee. And always pee before going to sleep. Urine that stays in your bladder can lead to infection. Try to pee before and after sex as well.    Practice good personal hygiene. Wipe yourself from front to back after using the toilet. This helps keep bacteria from getting into the urethra.    Use condoms during sex. These help prevent UTIs caused by sexually transmitted bacteria. Also don't use spermicides during sex. These can increase the risk for UTIs. Choose other forms of birth control instead. For women who tend to get UTIs after sex, a low-dose of a preventive antibiotic may be used. Be sure to discuss this option with  your healthcare provider.    Follow up with your healthcare provider as directed. He or she may test to make sure the infection has cleared. If needed, more treatment may be started.  [employer]ita last reviewed this educational content on 7/1/2019 2000-2021 [employer]. All rights reserved. This information is not intended as a substitute for professional medical care. Always follow your healthcare professional's instructions.

## 2022-08-29 ENCOUNTER — ANCILLARY PROCEDURE (OUTPATIENT)
Dept: MAMMOGRAPHY | Facility: CLINIC | Age: 72
End: 2022-08-29
Attending: INTERNAL MEDICINE
Payer: COMMERCIAL

## 2022-08-29 DIAGNOSIS — Z12.31 VISIT FOR SCREENING MAMMOGRAM: ICD-10-CM

## 2022-08-29 PROCEDURE — 77063 BREAST TOMOSYNTHESIS BI: CPT | Mod: TC | Performed by: RADIOLOGY

## 2022-08-29 PROCEDURE — 77067 SCR MAMMO BI INCL CAD: CPT | Mod: TC | Performed by: RADIOLOGY

## 2022-09-03 ENCOUNTER — E-VISIT (OUTPATIENT)
Dept: URGENT CARE | Facility: CLINIC | Age: 72
End: 2022-09-03
Payer: COMMERCIAL

## 2022-09-03 ENCOUNTER — LAB (OUTPATIENT)
Dept: URGENT CARE | Facility: URGENT CARE | Age: 72
End: 2022-09-03
Attending: PHYSICIAN ASSISTANT

## 2022-09-03 DIAGNOSIS — Z20.822 SUSPECTED COVID-19 VIRUS INFECTION: ICD-10-CM

## 2022-09-03 DIAGNOSIS — Z20.822 SUSPECTED COVID-19 VIRUS INFECTION: Primary | ICD-10-CM

## 2022-09-03 PROCEDURE — 99421 OL DIG E/M SVC 5-10 MIN: CPT | Mod: CS | Performed by: PHYSICIAN ASSISTANT

## 2022-09-03 PROCEDURE — U0003 INFECTIOUS AGENT DETECTION BY NUCLEIC ACID (DNA OR RNA); SEVERE ACUTE RESPIRATORY SYNDROME CORONAVIRUS 2 (SARS-COV-2) (CORONAVIRUS DISEASE [COVID-19]), AMPLIFIED PROBE TECHNIQUE, MAKING USE OF HIGH THROUGHPUT TECHNOLOGIES AS DESCRIBED BY CMS-2020-01-R: HCPCS

## 2022-09-03 PROCEDURE — U0005 INFEC AGEN DETEC AMPLI PROBE: HCPCS

## 2022-09-03 RX ORDER — BENZONATATE 100 MG/1
100 CAPSULE ORAL 3 TIMES DAILY PRN
Qty: 30 CAPSULE | Refills: 0 | Status: SHIPPED | OUTPATIENT
Start: 2022-09-03 | End: 2022-12-16

## 2022-09-03 RX ORDER — GUAIFENESIN 1200 MG/1
1200 TABLET, EXTENDED RELEASE ORAL 2 TIMES DAILY
Qty: 60 TABLET | Refills: 0 | Status: SHIPPED | OUTPATIENT
Start: 2022-09-03 | End: 2023-03-23

## 2022-09-03 NOTE — PATIENT INSTRUCTIONS
Dear Julia,      Based on your responses, you may have coronavirus (COVID-19).     Will I be tested for COVID-19?  We would like to test you for COVID. I have placed orders for this test.     To schedule: go to your [employer] home page and scroll down to the section that says  You have an appointment that needs to be scheduled  and click the large green button that says  Schedule Now  and follow the steps to find the next available openings.    If you are unable to complete these [employer] scheduling steps, please call [phone] to schedule your testing.     These guidelines are for isolating before returning to work, school or .     For employers, schools and day cares: This is an official notice for this person s medical guidelines for returning in-person.     For health care sites: The CDC gives different isolation and quarantine guidelines for healthcare sites, please check with these sites before arriving.     How do I self-isolate?  You isolate when you have symptoms of COVID or a test shows you have COVID, even if you don t have symptoms.     If you DO have symptoms:  o Stay home and away from others  - For at least 5 days after your symptoms started, AND   - You are fever free for 24 hours (with no medicine that reduces fever), AND  - Your other symptoms are better.  o Wear a mask for 10 full days any time you are around others.    If you DON T have symptoms:  o Stay at home and away from others for at least 5 days after your positive test.  o Wear a mask for 10 full days any time you are around others.    How can I take care of myself?  Over the counter medications may help with your symptoms such as runny or stuffy nose, cough, chills, or fever.  Talk to your care team about your options.     Some people are at high risk of severe illness (for example, you have a weak immune system, you re 65 years or older, or you have certain medical problems). If your risk is high and your symptoms started in  the last 5 to 7 days, we strongly recommend for you to get COVID treatment as soon as possible. Paxlovid, Molnupiravir and the monoclonal antibody treatments are proven safe and effective, make you feel better faster, and prevent hospitalization and death.       To schedule an appointment to discuss COVID treatment, request an appointment on MyChart (select  COVID-19 Treatment ) or call [phone]), press 7.      Get lots of rest. Drink extra fluids (unless a doctor has told you not to)    Take Tylenol (acetaminophen) or ibuprofen for fever or pain. If you have liver or kidney problems, ask your family doctor if it's okay to take Tylenol or ibuprofen    Take over the counter medications for your symptoms, as directed by your doctor. You may also talk to your pharmacist.      If you have other health problems (like cancer, heart failure, an organ transplant or severe kidney disease): Call your specialty clinic if you don't feel better in the next 2 days.    Know when to call 911. Emergency warning signs include:  o Trouble breathing or shortness of breath  o Pain or pressure in the chest that doesn't go away  o Feeling confused like you haven't felt before, or not being able to wake up  o Bluish-colored lips or face    Where can I get more information?    [facility] - About COVID-19: [email or web address]     CDC - What to Do If You're Sick: https://www.cdc.gov/coronavirus/2019-ncov/if-you-are-sick/index.html     CDC - Quarantine & Isolation: https://www.cdc.gov/coronavirus/2019-ncov/your-health/quarantine-isolation.html     [facility] clinical trials (COVID-19 research studies): [email or web address]-trials    Below are the COVID-19 hotlines at the [facility] of Health ([facility]). Interpreters are available.  o For health questions: Call [phone] or [phone] (7 a.m. to 7 p.m.)  o For questions about schools and childcare: Call  [phone] or [phone] (7 a.m. to 7 p.m.)

## 2022-09-04 LAB — SARS-COV-2 RNA RESP QL NAA+PROBE: POSITIVE

## 2022-09-06 ENCOUNTER — VIRTUAL VISIT (OUTPATIENT)
Dept: FAMILY MEDICINE | Facility: CLINIC | Age: 72
End: 2022-09-06
Payer: COMMERCIAL

## 2022-09-06 DIAGNOSIS — E11.22 TYPE 2 DIABETES MELLITUS WITH CHRONIC KIDNEY DISEASE, WITHOUT LONG-TERM CURRENT USE OF INSULIN, UNSPECIFIED CKD STAGE (H): ICD-10-CM

## 2022-09-06 DIAGNOSIS — U07.1 INFECTION DUE TO 2019 NOVEL CORONAVIRUS: Primary | ICD-10-CM

## 2022-09-06 PROCEDURE — 99213 OFFICE O/P EST LOW 20 MIN: CPT | Mod: 95 | Performed by: INTERNAL MEDICINE

## 2022-09-06 NOTE — PROGRESS NOTES
Julia is a 71 year old who is being evaluated via a billable video visit.    First tested positive on Thursday night.  Contacted clinic and told had to do another test.  Tested positive again on the 3rd.  Symptoms began Thursday night, had flown back from [place].  Has had cough off and on, slight headache, fatigue, slept a lot.  She did another home test today and was positive.  She feels her cough is better, had temp to 100.5 and has not had that, just really tired and has the cough.  Cough is mostly dry, white.  No fever,  No sore throat now.  No runny nose or ear ache.  She is not having shortness of breath.her oxygen level at home is 96%, blood pressure 124/69.    She has ovarian cancer 2 years ago, had surgery, then chemo, and has had 2 recurrences, last in November, treated with xrt.      How would you like to obtain your AVS? MyChart  If the video visit is dropped, the invitation should be resent by: Text to cell phone: [phone]  Will anyone else be joining your video visit? Yes: . How would they like to receive their invitation? Text to cell phone: [phone]            Vitals:  No vitals were obtained today due to virtual visit.    Physical Exam   GENERAL: Healthy, alert and no distress  EYES: Eyes grossly normal to inspection.  No discharge or erythema, or obvious scleral/conjunctival abnormalities.  RESP: No audible wheeze, cough, or visible cyanosis.  No visible retractions or increased work of breathing.    SKIN: Visible skin clear. No significant rash, abnormal pigmentation or lesions.  NEURO: Cranial nerves grossly intact.  Mentation and speech appropriate for age.  PSYCH: Mentation appears normal, affect normal/bright, judgement and insight intact, normal speech and appearance well-groomed.    ASSESSMENT:  1. covid 19, has risk factors including diabetes and age, prior ovarian ca, no known lung dz. I did discuss with the patient treatment with paxlovid.  I explained to her that if she  were to take it she would have to start it today as today is day 5.  I discussed with her the potential side effects and risks of treatment.  I explained to the patient that given her age and her health history she is at slightly higher risk of serious illness.  I discussed with her that since she is doing better and has been afebrile and her symptoms are improving except for the fatigue and it would be reasonable not to treat.  She understands this.  At this point in time she would rather not take treatment.  We discussed the CDC guidelines for isolating and when to go out.  I told her that if she were to get worse, have fevers or short of breath she will need to be evaluated right away and she understands this.  With respect to the fatigue it is unclear exactly how long this will last but hopefully she will get over this soon.  Her other medical issues are stable.    Raffi Charles M.D.    Video-Visit Details    Video Start Time: 2:26 PM    Type of service:  Video Visit    Video End Time:2:41 PM    Originating Location (pt. Location): Home    Distant Location (provider location):  [facility]     Platform used for Video Visit: Haris    [unfilled]  [unfilled]

## 2022-09-24 ENCOUNTER — HEALTH MAINTENANCE LETTER (OUTPATIENT)
Age: 72
End: 2022-09-24

## 2022-09-28 ENCOUNTER — LAB (OUTPATIENT)
Dept: LAB | Facility: CLINIC | Age: 72
End: 2022-09-28
Payer: COMMERCIAL

## 2022-09-28 DIAGNOSIS — E11.22 TYPE 2 DIABETES MELLITUS WITH CHRONIC KIDNEY DISEASE, WITHOUT LONG-TERM CURRENT USE OF INSULIN, UNSPECIFIED CKD STAGE (H): Primary | ICD-10-CM

## 2022-09-28 LAB — HBA1C MFR BLD: 6.1 % (ref 0–5.6)

## 2022-09-28 PROCEDURE — 83036 HEMOGLOBIN GLYCOSYLATED A1C: CPT

## 2022-09-28 PROCEDURE — 36415 COLL VENOUS BLD VENIPUNCTURE: CPT

## 2022-11-21 ENCOUNTER — TRANSFERRED RECORDS (OUTPATIENT)
Dept: HEALTH INFORMATION MANAGEMENT | Facility: CLINIC | Age: 72
End: 2022-11-21

## 2022-12-05 ENCOUNTER — TRANSFERRED RECORDS (OUTPATIENT)
Dept: HEALTH INFORMATION MANAGEMENT | Facility: CLINIC | Age: 72
End: 2022-12-05

## 2022-12-14 ENCOUNTER — OFFICE VISIT (OUTPATIENT)
Dept: ORTHOPEDICS | Facility: CLINIC | Age: 72
End: 2022-12-14
Payer: COMMERCIAL

## 2022-12-14 VITALS — HEIGHT: 62 IN | BODY MASS INDEX: 32.01 KG/M2

## 2022-12-14 DIAGNOSIS — M25.561 CHRONIC PAIN OF RIGHT KNEE: ICD-10-CM

## 2022-12-14 DIAGNOSIS — G89.29 CHRONIC RIGHT SHOULDER PAIN: Primary | ICD-10-CM

## 2022-12-14 DIAGNOSIS — M25.511 CHRONIC RIGHT SHOULDER PAIN: Primary | ICD-10-CM

## 2022-12-14 DIAGNOSIS — G89.29 CHRONIC PAIN OF RIGHT KNEE: ICD-10-CM

## 2022-12-14 DIAGNOSIS — M19.011 ARTHRITIS OF RIGHT GLENOHUMERAL JOINT: ICD-10-CM

## 2022-12-14 DIAGNOSIS — M17.11 PRIMARY OSTEOARTHRITIS OF RIGHT KNEE: ICD-10-CM

## 2022-12-14 PROCEDURE — 20611 DRAIN/INJ JOINT/BURSA W/US: CPT | Mod: RT | Performed by: STUDENT IN AN ORGANIZED HEALTH CARE EDUCATION/TRAINING PROGRAM

## 2022-12-14 PROCEDURE — 99213 OFFICE O/P EST LOW 20 MIN: CPT | Mod: 25 | Performed by: STUDENT IN AN ORGANIZED HEALTH CARE EDUCATION/TRAINING PROGRAM

## 2022-12-14 RX ORDER — TRIAMCINOLONE ACETONIDE 40 MG/ML
40 INJECTION, SUSPENSION INTRA-ARTICULAR; INTRAMUSCULAR
Status: SHIPPED | OUTPATIENT
Start: 2022-12-14

## 2022-12-14 RX ORDER — LIDOCAINE HYDROCHLORIDE 10 MG/ML
4 INJECTION, SOLUTION INFILTRATION; PERINEURAL
Status: SHIPPED | OUTPATIENT
Start: 2022-12-14

## 2022-12-14 RX ORDER — OLANZAPINE 2.5 MG/1
TABLET, FILM COATED ORAL
COMMUNITY
Start: 2022-11-15 | End: 2023-06-07

## 2022-12-14 RX ADMIN — LIDOCAINE HYDROCHLORIDE 4 ML: 10 INJECTION, SOLUTION INFILTRATION; PERINEURAL at 15:04

## 2022-12-14 RX ADMIN — TRIAMCINOLONE ACETONIDE 40 MG: 40 INJECTION, SUSPENSION INTRA-ARTICULAR; INTRAMUSCULAR at 15:04

## 2022-12-14 NOTE — LETTER
12/14/2022         RE: Julia Andre  [address] [address] [address]        Dear Colleague,    Thank you for referring your patient, Julia Andre, to the [facility] [place]. Please see a copy of my visit note below.    ASSESSMENT & PLAN       1. Chronic right shoulder pain    2. Arthritis of right glenohumeral joint    3. Chronic pain of right knee    4. Primary osteoarthritis of right knee      Ultrasound-guided cortisone injections of the right glenohumeral joint and right knee joint were performed in clinic today for arthritis pain without complication and with improvement in pain.    Please see post-injection instructions below. Try to take it easy for the next 1-2 weeks, then gradually increase activity as tolerated based on pain. You may return to the gym in 2 weeks, increasing activity gradually.    Return to clinic as needed for persistence or worsening of pain. These injections can be repeated after 4 months if needed. You may call our direct clinic number ([phone]) at any time with questions or concerns.    Elizabeth Gomez MD, [facility]    -----    SUBJECTIVE:  Julia Andre is a 72 year old female who is seen in follow-up for right shoulder and right knee pain. They were last seen on 5/20/22 and had US guided right shoulder glenohumeral joint cortisone injection. Patient reports some relief lasting ~ 4 months. Patient also had US guided right knee cortisone injection on 5/20/22 which provided great relief lasting ~ 4 months.    Since their last visit reports worsening pain. They indicate that their current pain level in right shoulder and right knee is 5-6/10. They have tried rest/activity avoidance, ibuprofen and previous imaging (xray 5/2/22).      Of note she is following with outside Oncology for ovarian cancer treatment. Her first recurrence was recently diagnosed and she has started a  "new chemotherapy regimen. She reports that her recent WBC and platelets were low but her Oncologist gave her clearance to repeat cortisone injections today.    The patient is seen by themselves.    Patient's past medical, surgical, social, and family histories were reviewed today and no changes are noted.    REVIEW OF SYSTEMS:  Constitutional: NEGATIVE for fever, chills, change in weight  Skin: NEGATIVE for worrisome rashes, moles or lesions  GI/: NEGATIVE for bowel or bladder changes  Neuro: NEGATIVE for weakness, dizziness or paresthesias    OBJECTIVE:  Ht 1.575 m (5' 2\")   BMI 32.01 kg/m     General: healthy, alert and in no distress  HEENT: no scleral icterus or conjunctival erythema  Skin: no suspicious lesions or rash. No jaundice.  CV: regular rhythm by palpation, no pedal edema  Resp: normal respiratory effort without conversational dyspnea   Psych: normal mood and affect  Gait: normal steady gait with appropriate coordination and balance  Neuro: normal light touch sensory exam of the extremities.    MSK:  RIGHT SHOULDER  Inspection:    No swelling, bruising, discoloration, or obvious deformity or asymmetry  Palpation:    Tender about the anterior capsule, greater tuberosity and posterior capsule. Remainder of bony and tendinous landmarks are nontender.  Active Range of Motion:     Abduction 800 limited by stiffness,  limited by stiffness, , IR L1. All limited by pain.   Strength:    Rotator cuff strength wfl  Special Tests:    Positive: Neer's, Torres', painful arc    RIGHT KNEE  Inspection:    Genu valgum bilaterally  Palpation:    Tender about the lateral patellar facet and lateral joint line. Remainder of bony and ligamentous landmarks are nontender.    Trace effusion is present    Patellofemoral crepitus is Present  Range of Motion:     00 extension to 1100 flexion  Strength:    Quadriceps grossly intact and hamstrings grossly intact    Extensor mechanism intact    Independent " visualization of the below image:  KNEE STANDING AP BILATERAL SUNRISE BILATERAL LATERAL RIGHT  5/2/2022  11:23 AM      HISTORY: Chronic pain of right knee.     COMPARISON: None.                                                                   IMPRESSION: Advanced lateral patellofemoral degenerative changes.  Medial and lateral compartment joint spaces appear preserved. No acute  fracture. Minimal joint effusion. Soft tissue calcification appears to  be in the subcutaneous tissues of the posterior knee.     Imaged portions of the left knee demonstrate advanced lateral  patellofemoral degenerative changes.    RIGHT SHOULDER THREE OR MORE VIEWS   5/2/2022 11:17 AM      HISTORY: Chronic right shoulder pain.     COMPARISON: None.                                                                   IMPRESSION: Advanced glenohumeral degenerative changes. 1.4 cm loose  body inferior to the coracoid. Moderate acromioclavicular degenerative  changes. No acute fracture or dislocation. MediPort type catheter in  place.    Large Joint Injection/Arthocentesis: R knee joint    Date/Time: 12/14/2022 3:04 PM  Performed by: Elizabeth Monteiro MD  Authorized by: Elizabeth Monteiro MD     Indications:  Pain and osteoarthritis  Needle Size:  25 G  Guidance: ultrasound    Approach:  Superolateral  Location:  Knee      Medications:  40 mg triamcinolone 40 MG/ML; 4 mL lidocaine 1 %  Outcome:  Tolerated well, no immediate complications  Procedure discussed: discussed risks, benefits, and alternatives    Consent Given by:  Patient  Timeout: timeout called immediately prior to procedure    Prep: patient was prepped and draped in usual sterile fashion     Ultrasound was used to ensure safe and accurate needle placement and injection. Ultrasound images of the procedure were permanently stored.    Large Joint Injection/Arthocentesis: R glenohumeral joint    Date/Time: 12/14/2022 3:04 PM  Performed by: Elizabeth Monteiro  MD  Authorized by: Elizabeth Monteiro MD     Indications:  Pain and osteoarthritis  Needle Size:  22 G  Guidance: ultrasound    Approach:  Posterior  Location:  Shoulder      Site:  R glenohumeral joint  Medications:  40 mg triamcinolone 40 MG/ML; 4 mL lidocaine 1 %  Outcome:  Tolerated well, no immediate complications  Procedure discussed: discussed risks, benefits, and alternatives    Consent Given by:  Patient  Timeout: timeout called immediately prior to procedure    Prep: patient was prepped and draped in usual sterile fashion     Ultrasound was used to ensure safe and accurate needle placement and injection. Ultrasound images of the procedure were permanently stored.      Patient rated her pain as improved post-injection.      Elizabeth Gomez MD, [facility]              Again, thank you for allowing me to participate in the care of your patient.        Sincerely,        Elizabeth Gomez MD

## 2022-12-14 NOTE — PATIENT INSTRUCTIONS
1. Chronic right shoulder pain    2. Arthritis of right glenohumeral joint    3. Chronic pain of right knee    4. Primary osteoarthritis of right knee      Ultrasound-guided cortisone injections of the right glenohumeral joint and right knee joint were performed in clinic today for arthritis pain without complication and with improvement in pain.    Please see post-injection instructions below. Try to take it easy for the next 1-2 weeks, then gradually increase activity as tolerated based on pain. You may return to the gym in 2 weeks, increasing activity gradually.    Return to clinic as needed for persistence or worsening of pain. These injections can be repeated after 4 months if needed. You may call our direct clinic number ([phone]) at any time with questions or concerns.    Elizabeth Gomez MD, [facility]    JOINT INJECTION AFTERCARE    After any kind of joint injection, it is not uncommon to experience:  - Soreness, swelling or bruising around the injection site.  - Mild numbness, tingling or weakness around the injection site caused by the numbing medicine used before or with the injection.     It is also possible to experience the following effects associated with the specific agent after injection:  - Allergic reaction.  - Increased blood sugar levels for 10-14 days following cortisone injection. If you have diabetes and notice that your blood sugar levels have increased, notify your primary care physician.  - Increased blood pressure levels.  - Mood swings.  - Increased fluid accumulation in the injected joint.    These effects all should resolve within a day or two of your procedure.    Please note that it may take up to 14 days for the steroid (cortisone) medication to start working.     HOME CARE INSTRUCTIONS    - Limit yourself to light activity activity on the day of your procedure. Avoid lifting heavy objects, bending, stooping or twisting.   - You  may shower, but please avoid swimming, hot tubs or tub baths for 24 hours following the procedure.   - Take over-the-counter pain medication (NSAIDS or Tylenol) for pain control after your procedure as needed.    - You may use ice packs for 10-15 minutes 3-4 times per day at the injection site to reduce pain and swelling after your procedure.   + Put ice in a plastic bag  + Place a towel between your skin and the ice bag  + Leave the ice on for no longer than 20 minutes each time to avoid injuring your skin or nerves  + This can be repeated 3-4 times per day for a few days after the injection    SEEK IMMEDIATE MEDICAL CARE IF:    - Pain and swelling get worse rather than better or extend beyond the injection site  - Numbness does not go away after a few hours  - Blood or fluid continues to leak from the injection site  - You experience chest pain  - You have swelling of your face or tongue  - You have trouble breathing or become dizzy  - You develop fever, chills or severe tenderness at the injection site that lasts longer than 1 day    MAKE SURE YOU:    - Understand these instructions  - Watch your condition  - Get help right away if you are not doing well or if you get worse

## 2022-12-14 NOTE — PROGRESS NOTES
ASSESSMENT & PLAN       1. Chronic right shoulder pain    2. Arthritis of right glenohumeral joint    3. Chronic pain of right knee    4. Primary osteoarthritis of right knee      Ultrasound-guided cortisone injections of the right glenohumeral joint and right knee joint were performed in clinic today for arthritis pain without complication and with improvement in pain.    Please see post-injection instructions below. Try to take it easy for the next 1-2 weeks, then gradually increase activity as tolerated based on pain. You may return to the gym in 2 weeks, increasing activity gradually.    Return to clinic as needed for persistence or worsening of pain. These injections can be repeated after 4 months if needed. You may call our direct clinic number ([phone]) at any time with questions or concerns.    Elizabeth Gomez MD, [facility]    -----    SUBJECTIVE:  Julia Andre is a 72 year old female who is seen in follow-up for right shoulder and right knee pain. They were last seen on 5/20/22 and had US guided right shoulder glenohumeral joint cortisone injection. Patient reports some relief lasting ~ 4 months. Patient also had US guided right knee cortisone injection on 5/20/22 which provided great relief lasting ~ 4 months.    Since their last visit reports worsening pain. They indicate that their current pain level in right shoulder and right knee is 5-6/10. They have tried rest/activity avoidance, ibuprofen and previous imaging (xray 5/2/22).      Of note she is following with outside Oncology for ovarian cancer treatment. Her first recurrence was recently diagnosed and she has started a new chemotherapy regimen. She reports that her recent WBC and platelets were low but her Oncologist gave her clearance to repeat cortisone injections today.    The patient is seen by themselves.    Patient's past medical, surgical, social, and family histories were reviewed  "today and no changes are noted.    REVIEW OF SYSTEMS:  Constitutional: NEGATIVE for fever, chills, change in weight  Skin: NEGATIVE for worrisome rashes, moles or lesions  GI/: NEGATIVE for bowel or bladder changes  Neuro: NEGATIVE for weakness, dizziness or paresthesias    OBJECTIVE:  Ht 1.575 m (5' 2\")   BMI 32.01 kg/m     General: healthy, alert and in no distress  HEENT: no scleral icterus or conjunctival erythema  Skin: no suspicious lesions or rash. No jaundice.  CV: regular rhythm by palpation, no pedal edema  Resp: normal respiratory effort without conversational dyspnea   Psych: normal mood and affect  Gait: normal steady gait with appropriate coordination and balance  Neuro: normal light touch sensory exam of the extremities.    MSK:  RIGHT SHOULDER  Inspection:    No swelling, bruising, discoloration, or obvious deformity or asymmetry  Palpation:    Tender about the anterior capsule, greater tuberosity and posterior capsule. Remainder of bony and tendinous landmarks are nontender.  Active Range of Motion:     Abduction 800 limited by stiffness,  limited by stiffness, , IR L1. All limited by pain.   Strength:    Rotator cuff strength wfl  Special Tests:    Positive: Neer's, Torres', painful arc    RIGHT KNEE  Inspection:    Genu valgum bilaterally  Palpation:    Tender about the lateral patellar facet and lateral joint line. Remainder of bony and ligamentous landmarks are nontender.    Trace effusion is present    Patellofemoral crepitus is Present  Range of Motion:     00 extension to 1100 flexion  Strength:    Quadriceps grossly intact and hamstrings grossly intact    Extensor mechanism intact    Independent visualization of the below image:  KNEE STANDING AP BILATERAL SUNRISE BILATERAL LATERAL RIGHT  5/2/2022  11:23 AM      HISTORY: Chronic pain of right knee.     COMPARISON: None.                                                                   IMPRESSION: Advanced lateral " patellofemoral degenerative changes.  Medial and lateral compartment joint spaces appear preserved. No acute  fracture. Minimal joint effusion. Soft tissue calcification appears to  be in the subcutaneous tissues of the posterior knee.     Imaged portions of the left knee demonstrate advanced lateral  patellofemoral degenerative changes.    RIGHT SHOULDER THREE OR MORE VIEWS   5/2/2022 11:17 AM      HISTORY: Chronic right shoulder pain.     COMPARISON: None.                                                                   IMPRESSION: Advanced glenohumeral degenerative changes. 1.4 cm loose  body inferior to the coracoid. Moderate acromioclavicular degenerative  changes. No acute fracture or dislocation. MediPort type catheter in  place.    Large Joint Injection/Arthocentesis: R knee joint    Date/Time: 12/14/2022 3:04 PM  Performed by: Elizabeth Monteiro MD  Authorized by: Elizabeth Monteiro MD     Indications:  Pain and osteoarthritis  Needle Size:  25 G  Guidance: ultrasound    Approach:  Superolateral  Location:  Knee      Medications:  40 mg triamcinolone 40 MG/ML; 4 mL lidocaine 1 %  Outcome:  Tolerated well, no immediate complications  Procedure discussed: discussed risks, benefits, and alternatives    Consent Given by:  Patient  Timeout: timeout called immediately prior to procedure    Prep: patient was prepped and draped in usual sterile fashion     Ultrasound was used to ensure safe and accurate needle placement and injection. Ultrasound images of the procedure were permanently stored.    Large Joint Injection/Arthocentesis: R glenohumeral joint    Date/Time: 12/14/2022 3:04 PM  Performed by: Elizabeth Monteiro MD  Authorized by: Elizabeth Monteiro MD     Indications:  Pain and osteoarthritis  Needle Size:  22 G  Guidance: ultrasound    Approach:  Posterior  Location:  Shoulder      Site:  R glenohumeral joint  Medications:  40 mg triamcinolone 40 MG/ML; 4 mL lidocaine 1  %  Outcome:  Tolerated well, no immediate complications  Procedure discussed: discussed risks, benefits, and alternatives    Consent Given by:  Patient  Timeout: timeout called immediately prior to procedure    Prep: patient was prepped and draped in usual sterile fashion     Ultrasound was used to ensure safe and accurate needle placement and injection. Ultrasound images of the procedure were permanently stored.      Patient rated her pain as improved post-injection.      Elizabeth Gomez MD, [facility] [place]

## 2022-12-16 ENCOUNTER — MYC REFILL (OUTPATIENT)
Dept: PEDIATRICS | Facility: CLINIC | Age: 72
End: 2022-12-16

## 2022-12-16 DIAGNOSIS — E11.22 TYPE 2 DIABETES MELLITUS WITH CHRONIC KIDNEY DISEASE, WITHOUT LONG-TERM CURRENT USE OF INSULIN, UNSPECIFIED CKD STAGE (H): ICD-10-CM

## 2022-12-16 DIAGNOSIS — I10 ESSENTIAL HYPERTENSION: ICD-10-CM

## 2022-12-16 DIAGNOSIS — E78.2 MIXED HYPERLIPIDEMIA: ICD-10-CM

## 2022-12-16 DIAGNOSIS — F33.42 RECURRENT MAJOR DEPRESSIVE DISORDER, IN FULL REMISSION (H): ICD-10-CM

## 2022-12-16 RX ORDER — METFORMIN HCL 500 MG
500 TABLET, EXTENDED RELEASE 24 HR ORAL 2 TIMES DAILY WITH MEALS
Qty: 180 TABLET | Refills: 0 | Status: SHIPPED | OUTPATIENT
Start: 2022-12-16 | End: 2023-03-17

## 2022-12-16 RX ORDER — AMLODIPINE BESYLATE 5 MG/1
5 TABLET ORAL DAILY
Qty: 90 TABLET | Refills: 0 | Status: SHIPPED | OUTPATIENT
Start: 2022-12-16 | End: 2023-03-17

## 2022-12-16 RX ORDER — BUPROPION HYDROCHLORIDE 300 MG/1
300 TABLET ORAL EVERY MORNING
Qty: 90 TABLET | Refills: 0 | Status: SHIPPED | OUTPATIENT
Start: 2022-12-16 | End: 2023-03-17

## 2022-12-16 RX ORDER — ROSUVASTATIN CALCIUM 20 MG/1
20 TABLET, COATED ORAL DAILY
Qty: 90 TABLET | Refills: 0 | Status: SHIPPED | OUTPATIENT
Start: 2022-12-16 | End: 2023-03-17

## 2022-12-16 NOTE — TELEPHONE ENCOUNTER
Routing refill request to provider for review/approval because:  Labs not current:  CR, LDL  No PHQ9 on file    Caitlin Quiñonez RN on 12/16/2022 at 12:34 PM

## 2022-12-19 ENCOUNTER — TRANSFERRED RECORDS (OUTPATIENT)
Dept: HEALTH INFORMATION MANAGEMENT | Facility: CLINIC | Age: 72
End: 2022-12-19

## 2023-01-11 ENCOUNTER — MYC REFILL (OUTPATIENT)
Dept: PEDIATRICS | Facility: CLINIC | Age: 73
End: 2023-01-11

## 2023-01-11 DIAGNOSIS — I10 ESSENTIAL HYPERTENSION: ICD-10-CM

## 2023-01-13 RX ORDER — LISINOPRIL 5 MG/1
5 TABLET ORAL DAILY
Qty: 90 TABLET | Refills: 0 | Status: SHIPPED | OUTPATIENT
Start: 2023-01-13 | End: 2023-03-17

## 2023-01-13 RX ORDER — METOPROLOL TARTRATE 50 MG
50 TABLET ORAL 2 TIMES DAILY
Qty: 180 TABLET | Refills: 0 | Status: SHIPPED | OUTPATIENT
Start: 2023-01-13 | End: 2023-03-17

## 2023-01-13 NOTE — TELEPHONE ENCOUNTER
Prescription approved per [facility] Refill Protocol.    Appointment needed, labs overdue.     Jojo Tompkins RN   PAL (Patient Advocate Liason)  [facility]

## 2023-01-29 ENCOUNTER — HEALTH MAINTENANCE LETTER (OUTPATIENT)
Age: 73
End: 2023-01-29

## 2023-01-31 ENCOUNTER — TRANSFERRED RECORDS (OUTPATIENT)
Dept: HEALTH INFORMATION MANAGEMENT | Facility: CLINIC | Age: 73
End: 2023-01-31

## 2023-02-07 ENCOUNTER — TRANSFERRED RECORDS (OUTPATIENT)
Dept: HEALTH INFORMATION MANAGEMENT | Facility: CLINIC | Age: 73
End: 2023-02-07

## 2023-02-14 ENCOUNTER — TRANSFERRED RECORDS (OUTPATIENT)
Dept: HEALTH INFORMATION MANAGEMENT | Facility: CLINIC | Age: 73
End: 2023-02-14

## 2023-03-01 NOTE — PLAN OF CARE
A&Ox4. VSS on RA. C/o mild incisional pain, norco & ice effective for pain control. Midline incision w/ staples, WDL & MANDY. BS hypoactive, no gas. Voiding adequately. Regular diet, tolerating well but minimal intake, BG WNL. Port placed today, dressing covered w/ gauze & UTV. PIV infusing NS @75ml/hr. SBA to ambulate. Will need Lovenox teaching prior to discharge. Discharge pending bowel function.    This note was copied from a baby's chart  CONSULT - LACTATION  Baby Boy (Selena) Dale Loo 0 days male MRN: [id number]    [facility] NURSERY Room / Bed: (N)/(N) Encounter: [id number]    Maternal Information     MOTHER:  Selena Wong  Maternal Age: 34 y o    OB History: # 1 - Date: 18, Sex: Female, Weight: 3600 g (7 lb 15 oz), GA: 40w4d, Delivery: Vaginal, Vacuum (Extractor), Apgar1: None, Apgar5: None, Living: Living, Birth Comments: None    # 2 - Date: 21, Sex: Male, Weight: 3856 g (8 lb 8 oz), GA: 38w4d, Delivery: , Low Transverse, Apgar1: None, Apgar5: None, Living: Living, Birth Comments: None    # 3 - Date: 23, Sex: Male, Weight: 3400 g (7 lb 7 9 oz), GA: 38w4d, Delivery: , Low Transverse, Apgar1: 8, Apgar5: 9, Living: Living, Birth Comments: Dr Orlando Crowell to [place] [id number] for C/S   Previouse breast reduction surgery? No    Lactation history:   Has patient previously breast fed: Yes   How long had patient previously breast fed: 9 months first child and 10 months second child   Previous breast feeding complications:       Past Surgical History:   Procedure Laterality Date   •  SECTION     • SKIN LESION EXCISION      birth sara removed   • VAGINAL DELIVERY  2018    female        Birth information:  YOB: 2024   Time of birth: 6:11 AM   Sex: male   Delivery type: , Low Transverse   Birth Weight: 3400 g (7 lb 7 9 oz)   Percent of Weight Change: 0%     Gestational Age: 38w3d   [unfilled]    Assessment     Breast and nipple assessment: large breast and nipples are everts, wide, firm     Assessment: sleepy    Feeding assessment: no latch  LATCH:  Latch:  Too sleepy or reluctant, no latch achieved   Audible Swallowing: Spontaneous and intermittent (24 hours old)   Type of Nipple: Everted (After stimulation)   Comfort (Breast/Nipple): Soft/non-tender   Hold (Positioning): No assist from staff, mother able to position/hold infant   LATCH Score: 0          Feeding recommendations:  breast feed on demand     Met with parents to discuss feeding plan  Mother is planning on breastfeeding and baby's first feeding was around 10 minutes  Mother was assisted with waking and bringing baby to the breast, undressed  Baby was sleepy, was given suck training and muscle exercises but did not waken, he was given 5 drops of colostrum via finger and did not latch  Baby was placed skin to skin to help waken  The Ready, Set, Baby Booklet was discussed  Discussed importance of skin to skin to help baby awaken for breastfeeding, to help with milk production as well as stabilize temperature, blood sugars, decrease pain, promote relaxation, and calm the baby as well as for bonding that father may do as well  Showed images of tummy size progression as milk production increases to meet the nutritional/growing needs of the baby  Discussed alternative feeding methods as a manner to provide baby with additional colostrum/breast milk if baby is sleepy and/or unable to breastfeed directly to help protect the milk supply and preserve latching abilities at the breast     Discussed “Second Night Syndrome” explaining how baby’s cluster feeds to meet growing needs  Growth spurts were explained and how cluster feeding helps boost milk supply  Explained feeding cues and fullness cues as well as importance of obtaining a deep latch for effective milk removal and proper positioning (tummy to tummy, at level, nose to nipple, bring chin to breast first and bringing baby to breast) with ear, shoulder, and hip alignment  Demonstrated on breast model how to hold, compress and perform hand expression  Mother was able to demonstrate well during encounter  Addressed breast pump needs and mother discussed that she has a breast pump for home use      Parents were made aware of how to communicate with lactation and encouraged to reach out for continued support and/or questions that arise        Joseph Vance RN 3/1/2023 10:18 AM

## 2023-03-06 LAB
ABO/RH(D): NORMAL
ANTIBODY SCREEN: NEGATIVE
SPECIMEN EXPIRATION DATE: NORMAL

## 2023-03-07 ENCOUNTER — TRANSFERRED RECORDS (OUTPATIENT)
Dept: HEALTH INFORMATION MANAGEMENT | Facility: CLINIC | Age: 73
End: 2023-03-07

## 2023-03-07 ENCOUNTER — LAB (OUTPATIENT)
Dept: LAB | Facility: CLINIC | Age: 73
End: 2023-03-07
Payer: COMMERCIAL

## 2023-03-07 DIAGNOSIS — C56.1: Primary | ICD-10-CM

## 2023-03-07 DIAGNOSIS — C56.1: ICD-10-CM

## 2023-03-07 PROCEDURE — 36415 COLL VENOUS BLD VENIPUNCTURE: CPT

## 2023-03-07 PROCEDURE — 86850 RBC ANTIBODY SCREEN: CPT

## 2023-03-07 PROCEDURE — 86901 BLOOD TYPING SEROLOGIC RH(D): CPT

## 2023-03-08 ENCOUNTER — HOSPITAL ENCOUNTER (OUTPATIENT)
Facility: CLINIC | Age: 73
Discharge: HOME | End: 2023-03-08
Admitting: NURSE PRACTITIONER
Payer: COMMERCIAL

## 2023-03-08 VITALS
SYSTOLIC BLOOD PRESSURE: 128 MMHG | WEIGHT: 175 LBS | RESPIRATION RATE: 18 BRPM | BODY MASS INDEX: 32.2 KG/M2 | DIASTOLIC BLOOD PRESSURE: 96 MMHG | TEMPERATURE: 97.7 F | HEART RATE: 65 BPM | OXYGEN SATURATION: 98 % | HEIGHT: 62 IN

## 2023-03-08 DIAGNOSIS — D64.9 ANEMIA: ICD-10-CM

## 2023-03-08 DIAGNOSIS — C56.9 OVARIAN CANCER, UNSPECIFIED LATERALITY (H): Primary | ICD-10-CM

## 2023-03-08 LAB
BLD PROD TYP BPU: NORMAL
BLD PROD TYP BPU: NORMAL
BLOOD COMPONENT TYPE: NORMAL
BLOOD COMPONENT TYPE: NORMAL
CODING SYSTEM: NORMAL
CODING SYSTEM: NORMAL
CROSSMATCH: NORMAL
CROSSMATCH: NORMAL
ISSUE DATE AND TIME: NORMAL
ISSUE DATE AND TIME: NORMAL
UNIT ABO/RH: NORMAL
UNIT ABO/RH: NORMAL
UNIT NUMBER: NORMAL
UNIT NUMBER: NORMAL
UNIT STATUS: NORMAL
UNIT STATUS: NORMAL
UNIT TYPE ISBT: 5100
UNIT TYPE ISBT: 5100

## 2023-03-08 PROCEDURE — P9016 RBC LEUKOCYTES REDUCED: HCPCS | Performed by: NURSE PRACTITIONER

## 2023-03-08 PROCEDURE — 999N000087 HC STATISTIC IV OP-OVERFLOW

## 2023-03-08 PROCEDURE — 86923 COMPATIBILITY TEST ELECTRIC: CPT | Performed by: NURSE PRACTITIONER

## 2023-03-08 PROCEDURE — 36430 TRANSFUSION BLD/BLD COMPNT: CPT

## 2023-03-08 PROCEDURE — 250N000011 HC RX IP 250 OP 636: Performed by: NURSE PRACTITIONER

## 2023-03-08 RX ORDER — HEPARIN SODIUM,PORCINE 10 UNIT/ML
5-10 VIAL (ML) INTRAVENOUS EVERY 24 HOURS
Status: DISCONTINUED | OUTPATIENT
Start: 2023-03-08 | End: 2023-03-08 | Stop reason: HOSPADM

## 2023-03-08 RX ORDER — HEPARIN SODIUM,PORCINE 10 UNIT/ML
5-10 VIAL (ML) INTRAVENOUS
Status: DISCONTINUED | OUTPATIENT
Start: 2023-03-08 | End: 2023-03-08 | Stop reason: HOSPADM

## 2023-03-08 RX ORDER — HEPARIN SODIUM,PORCINE 10 UNIT/ML
5 VIAL (ML) INTRAVENOUS
Status: DISCONTINUED | OUTPATIENT
Start: 2023-03-08 | End: 2023-03-08 | Stop reason: HOSPADM

## 2023-03-08 RX ORDER — HEPARIN SODIUM (PORCINE) LOCK FLUSH IV SOLN 100 UNIT/ML 100 UNIT/ML
5-10 SOLUTION INTRAVENOUS
Status: DISCONTINUED | OUTPATIENT
Start: 2023-03-08 | End: 2023-03-08 | Stop reason: HOSPADM

## 2023-03-08 RX ADMIN — Medication 5 ML: at 13:50

## 2023-03-08 ASSESSMENT — ACTIVITIES OF DAILY LIVING (ADL)
ADLS_ACUITY_SCORE: 35
ADLS_ACUITY_SCORE: 35

## 2023-03-08 NOTE — DISCHARGE INSTRUCTIONS
Blood Transfusion Discharge Instructions     After you go home:    After a blood transfusion (red blood cells, platelets, plasma, cryo or granulocytes), you will need to watch for signes of a reaction for the next 48 hours.    Medicines:    You may resume all your medications            Call the provider who ordered your blood transfusion or call 911 or go to the Emergency Room if you have any signs of a reaction:    Shaking or chills  Fever - temperature above 100.0 F  Headache  Nausea  Hives  Itching  Swelling of the face or feeling flushed  Ongoing dry cough (nothing is coughed up)  Trouble breathing or wheezing    Some signs of a reaction won't show up for a few days or up to 4 weeks. These may include:    Fatigue  Dizziness  Pink or red urine    If you have questions call your provider who ordered the blood transfusion.

## 2023-03-08 NOTE — PROGRESS NOTES
Care Suites Admission Nursing Note    Patient Information  Name: Julia Andre  Age: 72 year old  Reason for admission: 2 units blood  Care Suites arrival time: 1000    Patient Admission/Assessment   Pre-procedure assessment complete: Yes  If abnormal assessment/labs, provider notified: N/A  NPO: N/A  Medications held per instructions/orders: N/A  Consent: obtained  If applicable, pregnancy test status: declined  Patient oriented to room: Yes  Education/questions answered: Yes  Plan/other: proceed with transfusion    Discharge Planning  Discharge name/phone number:  Don is present  Discharge location: home    Jamin Velez RN

## 2023-03-08 NOTE — DISCHARGE SUMMARY
Care Suites Discharge Nursing Note    Patient Information  Name: Julia Andre  Age: 72 year old    Discharge Education:  Discharge instructions reviewed: Yes  Additional education/resources provided: no  Patient/patient representative verbalizes understanding: Yes  Patient discharging on new medications: No  Medication education completed: N/A    Discharge Plans:   Discharge location: home  Discharge ride contacted: Yes  Approximate discharge time: 1400    Discharge Criteria:  Discharge criteria met and vital signs stable: Yes    Patient Belongs:  Patient belongings returned to patient: Yes    Jamin Velez RN

## 2023-03-14 ENCOUNTER — TRANSFERRED RECORDS (OUTPATIENT)
Dept: HEALTH INFORMATION MANAGEMENT | Facility: CLINIC | Age: 73
End: 2023-03-14

## 2023-03-17 ENCOUNTER — VIRTUAL VISIT (OUTPATIENT)
Dept: FAMILY MEDICINE | Facility: CLINIC | Age: 73
End: 2023-03-17
Payer: COMMERCIAL

## 2023-03-17 DIAGNOSIS — E11.22 TYPE 2 DIABETES MELLITUS WITH CHRONIC KIDNEY DISEASE, WITHOUT LONG-TERM CURRENT USE OF INSULIN, UNSPECIFIED CKD STAGE (H): Primary | ICD-10-CM

## 2023-03-17 DIAGNOSIS — C56.1 MALIGNANT NEOPLASM OF RIGHT OVARY (H): ICD-10-CM

## 2023-03-17 DIAGNOSIS — I10 ESSENTIAL HYPERTENSION: ICD-10-CM

## 2023-03-17 DIAGNOSIS — F33.42 RECURRENT MAJOR DEPRESSIVE DISORDER, IN FULL REMISSION (H): ICD-10-CM

## 2023-03-17 DIAGNOSIS — E78.2 MIXED HYPERLIPIDEMIA: ICD-10-CM

## 2023-03-17 DIAGNOSIS — I20.9 ANGINA PECTORIS WITH NORMAL CORONARY ARTERIOGRAM (H): ICD-10-CM

## 2023-03-17 DIAGNOSIS — F51.02 ADJUSTMENT INSOMNIA: ICD-10-CM

## 2023-03-17 PROCEDURE — 99214 OFFICE O/P EST MOD 30 MIN: CPT | Mod: VID | Performed by: PHYSICIAN ASSISTANT

## 2023-03-17 RX ORDER — TRAZODONE HYDROCHLORIDE 50 MG/1
50-100 TABLET, FILM COATED ORAL AT BEDTIME
Qty: 90 TABLET | Refills: 1 | Status: SHIPPED | OUTPATIENT
Start: 2023-03-17 | End: 2023-10-27

## 2023-03-17 RX ORDER — METFORMIN HCL 500 MG
500 TABLET, EXTENDED RELEASE 24 HR ORAL 2 TIMES DAILY WITH MEALS
Qty: 180 TABLET | Refills: 1 | Status: SHIPPED | OUTPATIENT
Start: 2023-03-17 | End: 2023-09-12

## 2023-03-17 RX ORDER — ROSUVASTATIN CALCIUM 20 MG/1
20 TABLET, COATED ORAL DAILY
Qty: 90 TABLET | Refills: 0 | Status: SHIPPED | OUTPATIENT
Start: 2023-03-17 | End: 2023-03-17

## 2023-03-17 RX ORDER — AMLODIPINE BESYLATE 5 MG/1
5 TABLET ORAL DAILY
Qty: 90 TABLET | Refills: 1 | Status: SHIPPED | OUTPATIENT
Start: 2023-03-17 | End: 2023-09-12

## 2023-03-17 RX ORDER — LISINOPRIL 5 MG/1
5 TABLET ORAL DAILY
Qty: 90 TABLET | Refills: 1 | Status: SHIPPED | OUTPATIENT
Start: 2023-03-17 | End: 2023-03-17

## 2023-03-17 RX ORDER — ROSUVASTATIN CALCIUM 20 MG/1
20 TABLET, COATED ORAL DAILY
Qty: 90 TABLET | Refills: 1 | Status: SHIPPED | OUTPATIENT
Start: 2023-03-17 | End: 2023-10-27

## 2023-03-17 RX ORDER — METOPROLOL TARTRATE 50 MG
50 TABLET ORAL 2 TIMES DAILY
Qty: 180 TABLET | Refills: 1 | Status: SHIPPED | OUTPATIENT
Start: 2023-03-17 | End: 2023-10-13

## 2023-03-17 RX ORDER — BUPROPION HYDROCHLORIDE 300 MG/1
300 TABLET ORAL EVERY MORNING
Qty: 90 TABLET | Refills: 1 | Status: SHIPPED | OUTPATIENT
Start: 2023-03-17 | End: 2023-09-12

## 2023-03-17 RX ORDER — DOXORUBICIN HYDROCHLORIDE 2 MG/ML
INJECTABLE, LIPOSOMAL INTRAVENOUS
COMMUNITY
Start: 2023-03-17 | End: 2023-03-23

## 2023-03-17 RX ORDER — LISINOPRIL 20 MG/1
20 TABLET ORAL DAILY
Qty: 90 TABLET | Refills: 1 | Status: SHIPPED | OUTPATIENT
Start: 2023-03-17 | End: 2023-09-12

## 2023-03-17 ASSESSMENT — ANXIETY QUESTIONNAIRES
5. BEING SO RESTLESS THAT IT IS HARD TO SIT STILL: NOT AT ALL
6. BECOMING EASILY ANNOYED OR IRRITABLE: NOT AT ALL
1. FEELING NERVOUS, ANXIOUS, OR ON EDGE: NOT AT ALL
GAD7 TOTAL SCORE: 0
7. FEELING AFRAID AS IF SOMETHING AWFUL MIGHT HAPPEN: NOT AT ALL
7. FEELING AFRAID AS IF SOMETHING AWFUL MIGHT HAPPEN: NOT AT ALL
3. WORRYING TOO MUCH ABOUT DIFFERENT THINGS: NOT AT ALL
2. NOT BEING ABLE TO STOP OR CONTROL WORRYING: NOT AT ALL
GAD7 TOTAL SCORE: 0
GAD7 TOTAL SCORE: 0
4. TROUBLE RELAXING: NOT AT ALL

## 2023-03-17 ASSESSMENT — PATIENT HEALTH QUESTIONNAIRE - PHQ9
SUM OF ALL RESPONSES TO PHQ QUESTIONS 1-9: 4
SUM OF ALL RESPONSES TO PHQ QUESTIONS 1-9: 4
10. IF YOU CHECKED OFF ANY PROBLEMS, HOW DIFFICULT HAVE THESE PROBLEMS MADE IT FOR YOU TO DO YOUR WORK, TAKE CARE OF THINGS AT HOME, OR GET ALONG WITH OTHER PEOPLE: VERY DIFFICULT

## 2023-03-17 NOTE — PROGRESS NOTES
Julia is a 72 year old who is being evaluated via a billable video visit.      How would you like to obtain your AVS? MyChart  If the video visit is dropped, the invitation should be resent by: Text to cell phone: [phone]  Will anyone else be joining your video visit? No        BP Readings from Last 2 Encounters:   03/08/23 (!) 128/96   06/08/22 114/60     Hemoglobin A1C (%)   Date Value   09/28/2022 6.1 (H)   03/31/2022 5.8 (H)   09/02/2020 6.1 (H)     LDL Cholesterol Calculated (mg/dL)   Date Value   12/07/2021 53              Assessment & Plan     Type 2 diabetes mellitus with chronic kidney disease, without long-term current use of insulin, unspecified CKD stage (H)  Will get A1c at physical after she returns from her trip.  Tolerating metformin  - metFORMIN (GLUCOPHAGE XR) 500 MG 24 hr tablet; Take 1 tablet (500 mg) by mouth 2 times daily (with meals)    Recurrent major depressive disorder, in full remission (H)  Mood is stable with medications  - buPROPion (WELLBUTRIN XL) 300 MG 24 hr tablet; Take 1 tablet (300 mg) by mouth every morning    Angina pectoris with normal coronary arteriogram (H)   no changes. No chest pain    Malignant neoplasm of right ovary (H)  Pausing chemo to go to [place]  Will follow-up with Dr. Hickey when she gets back    Essential hypertension  Lisinopril dose was adjusted by oncology, PCP to make over management. BP controlled.   - amLODIPine (NORVASC) 5 MG tablet; Take 1 tablet (5 mg) by mouth daily  - metoprolol tartrate (LOPRESSOR) 50 MG tablet; Take 1 tablet (50 mg) by mouth 2 times daily  - lisinopril (ZESTRIL) 20 MG tablet; Take 1 tablet (20 mg) by mouth daily    Mixed hyperlipidemia  Tolerates statin  - rosuvastatin (CRESTOR) 20 MG tablet; Take 1 tablet (20 mg) by mouth daily    Adjustment insomnia  Uses as needed   - traZODone (DESYREL) 50 MG tablet; Take 1-2 tablets ( mg) by mouth At Bedtime             BMI:   Estimated body mass index is 32.01 kg/m  as calculated from  "the following:    Height as of 3/8/23: 1.575 m (5' 2\").    Weight as of 3/8/23: 79.4 kg (175 lb).       Follow-up 2 months for physical     No follow-ups on file.    LORENA [facility] LUIS Dumont is a 72 year old, presenting for the following health issues:  Medication Refill, Anxiety, Depression, Hypertension, and Diabetes      History of Present Illness       Diabetes:   She presents for follow up of diabetes.  She is not checking blood glucose. She has no concerns regarding her diabetes at this time.  She is having numbness in feet. The patient has had a diabetic eye exam in the last 12 months. Eye exam performed on 2022. Location of last eye exam [facility].        Hyperlipidemia:  She presents for follow up of hyperlipidemia.  She is taking medication to lower cholesterol. She is not having myalgia or other side effects to statin medications.    Hypertension: She presents for follow up of hypertension.  She does not check blood pressure  regularly outside of the clinic. Outside blood pressures have been over 140/90. She follows a low salt diet.     She eats 2-3 servings of fruits and vegetables daily.She consumes 0 sweetened beverage(s) daily.She exercises with enough effort to increase her heart rate 9 or less minutes per day.  She exercises with enough effort to increase her heart rate 3 or less days per week.   She is taking medications regularly.    Today's PHQ-9         PHQ-9 Total Score: 4    PHQ-9 Q9 Thoughts of better off dead/self-harm past 2 weeks :   Not at all    How difficult have these problems made it for you to do your work, take care of things at home, or get along with other people: Very difficult  Today's IMELDA-7 Score: 0       Social History     Tobacco Use     Smoking status: Former     Packs/day: 2.50     Years: 27.00     Pack years: 67.50     Types: Cigarettes     Quit date: 1993     Years since quittin.2     Smokeless " tobacco: Never   Substance Use Topics     Alcohol use: Not Currently     Comment: sober for 32 years     Drug use: Never     PHQ 3/17/2023   PHQ-9 Total Score 4   Q9: Thoughts of better off dead/self-harm past 2 weeks Not at all     IMELDA-7 SCORE 3/17/2023   Total Score 0 (minimal anxiety)   Total Score 0     Last PHQ-9 3/17/2023   1.  Little interest or pleasure in doing things 0   2.  Feeling down, depressed, or hopeless 0   3.  Trouble falling or staying asleep, or sleeping too much 1   4.  Feeling tired or having little energy 3   5.  Poor appetite or overeating 0   6.  Feeling bad about yourself 0   7.  Trouble concentrating 0   8.  Moving slowly or restless 0   Q9: Thoughts of better off dead/self-harm past 2 weeks 0   PHQ-9 Total Score 4     IMELDA-7  3/17/2023   1. Feeling nervous, anxious, or on edge 0   2. Not being able to stop or control worrying 0   3. Worrying too much about different things 0   4. Trouble relaxing 0   5. Being so restless that it is hard to sit still 0   6. Becoming easily annoyed or irritable 0   7. Feeling afraid, as if something awful might happen 0   IMELDA-7 Total Score 0       Suicide Assessment Five-step Evaluation and Treatment (SAFE-T)    Patient seeing Dr. Hickey for oncology. Required transfusion recently.  Pausing chemo so she can go to [place]        Review of Systems   Constitutional, HEENT, cardiovascular, pulmonary, gi and gu systems are negative, except as otherwise noted.      Objective           Vitals:  No vitals were obtained today due to virtual visit.    Physical Exam   GENERAL: Healthy, alert and no distress  EYES: Eyes grossly normal to inspection.  No discharge or erythema, or obvious scleral/conjunctival abnormalities.  RESP: No audible wheeze, cough, or visible cyanosis.  No visible retractions or increased work of breathing.    SKIN: Visible skin clear. No significant rash, abnormal pigmentation or lesions.  NEURO: Cranial nerves grossly intact.  Mentation and  speech appropriate for age.  PSYCH: Mentation appears normal, affect normal/bright, judgement and insight intact, normal speech and appearance well-groomed.                Video-Visit Details    Type of service:  Video Visit   Video Start Time: 0240  Video End Time:3:00 PM    Originating Location (pt. Location): Home  Distant Location (provider location):  On-site  Platform used for Video Visit: [employer]

## 2023-03-23 ENCOUNTER — MYC MEDICAL ADVICE (OUTPATIENT)
Dept: FAMILY MEDICINE | Facility: CLINIC | Age: 73
End: 2023-03-23

## 2023-03-23 ENCOUNTER — OFFICE VISIT (OUTPATIENT)
Dept: FAMILY MEDICINE | Facility: CLINIC | Age: 73
End: 2023-03-23
Payer: COMMERCIAL

## 2023-03-23 VITALS
SYSTOLIC BLOOD PRESSURE: 138 MMHG | HEIGHT: 63 IN | DIASTOLIC BLOOD PRESSURE: 76 MMHG | HEART RATE: 68 BPM | OXYGEN SATURATION: 97 % | BODY MASS INDEX: 31.1 KG/M2 | RESPIRATION RATE: 20 BRPM | TEMPERATURE: 97.4 F | WEIGHT: 175.5 LBS

## 2023-03-23 DIAGNOSIS — L71.9 ROSACEA: ICD-10-CM

## 2023-03-23 DIAGNOSIS — D64.9 ANEMIA, UNSPECIFIED TYPE: ICD-10-CM

## 2023-03-23 DIAGNOSIS — I10 ESSENTIAL HYPERTENSION: ICD-10-CM

## 2023-03-23 DIAGNOSIS — I10 ESSENTIAL HYPERTENSION: Primary | ICD-10-CM

## 2023-03-23 PROCEDURE — 99214 OFFICE O/P EST MOD 30 MIN: CPT | Performed by: PHYSICIAN ASSISTANT

## 2023-03-23 PROCEDURE — 85027 COMPLETE CBC AUTOMATED: CPT | Performed by: PHYSICIAN ASSISTANT

## 2023-03-23 PROCEDURE — 36415 COLL VENOUS BLD VENIPUNCTURE: CPT | Performed by: PHYSICIAN ASSISTANT

## 2023-03-23 RX ORDER — METRONIDAZOLE 10 MG/G
GEL TOPICAL DAILY
Qty: 60 G | Refills: 1 | Status: SHIPPED | OUTPATIENT
Start: 2023-03-23

## 2023-03-23 RX ORDER — LABETALOL 100 MG/1
TABLET, FILM COATED ORAL
Qty: 30 TABLET | Refills: 1 | Status: SHIPPED | OUTPATIENT
Start: 2023-03-23 | End: 2023-03-24

## 2023-03-23 NOTE — PATIENT INSTRUCTIONS
Check blood pressure 1-2 times daily and as needed.    Can use Labetalol as needed up to twice daily for blood systolic blood pressure (top number) of 180 or above or diasostic blood pressure (bottom number) 100 or above.

## 2023-03-23 NOTE — LETTER
Julia Andre  [id number] [facility] [address]    Thank you for choosing [facility] today for your health care needs.     [facility] is transforming primary care  At [facility], we re dedicated to constantly improve how we serve the health care needs of our patients and communities. We re currently making changes to the way we deliver care.     Changes you ll notice include:    An emphasis on building a relationship with a primary care provider    Access to a PAL (patient advocate and liaison) to help guide you with your care needs    Appointment lengths tailored to your specific needs and greater access to a care team to help you and your provider improve and maintain your health and well-being    Improved online access to your care team    Benefits of a primary care provider  If you don t have a designated primary care provider, we encourage you to get to know our care team online and find a provider you d like to see. Most of our providers have a short video on their online provider page. Visit [email or web address] to explore our providers and locations.    Benefits of having a primary care provider include:      They get to know you - your health history, family history and goals, making it easier to make a health plan together.     You get to know them - making health-related conversations and decisions easier      Primary care doctors help you when you re sick or hurt - but also focus on keeping you healthy with preventive care and screenings.      A doctor who sees you regularly is more likely to notice changes in your health.     You ll be connected to a broad care team who partners with your provider to support you.    Patient Advocate Liaison (PAL)   To help make sure you get the right care, at the right time, we include PALs, or Patient Advocate Liaisons, as part of your care team. Your PAL will be your first line of contact. They ll advocate for your needs and help you  navigate our services, connecting you with care team members and community resources to ensure your care is well coordinated. You ll be introduced to a PAL in an upcoming visit.     Expanded care team access with tailored appointment lengths  Depending on your health care needs, you may have longer or shorter appointments and see additional care team providers - including Medication Therapy Management (MTM) pharmacists, diabetes educators, behavioral health clinicians, or social workers. At times, they may be included in your visit with your provider, or you may see them individually.     Online access to your health care records and care team  [employer] is our online tool that makes it easy to see your health care information and communicate with your care team.     [employer] allows you to:     View your health maintenance plan so you know when you re due for a preventive screening    Send secure messages to your care team    View your health history and visit summaries     Schedule appointments     Complete questionnaires and eCheck-in before appointments      Get care from your provider with an e-visit      View and pay your bill     Sign up at [employer]. Once you have an account, you also can download the mobile josh.     Connecting to fast and convenient care  When you need fast, convenient care - consider one of the following options:       Video Visit: A convenient care option for visiting with your provider out of the comfort of your own home. Most of the things you come to the clinic to address with your provider can now be done virtually through a video. This includes your chronic medication follow up, questions or concerns you may have, and even your annual Medicare Wellness Visit.       Phone Visit: Another convenient option for follow up of common problems that may require a more in-depth discussion with your provider.       E-visit: When you need acute care quickly, or have a quick question about  your medication, an E-visit is completed through [employer] and your provider will respond within one business day.      Rabia Sanches, Registered Nurse, PAL (Patient Advocate Liaison)   [facility]   [phone]

## 2023-03-23 NOTE — PROGRESS NOTES
Assessment & Plan     Essential hypertension  Blood pressure is being labile with elevations into the 180's at times. Continue chronic medications for now (as blood pressures have been in the 110's at times).   Use the labetalol as needed. Should only be needed no more than twice daily. Consider additional medications if having to use more often or baseline blood pressure running higher.  - labetalol (NORMODYNE) 100 MG tablet; Take 1 tablet if systolic blood pressure at 180 or higher or diastolic blood pressure at 100 or above    Anemia, unspecified type  Recheck today. Improved to 8.6 (previously low due to chemo)  - CBC with platelets; Future  - CBC with platelets    Rosacea  Try metronidazole topically.  Recommended staying out of the sun and using sunscreen.  - metroNIDAZOLE (METROGEL) 1 % external gel; Apply topically daily    Review of external notes as documented elsewhere in note  Review of the result(s) of each unique test - CBC  Ordering of each unique test  Prescription drug management    Follow up as scheduled.    Paula Garcia PA-C  [facility]    Shravan Dumont is a 72 year old, presenting for the following health issues:  Hypertension    Additional Questions 3/23/2023   Roomed by Mary MILIAN     Hypertension Follow-up      Do you check your blood pressure regularly outside of the clinic? Yes     Are you following a low salt diet? Yes    Are your blood pressures ever more than 140 on the top number (systolic) OR more   than 90 on the bottom number (diastolic), for example 140/90? Yes    Patient reports prickly feeling in the face and feeling flush in the cheeks.    She is reports blood pressure was high when she checked last night (having symptoms above). Blood pressures at oncology visits have been 117/70 most recently. But home blood pressure 176/94 and 184/90.    Patient currently on lisinopril 20 mg, metoprolol 50 mg and amlodipine 5 mg.       Review of  "Systems   GENERAL:  No fevers        Objective    /76   Pulse 68   Temp 97.4  F (36.3  C) (Oral)   Resp 20   Ht 1.588 m (5' 2.5\")   Wt 79.6 kg (175 lb 8 oz)   SpO2 97%   BMI 31.59 kg/m    Body mass index is 31.59 kg/m .  Physical Exam   GENERAL: No acute distress  HEENT: Normocephalic  SKIN: Flushed cheeks bilaterally with telangectasias.  NEURO: Alert and non-focal              "

## 2023-03-23 NOTE — TELEPHONE ENCOUNTER
RN PAL spoke to patient after reviewing my chart message     Currently being treated by [facility] for ovarian cancer - blood pressure has been elevated at visit with oncology and per home reading   Patient notes she has a prickly feeling at times in her face     Visit scheduled today   Next 5 appointments (look out 90 days)    Mar 23, 2023  5:00 PM  (Arrive by 4:40 PM)  Provider Visit with Paula Garcia PA-C  [facility] ([facility] ) [address] [place] [id number] [phone]     Ana Laura Smith Registered Nurse, PAL (Patient Advocate Liaison)   [facility]   [phone]      sleepy

## 2023-03-24 ENCOUNTER — PATIENT OUTREACH (OUTPATIENT)
Dept: FAMILY MEDICINE | Facility: CLINIC | Age: 73
End: 2023-03-24
Payer: COMMERCIAL

## 2023-03-24 LAB
ERYTHROCYTE [DISTWIDTH] IN BLOOD BY AUTOMATED COUNT: 20.7 % (ref 10–15)
HCT VFR BLD AUTO: 25.6 % (ref 35–47)
HGB BLD-MCNC: 8.5 G/DL (ref 11.7–15.7)
MCH RBC QN AUTO: 34.8 PG (ref 26.5–33)
MCHC RBC AUTO-ENTMCNC: 33.2 G/DL (ref 31.5–36.5)
MCV RBC AUTO: 105 FL (ref 78–100)
PLATELET # BLD AUTO: 76 10E3/UL (ref 150–450)
RBC # BLD AUTO: 2.44 10E6/UL (ref 3.8–5.2)
WBC # BLD AUTO: 3.2 10E3/UL (ref 4–11)

## 2023-03-24 RX ORDER — LABETALOL 100 MG/1
TABLET, FILM COATED ORAL
Qty: 90 TABLET | Refills: 0 | Status: SHIPPED | OUTPATIENT
Start: 2023-03-24 | End: 2023-10-27

## 2023-03-24 NOTE — TELEPHONE ENCOUNTER
Patient calls to inform medications sent during visit yesterday with Paula Garcia PA-C have not been received by pharmacy at this time.     Spoke to pharmacy staff who reports labetalol is ready for patient to . Pharmacy is processing prescription for metronidazole, prescription should be ready in about 30 minutes.    Spoke to patient to inform. Patient verbalized understanding.     Rabia GODFREY RN, BSN, PHN - PAL (patient advocate liaison)  [facility]  [phone]

## 2023-04-12 ENCOUNTER — TRANSFERRED RECORDS (OUTPATIENT)
Dept: HEALTH INFORMATION MANAGEMENT | Facility: CLINIC | Age: 73
End: 2023-04-12
Payer: COMMERCIAL

## 2023-04-20 SDOH — HEALTH STABILITY: PHYSICAL HEALTH: ON AVERAGE, HOW MANY MINUTES DO YOU ENGAGE IN EXERCISE AT THIS LEVEL?: 0 MIN

## 2023-04-20 SDOH — ECONOMIC STABILITY: FOOD INSECURITY: WITHIN THE PAST 12 MONTHS, THE FOOD YOU BOUGHT JUST DIDN'T LAST AND YOU DIDN'T HAVE MONEY TO GET MORE.: NEVER TRUE

## 2023-04-20 SDOH — HEALTH STABILITY: PHYSICAL HEALTH: ON AVERAGE, HOW MANY DAYS PER WEEK DO YOU ENGAGE IN MODERATE TO STRENUOUS EXERCISE (LIKE A BRISK WALK)?: 0 DAYS

## 2023-04-20 SDOH — ECONOMIC STABILITY: INCOME INSECURITY: IN THE LAST 12 MONTHS, WAS THERE A TIME WHEN YOU WERE NOT ABLE TO PAY THE MORTGAGE OR RENT ON TIME?: NO

## 2023-04-20 SDOH — ECONOMIC STABILITY: INCOME INSECURITY: HOW HARD IS IT FOR YOU TO PAY FOR THE VERY BASICS LIKE FOOD, HOUSING, MEDICAL CARE, AND HEATING?: NOT HARD AT ALL

## 2023-04-20 SDOH — ECONOMIC STABILITY: FOOD INSECURITY: WITHIN THE PAST 12 MONTHS, YOU WORRIED THAT YOUR FOOD WOULD RUN OUT BEFORE YOU GOT MONEY TO BUY MORE.: NEVER TRUE

## 2023-04-20 ASSESSMENT — ENCOUNTER SYMPTOMS
BREAST MASS: 0
CONSTIPATION: 1
ARTHRALGIAS: 1
SHORTNESS OF BREATH: 1

## 2023-04-20 ASSESSMENT — LIFESTYLE VARIABLES
HOW MANY STANDARD DRINKS CONTAINING ALCOHOL DO YOU HAVE ON A TYPICAL DAY: PATIENT DOES NOT DRINK
AUDIT-C TOTAL SCORE: 0
HOW OFTEN DO YOU HAVE A DRINK CONTAINING ALCOHOL: NEVER
HOW OFTEN DO YOU HAVE SIX OR MORE DRINKS ON ONE OCCASION: NEVER
SKIP TO QUESTIONS 9-10: 1

## 2023-04-20 ASSESSMENT — SOCIAL DETERMINANTS OF HEALTH (SDOH)
HOW OFTEN DO YOU GET TOGETHER WITH FRIENDS OR RELATIVES?: THREE TIMES A WEEK
HOW OFTEN DO YOU ATTEND CHURCH OR RELIGIOUS SERVICES?: NEVER
DO YOU BELONG TO ANY CLUBS OR ORGANIZATIONS SUCH AS CHURCH GROUPS UNIONS, FRATERNAL OR ATHLETIC GROUPS, OR SCHOOL GROUPS?: YES
IN A TYPICAL WEEK, HOW MANY TIMES DO YOU TALK ON THE PHONE WITH FAMILY, FRIENDS, OR NEIGHBORS?: MORE THAN THREE TIMES A WEEK

## 2023-04-20 ASSESSMENT — ACTIVITIES OF DAILY LIVING (ADL): CURRENT_FUNCTION: NO ASSISTANCE NEEDED

## 2023-04-21 ENCOUNTER — OFFICE VISIT (OUTPATIENT)
Dept: FAMILY MEDICINE | Facility: CLINIC | Age: 73
End: 2023-04-21
Payer: COMMERCIAL

## 2023-04-21 VITALS
BODY MASS INDEX: 30.51 KG/M2 | WEIGHT: 172.2 LBS | HEIGHT: 63 IN | OXYGEN SATURATION: 97 % | HEART RATE: 73 BPM | RESPIRATION RATE: 16 BRPM | TEMPERATURE: 97.9 F | SYSTOLIC BLOOD PRESSURE: 128 MMHG | DIASTOLIC BLOOD PRESSURE: 66 MMHG

## 2023-04-21 DIAGNOSIS — I10 ESSENTIAL HYPERTENSION: ICD-10-CM

## 2023-04-21 DIAGNOSIS — G62.0 DRUG-INDUCED POLYNEUROPATHY (H): ICD-10-CM

## 2023-04-21 DIAGNOSIS — Z00.00 ENCOUNTER FOR MEDICARE ANNUAL WELLNESS EXAM: Primary | ICD-10-CM

## 2023-04-21 DIAGNOSIS — K59.00 CONSTIPATION, UNSPECIFIED CONSTIPATION TYPE: ICD-10-CM

## 2023-04-21 DIAGNOSIS — E89.40 ASYMPTOMATIC POSTSURGICAL MENOPAUSE: ICD-10-CM

## 2023-04-21 DIAGNOSIS — E11.22 TYPE 2 DIABETES MELLITUS WITH CHRONIC KIDNEY DISEASE, WITHOUT LONG-TERM CURRENT USE OF INSULIN, UNSPECIFIED CKD STAGE (H): ICD-10-CM

## 2023-04-21 DIAGNOSIS — I25.10 CORONARY ARTERY DISEASE INVOLVING NATIVE HEART WITHOUT ANGINA PECTORIS, UNSPECIFIED VESSEL OR LESION TYPE: ICD-10-CM

## 2023-04-21 LAB
CHOLEST SERPL-MCNC: 183 MG/DL
CREAT UR-MCNC: 94.1 MG/DL
HBA1C MFR BLD: 5.1 % (ref 0–5.6)
HDLC SERPL-MCNC: 49 MG/DL
LDLC SERPL CALC-MCNC: 62 MG/DL
MICROALBUMIN UR-MCNC: 45.3 MG/L
MICROALBUMIN/CREAT UR: 48.14 MG/G CR (ref 0–25)
NONHDLC SERPL-MCNC: 134 MG/DL
TRIGL SERPL-MCNC: 360 MG/DL

## 2023-04-21 PROCEDURE — 82043 UR ALBUMIN QUANTITATIVE: CPT | Performed by: PHYSICIAN ASSISTANT

## 2023-04-21 PROCEDURE — 99214 OFFICE O/P EST MOD 30 MIN: CPT | Mod: 25 | Performed by: PHYSICIAN ASSISTANT

## 2023-04-21 PROCEDURE — 36415 COLL VENOUS BLD VENIPUNCTURE: CPT | Performed by: PHYSICIAN ASSISTANT

## 2023-04-21 PROCEDURE — 82570 ASSAY OF URINE CREATININE: CPT | Performed by: PHYSICIAN ASSISTANT

## 2023-04-21 PROCEDURE — 83036 HEMOGLOBIN GLYCOSYLATED A1C: CPT | Performed by: PHYSICIAN ASSISTANT

## 2023-04-21 PROCEDURE — G0438 PPPS, INITIAL VISIT: HCPCS | Performed by: PHYSICIAN ASSISTANT

## 2023-04-21 PROCEDURE — 80061 LIPID PANEL: CPT | Performed by: PHYSICIAN ASSISTANT

## 2023-04-21 RX ORDER — GABAPENTIN 300 MG/1
300 CAPSULE ORAL AT BEDTIME
Qty: 90 CAPSULE | Refills: 1 | Status: SHIPPED | OUTPATIENT
Start: 2023-04-21 | End: 2024-01-23

## 2023-04-21 ASSESSMENT — ENCOUNTER SYMPTOMS
ENDOCRINE NEGATIVE: 1
BREAST MASS: 0
ARTHRALGIAS: 1
CONSTIPATION: 1
EYES NEGATIVE: 1
CONSTITUTIONAL NEGATIVE: 1
SHORTNESS OF BREATH: 1

## 2023-04-21 ASSESSMENT — ACTIVITIES OF DAILY LIVING (ADL): CURRENT_FUNCTION: NO ASSISTANCE NEEDED

## 2023-04-21 NOTE — PATIENT INSTRUCTIONS
Patient Education   Personalized Prevention Plan  You are due for the preventive services outlined below.  Your care team is available to assist you in scheduling these services.  If you have already completed any of these items, please share that information with your care team to update in your medical record.  Health Maintenance Due   Topic Date Due     Osteoporosis Screening  Never done     Diabetic Foot Exam  Never done     Depression Action Plan  Never done     Diptheria Tetanus Pertussis (DTAP/TDAP/TD) Vaccine (1 - Tdap) Never done     COVID-19 Vaccine (5 - Booster for Pfizer series) 06/13/2022     Flu Vaccine (1) 09/01/2022     Annual Wellness Visit  12/07/2022     Basic Metabolic Panel  12/07/2022     Cholesterol Lab  12/07/2022     Kidney Microalbumin Urine Test  12/07/2022     A1C Lab  12/28/2022

## 2023-04-21 NOTE — PROGRESS NOTES
"SUBJECTIVE:   Julia is a 72 year old who presents for Preventive Visit.      3/23/2023     4:49 PM   Additional Questions   Roomed by Mary THOMAS   Patient has been advised of split billing requirements and indicates understanding: Yes  Are you in the first 12 months of your Medicare coverage?  No    Healthy Habits:     In general, how would you rate your overall health?  Fair    Frequency of exercise:  None    Do you usually eat at least 4 servings of fruit and vegetables a day, include whole grains    & fiber and avoid regularly eating high fat or \"junk\" foods?  No    Taking medications regularly:  Yes    Medication side effects:  Other    Ability to successfully perform activities of daily living:  No assistance needed    Home Safety:  No safety concerns identified    Hearing Impairment:  No hearing concerns    In the past 6 months, have you been bothered by leaking of urine? Yes    In general, how would you rate your overall mental or emotional health?  Good      PHQ-2 Total Score: 1      Have you ever done Advance Care Planning? (For example, a Health Directive, POLST, or a discussion with a medical provider or your loved ones about your wishes): No, advance care planning information given to patient to review.  Patient plans to discuss their wishes with loved ones or provider.       Fall risk  Fallen 2 or more times in the past year?: No  Any fall with injury in the past year?: No    Cognitive Screening   1) Repeat 3 items (Leader, Season, Table)    2) Clock draw: NORMAL  3) 3 item recall: Recalls 3 objects  Results: 3 items recalled: COGNITIVE IMPAIRMENT LESS LIKELY    Mini-CogTM Copyright FIORELLA Escalante. Licensed by the author for use in [facility]; reprinted with permission ([email or web address]). All rights reserved.      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 No             Due to labs. Patient had BMP and CBC with oncology    Current providers sharing in care for this patient include:   Patient Care Team:  Therese Vanessa PA-C as PCP - General  Keely Dewey MD as MD (Gastroenterology)  Elizabeth Monteiro MD as Assigned Musculoskeletal Provider  Cecile De La Rosa MD as Assigned PCP  Rabia Sanches RN as Personal Advocate & Liaison (PAL) (Nurse)    The following health maintenance items are reviewed in Epic and correct as of today:  Health Maintenance   Topic Date Due     DEXA  Never done     DIABETIC FOOT EXAM  Never done     DEPRESSION ACTION PLAN  Never done     DTAP/TDAP/TD IMMUNIZATION (1 - Tdap) Never done     COVID-19 Vaccine (5 - Booster for Pfizer series) 2022     INFLUENZA VACCINE (1) 2022     MEDICARE ANNUAL WELLNESS VISIT  2022     BMP  2022     LIPID  2022     MICROALBUMIN  2022     A1C  2022     PHQ-9  2023     EYE EXAM  2023     ANNUAL REVIEW OF HM ORDERS  2024     FALL RISK ASSESSMENT  2024     MAMMO SCREENING  2024     ADVANCE CARE PLANNING  2028     HEPATITIS C SCREENING  Completed     Pneumococcal Vaccine: 65+ Years  Completed     ZOSTER IMMUNIZATION  Completed     IPV IMMUNIZATION  Aged Out     MENINGITIS IMMUNIZATION  Aged Out     COLORECTAL CANCER SCREENING  Discontinued     Lab work is in process            Review of Systems   Constitutional:  "Negative.    HENT: Negative.    Eyes: Negative.    Respiratory: Positive for shortness of breath.    Gastrointestinal: Positive for constipation.   Endocrine: Negative.    Breasts:  Negative for tenderness, breast mass and discharge.   Genitourinary: Negative for pelvic pain, vaginal bleeding and vaginal discharge.   Musculoskeletal: Positive for arthralgias.   Psychiatric/Behavioral: Positive for mood changes.         OBJECTIVE:   /66 (BP Location: Right arm, Patient Position: Sitting, Cuff Size: Adult Large)   Pulse 73   Temp 97.9  F (36.6  C) (Oral)   Resp 16   Ht 1.588 m (5' 2.5\")   Wt 78.1 kg (172 lb 3.2 oz)   SpO2 97%   BMI 30.99 kg/m   Estimated body mass index is 30.99 kg/m  as calculated from the following:    Height as of this encounter: 1.588 m (5' 2.5\").    Weight as of this encounter: 78.1 kg (172 lb 3.2 oz).  Physical Exam  GENERAL APPEARANCE: healthy, alert and no distress  EYES: Eyes grossly normal to inspection, PERRL and conjunctivae and sclerae normal  HENT: ear canals and TM's normal, nose and mouth without ulcers or lesions, oropharynx clear and oral mucous membranes moist  NECK: no adenopathy, no asymmetry, masses, or scars and thyroid normal to palpation  RESP: lungs clear to auscultation - no rales, rhonchi or wheezes  BREAST: normal without masses, tenderness or nipple discharge and no palpable axillary masses or adenopathy  CV: regular rate and rhythm, normal S1 S2, no S3 or S4, no murmur, click or rub, no peripheral edema and peripheral pulses strong  ABDOMEN: soft, nontender, no hepatosplenomegaly, no masses and bowel sounds normal  MS: no musculoskeletal defects are noted and gait is age appropriate without ataxia  SKIN: no suspicious lesions or rashes  NEURO: Normal strength and tone, sensory exam grossly normal, mentation intact and speech normal  PSYCH: mentation appears normal and affect normal/bright        ASSESSMENT / PLAN:   (Z00.00) Encounter for Medicare annual " "wellness exam  (primary encounter diagnosis)  Comment:   Plan: MI ANNUAL WELLNESS VISIT, PPS, SUBSEQUENT            (E11.22) Type 2 diabetes mellitus with chronic kidney disease, without long-term current use of insulin, unspecified CKD stage (H)  Comment: await labs.   Plan: Albumin Random Urine Quantitative with Creat         Ratio, HEMOGLOBIN A1C, FOOT EXAM            (G62.0) Drug-induced polyneuropathy (H)  Comment: Risks/benefits of medication use discussed with patient. Patient reports understanding and accepts trial of medication.    Plan: gabapentin (NEURONTIN) 300 MG capsule            (I25.10) Coronary artery disease involving native heart without angina pectoris, unspecified vessel or lesion type  Comment: await labs.   Plan: Lipid panel reflex to direct LDL Non-fasting            (I10) Essential hypertension  Comment:   Plan: stabe with meds.     (K59.00) Constipation, unspecified constipation type  Comment:   Plan: using metamucil, senna-S, will add miralax  Did mention Linzess if no relief    (E89.40) Asymptomatic postsurgical menopause  Comment:   Plan: DEXA HIP/PELVIS/SPINE - Future              Patient has been advised of split billing requirements and indicates understanding: Yes      COUNSELING:  Reviewed preventive health counseling, as reflected in patient instructions       Regular exercise       Healthy diet/nutrition       Vision screening      BMI:   Estimated body mass index is 30.99 kg/m  as calculated from the following:    Height as of this encounter: 1.588 m (5' 2.5\").    Weight as of this encounter: 78.1 kg (172 lb 3.2 oz).   Weight management plan: Discussed healthy diet and exercise guidelines      She reports that she quit smoking about 29 years ago. Her smoking use included cigarettes. She has a 67.50 pack-year smoking history. She has never used smokeless tobacco.      Appropriate preventive services were discussed with this patient, including applicable screening as appropriate " for cardiovascular disease, diabetes, osteopenia/osteoporosis, and glaucoma.  As appropriate for age/gender, discussed screening for colorectal cancer, prostate cancer, breast cancer, and cervical cancer. Checklist reviewing preventive services available has been given to the patient.    Reviewed patients plan of care and provided an AVS. The Intermediate Care Plan ( asthma action plan, low back pain action plan, and migraine action plan) for Julia meets the Care Plan requirement. This Care Plan has been established and reviewed with the Patient.          Therese Vanessa PA-C  [facility]    Identified Health Risks:    I have reviewed Opioid Use Disorder and Substance Use Disorder risk factors and made any needed referrals.

## 2023-05-03 ENCOUNTER — TRANSFERRED RECORDS (OUTPATIENT)
Dept: HEALTH INFORMATION MANAGEMENT | Facility: CLINIC | Age: 73
End: 2023-05-03
Payer: COMMERCIAL

## 2023-05-08 DIAGNOSIS — C56.1: Primary | ICD-10-CM

## 2023-05-08 LAB
ABO/RH(D): NORMAL
ANTIBODY SCREEN: NEGATIVE
SPECIMEN EXPIRATION DATE: NORMAL

## 2023-05-09 ENCOUNTER — HOSPITAL ENCOUNTER (OUTPATIENT)
Facility: CLINIC | Age: 73
Discharge: HOME | End: 2023-05-09
Admitting: NURSE PRACTITIONER
Payer: COMMERCIAL

## 2023-05-09 ENCOUNTER — LAB (OUTPATIENT)
Dept: LAB | Facility: CLINIC | Age: 73
End: 2023-05-09
Payer: COMMERCIAL

## 2023-05-09 VITALS
OXYGEN SATURATION: 98 % | DIASTOLIC BLOOD PRESSURE: 72 MMHG | BODY MASS INDEX: 29.77 KG/M2 | HEIGHT: 63 IN | TEMPERATURE: 96.8 F | WEIGHT: 168 LBS | HEART RATE: 59 BPM | SYSTOLIC BLOOD PRESSURE: 164 MMHG | RESPIRATION RATE: 18 BRPM

## 2023-05-09 DIAGNOSIS — C56.1: ICD-10-CM

## 2023-05-09 LAB
BLD PROD TYP BPU: NORMAL
BLOOD COMPONENT TYPE: NORMAL
CODING SYSTEM: NORMAL
CROSSMATCH: NORMAL
ISSUE DATE AND TIME: NORMAL
UNIT ABO/RH: NORMAL
UNIT NUMBER: NORMAL
UNIT STATUS: NORMAL
UNIT TYPE ISBT: 5100

## 2023-05-09 PROCEDURE — 250N000011 HC RX IP 250 OP 636: Performed by: NURSE PRACTITIONER

## 2023-05-09 PROCEDURE — 86850 RBC ANTIBODY SCREEN: CPT

## 2023-05-09 PROCEDURE — P9016 RBC LEUKOCYTES REDUCED: HCPCS | Performed by: NURSE PRACTITIONER

## 2023-05-09 PROCEDURE — 36415 COLL VENOUS BLD VENIPUNCTURE: CPT

## 2023-05-09 PROCEDURE — 86923 COMPATIBILITY TEST ELECTRIC: CPT | Performed by: NURSE PRACTITIONER

## 2023-05-09 PROCEDURE — 36430 TRANSFUSION BLD/BLD COMPNT: CPT

## 2023-05-09 PROCEDURE — 86901 BLOOD TYPING SEROLOGIC RH(D): CPT

## 2023-05-09 PROCEDURE — 999N000087 HC STATISTIC IV OP-OVERFLOW

## 2023-05-09 RX ORDER — HEPARIN SODIUM,PORCINE 10 UNIT/ML
5-10 VIAL (ML) INTRAVENOUS
Status: DISCONTINUED | OUTPATIENT
Start: 2023-05-09 | End: 2023-05-09 | Stop reason: HOSPADM

## 2023-05-09 RX ORDER — HEPARIN SODIUM,PORCINE 10 UNIT/ML
5-10 VIAL (ML) INTRAVENOUS EVERY 24 HOURS
Status: DISCONTINUED | OUTPATIENT
Start: 2023-05-09 | End: 2023-05-09 | Stop reason: HOSPADM

## 2023-05-09 RX ORDER — HEPARIN SODIUM (PORCINE) LOCK FLUSH IV SOLN 100 UNIT/ML 100 UNIT/ML
5-10 SOLUTION INTRAVENOUS
Status: DISCONTINUED | OUTPATIENT
Start: 2023-05-09 | End: 2023-05-09 | Stop reason: HOSPADM

## 2023-05-09 RX ADMIN — Medication 5 ML: at 14:23

## 2023-05-09 ASSESSMENT — ACTIVITIES OF DAILY LIVING (ADL)
ADLS_ACUITY_SCORE: 35

## 2023-05-09 NOTE — PROGRESS NOTES
Care Suites Admission Nursing Note    Patient Information  Name: Julia Andre  Age: 72 year old  Reason for admission: Transfuse 1 unit PRBC's  Care Suites arrival time: 1110    Patient Admission/Assessment   Pre-procedure assessment complete: Yes  If abnormal assessment/labs, provider notified: N/A  NPO: N/A  Medications held per instructions/orders: N/A  Consent: obtained Copy sent from clinic.  If applicable, pregnancy test status: deferred  Patient oriented to room: Yes  Education/questions answered: Yes  Plan/other: 1 unit PRBC's started at 1202, pt tolerating well. VSS, afebrile no sx of reactions.  now visiting at bedside. Tolerating PO.     Discharge Planning  Discharge name/phone number: Self  Overnight post sedation caregiver: NA  Discharge location: home    Radha Araujo RN       Care Suites Discharge Nursing Note    Patient Information  Name: Julia Andre  Age: 72 year old    Discharge Education:  Discharge instructions reviewed: Yes  Additional education/resources provided: NA  Patient/patient representative verbalizes understanding: Yes  Patient discharging on new medications: No  Medication education completed: N/A    Discharge Plans:   Discharge location: home  Discharge ride contacted: N/A  Approximate discharge time: 1430    Discharge Criteria:  Discharge criteria met and vital signs stable: Yes    Patient Belongs:  Patient belongings returned to patient: Yes    Radha Araujo RN

## 2023-05-24 ENCOUNTER — TRANSFERRED RECORDS (OUTPATIENT)
Dept: HEALTH INFORMATION MANAGEMENT | Facility: CLINIC | Age: 73
End: 2023-05-24
Payer: COMMERCIAL

## 2023-06-06 ENCOUNTER — TELEPHONE (OUTPATIENT)
Dept: FAMILY MEDICINE | Facility: CLINIC | Age: 73
End: 2023-06-06

## 2023-06-06 ENCOUNTER — ANCILLARY PROCEDURE (OUTPATIENT)
Dept: BONE DENSITY | Facility: CLINIC | Age: 73
End: 2023-06-06
Attending: PHYSICIAN ASSISTANT
Payer: COMMERCIAL

## 2023-06-06 DIAGNOSIS — E89.40 ASYMPTOMATIC POSTSURGICAL MENOPAUSE: ICD-10-CM

## 2023-06-06 PROCEDURE — 77085 DXA BONE DENSITY AXL VRT FX: CPT | Performed by: INTERNAL MEDICINE

## 2023-06-06 NOTE — TELEPHONE ENCOUNTER
Patient calling with URI symptoms x 3 days.  Negative COVID test yesterday.  States has had COVID twice in the past 6 months.  Cough- productive green, hoarse voice, headache and fatigue.  Scheduled video visit with Paula tomorrow at 2:10 pm.  Sherita Torres RN

## 2023-06-07 ENCOUNTER — VIRTUAL VISIT (OUTPATIENT)
Dept: FAMILY MEDICINE | Facility: CLINIC | Age: 73
End: 2023-06-07
Payer: COMMERCIAL

## 2023-06-07 DIAGNOSIS — R05.1 ACUTE COUGH: Primary | ICD-10-CM

## 2023-06-07 PROCEDURE — 99213 OFFICE O/P EST LOW 20 MIN: CPT | Mod: VID | Performed by: PHYSICIAN ASSISTANT

## 2023-06-07 RX ORDER — AMOXICILLIN 875 MG
875 TABLET ORAL 2 TIMES DAILY
Qty: 20 TABLET | Refills: 0 | Status: SHIPPED | OUTPATIENT
Start: 2023-06-07 | End: 2023-06-17

## 2023-06-07 RX ORDER — LORAZEPAM 0.5 MG/1
0.5 TABLET ORAL PRN
COMMUNITY
End: 2024-02-09

## 2023-06-07 RX ORDER — CODEINE PHOSPHATE AND GUAIFENESIN 10; 100 MG/5ML; MG/5ML
1-2 SOLUTION ORAL EVERY 4 HOURS PRN
Qty: 180 ML | Refills: 0 | Status: SHIPPED | OUTPATIENT
Start: 2023-06-07 | End: 2024-01-23

## 2023-06-07 NOTE — PROGRESS NOTES
Julia is a 72 year old who is being evaluated via a billable video visit.      How would you like to obtain your AVS? MyChart  If the video visit is dropped, the invitation should be resent by: Send to e-mail at: [email or web address]  Will anyone else be joining your video visit? No        Assessment & Plan     Acute cough  Patient has history of ovarian cancer on bevacizumab currently. Cough started about 4 days ago could be viral but given her history and treatments I opted to treat. If not improving could be viral. If worsening she will reach out. OTC cough syrups not helpful. We will try the codeine cough syrup instead.  - guaiFENesin-codeine (ROBITUSSIN AC) 100-10 MG/5ML solution; Take 5-10 mLs by mouth every 4 hours as needed for cough  - amoxicillin (AMOXIL) 875 MG tablet; Take 1 tablet (875 mg) by mouth 2 times daily for 10 days    Review of prior external note(s) from - Outside records from [facility]   Prescription drug management    Paula aGrcia PA-C  [facility]   Julia is a 72 year old, presenting for the following health issues:  URI        6/7/2023     2:08 PM   Additional Questions   Roomed by Indu Echevarria CMA   Accompanied by self         6/7/2023     2:08 PM   Patient Reported Additional Medications   Patient reports taking the following new medications no     URI    History of Present Illness       Reason for visit:  URI  Symptom onset:  3-7 days ago (4 days)  Symptoms include:  Coughing, slight HA, tired/sleeping a ton, no fever.  Symptom intensity:  Moderate  Symptom progression:  Staying the same  Had these symptoms before:  No  What makes it worse:  No  What makes it better:  No    She eats 2-3 servings of fruits and vegetables daily.She consumes 0 sweetened beverage(s) daily.She exercises with enough effort to increase her heart rate 9 or less minutes per day.  She exercises with enough effort to increase her heart rate 3 or less days per week.    She is taking medications regularly.     Home COVID testing negative    She has tried: Tessalon Perles- did not work well, Promethazine cough syrup- did not help and Robitussin DM and a night time cough syrup.  Cough is mostly dry.    She denies any fevers or shortness of breath    Has history of ovarian cancer seeing [facility] (currently on bevacizumab).      Review of Systems   GENERAL:  No fevers        Objective    Vitals - Patient Reported  Pain Score: No Pain (0)        Physical Exam   GENERAL: Healthy, alert and no distress  EYES: Eyes grossly normal to inspection.  No discharge or erythema, or obvious scleral/conjunctival abnormalities.  RESP: Occasional cough. No visible cyanosis.  No visible retractions or increased work of breathing.    SKIN: Visible skin clear. No significant rash, abnormal pigmentation or lesions.  NEURO: Cranial nerves grossly intact.  Mentation and speech appropriate for age.  PSYCH: Mentation appears normal, affect normal/bright, judgement and insight intact, normal speech and appearance well-groomed.        Video-Visit Details    Type of service:  Video Visit   Video Start Time: 2:24 PM  Video End Time:2:34 PM    Originating Location (pt. Location): Home  Distant Location (provider location):  On-site  Platform used for Video Visit: Haris

## 2023-06-14 ENCOUNTER — TRANSFERRED RECORDS (OUTPATIENT)
Dept: HEALTH INFORMATION MANAGEMENT | Facility: CLINIC | Age: 73
End: 2023-06-14
Payer: COMMERCIAL

## 2023-07-05 ENCOUNTER — TRANSFERRED RECORDS (OUTPATIENT)
Dept: HEALTH INFORMATION MANAGEMENT | Facility: CLINIC | Age: 73
End: 2023-07-05
Payer: COMMERCIAL

## 2023-07-26 ENCOUNTER — TRANSFERRED RECORDS (OUTPATIENT)
Dept: HEALTH INFORMATION MANAGEMENT | Facility: CLINIC | Age: 73
End: 2023-07-26
Payer: COMMERCIAL

## 2023-08-01 ENCOUNTER — OFFICE VISIT (OUTPATIENT)
Dept: ORTHOPEDICS | Facility: CLINIC | Age: 73
End: 2023-08-01
Payer: COMMERCIAL

## 2023-08-01 VITALS
HEIGHT: 63 IN | DIASTOLIC BLOOD PRESSURE: 80 MMHG | BODY MASS INDEX: 28.35 KG/M2 | WEIGHT: 160 LBS | SYSTOLIC BLOOD PRESSURE: 124 MMHG

## 2023-08-01 DIAGNOSIS — M17.11 PRIMARY OSTEOARTHRITIS OF RIGHT KNEE: ICD-10-CM

## 2023-08-01 DIAGNOSIS — M19.011 ARTHRITIS OF RIGHT GLENOHUMERAL JOINT: Primary | ICD-10-CM

## 2023-08-01 PROCEDURE — 99214 OFFICE O/P EST MOD 30 MIN: CPT | Mod: 25 | Performed by: STUDENT IN AN ORGANIZED HEALTH CARE EDUCATION/TRAINING PROGRAM

## 2023-08-01 PROCEDURE — 20611 DRAIN/INJ JOINT/BURSA W/US: CPT | Mod: RT | Performed by: STUDENT IN AN ORGANIZED HEALTH CARE EDUCATION/TRAINING PROGRAM

## 2023-08-01 PROCEDURE — 20610 DRAIN/INJ JOINT/BURSA W/O US: CPT | Mod: 59 | Performed by: STUDENT IN AN ORGANIZED HEALTH CARE EDUCATION/TRAINING PROGRAM

## 2023-08-01 RX ORDER — LIDOCAINE HYDROCHLORIDE 10 MG/ML
4 INJECTION, SOLUTION INFILTRATION; PERINEURAL
Status: SHIPPED | OUTPATIENT
Start: 2023-08-01

## 2023-08-01 RX ORDER — LIDOCAINE HYDROCHLORIDE 10 MG/ML
3 INJECTION, SOLUTION INFILTRATION; PERINEURAL
Status: SHIPPED | OUTPATIENT
Start: 2023-08-01

## 2023-08-01 RX ORDER — TRIAMCINOLONE ACETONIDE 40 MG/ML
40 INJECTION, SUSPENSION INTRA-ARTICULAR; INTRAMUSCULAR
Status: SHIPPED | OUTPATIENT
Start: 2023-08-01

## 2023-08-01 RX ORDER — LIDOCAINE HYDROCHLORIDE 10 MG/ML
5 INJECTION, SOLUTION INFILTRATION; PERINEURAL
Status: SHIPPED | OUTPATIENT
Start: 2023-08-01

## 2023-08-01 RX ADMIN — LIDOCAINE HYDROCHLORIDE 4 ML: 10 INJECTION, SOLUTION INFILTRATION; PERINEURAL at 12:07

## 2023-08-01 RX ADMIN — TRIAMCINOLONE ACETONIDE 40 MG: 40 INJECTION, SUSPENSION INTRA-ARTICULAR; INTRAMUSCULAR at 12:07

## 2023-08-01 RX ADMIN — LIDOCAINE HYDROCHLORIDE 3 ML: 10 INJECTION, SOLUTION INFILTRATION; PERINEURAL at 12:02

## 2023-08-01 RX ADMIN — LIDOCAINE HYDROCHLORIDE 4 ML: 10 INJECTION, SOLUTION INFILTRATION; PERINEURAL at 12:02

## 2023-08-01 RX ADMIN — TRIAMCINOLONE ACETONIDE 40 MG: 40 INJECTION, SUSPENSION INTRA-ARTICULAR; INTRAMUSCULAR at 12:02

## 2023-08-01 RX ADMIN — LIDOCAINE HYDROCHLORIDE 5 ML: 10 INJECTION, SOLUTION INFILTRATION; PERINEURAL at 12:07

## 2023-08-01 ASSESSMENT — PAIN SCALES - GENERAL: PAINLEVEL: EXTREME PAIN (8)

## 2023-08-01 NOTE — LETTER
8/1/2023         RE: Julia Andre  [id number] [address] [id number]        Dear Colleague,    Thank you for referring your patient, Julia Andre, to the [facility] [place]. Please see a copy of my visit note below.    [facility]  Sports Medicine Clinic           ASSESSMENT and PLAN:     Julia was seen today for pain.    Diagnoses and all orders for this visit:    Arthritis of right glenohumeral joint: Significant glenohumeral arthritis has been very responsive to corticosteroid injections, getting 6 months of benefit from the last 2 injections.  Patient has severely limited range of motion and strength secondary to pain today.  She does believe that after injections both her strength and her range of motion significantly improved.  Discussed joint replacement with patient she feels he has such good success from injections that she does not need to consider this at this time.  -     Large Joint Injection/Arthocentesis: R glenohumeral joint  -     Follow-up as needed for repeat injections, no sooner than 3 to 4 months    Primary osteoarthritis of right knee: Primarily patellofemoral arthritis also significantly improved by corticosteroid injections, with 6 months of relief.  -     Large Joint Injection/Arthocentesis: R knee joint  -     Follow-up as needed for repeat injections, no sooner than 3 to 4 months    Return sooner if develops new or worsening symptoms.    Options for treatment and/or follow-up care were reviewed with the patient was actively involved in the decision making process. Patient verbalized understanding and was in agreement with the plan.      Aurelia Hunter MD, [facility]  Primary Care Sports Medicine    Procedure  Large Joint Injection/Arthocentesis: R glenohumeral joint    Date/Time: 8/1/2023 12:02 PM    Performed by: Aurelia Hunter MD  Authorized by: Aurelia Hunter MD    Indications:  Pain and osteoarthritis  Needle Size:  22  G  Guidance: ultrasound    Approach:  Posterior  Location:  Shoulder      Site:  R glenohumeral joint  Medications:  40 mg triamcinolone 40 MG/ML; 3 mL lidocaine 1 %; 4 mL lidocaine 1 %  Outcome:  Tolerated well, no immediate complications  Procedure discussed: discussed risks, benefits, and alternatives    Consent Given by:  Patient  Timeout: timeout called immediately prior to procedure    Prep: patient was prepped and draped in usual sterile fashion     Ultrasound was used to ensure safe and accurate needle placement and injection. Ultrasound images of the procedure were permanently stored.    Large Joint Injection/Arthocentesis: R knee joint    Date/Time: 8/1/2023 12:07 PM    Performed by: Aurelia Hunter MD  Authorized by: Aurelia Hunter MD    Indications:  Pain and osteoarthritis  Needle Size:  22 G  Guidance: landmark guided    Approach:  Anterolateral  Location:  Knee      Medications:  40 mg triamcinolone 40 MG/ML; 4 mL lidocaine 1 %; 5 mL lidocaine 1 %  Outcome:  Tolerated well, no immediate complications  Procedure discussed: discussed risks, benefits, and alternatives    Timeout: timeout called immediately prior to procedure    Prep: patient was prepped and draped in usual sterile fashion     Ultrasound was used to ensure safe and accurate needle placement and injection. Ultrasound images of the procedure were permanently stored.                   SUBJECTIVE       Julia Andre is a 72 year old female presenting to clinic today with a chief complaint of Right shoudler pain, referred as a former Dr. Zaldivar Patient.    Onset: 3 years(s) ago. Reports insidious onset without acute precipitating event.  Location of Pain: right shoulder joint  Rating of Pain at worst: 9/10  Rating of Pain Currently: 8/10  Worsened by: using the shoulder to reach, laying on it in bed, overhead movements,   Better with: injections,   Treatments tried: ibuprofen, physical therapy (8 visits), and corticosteroid injection  (most recent date: 12/14/22) that provided  8 month(s) of relief  Associated symptoms: dull ache, sharp when she moves it  Orthopedic history: NO  Relevant surgical history: NO  Social history: social history: retired    Patient presents for repeat injections of the right knee and the right hip. She last had injections 12/14/22 and thinks that she has been getting about 6 months relief from the injections.     PMH, Medications and Allergies were reviewed and updated as needed.    ROS:  As noted above otherwise negative.    Patient Active Problem List   Diagnosis     Pelvic mass     Elevated CA-125     CAD (coronary artery disease)     Type 2 diabetes mellitus with chronic kidney disease, without long-term current use of insulin, unspecified CKD stage (H)     Essential hypertension     Hyperlipidemia     GERD (gastroesophageal reflux disease)     Anxiety and depression     Constipation     Iron deficiency anemia     Osteopenia     Psoriasis     Depression     Pelvic mass in female     Acute infectious disease     Bacteremia     Ovarian cancer, unspecified laterality (H)     Anemia     Angina pectoris with normal coronary arteriogram (H)     Drug-induced polyneuropathy (H)       Current Outpatient Medications   Medication Sig Dispense Refill     amLODIPine (NORVASC) 5 MG tablet Take 1 tablet (5 mg) by mouth daily 90 tablet 1     aspirin 81 MG EC tablet Take 81 mg by mouth daily       bevacizumab (AVASTIN) 100 MG/4ML injection Inject 100 mLs into the vein       bevacizumab (AVASTIN) 100 MG/4ML injection        BIOTIN PO Take 1 tablet by mouth every morning       buPROPion (WELLBUTRIN XL) 300 MG 24 hr tablet Take 1 tablet (300 mg) by mouth every morning 90 tablet 1     gabapentin (NEURONTIN) 300 MG capsule Take 1 capsule (300 mg) by mouth At Bedtime 90 capsule 1     guaiFENesin-codeine (ROBITUSSIN AC) 100-10 MG/5ML solution Take 5-10 mLs by mouth every 4 hours as needed for cough 180 mL 0     labetalol (NORMODYNE) 100  "MG tablet TAKE 1 TABLET BY MOUTH IF SYSTOLIC BLOOD PRESSURE  OR HIGHER, OR IF DIASTOLIC BLOOD PRESSURE  OR ABOVE 90 tablet 0     lisinopril (ZESTRIL) 20 MG tablet Take 1 tablet (20 mg) by mouth daily 90 tablet 1     LORazepam (ATIVAN) 0.5 MG tablet Take 0.5 mg by mouth as needed for anxiety Uses for PET scan       metFORMIN (GLUCOPHAGE XR) 500 MG 24 hr tablet Take 1 tablet (500 mg) by mouth 2 times daily (with meals) 180 tablet 1     metoprolol tartrate (LOPRESSOR) 50 MG tablet Take 1 tablet (50 mg) by mouth 2 times daily 180 tablet 1     metroNIDAZOLE (METROGEL) 1 % external gel Apply topically daily 60 g 1     Multiple Vitamin (MULTIVITAMIN PO) Take 1 tablet by mouth every morning       oxymetazoline (AFRIN) 0.05 % nasal spray Spray 2 sprays into both nostrils daily as needed for congestion       rosuvastatin (CRESTOR) 20 MG tablet Take 1 tablet (20 mg) by mouth daily 90 tablet 1     traZODone (DESYREL) 50 MG tablet Take 1-2 tablets ( mg) by mouth At Bedtime 90 tablet 1            OBJECTIVE:       Vitals:   Vitals:    08/01/23 1051   BP: 124/80   Weight: 72.6 kg (160 lb)   Height: 1.588 m (5' 2.5\")     BMI: Body mass index is 28.8 kg/m .    Gen:  Well nourished and in no acute distress  HEENT: Extraocular movement intact  Neck: Supple  Pulm:  Breathing Comfortably. No increased respiratory effort.  Psych: Euthymic. Appropriately answers questions    MSK:   RIGHT SHOULDER  Inspection:    no swelling, bruising, discoloration, or obvious deformity or asymmetry  Palpation:    Tender about the anterior capsule and greater tuberosity. Remainder of bony and tendinous landmarks are nontender.  Active Range of Motion:     Abduction 800 /  /  / IR L4, significant limited by pain in all planes    Strength:    Scapular plane abduction painful, weakness / ER painful, weakness / IR painful, weakness / biceps 5/5 / triceps 5/5    RIGHT KNEE  Inspection:    Normal alignment; no edema, erythema, or " ecchymosis present  Palpation:    Tender about the lateral patellar facet and medial patellar facet. Remainder of bony and ligamentous landmarks are nontender.    Trace effusion is present    Patellofemoral crepitus is Present  Range of Motion:     00 extension to 1200 flexion  Strength:    Quadriceps grossly intact and hamstrings grossly intact    Extensor mechanism intact      Imaging was personally reviewed and interpreted by me.   XRAY Knee Right (5/2/23): IMPRESSION: Advanced lateral patellofemoral degenerative changes.  Medial and lateral compartment joint spaces appear preserved. No acute  fracture. Minimal joint effusion. Soft tissue calcification appears to  be in the subcutaneous tissues of the posterior knee.     Imaged portions of the left knee demonstrate advanced lateral  patellofemoral degenerative changes.'    XRAY Shoulder Right (5/2/23):IMPRESSION: Advanced glenohumeral degenerative changes. 1.4 cm loose  body inferior to the coracoid. Moderate acromioclavicular degenerative  changes. No acute fracture or dislocation. MediPort type catheter in  place.               Again, thank you for allowing me to participate in the care of your patient.        Sincerely,        Aurelia Hunter MD

## 2023-08-01 NOTE — PROGRESS NOTES
[facility]  Sports Medicine Clinic           ASSESSMENT and PLAN:     Julia was seen today for pain.    Diagnoses and all orders for this visit:    Arthritis of right glenohumeral joint: Significant glenohumeral arthritis has been very responsive to corticosteroid injections, getting 6 months of benefit from the last 2 injections.  Patient has severely limited range of motion and strength secondary to pain today.  She does believe that after injections both her strength and her range of motion significantly improved.  Discussed joint replacement with patient she feels he has such good success from injections that she does not need to consider this at this time.  -     Large Joint Injection/Arthocentesis: R glenohumeral joint  -     Follow-up as needed for repeat injections, no sooner than 3 to 4 months    Primary osteoarthritis of right knee: Primarily patellofemoral arthritis also significantly improved by corticosteroid injections, with 6 months of relief.  -     Large Joint Injection/Arthocentesis: R knee joint  -     Follow-up as needed for repeat injections, no sooner than 3 to 4 months    Return sooner if develops new or worsening symptoms.    Options for treatment and/or follow-up care were reviewed with the patient was actively involved in the decision making process. Patient verbalized understanding and was in agreement with the plan.      Aurelia Hunter MD, [facility]  Primary Care Sports Medicine    Procedure  Large Joint Injection/Arthocentesis: R glenohumeral joint    Date/Time: 8/1/2023 12:02 PM    Performed by: Aurelia Hunter MD  Authorized by: Aurelia Hunter MD    Indications:  Pain and osteoarthritis  Needle Size:  22 G  Guidance: ultrasound    Approach:  Posterior  Location:  Shoulder      Site:  R glenohumeral joint  Medications:  40 mg triamcinolone 40 MG/ML; 3 mL lidocaine 1 %; 4 mL lidocaine 1 %  Outcome:  Tolerated well, no immediate complications  Procedure discussed: discussed  risks, benefits, and alternatives    Consent Given by:  Patient  Timeout: timeout called immediately prior to procedure    Prep: patient was prepped and draped in usual sterile fashion     Ultrasound was used to ensure safe and accurate needle placement and injection. Ultrasound images of the procedure were permanently stored.    Large Joint Injection/Arthocentesis: R knee joint    Date/Time: 8/1/2023 12:07 PM    Performed by: Aurelia Hunter MD  Authorized by: Aurelia Hunter MD    Indications:  Pain and osteoarthritis  Needle Size:  22 G  Guidance: landmark guided    Approach:  Anterolateral  Location:  Knee      Medications:  40 mg triamcinolone 40 MG/ML; 4 mL lidocaine 1 %; 5 mL lidocaine 1 %  Outcome:  Tolerated well, no immediate complications  Procedure discussed: discussed risks, benefits, and alternatives    Timeout: timeout called immediately prior to procedure    Prep: patient was prepped and draped in usual sterile fashion     Ultrasound was used to ensure safe and accurate needle placement and injection. Ultrasound images of the procedure were permanently stored.                   SUBJECTIVE       Julia Andre is a 72 year old female presenting to clinic today with a chief complaint of Right shoudler pain, referred as a former Dr. Zaldivar Patient.    Onset: 3 years(s) ago. Reports insidious onset without acute precipitating event.  Location of Pain: right shoulder joint  Rating of Pain at worst: 9/10  Rating of Pain Currently: 8/10  Worsened by: using the shoulder to reach, laying on it in bed, overhead movements,   Better with: injections,   Treatments tried: ibuprofen, physical therapy (8 visits), and corticosteroid injection (most recent date: 12/14/22) that provided  8 month(s) of relief  Associated symptoms: dull ache, sharp when she moves it  Orthopedic history: NO  Relevant surgical history: NO  Social history: social history: retired    Patient presents for repeat injections of the right  knee and the right hip. She last had injections 12/14/22 and thinks that she has been getting about 6 months relief from the injections.     PMH, Medications and Allergies were reviewed and updated as needed.    ROS:  As noted above otherwise negative.    Patient Active Problem List   Diagnosis    Pelvic mass    Elevated CA-125    CAD (coronary artery disease)    Type 2 diabetes mellitus with chronic kidney disease, without long-term current use of insulin, unspecified CKD stage (H)    Essential hypertension    Hyperlipidemia    GERD (gastroesophageal reflux disease)    Anxiety and depression    Constipation    Iron deficiency anemia    Osteopenia    Psoriasis    Depression    Pelvic mass in female    Acute infectious disease    Bacteremia    Ovarian cancer, unspecified laterality (H)    Anemia    Angina pectoris with normal coronary arteriogram (H)    Drug-induced polyneuropathy (H)       Current Outpatient Medications   Medication Sig Dispense Refill    amLODIPine (NORVASC) 5 MG tablet Take 1 tablet (5 mg) by mouth daily 90 tablet 1    aspirin 81 MG EC tablet Take 81 mg by mouth daily      bevacizumab (AVASTIN) 100 MG/4ML injection Inject 100 mLs into the vein      bevacizumab (AVASTIN) 100 MG/4ML injection       BIOTIN PO Take 1 tablet by mouth every morning      buPROPion (WELLBUTRIN XL) 300 MG 24 hr tablet Take 1 tablet (300 mg) by mouth every morning 90 tablet 1    gabapentin (NEURONTIN) 300 MG capsule Take 1 capsule (300 mg) by mouth At Bedtime 90 capsule 1    guaiFENesin-codeine (ROBITUSSIN AC) 100-10 MG/5ML solution Take 5-10 mLs by mouth every 4 hours as needed for cough 180 mL 0    labetalol (NORMODYNE) 100 MG tablet TAKE 1 TABLET BY MOUTH IF SYSTOLIC BLOOD PRESSURE  OR HIGHER, OR IF DIASTOLIC BLOOD PRESSURE  OR ABOVE 90 tablet 0    lisinopril (ZESTRIL) 20 MG tablet Take 1 tablet (20 mg) by mouth daily 90 tablet 1    LORazepam (ATIVAN) 0.5 MG tablet Take 0.5 mg by mouth as needed for anxiety  "Uses for PET scan      metFORMIN (GLUCOPHAGE XR) 500 MG 24 hr tablet Take 1 tablet (500 mg) by mouth 2 times daily (with meals) 180 tablet 1    metoprolol tartrate (LOPRESSOR) 50 MG tablet Take 1 tablet (50 mg) by mouth 2 times daily 180 tablet 1    metroNIDAZOLE (METROGEL) 1 % external gel Apply topically daily 60 g 1    Multiple Vitamin (MULTIVITAMIN PO) Take 1 tablet by mouth every morning      oxymetazoline (AFRIN) 0.05 % nasal spray Spray 2 sprays into both nostrils daily as needed for congestion      rosuvastatin (CRESTOR) 20 MG tablet Take 1 tablet (20 mg) by mouth daily 90 tablet 1    traZODone (DESYREL) 50 MG tablet Take 1-2 tablets ( mg) by mouth At Bedtime 90 tablet 1            OBJECTIVE:       Vitals:   Vitals:    08/01/23 1051   BP: 124/80   Weight: 72.6 kg (160 lb)   Height: 1.588 m (5' 2.5\")     BMI: Body mass index is 28.8 kg/m .    Gen:  Well nourished and in no acute distress  HEENT: Extraocular movement intact  Neck: Supple  Pulm:  Breathing Comfortably. No increased respiratory effort.  Psych: Euthymic. Appropriately answers questions    MSK:   RIGHT SHOULDER  Inspection:    no swelling, bruising, discoloration, or obvious deformity or asymmetry  Palpation:    Tender about the anterior capsule and greater tuberosity. Remainder of bony and tendinous landmarks are nontender.  Active Range of Motion:     Abduction 800 /  /  / IR L4, significant limited by pain in all planes    Strength:    Scapular plane abduction painful, weakness / ER painful, weakness / IR painful, weakness / biceps 5/5 / triceps 5/5    RIGHT KNEE  Inspection:    Normal alignment; no edema, erythema, or ecchymosis present  Palpation:    Tender about the lateral patellar facet and medial patellar facet. Remainder of bony and ligamentous landmarks are nontender.    Trace effusion is present    Patellofemoral crepitus is Present  Range of Motion:     00 extension to 1200 flexion  Strength:    Quadriceps grossly " intact and hamstrings grossly intact    Extensor mechanism intact      Imaging was personally reviewed and interpreted by me.   XRAY Knee Right (5/2/23): IMPRESSION: Advanced lateral patellofemoral degenerative changes.  Medial and lateral compartment joint spaces appear preserved. No acute  fracture. Minimal joint effusion. Soft tissue calcification appears to  be in the subcutaneous tissues of the posterior knee.     Imaged portions of the left knee demonstrate advanced lateral  patellofemoral degenerative changes.'    XRAY Shoulder Right (5/2/23):IMPRESSION: Advanced glenohumeral degenerative changes. 1.4 cm loose  body inferior to the coracoid. Moderate acromioclavicular degenerative  changes. No acute fracture or dislocation. MediPort type catheter in  place.

## 2023-08-05 ENCOUNTER — HEALTH MAINTENANCE LETTER (OUTPATIENT)
Age: 73
End: 2023-08-05

## 2023-08-11 NOTE — PLAN OF CARE
[id number] shift. POD6. Vitals stable, afebrile. Ambulating halls independently. C/o pain in abdomen, Tylenol and Ibuprofen effective. Tolerating regular diet. Adequate urine out put. Passing gas, small loose BM. Midline incision stapled and open to air. Port hep locked, good blood return. UA sent. Continuing IV Zosyn, watching repeat blood cultures. Plan pending.   sob

## 2023-09-06 ENCOUNTER — HOSPITAL ENCOUNTER (OUTPATIENT)
Dept: MAMMOGRAPHY | Facility: CLINIC | Age: 73
Discharge: HOME | End: 2023-09-06
Attending: PHYSICIAN ASSISTANT | Admitting: PHYSICIAN ASSISTANT
Payer: COMMERCIAL

## 2023-09-06 DIAGNOSIS — Z12.31 VISIT FOR SCREENING MAMMOGRAM: ICD-10-CM

## 2023-09-06 PROCEDURE — 77067 SCR MAMMO BI INCL CAD: CPT

## 2023-09-08 DIAGNOSIS — F33.42 RECURRENT MAJOR DEPRESSIVE DISORDER, IN FULL REMISSION (H): ICD-10-CM

## 2023-09-08 DIAGNOSIS — E11.22 TYPE 2 DIABETES MELLITUS WITH CHRONIC KIDNEY DISEASE, WITHOUT LONG-TERM CURRENT USE OF INSULIN, UNSPECIFIED CKD STAGE (H): ICD-10-CM

## 2023-09-08 DIAGNOSIS — I10 ESSENTIAL HYPERTENSION: ICD-10-CM

## 2023-09-11 NOTE — TELEPHONE ENCOUNTER
Routing refill request to provider for review/approval because:  Labs not current:  serum creatinine, potassium, EGFR    Rabia GODFREY RN, BSN, PHN  [facility]  [phone]

## 2023-09-12 RX ORDER — LISINOPRIL 20 MG/1
20 TABLET ORAL DAILY
Qty: 90 TABLET | Refills: 0 | Status: SHIPPED | OUTPATIENT
Start: 2023-09-12 | End: 2023-10-27

## 2023-09-12 RX ORDER — BUPROPION HYDROCHLORIDE 300 MG/1
300 TABLET ORAL EVERY MORNING
Qty: 90 TABLET | Refills: 0 | Status: SHIPPED | OUTPATIENT
Start: 2023-09-12 | End: 2023-10-27

## 2023-09-12 RX ORDER — AMLODIPINE BESYLATE 5 MG/1
5 TABLET ORAL DAILY
Qty: 90 TABLET | Refills: 0 | Status: SHIPPED | OUTPATIENT
Start: 2023-09-12 | End: 2023-10-27

## 2023-09-12 RX ORDER — METFORMIN HCL 500 MG
500 TABLET, EXTENDED RELEASE 24 HR ORAL 2 TIMES DAILY WITH MEALS
Qty: 180 TABLET | Refills: 0 | Status: SHIPPED | OUTPATIENT
Start: 2023-09-12 | End: 2023-10-27

## 2023-09-12 NOTE — TELEPHONE ENCOUNTER
Refills provided. Therese would you like to see Julia or get labs (creatinine in particular, also due for A1c)? You last saw her in April for annual wellness.

## 2023-09-19 ENCOUNTER — TRANSFERRED RECORDS (OUTPATIENT)
Dept: HEALTH INFORMATION MANAGEMENT | Facility: CLINIC | Age: 73
End: 2023-09-19
Payer: COMMERCIAL

## 2023-10-01 ENCOUNTER — TRANSFERRED RECORDS (OUTPATIENT)
Dept: MULTI SPECIALTY CLINIC | Facility: CLINIC | Age: 73
End: 2023-10-01
Payer: COMMERCIAL

## 2023-10-01 LAB — RETINOPATHY: NORMAL

## 2023-10-11 ENCOUNTER — TRANSFERRED RECORDS (OUTPATIENT)
Dept: HEALTH INFORMATION MANAGEMENT | Facility: CLINIC | Age: 73
End: 2023-10-11
Payer: COMMERCIAL

## 2023-10-12 DIAGNOSIS — I10 ESSENTIAL HYPERTENSION: ICD-10-CM

## 2023-10-13 RX ORDER — METOPROLOL TARTRATE 50 MG
50 TABLET ORAL 2 TIMES DAILY
Qty: 180 TABLET | Refills: 0 | Status: SHIPPED | OUTPATIENT
Start: 2023-10-13 | End: 2023-10-27

## 2023-10-16 ENCOUNTER — E-VISIT (OUTPATIENT)
Dept: FAMILY MEDICINE | Facility: CLINIC | Age: 73
End: 2023-10-16
Payer: COMMERCIAL

## 2023-10-16 DIAGNOSIS — N39.0 ACUTE UTI (URINARY TRACT INFECTION): Primary | ICD-10-CM

## 2023-10-16 PROCEDURE — 99421 OL DIG E/M SVC 5-10 MIN: CPT | Performed by: PHYSICIAN ASSISTANT

## 2023-10-16 RX ORDER — NITROFURANTOIN 25; 75 MG/1; MG/1
100 CAPSULE ORAL 2 TIMES DAILY
Qty: 10 CAPSULE | Refills: 0 | Status: SHIPPED | OUTPATIENT
Start: 2023-10-16 | End: 2023-10-21

## 2023-10-23 ENCOUNTER — TELEPHONE (OUTPATIENT)
Dept: FAMILY MEDICINE | Facility: CLINIC | Age: 73
End: 2023-10-23
Payer: COMMERCIAL

## 2023-10-23 NOTE — TELEPHONE ENCOUNTER
Reason for Call:  Other appointment    Detailed comments: pt is currently sched 10/25 - but needs to reschedule - pcp booking out till next feb. Pt needs to be seen prior to that. Please contat pt    Phone Number Patient can be reached at: Home number on file [phone] (home)    Best Time: anhy    Can we leave a detailed message on this number? YES    Call taken on 10/23/2023 at 10:32 AM by Vivienne Coffey

## 2023-10-27 ENCOUNTER — OFFICE VISIT (OUTPATIENT)
Dept: FAMILY MEDICINE | Facility: CLINIC | Age: 73
End: 2023-10-27
Payer: COMMERCIAL

## 2023-10-27 VITALS
RESPIRATION RATE: 24 BRPM | HEIGHT: 63 IN | BODY MASS INDEX: 29.1 KG/M2 | SYSTOLIC BLOOD PRESSURE: 132 MMHG | HEART RATE: 57 BPM | OXYGEN SATURATION: 98 % | WEIGHT: 164.25 LBS | DIASTOLIC BLOOD PRESSURE: 70 MMHG | TEMPERATURE: 97.9 F

## 2023-10-27 DIAGNOSIS — F33.42 RECURRENT MAJOR DEPRESSIVE DISORDER, IN FULL REMISSION (H): ICD-10-CM

## 2023-10-27 DIAGNOSIS — E11.22 TYPE 2 DIABETES MELLITUS WITH CHRONIC KIDNEY DISEASE, WITHOUT LONG-TERM CURRENT USE OF INSULIN, UNSPECIFIED CKD STAGE (H): Primary | ICD-10-CM

## 2023-10-27 DIAGNOSIS — I10 ESSENTIAL HYPERTENSION: ICD-10-CM

## 2023-10-27 DIAGNOSIS — D50.8 OTHER IRON DEFICIENCY ANEMIA: ICD-10-CM

## 2023-10-27 DIAGNOSIS — F51.02 ADJUSTMENT INSOMNIA: ICD-10-CM

## 2023-10-27 DIAGNOSIS — E78.2 MIXED HYPERLIPIDEMIA: ICD-10-CM

## 2023-10-27 DIAGNOSIS — C78.6 MALIGNANT NEOPLASM METASTATIC TO PERITONEUM (H): ICD-10-CM

## 2023-10-27 DIAGNOSIS — D70.2 DRUG-INDUCED NEUTROPENIA (H): ICD-10-CM

## 2023-10-27 PROBLEM — D63.1 ANEMIA OF CHRONIC RENAL FAILURE: Status: ACTIVE | Noted: 2023-10-27

## 2023-10-27 PROBLEM — N18.9 ANEMIA OF CHRONIC RENAL FAILURE: Status: ACTIVE | Noted: 2023-10-27

## 2023-10-27 LAB
ERYTHROCYTE [DISTWIDTH] IN BLOOD BY AUTOMATED COUNT: 15.8 % (ref 10–15)
HBA1C MFR BLD: 5.4 % (ref 0–5.6)
HCT VFR BLD AUTO: 26.4 % (ref 35–47)
HGB BLD-MCNC: 8.2 G/DL (ref 11.7–15.7)
MCH RBC QN AUTO: 33.2 PG (ref 26.5–33)
MCHC RBC AUTO-ENTMCNC: 31.1 G/DL (ref 31.5–36.5)
MCV RBC AUTO: 107 FL (ref 78–100)
PLATELET # BLD AUTO: 153 10E3/UL (ref 150–450)
RBC # BLD AUTO: 2.47 10E6/UL (ref 3.8–5.2)
WBC # BLD AUTO: 4.5 10E3/UL (ref 4–11)

## 2023-10-27 PROCEDURE — 90662 IIV NO PRSV INCREASED AG IM: CPT | Performed by: PHYSICIAN ASSISTANT

## 2023-10-27 PROCEDURE — 91320 SARSCV2 VAC 30MCG TRS-SUC IM: CPT | Performed by: PHYSICIAN ASSISTANT

## 2023-10-27 PROCEDURE — 99214 OFFICE O/P EST MOD 30 MIN: CPT | Mod: 25 | Performed by: PHYSICIAN ASSISTANT

## 2023-10-27 PROCEDURE — G0008 ADMIN INFLUENZA VIRUS VAC: HCPCS | Performed by: PHYSICIAN ASSISTANT

## 2023-10-27 PROCEDURE — 83036 HEMOGLOBIN GLYCOSYLATED A1C: CPT | Performed by: PHYSICIAN ASSISTANT

## 2023-10-27 PROCEDURE — 90480 ADMN SARSCOV2 VAC 1/ONLY CMP: CPT | Performed by: PHYSICIAN ASSISTANT

## 2023-10-27 PROCEDURE — 36415 COLL VENOUS BLD VENIPUNCTURE: CPT | Performed by: PHYSICIAN ASSISTANT

## 2023-10-27 PROCEDURE — 85027 COMPLETE CBC AUTOMATED: CPT | Performed by: PHYSICIAN ASSISTANT

## 2023-10-27 RX ORDER — METOPROLOL TARTRATE 50 MG
50 TABLET ORAL 2 TIMES DAILY
Qty: 180 TABLET | Refills: 1 | Status: SHIPPED | OUTPATIENT
Start: 2023-10-27 | End: 2024-05-01

## 2023-10-27 RX ORDER — ROSUVASTATIN CALCIUM 20 MG/1
20 TABLET, COATED ORAL DAILY
Qty: 90 TABLET | Refills: 1 | Status: SHIPPED | OUTPATIENT
Start: 2023-10-27 | End: 2024-05-01

## 2023-10-27 RX ORDER — LABETALOL 100 MG/1
TABLET, FILM COATED ORAL
Qty: 90 TABLET | Refills: 1 | Status: SHIPPED | OUTPATIENT
Start: 2023-10-27 | End: 2024-02-09 | Stop reason: ALTCHOICE

## 2023-10-27 RX ORDER — LISINOPRIL 20 MG/1
20 TABLET ORAL DAILY
Qty: 90 TABLET | Refills: 1 | Status: SHIPPED | OUTPATIENT
Start: 2023-10-27 | End: 2024-01-23 | Stop reason: DRUGHIGH

## 2023-10-27 RX ORDER — METFORMIN HCL 500 MG
500 TABLET, EXTENDED RELEASE 24 HR ORAL DAILY
Qty: 90 TABLET | Refills: 1 | Status: SHIPPED | OUTPATIENT
Start: 2023-10-27 | End: 2024-03-14 | Stop reason: ALTCHOICE

## 2023-10-27 RX ORDER — TRAZODONE HYDROCHLORIDE 50 MG/1
50-100 TABLET, FILM COATED ORAL AT BEDTIME
Qty: 90 TABLET | Refills: 1 | Status: SHIPPED | OUTPATIENT
Start: 2023-10-27 | End: 2024-05-01

## 2023-10-27 RX ORDER — AMLODIPINE BESYLATE 5 MG/1
5 TABLET ORAL DAILY
Qty: 90 TABLET | Refills: 1 | Status: SHIPPED | OUTPATIENT
Start: 2023-10-27 | End: 2024-05-01

## 2023-10-27 RX ORDER — BUPROPION HYDROCHLORIDE 300 MG/1
300 TABLET ORAL EVERY MORNING
Qty: 90 TABLET | Refills: 1 | Status: SHIPPED | OUTPATIENT
Start: 2023-10-27 | End: 2024-05-01

## 2023-10-27 ASSESSMENT — ANXIETY QUESTIONNAIRES
2. NOT BEING ABLE TO STOP OR CONTROL WORRYING: NOT AT ALL
4. TROUBLE RELAXING: NOT AT ALL
7. FEELING AFRAID AS IF SOMETHING AWFUL MIGHT HAPPEN: NOT AT ALL
GAD7 TOTAL SCORE: 1
6. BECOMING EASILY ANNOYED OR IRRITABLE: NOT AT ALL
GAD7 TOTAL SCORE: 1
3. WORRYING TOO MUCH ABOUT DIFFERENT THINGS: NOT AT ALL
1. FEELING NERVOUS, ANXIOUS, OR ON EDGE: SEVERAL DAYS
5. BEING SO RESTLESS THAT IT IS HARD TO SIT STILL: NOT AT ALL
IF YOU CHECKED OFF ANY PROBLEMS ON THIS QUESTIONNAIRE, HOW DIFFICULT HAVE THESE PROBLEMS MADE IT FOR YOU TO DO YOUR WORK, TAKE CARE OF THINGS AT HOME, OR GET ALONG WITH OTHER PEOPLE: NOT DIFFICULT AT ALL

## 2023-10-27 ASSESSMENT — PATIENT HEALTH QUESTIONNAIRE - PHQ9
10. IF YOU CHECKED OFF ANY PROBLEMS, HOW DIFFICULT HAVE THESE PROBLEMS MADE IT FOR YOU TO DO YOUR WORK, TAKE CARE OF THINGS AT HOME, OR GET ALONG WITH OTHER PEOPLE: NOT DIFFICULT AT ALL
SUM OF ALL RESPONSES TO PHQ QUESTIONS 1-9: 4
SUM OF ALL RESPONSES TO PHQ QUESTIONS 1-9: 4

## 2023-10-27 NOTE — PROGRESS NOTES
Assessment & Plan     Type 2 diabetes mellitus with chronic kidney disease, without long-term current use of insulin, unspecified CKD stage (H)  A1c shows great control on minimal metformin. I recommended she dose decrease to 1 tablet. Could likely drop it all together given her good A1c and concern for the kidneys.  - HEMOGLOBIN A1C; Future  - metFORMIN (GLUCOPHAGE XR) 500 MG 24 hr tablet; Take 1 tablet (500 mg) by mouth daily  - HEMOGLOBIN A1C    Essential hypertension  Well controlled. Refilled today.   - amLODIPine (NORVASC) 5 MG tablet; Take 1 tablet (5 mg) by mouth daily  - labetalol (NORMODYNE) 100 MG tablet; TAKE 1 TABLET BY MOUTH IF SYSTOLIC BLOOD PRESSURE  OR HIGHER, OR IF DIASTOLIC BLOOD PRESSURE  OR ABOVE  - lisinopril (ZESTRIL) 20 MG tablet; Take 1 tablet (20 mg) by mouth daily  - metoprolol tartrate (LOPRESSOR) 50 MG tablet; Take 1 tablet (50 mg) by mouth 2 times daily    Recurrent major depressive disorder, in full remission (H24)  Refilled for patient today. Stable.  - buPROPion (WELLBUTRIN XL) 300 MG 24 hr tablet; Take 1 tablet (300 mg) by mouth every morning    Mixed hyperlipidemia  Refilled for patient today.   - rosuvastatin (CRESTOR) 20 MG tablet; Take 1 tablet (20 mg) by mouth daily    Adjustment insomnia  Stable. Refilled today.  - traZODone (DESYREL) 50 MG tablet; Take 1-2 tablets ( mg) by mouth at bedtime    Other iron deficiency anemia  Recheck today. Has seen hematology. They are worried about kidneys being cause of anemia. Seeing nephrology next week.  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 Not currently on any treatment. Considering a drug trial but has not met requirements yet.    Drug-induced neutropenia (H24)  Normal today.    Review of prior external note(s) from - Outside records from [facility]  Review of external notes as documented elsewhere in  note  Review of the result(s) of each unique test - CBC, A1c  Ordering of each unique test  Prescription drug management      aPula Garcia PA-C  [facility]      Shravan Dumont is a 72 year old, presenting for the following health issues:  Diabetes (Follow up) and Forms (To be signed for insurance reimbursement)        10/27/2023     9:44 AM   Additional Questions   Roomed by Mary   Accompanied by Self       History of Present Illness       Diabetes:   She presents for follow up of diabetes.    She is not checking blood glucose.         She has no concerns regarding her diabetes at this time.  She is having numbness in feet.  The patient has had a diabetic eye exam in the last 12 months. Eye exam performed on 2023. Location of last eye exam [facility].    She consumes 0 sweetened beverage(s) daily.She exercises with enough effort to increase her heart rate 9 or less minutes per day.  She exercises with enough effort to increase her heart rate 3 or less days per week.   She is taking medications regularly.     Diabetes Follow-up    How often are you checking your blood sugar? Not at all  What concerns do you have today about your diabetes? None   Do you have any of these symptoms? (Select all that apply)  Numbness in feet occasionally  Have you had a diabetic eye exam in the last 12 months? Yes- Date of last eye exam: does not remember,  Location: [facility]        BP Readings from Last 2 Encounters:   10/27/23 132/70   08/01/23 124/80     Hemoglobin A1C (%)   Date Value   10/27/2023 5.4   04/21/2023 5.1   09/02/2020 6.1 (H)     LDL Cholesterol Calculated (mg/dL)   Date Value   04/21/2023 62   12/07/2021 53         How many servings of fruits and vegetables do you eat daily?  2-3  On average, how many sweetened beverages do you drink each day (Examples: soda, juice, sweet tea, etc.  Do NOT count diet or artificially sweetened beverages)?   1  How many days per week do you exercise  "enough to make your heart beat faster? 3 or less  How many minutes a day do you exercise enough to make your heart beat faster? 9 or less  How many days per week do you miss taking your medication? 0    Review of Systems   GENERAL:  No fevers         Objective    /70   Pulse 57   Temp 97.9  F (36.6  C) (Oral)   Resp 24   Ht 1.588 m (5' 2.5\")   Wt 74.5 kg (164 lb 4 oz)   SpO2 98%   BMI 29.56 kg/m    Body mass index is 29.56 kg/m .  Physical Exam   GENERAL: No acute distress  HEENT: Normocephalic  NEURO: Alert and non-focal      Results for orders placed or performed in visit on 10/27/23 (from the past 24 hour(s))   CBC with platelets   Result Value Ref Range    WBC Count 4.5 4.0 - 11.0 10e3/uL    RBC Count 2.47 (L) 3.80 - 5.20 10e6/uL    Hemoglobin 8.2 (L) 11.7 - 15.7 g/dL    Hematocrit 26.4 (L) 35.0 - 47.0 %     (H) 78 - 100 fL    MCH 33.2 (H) 26.5 - 33.0 pg    MCHC 31.1 (L) 31.5 - 36.5 g/dL    RDW 15.8 (H) 10.0 - 15.0 %    Platelet Count 153 150 - 450 10e3/uL    Narrative    Results confirmed by repeat test.    HEMOGLOBIN A1C   Result Value Ref Range    Hemoglobin A1C 5.4 0.0 - 5.6 %       Physician Attestation   I, Paula Garcia PA-C, was present with the medical/[employer] student who participated in the service and in the documentation of the note.  I have verified the history and personally performed the physical exam and medical decision making.  I agree with the assessment and plan of care as documented in the note.      Items personally reviewed: agree with the interpretation documented in the note.    Paula Garcia PA-C              "

## 2023-10-31 ENCOUNTER — TRANSFERRED RECORDS (OUTPATIENT)
Dept: HEALTH INFORMATION MANAGEMENT | Facility: CLINIC | Age: 73
End: 2023-10-31
Payer: COMMERCIAL

## 2023-10-31 DIAGNOSIS — I10 HYPERTENSION, UNSPECIFIED TYPE: ICD-10-CM

## 2023-10-31 DIAGNOSIS — N18.31 STAGE 3A CHRONIC KIDNEY DISEASE (CKD) (H): Primary | ICD-10-CM

## 2023-11-06 ENCOUNTER — TRANSFERRED RECORDS (OUTPATIENT)
Dept: HEALTH INFORMATION MANAGEMENT | Facility: CLINIC | Age: 73
End: 2023-11-06
Payer: COMMERCIAL

## 2023-11-06 PROCEDURE — 88305 TISSUE EXAM BY PATHOLOGIST: CPT | Performed by: PATHOLOGY

## 2023-11-07 ENCOUNTER — HOSPITAL ENCOUNTER (OUTPATIENT)
Dept: ULTRASOUND IMAGING | Facility: CLINIC | Age: 73
Discharge: HOME | End: 2023-11-07
Attending: INTERNAL MEDICINE | Admitting: INTERNAL MEDICINE
Payer: COMMERCIAL

## 2023-11-07 ENCOUNTER — LAB REQUISITION (OUTPATIENT)
Dept: LAB | Facility: CLINIC | Age: 73
End: 2023-11-07

## 2023-11-07 DIAGNOSIS — K31.89 OTHER DISEASES OF STOMACH AND DUODENUM: ICD-10-CM

## 2023-11-07 DIAGNOSIS — N18.31 STAGE 3A CHRONIC KIDNEY DISEASE (CKD) (H): ICD-10-CM

## 2023-11-07 DIAGNOSIS — I10 HYPERTENSION, UNSPECIFIED TYPE: ICD-10-CM

## 2023-11-07 DIAGNOSIS — K21.00 GASTRO-ESOPHAGEAL REFLUX DISEASE WITH ESOPHAGITIS, WITHOUT BLEEDING: ICD-10-CM

## 2023-11-07 DIAGNOSIS — D50.9 IRON DEFICIENCY ANEMIA, UNSPECIFIED: ICD-10-CM

## 2023-11-07 DIAGNOSIS — K44.9 DIAPHRAGMATIC HERNIA WITHOUT OBSTRUCTION OR GANGRENE: ICD-10-CM

## 2023-11-07 PROCEDURE — 76770 US EXAM ABDO BACK WALL COMP: CPT | Mod: XU

## 2023-11-08 LAB
PATH REPORT.COMMENTS IMP SPEC: NORMAL
PATH REPORT.COMMENTS IMP SPEC: NORMAL
PATH REPORT.FINAL DX SPEC: NORMAL
PATH REPORT.GROSS SPEC: NORMAL
PATH REPORT.MICROSCOPIC SPEC OTHER STN: NORMAL
PATH REPORT.RELEVANT HX SPEC: NORMAL
PHOTO IMAGE: NORMAL

## 2023-11-14 ENCOUNTER — TRANSFERRED RECORDS (OUTPATIENT)
Dept: HEALTH INFORMATION MANAGEMENT | Facility: CLINIC | Age: 73
End: 2023-11-14
Payer: COMMERCIAL

## 2023-11-15 ENCOUNTER — TRANSFERRED RECORDS (OUTPATIENT)
Dept: HEALTH INFORMATION MANAGEMENT | Facility: CLINIC | Age: 73
End: 2023-11-15
Payer: COMMERCIAL

## 2023-11-15 ENCOUNTER — MEDICAL CORRESPONDENCE (OUTPATIENT)
Dept: HEALTH INFORMATION MANAGEMENT | Facility: CLINIC | Age: 73
End: 2023-11-15
Payer: COMMERCIAL

## 2023-11-16 ENCOUNTER — TRANSFERRED RECORDS (OUTPATIENT)
Dept: HEALTH INFORMATION MANAGEMENT | Facility: CLINIC | Age: 73
End: 2023-11-16
Payer: COMMERCIAL

## 2023-11-16 LAB — RETINOPATHY: NEGATIVE

## 2023-11-17 DIAGNOSIS — C56.1 OVARIAN CANCER, RIGHT (H): ICD-10-CM

## 2023-11-17 DIAGNOSIS — Z01.818 EXAMINATION PRIOR TO CHEMOTHERAPY: Primary | ICD-10-CM

## 2023-11-24 ENCOUNTER — TRANSFERRED RECORDS (OUTPATIENT)
Dept: HEALTH INFORMATION MANAGEMENT | Facility: CLINIC | Age: 73
End: 2023-11-24
Payer: COMMERCIAL

## 2023-11-27 ENCOUNTER — TELEPHONE (OUTPATIENT)
Dept: FAMILY MEDICINE | Facility: CLINIC | Age: 73
End: 2023-11-27
Payer: COMMERCIAL

## 2023-11-27 NOTE — TELEPHONE ENCOUNTER
Patient Quality Outreach    Patient is due for the following:   Diabetes -  Eye Exam    Next Steps:   No follow up needed at this time.    Type of outreach:    Chart review performed, no outreach needed. and patient has been seen with [facility] on 11-      Questions for provider review:    None           Lizzy Forte MA

## 2023-11-28 ENCOUNTER — HOSPITAL ENCOUNTER (OUTPATIENT)
Dept: CARDIOLOGY | Facility: CLINIC | Age: 73
Discharge: HOME | End: 2023-11-28
Attending: PHYSICIAN ASSISTANT | Admitting: PHYSICIAN ASSISTANT
Payer: COMMERCIAL

## 2023-11-28 DIAGNOSIS — C56.1 OVARIAN CANCER, RIGHT (H): ICD-10-CM

## 2023-11-28 DIAGNOSIS — Z01.818 EXAMINATION PRIOR TO CHEMOTHERAPY: ICD-10-CM

## 2023-11-28 LAB
BI-PLANE LVEF ECHO: NORMAL
LVEF ECHO: NORMAL

## 2023-11-28 PROCEDURE — 93356 MYOCRD STRAIN IMG SPCKL TRCK: CPT | Performed by: INTERNAL MEDICINE

## 2023-11-28 PROCEDURE — 93306 TTE W/DOPPLER COMPLETE: CPT | Mod: 26 | Performed by: INTERNAL MEDICINE

## 2023-11-28 PROCEDURE — 93306 TTE W/DOPPLER COMPLETE: CPT

## 2023-12-07 ENCOUNTER — TRANSFERRED RECORDS (OUTPATIENT)
Dept: HEALTH INFORMATION MANAGEMENT | Facility: CLINIC | Age: 73
End: 2023-12-07
Payer: COMMERCIAL

## 2023-12-13 ENCOUNTER — TRANSFERRED RECORDS (OUTPATIENT)
Dept: HEALTH INFORMATION MANAGEMENT | Facility: CLINIC | Age: 73
End: 2023-12-13
Payer: COMMERCIAL

## 2023-12-17 ENCOUNTER — HEALTH MAINTENANCE LETTER (OUTPATIENT)
Age: 73
End: 2023-12-17

## 2023-12-20 ENCOUNTER — TRANSFERRED RECORDS (OUTPATIENT)
Dept: HEALTH INFORMATION MANAGEMENT | Facility: CLINIC | Age: 73
End: 2023-12-20
Payer: COMMERCIAL

## 2024-01-15 ENCOUNTER — TRANSFERRED RECORDS (OUTPATIENT)
Dept: HEALTH INFORMATION MANAGEMENT | Facility: CLINIC | Age: 74
End: 2024-01-15
Payer: COMMERCIAL

## 2024-01-22 ENCOUNTER — MYC MEDICAL ADVICE (OUTPATIENT)
Dept: FAMILY MEDICINE | Facility: CLINIC | Age: 74
End: 2024-01-22
Payer: COMMERCIAL

## 2024-01-22 DIAGNOSIS — I10 ESSENTIAL HYPERTENSION: Primary | ICD-10-CM

## 2024-01-22 NOTE — LETTER
Julia Andre  [id number] [address] [address]    Thank you for choosing [facility] today for your health care needs.     [facility] is transforming primary care  At [facility], we re dedicated to constantly improve how we serve the health care needs of our patients and communities. We re currently making changes to the way we deliver care.     Changes you ll notice include:  An emphasis on building a relationship with a primary care provider  Access to a PAL (patient advocate and liaison) to help guide you with your care needs  Appointment lengths tailored to your specific needs and greater access to a care team to help you and your provider improve and maintain your health and well-being  Improved online access to your care team    Benefits of a primary care provider  If you don t have a designated primary care provider, we encourage you to get to know our care team online and find a provider you d like to see. Most of our providers have a short video on their online provider page. Visit [email or web address] to explore our providers and locations.    Benefits of having a primary care provider include:    They get to know you - your health history, family history and goals, making it easier to make a health plan together.   You get to know them - making health-related conversations and decisions easier    Primary care doctors help you when you re sick or hurt - but also focus on keeping you healthy with preventive care and screenings.    A doctor who sees you regularly is more likely to notice changes in your health.   You ll be connected to a broad care team who partners with your provider to support you.    Patient Advocate Liaison (PAL)   To help make sure you get the right care, at the right time, we include PALs, or Patient Advocate Liaisons, as part of your care team. Your PAL will be your first line of contact. They ll advocate for your needs and help you navigate our services,  connecting you with care team members and community resources to ensure your care is well coordinated. You ll be introduced to a PAL in an upcoming visit.     Expanded care team access with tailored appointment lengths  Depending on your health care needs, you may have longer or shorter appointments and see additional care team providers - including Medication Therapy Management (MTM) pharmacists, diabetes educators, behavioral health clinicians, or social workers. At times, they may be included in your visit with your provider, or you may see them individually.     Online access to your health care records and care team  [employer] is our online tool that makes it easy to see your health care information and communicate with your care team.     [employer] allows you to:   View your health maintenance plan so you know when you re due for a preventive screening  Send secure messages to your care team  View your health history and visit summaries   Schedule appointments   Complete questionnaires and eCheck-in before appointments    Get care from your provider with an e-visit    View and pay your bill     Sign up at [employer]. Once you have an account, you also can download the mobile josh.     Connecting to fast and convenient care  When you need fast, convenient care - consider one of the following options:     Video Visit: A convenient care option for visiting with your provider out of the comfort of your own home. Most of the things you come to the clinic to address with your provider can now be done virtually through a video. This includes your chronic medication follow up, questions or concerns you may have, and even your annual Medicare Wellness Visit.     Phone Visit: Another convenient option for follow up of common problems that may require a more in-depth discussion with your provider.     E-visit: When you need acute care quickly, or have a quick question about your medication, an E-visit is completed  through [employer] and your provider will respond within one business day.      Ana Laura Smith, Registered Nurse, PAL (Patient Advocate Liaison)   [facility]   [phone]

## 2024-01-22 NOTE — TELEPHONE ENCOUNTER
Therese Vanessa PA-C   Please review my chart message and advise   If you would like visit please advise which type     Thank you   Ana Laura Smith, Registered Nurse, PAL (Patient Advocate Liaison)   [facility]   [phone]

## 2024-01-23 RX ORDER — LISINOPRIL 30 MG/1
30 TABLET ORAL DAILY
Qty: 30 TABLET | Refills: 1 | Status: SHIPPED | OUTPATIENT
Start: 2024-01-23 | End: 2024-02-09

## 2024-01-23 NOTE — TELEPHONE ENCOUNTER
Paula Garcia, PAC    Please send RX   T'd up pharmacy     Thank you   Ana Laura Smith, Registered Nurse, PAL (Patient Advocate Liaison)   [facility]   [phone]

## 2024-01-23 NOTE — TELEPHONE ENCOUNTER
My chart message sent to patient asking for address to pharmacy     Awaiting reply     Ana Laura Smith, Registered Nurse, PAL (Patient Advocate Liaison)   [facility]   [phone]

## 2024-01-23 NOTE — TELEPHONE ENCOUNTER
I cannot find this pharmacy. Can you reach out to patient or find the pharmacy she is asking this be sent to?

## 2024-01-30 NOTE — TELEPHONE ENCOUNTER
Therese Vanessa PA-C    Please review my chart message   Patient is currently in [place], would you suggest she see someone in [place]?     Thank you   Ana Laura Smith, Registered Nurse, PAL (Patient Advocate Liaison)   [facility]   [phone]

## 2024-01-31 NOTE — TELEPHONE ENCOUNTER
RN LEE ANN spoke to patient     - currently in [place] and will return 2/8 -  2/15 to see [facility] have chemo treatment and PET scan   - patient will continue monitoring blood pressure, varying times, sitting, sitting quietly for 10 minutes prior to checking   - write down readings and bring to visit   - visit scheduled   Next 5 appointments (look out 90 days)      Feb 09, 2024  4:00 PM  (Arrive by 3:40 PM)  Provider Visit with Paula Garcia PA-C  [facility] ([facility] ) [address] [place] [id number] [phone]        - patient will return to [place] after chemo treatment, is feeling well and then will return to [place] approx 1-2 months later     Ana Laura Smith Registered Nurse, PAL (Patient Advocate Liaison)   [facility]   [phone]

## 2024-01-31 NOTE — TELEPHONE ENCOUNTER
Can RN call pt?    BP's do look better, even with some higher readings, but we need to get pt in to follow-up in clinic with labs ASAP when she comes back from [place].  Unless she is not coming back within the next 2 weeks.    If not, then follow-up in [place],     Otherwise see Therese Vanessa PA-C or Paula Garcia in person.    Therese Vanessa PA-C

## 2024-02-03 ENCOUNTER — NURSE TRIAGE (OUTPATIENT)
Dept: NURSING | Facility: CLINIC | Age: 74
End: 2024-02-03
Payer: COMMERCIAL

## 2024-02-03 NOTE — TELEPHONE ENCOUNTER
Pt calling re swollen foot and ankle, one sided.  Pt states currently in [place].  Pt states she is on her way to Urgent Care.  Pt advised that due to being in [place] pt can't be triaged by [facility] RN and guided pt as well to go to Urgent Care.  Skylar Cheng RN  [facility] Nurse Advisor

## 2024-02-09 ENCOUNTER — OFFICE VISIT (OUTPATIENT)
Dept: FAMILY MEDICINE | Facility: CLINIC | Age: 74
End: 2024-02-09
Payer: COMMERCIAL

## 2024-02-09 VITALS
HEART RATE: 74 BPM | WEIGHT: 164.4 LBS | OXYGEN SATURATION: 95 % | TEMPERATURE: 98.2 F | HEIGHT: 63 IN | BODY MASS INDEX: 29.13 KG/M2 | RESPIRATION RATE: 12 BRPM | DIASTOLIC BLOOD PRESSURE: 72 MMHG | SYSTOLIC BLOOD PRESSURE: 150 MMHG

## 2024-02-09 DIAGNOSIS — D70.2 DRUG-INDUCED NEUTROPENIA (H): ICD-10-CM

## 2024-02-09 DIAGNOSIS — R09.89 LABILE BLOOD PRESSURE: ICD-10-CM

## 2024-02-09 DIAGNOSIS — M79.89 LEFT LEG SWELLING: ICD-10-CM

## 2024-02-09 DIAGNOSIS — C78.6 MALIGNANT NEOPLASM METASTATIC TO PERITONEUM (H): ICD-10-CM

## 2024-02-09 DIAGNOSIS — E11.22 TYPE 2 DIABETES MELLITUS WITH CHRONIC KIDNEY DISEASE, WITHOUT LONG-TERM CURRENT USE OF INSULIN, UNSPECIFIED CKD STAGE (H): ICD-10-CM

## 2024-02-09 DIAGNOSIS — N18.31 STAGE 3A CHRONIC KIDNEY DISEASE (H): ICD-10-CM

## 2024-02-09 DIAGNOSIS — L60.0 INGROWN TOENAIL: ICD-10-CM

## 2024-02-09 DIAGNOSIS — I10 ESSENTIAL HYPERTENSION: Primary | ICD-10-CM

## 2024-02-09 DIAGNOSIS — F41.9 ANXIETY: ICD-10-CM

## 2024-02-09 LAB
ANION GAP SERPL CALCULATED.3IONS-SCNC: 11 MMOL/L (ref 7–15)
BUN SERPL-MCNC: 19.2 MG/DL (ref 8–23)
CALCIUM SERPL-MCNC: 9.4 MG/DL (ref 8.8–10.2)
CHLORIDE SERPL-SCNC: 102 MMOL/L (ref 98–107)
CREAT SERPL-MCNC: 0.89 MG/DL (ref 0.51–0.95)
DEPRECATED HCO3 PLAS-SCNC: 25 MMOL/L (ref 22–29)
EGFRCR SERPLBLD CKD-EPI 2021: 68 ML/MIN/1.73M2
GLUCOSE SERPL-MCNC: 126 MG/DL (ref 70–99)
HBA1C MFR BLD: 5.7 % (ref 0–5.6)
POTASSIUM SERPL-SCNC: 4.2 MMOL/L (ref 3.4–5.3)
SODIUM SERPL-SCNC: 138 MMOL/L (ref 135–145)

## 2024-02-09 PROCEDURE — 99214 OFFICE O/P EST MOD 30 MIN: CPT | Performed by: PHYSICIAN ASSISTANT

## 2024-02-09 PROCEDURE — 36415 COLL VENOUS BLD VENIPUNCTURE: CPT | Performed by: PHYSICIAN ASSISTANT

## 2024-02-09 PROCEDURE — 80048 BASIC METABOLIC PNL TOTAL CA: CPT | Performed by: PHYSICIAN ASSISTANT

## 2024-02-09 PROCEDURE — 83036 HEMOGLOBIN GLYCOSYLATED A1C: CPT | Performed by: PHYSICIAN ASSISTANT

## 2024-02-09 RX ORDER — LISINOPRIL 40 MG/1
40 TABLET ORAL DAILY
Qty: 30 TABLET | Refills: 5 | Status: SHIPPED | OUTPATIENT
Start: 2024-02-09 | End: 2024-03-14

## 2024-02-09 RX ORDER — LORAZEPAM 0.5 MG/1
0.5 TABLET ORAL PRN
Qty: 15 TABLET | Refills: 0 | Status: SHIPPED | OUTPATIENT
Start: 2024-02-09 | End: 2024-05-01

## 2024-02-09 RX ORDER — RESPIRATORY SYNCYTIAL VIRUS VACCINE 120MCG/0.5
0.5 KIT INTRAMUSCULAR ONCE
Qty: 1 EACH | Refills: 0 | Status: CANCELLED | OUTPATIENT
Start: 2024-02-09 | End: 2024-02-09

## 2024-02-09 RX ORDER — CLONIDINE HYDROCHLORIDE 0.1 MG/1
TABLET ORAL
Qty: 30 TABLET | Refills: 1 | Status: SHIPPED | OUTPATIENT
Start: 2024-02-09

## 2024-02-09 NOTE — PROGRESS NOTES
Assessment & Plan     Essential hypertension  Blood pressure still running high. Adjust lisinopril to 40 mg daily and will check BMP today.  Recommended adjusting medications to take one in the morning and one in the evening she will plan on taking lisinopril in the evening and amlodipine in the morning.  I will reach out in a week to see at home blood pressures.  - Basic metabolic panel  (Ca, Cl, CO2, Creat, Gluc, K, Na, BUN); Future  - lisinopril (ZESTRIL) 40 MG tablet; Take 1 tablet (40 mg) by mouth daily  - Basic metabolic panel  (Ca, Cl, CO2, Creat, Gluc, K, Na, BUN)    Labile blood pressure  Switched to clonidine from labetalol given low pulse rate.  - cloNIDine (CATAPRES) 0.1 MG tablet; TAKE 1 TABLET BY MOUTH IF SYSTOLIC BLOOD PRESSURE  OR HIGHER, OR IF DIASTOLIC BLOOD PRESSURE  OR ABOVE    Type 2 diabetes mellitus with chronic kidney disease, without long-term current use of insulin, unspecified CKD stage (H)  A1c obtained today and looks great.   - HEMOGLOBIN A1C; Future  - Basic metabolic panel  (Ca, Cl, CO2, Creat, Gluc, K, Na, BUN); Future  - HEMOGLOBIN A1C  - Basic metabolic panel  (Ca, Cl, CO2, Creat, Gluc, K, Na, BUN)    Anxiety  Refilled lorazepam for patient.  - LORazepam (ATIVAN) 0.5 MG tablet; Take 1 tablet (0.5 mg) by mouth as needed for anxiety    Stage 3a chronic kidney disease (H)  Checking BMP today.    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    Drug-induced neutropenia (H24)  Follows [facility].    Left leg swelling  DVT ruled out in [place] ER. Continue to monitor. If blood pressure still not at goal can increase amlodipine but may get more swelling.    Ingrown toenail    Try warm soaks. Consider podiatry referral.    Review of prior external note(s) from - Outside records from [facility]  Review of the result(s) of each unique test - CBC from [facility], A1c  Ordering of each unique test  Prescription drug management  32 minutes spent by  me on the date of the encounter doing chart review, history and exam, documentation and further activities per the note      Subjective   Julia is a 73 year old, presenting for the following health issues:  Hypertension (Follow up on BP)        2/9/2024     3:45 PM   Additional Questions   Roomed by ainsley adhikair     History of Present Illness       Hypertension: She presents for follow up of hypertension.  She does check blood pressure  regularly outside of the clinic. Outside blood pressures have been over 140/90. She does not follow a low salt diet.     She eats 2-3 servings of fruits and vegetables daily.She consumes 0 sweetened beverage(s) daily.She exercises with enough effort to increase her heart rate 9 or less minutes per day.  She exercises with enough effort to increase her heart rate 3 or less days per week.   She is taking medications regularly.  Answers submitted by the patient for this visit:  Hypertension Visit (Submitted on 2/6/2024)  Chief Complaint: Chronic problems general questions HPI Form  Do you check your blood pressure regularly outside of the clinic?: Yes  Are your blood pressures ever more than 140 on the top number (systolic) OR more than 90 on the bottom number (diastolic)? (For example, greater than 140/90): Yes  Are you following a low salt diet?: No  General Questionnaire (Submitted on 2/6/2024)  Chief Complaint: Chronic problems general questions HPI Form  How many servings of fruits and vegetables do you eat daily?: 2-3  On average, how many sweetened beverages do you drink each day (Examples: soda, juice, sweet tea, etc.  Do NOT count diet or artificially sweetened beverages)?: 0  How many minutes a day do you exercise enough to make your heart beat faster?: 9 or less  How many days a week do you exercise enough to make your heart beat faster?: 3 or less  How many days per week do you miss taking your medication?: 0     Last 3 BP Readings:    BP Readings from Last 3  "Encounters:   02/09/24 (!) 169/78   10/27/23 132/70   08/01/23 124/80       Home BP Readings:    1/17 136/69  1/18 178/96  1/19 08:20a 185/76  1/19 09:30p 118-64  1/20 180/78    She has used the labetalol a few times (concerned about low pulse rate as she usually has a low pulse)    Currently taking:  Amlodipine 5 mg in the morning  Lisinopril 30 mg in the morning  Metoprolol 50 mg twice daily    She reports she is seeing [facility]. CBC recently obtained and shows hemoglobin 8.6    Of note patient was seen in the ER in [place] due to swelling in the left foot and leg. Testing negative for DVT.        Objective    BP (!) 150/72   Pulse 74   Temp 98.2  F (36.8  C) (Oral)   Resp 12   Ht 1.588 m (5' 2.5\")   Wt 74.6 kg (164 lb 6.4 oz)   SpO2 95%   BMI 29.59 kg/m    Body mass index is 29.59 kg/m .  Physical Exam   GENERAL: No acute distress  HEENT: Normocephalic  NEURO: Alert and non-focal     Results for orders placed or performed in visit on 02/09/24 (from the past 24 hour(s))   HEMOGLOBIN A1C   Result Value Ref Range    Hemoglobin A1C 5.7 (H) 0.0 - 5.6 %           Signed Electronically by: Paula Garcia PA-C    "

## 2024-02-14 ENCOUNTER — TRANSFERRED RECORDS (OUTPATIENT)
Dept: HEALTH INFORMATION MANAGEMENT | Facility: CLINIC | Age: 74
End: 2024-02-14

## 2024-02-16 ENCOUNTER — MYC MEDICAL ADVICE (OUTPATIENT)
Dept: FAMILY MEDICINE | Facility: CLINIC | Age: 74
End: 2024-02-16
Payer: COMMERCIAL

## 2024-02-16 DIAGNOSIS — I10 ESSENTIAL HYPERTENSION: Primary | ICD-10-CM

## 2024-02-16 RX ORDER — AMLODIPINE BESYLATE 2.5 MG/1
2.5 TABLET ORAL DAILY
Qty: 90 TABLET | Refills: 0 | Status: SHIPPED | OUTPATIENT
Start: 2024-02-16 | End: 2024-05-01

## 2024-02-16 NOTE — TELEPHONE ENCOUNTER
Paula Garcia PA-C- See pt's Mychart message.     Pended prescription below. Please review.     Routed to Paula FISHER RN   PAL (Patient Advocate Liaison)  [facility]  [phone]

## 2024-02-16 NOTE — TELEPHONE ENCOUNTER
Please see [employer] message in reference to BPs. Routed to provider, Please review and advise.     Thank you,    Pradip Rojas RN    02/16/2024 at 12:55 PM

## 2024-02-23 NOTE — TELEPHONE ENCOUNTER
See my chart RN LEE ANN scheduled follow up with pcp     Next 5 appointments (look out 90 days)      Mar 14, 2024  1:00 PM  (Arrive by 12:40 PM)  Provider Visit with Therese Vanessa PA-C  [facility] ([facility] ) [address] [place] [id number] [phone]          Advised if this time does not work to call scheduling to change     Ana Laura Smith Registered Nurse, PAL (Patient Advocate Liaison)   [facility]   [phone]

## 2024-02-23 NOTE — TELEPHONE ENCOUNTER
Paula Garcia, PAC    Please see my chart home BP readings and advise     Thank you   Ana Laura Smith, Registered Nurse, PAL (Patient Advocate Liaison)   [facility]   [phone]

## 2024-02-23 NOTE — TELEPHONE ENCOUNTER
Paula Garcia, PAC    Please see my chart response - did  you want a visit when back in [place]?     Ana Laura Smith, Registered Nurse, PAL (Patient Advocate Liaison)   [facility]   [phone]

## 2024-03-12 ENCOUNTER — OFFICE VISIT (OUTPATIENT)
Dept: ORTHOPEDICS | Facility: CLINIC | Age: 74
End: 2024-03-12
Payer: COMMERCIAL

## 2024-03-12 VITALS
WEIGHT: 162 LBS | HEIGHT: 63 IN | DIASTOLIC BLOOD PRESSURE: 77 MMHG | BODY MASS INDEX: 28.7 KG/M2 | SYSTOLIC BLOOD PRESSURE: 150 MMHG

## 2024-03-12 DIAGNOSIS — M25.811 IMPINGEMENT OF RIGHT SHOULDER: ICD-10-CM

## 2024-03-12 DIAGNOSIS — M19.011 ARTHRITIS OF RIGHT GLENOHUMERAL JOINT: Primary | ICD-10-CM

## 2024-03-12 PROCEDURE — 20611 DRAIN/INJ JOINT/BURSA W/US: CPT | Mod: RT | Performed by: FAMILY MEDICINE

## 2024-03-12 RX ORDER — LIDOCAINE HYDROCHLORIDE 10 MG/ML
8 INJECTION, SOLUTION INFILTRATION; PERINEURAL
Status: SHIPPED | OUTPATIENT
Start: 2024-03-12

## 2024-03-12 RX ORDER — LIDOCAINE HYDROCHLORIDE 10 MG/ML
4 INJECTION, SOLUTION INFILTRATION; PERINEURAL
Status: SHIPPED | OUTPATIENT
Start: 2024-03-12

## 2024-03-12 RX ORDER — METHYLPREDNISOLONE ACETATE 40 MG/ML
40 INJECTION, SUSPENSION INTRA-ARTICULAR; INTRALESIONAL; INTRAMUSCULAR; SOFT TISSUE
Status: SHIPPED | OUTPATIENT
Start: 2024-03-12

## 2024-03-12 RX ORDER — ROPIVACAINE HYDROCHLORIDE 5 MG/ML
4 INJECTION, SOLUTION EPIDURAL; INFILTRATION; PERINEURAL
Status: SHIPPED | OUTPATIENT
Start: 2024-03-12

## 2024-03-12 RX ADMIN — ROPIVACAINE HYDROCHLORIDE 4 ML: 5 INJECTION, SOLUTION EPIDURAL; INFILTRATION; PERINEURAL at 13:45

## 2024-03-12 RX ADMIN — METHYLPREDNISOLONE ACETATE 40 MG: 40 INJECTION, SUSPENSION INTRA-ARTICULAR; INTRALESIONAL; INTRAMUSCULAR; SOFT TISSUE at 13:46

## 2024-03-12 RX ADMIN — LIDOCAINE HYDROCHLORIDE 8 ML: 10 INJECTION, SOLUTION INFILTRATION; PERINEURAL at 13:45

## 2024-03-12 RX ADMIN — LIDOCAINE HYDROCHLORIDE 4 ML: 10 INJECTION, SOLUTION INFILTRATION; PERINEURAL at 13:46

## 2024-03-12 RX ADMIN — METHYLPREDNISOLONE ACETATE 40 MG: 40 INJECTION, SUSPENSION INTRA-ARTICULAR; INTRALESIONAL; INTRAMUSCULAR; SOFT TISSUE at 13:45

## 2024-03-12 NOTE — PATIENT INSTRUCTIONS
1. Arthritis of right glenohumeral joint    2. Impingement of right shoulder      -Patient is following up for chronic right shoulder pain due to arthritis and bursitis  -Patient tolerated right glenohumeral and subacromial cortisone injection today without complications.  Patient was given postprocedure instructions  -Patient had significant loss of range of motion on exam today due to chronic pain and lack of use of her right arm  -Patient had full resolution of her pain shortly after the injection  -Call direct clinic number [[phone]] at any time with questions or concerns.    Albert Yeo MD [facility]  [facility] [place]

## 2024-03-12 NOTE — PROGRESS NOTES
"ASSESSMENT & PLAN  Patient Instructions     1. Arthritis of right glenohumeral joint    2. Impingement of right shoulder      -Patient is following up for chronic right shouulder pain due to arthritis and bursitis  -Patient tolerated right glenohumeral and subacromial cortisone injection today without complications.  Patient was given postprocedure instructions  -Patient had significant loss of range of motion on exam today due to chronic pain and lack of use of her right arm  -Patient had full resolution of her pain shortly after the injection  -Call direct clinic number [[phone]] at any time with questions or concerns.    Albert Yeo MD [facility] Orthopedics and Sports Medicine  [facility]        -----    SUBJECTIVE:  Julia Andre is a 73 year old female who is seen in follow-up for right shoulder pain.They were last seen 08/01/2023 by Dr. Hunter in which they received a right glenohumeral steroid injection.     Since their last visit reports worsening pain. They indicate that their current pain level is 5/10. They have tried rest/activity avoidance, Tylenol, corticosteroid injection (most recent date: 08/03/2023) that provided no relief, lidocaine patches, and Voltaren.      The patient is seen by themselves.    Patient's past medical, surgical, social, and family histories were reviewed today and no changes are noted.    REVIEW OF SYSTEMS:  Constitutional: NEGATIVE for fever, chills, change in weight  Skin: NEGATIVE for worrisome rashes, moles or lesions  GI/: NEGATIVE for bowel or bladder changes  Neuro: NEGATIVE for weakness, dizziness or paresthesias    OBJECTIVE:  BP (!) 150/77   Ht 1.588 m (5' 2.5\")   Wt 73.5 kg (162 lb)   BMI 29.16 kg/m     General: healthy, alert and in no distress  HEENT: no scleral icterus or conjunctival erythema  Skin: no suspicious lesions or rash. No jaundice.  CV: regular rhythm by palpation, no pedal edema  Resp: normal respiratory effort " without conversational dyspnea   Psych: normal mood and affect  Gait: normal steady gait with appropriate coordination and balance  Neuro: normal light touch sensory exam of the extremities.    MSK:  RIGHT SHOULDER  Inspection:    no swelling, bruising, discoloration, or obvious deformity or asymmetry  Palpation:    Tender about the anterior capsule and greater tuberosity. Remainder of bony and tendinous landmarks are nontender.  Active Range of Motion:     Abduction 800 /  /  / IR L4, significant limited by pain in all planes    Strength:    Scapular plane abduction painful, weakness / ER painful, weakness / IR painful, weakness / biceps 5/5 / triceps 5/5  Special tests: Supraspinatus, Neers, Torres, Cross arm test positive    Independent visualization of the below image:  Narrative & Impression   RIGHT SHOULDER THREE OR MORE VIEWS   5/2/2022 11:17 AM      HISTORY: Chronic right shoulder pain.     COMPARISON: None.                                                                      IMPRESSION: Advanced glenohumeral degenerative changes. 1.4 cm loose  body inferior to the coracoid. Moderate acromioclavicular degenerative  changes. No acute fracture or dislocation. MediPort type catheter in  place.     MARILEE BULLOCK MD      Large Joint Injection/Arthocentesis: R glenohumeral joint    Date/Time: 3/12/2024 1:45 PM    Performed by: Yeo, Albert, MD  Authorized by: Yeo, Albert, MD    Indications:  Pain and osteoarthritis  Needle Size:  22 G  Guidance: ultrasound    Approach:  Posterior  Location:  Shoulder      Site:  R glenohumeral joint  Medications:  40 mg methylPREDNISolone 40 MG/ML; 8 mL lidocaine 1 %; 4 mL ROPivacaine 5 MG/ML  Outcome:  Tolerated well, no immediate complications  Procedure discussed: discussed risks, benefits, and alternatives    Consent Given by:  Patient  Timeout: timeout called immediately prior to procedure    Prep: patient was prepped and draped in usual sterile fashion      Ultrasound was used to ensure safe and accurate needle placement and injection. Ultrasound images of the procedure were permanently stored.    Large Joint Injection/Arthocentesis: R subacromial bursa    Date/Time: 3/12/2024 1:46 PM    Performed by: Yeo, Albert, MD  Authorized by: Yeo, Albert, MD    Indications:  Pain  Needle Size:  25 G  Guidance: landmark guided    Approach:  Posterior  Location:  Shoulder      Site:  R subacromial bursa  Medications:  40 mg methylPREDNISolone 40 MG/ML; 4 mL lidocaine 1 %  Outcome:  Tolerated well, no immediate complications  Procedure discussed: discussed risks, benefits, and alternatives    Consent Given by:  Patient  Timeout: timeout called immediately prior to procedure    Prep: patient was prepped and draped in usual sterile fashion      Preprocedure pain 5/10, postprocedure pain 0/10 with increased range of motion    Patient's conditions were thoroughly discussed during today's visit with total time spent face-to-face with the patient and documentation being 35 minutes.    Albert Yeo MD, [facility] Orthopedic Care

## 2024-03-12 NOTE — LETTER
"    3/12/2024         RE: Julia Andre  [address] [address] [facility] [address]        Dear Colleague,    Thank you for referring your patient, Julia Andre, to the [facility] [place]. Please see a copy of my visit note below.    ASSESSMENT & PLAN  Patient Instructions     1. Arthritis of right glenohumeral joint    2. Impingement of right shoulder      -Patient is following up for chronic right shouulder pain due to arthritis and bursitis  -Patient tolerated right glenohumeral and subacromial cortisone injection today without complications.  Patient was given postprocedure instructions  -Patient had significant loss of range of motion on exam today due to chronic pain and lack of use of her right arm  -Patient had full resolution of her pain shortly after the injection  -Call direct clinic number [[phone]] at any time with questions or concerns.    Albert Yeo MD [facility] Orthopedics and Sports Medicine  [facility] [place]        -----    SUBJECTIVE:  Julia Andre is a 73 year old female who is seen in follow-up for right shoulder pain.They were last seen 08/01/2023 by Dr. Hunter in which they received a right glenohumeral steroid injection.     Since their last visit reports worsening pain. They indicate that their current pain level is 5/10. They have tried rest/activity avoidance, Tylenol, corticosteroid injection (most recent date: 08/03/2023) that provided no relief, lidocaine patches, and Voltaren.      The patient is seen by themselves.    Patient's past medical, surgical, social, and family histories were reviewed today and no changes are noted.    REVIEW OF SYSTEMS:  Constitutional: NEGATIVE for fever, chills, change in weight  Skin: NEGATIVE for worrisome rashes, moles or lesions  GI/: NEGATIVE for bowel or bladder changes  Neuro: NEGATIVE for weakness, dizziness or paresthesias    OBJECTIVE:  BP (!) 150/77   Ht 1.588 m (5' 2.5\")   " Wt 73.5 kg (162 lb)   BMI 29.16 kg/m     General: healthy, alert and in no distress  HEENT: no scleral icterus or conjunctival erythema  Skin: no suspicious lesions or rash. No jaundice.  CV: regular rhythm by palpation, no pedal edema  Resp: normal respiratory effort without conversational dyspnea   Psych: normal mood and affect  Gait: normal steady gait with appropriate coordination and balance  Neuro: normal light touch sensory exam of the extremities.    MSK:  RIGHT SHOULDER  Inspection:    no swelling, bruising, discoloration, or obvious deformity or asymmetry  Palpation:    Tender about the anterior capsule and greater tuberosity. Remainder of bony and tendinous landmarks are nontender.  Active Range of Motion:     Abduction 800 /  /  / IR L4, significant limited by pain in all planes    Strength:    Scapular plane abduction painful, weakness / ER painful, weakness / IR painful, weakness / biceps 5/5 / triceps 5/5  Special tests: Supraspinatus, Neers, Torres, Cross arm test positive    Independent visualization of the below image:  Narrative & Impression   RIGHT SHOULDER THREE OR MORE VIEWS   5/2/2022 11:17 AM      HISTORY: Chronic right shoulder pain.     COMPARISON: None.                                                                      IMPRESSION: Advanced glenohumeral degenerative changes. 1.4 cm loose  body inferior to the coracoid. Moderate acromioclavicular degenerative  changes. No acute fracture or dislocation. MediPort type catheter in  place.     MARILEE BULLOCK MD      Large Joint Injection/Arthocentesis: R glenohumeral joint    Date/Time: 3/12/2024 1:45 PM    Performed by: Yeo, Albert, MD  Authorized by: Yeo, Albert, MD    Indications:  Pain and osteoarthritis  Needle Size:  22 G  Guidance: ultrasound    Approach:  Posterior  Location:  Shoulder      Site:  R glenohumeral joint  Medications:  40 mg methylPREDNISolone 40 MG/ML; 8 mL lidocaine 1 %; 4 mL ROPivacaine 5 MG/ML  Outcome:   Tolerated well, no immediate complications  Procedure discussed: discussed risks, benefits, and alternatives    Consent Given by:  Patient  Timeout: timeout called immediately prior to procedure    Prep: patient was prepped and draped in usual sterile fashion     Ultrasound was used to ensure safe and accurate needle placement and injection. Ultrasound images of the procedure were permanently stored.    Large Joint Injection/Arthocentesis: R subacromial bursa    Date/Time: 3/12/2024 1:46 PM    Performed by: Yeo, Albert, MD  Authorized by: Yeo, Albert, MD    Indications:  Pain  Needle Size:  25 G  Guidance: landmark guided    Approach:  Posterior  Location:  Shoulder      Site:  R subacromial bursa  Medications:  40 mg methylPREDNISolone 40 MG/ML; 4 mL lidocaine 1 %  Outcome:  Tolerated well, no immediate complications  Procedure discussed: discussed risks, benefits, and alternatives    Consent Given by:  Patient  Timeout: timeout called immediately prior to procedure    Prep: patient was prepped and draped in usual sterile fashion      Preprocedure pain 5/10, postprocedure pain 0/10 with increased range of motion    Patient's conditions were thoroughly discussed during today's visit with total time spent face-to-face with the patient and documentation being 35 minutes.    Albert Yeo MD, [facility]      Again, thank you for allowing me to participate in the care of your patient.        Sincerely,        Albert Yeo, MD

## 2024-03-13 ENCOUNTER — MEDICAL CORRESPONDENCE (OUTPATIENT)
Dept: HEALTH INFORMATION MANAGEMENT | Facility: CLINIC | Age: 74
End: 2024-03-13
Payer: COMMERCIAL

## 2024-03-13 ENCOUNTER — TRANSFERRED RECORDS (OUTPATIENT)
Dept: HEALTH INFORMATION MANAGEMENT | Facility: CLINIC | Age: 74
End: 2024-03-13
Payer: COMMERCIAL

## 2024-03-13 DIAGNOSIS — D64.9 ANEMIA, UNSPECIFIED: ICD-10-CM

## 2024-03-13 DIAGNOSIS — C56.1 OVARIAN CANCER ON RIGHT (H): Primary | ICD-10-CM

## 2024-03-14 ENCOUNTER — OFFICE VISIT (OUTPATIENT)
Dept: FAMILY MEDICINE | Facility: CLINIC | Age: 74
End: 2024-03-14
Payer: COMMERCIAL

## 2024-03-14 VITALS
OXYGEN SATURATION: 95 % | WEIGHT: 161.4 LBS | HEIGHT: 63 IN | BODY MASS INDEX: 28.6 KG/M2 | SYSTOLIC BLOOD PRESSURE: 120 MMHG | DIASTOLIC BLOOD PRESSURE: 68 MMHG | HEART RATE: 64 BPM | TEMPERATURE: 97.9 F | RESPIRATION RATE: 12 BRPM

## 2024-03-14 DIAGNOSIS — E11.22 TYPE 2 DIABETES MELLITUS WITH STAGE 3A CHRONIC KIDNEY DISEASE, WITHOUT LONG-TERM CURRENT USE OF INSULIN (H): ICD-10-CM

## 2024-03-14 DIAGNOSIS — F33.42 RECURRENT MAJOR DEPRESSIVE DISORDER, IN FULL REMISSION (H): Primary | ICD-10-CM

## 2024-03-14 DIAGNOSIS — D64.9 ANEMIA, UNSPECIFIED TYPE: ICD-10-CM

## 2024-03-14 DIAGNOSIS — N25.81 SECONDARY RENAL HYPERPARATHYROIDISM (H): ICD-10-CM

## 2024-03-14 DIAGNOSIS — I20.9 ANGINA PECTORIS WITH NORMAL CORONARY ARTERIOGRAM (H): ICD-10-CM

## 2024-03-14 DIAGNOSIS — I10 ESSENTIAL HYPERTENSION: ICD-10-CM

## 2024-03-14 DIAGNOSIS — G62.0 DRUG-INDUCED POLYNEUROPATHY (H): ICD-10-CM

## 2024-03-14 DIAGNOSIS — N18.31 TYPE 2 DIABETES MELLITUS WITH STAGE 3A CHRONIC KIDNEY DISEASE, WITHOUT LONG-TERM CURRENT USE OF INSULIN (H): ICD-10-CM

## 2024-03-14 DIAGNOSIS — C78.6 MALIGNANT NEOPLASM METASTATIC TO PERITONEUM (H): ICD-10-CM

## 2024-03-14 DIAGNOSIS — D70.2 DRUG-INDUCED NEUTROPENIA (H): ICD-10-CM

## 2024-03-14 LAB
ABO/RH(D): NORMAL
ANTIBODY SCREEN: NEGATIVE
SPECIMEN EXPIRATION DATE: NORMAL

## 2024-03-14 PROCEDURE — 99214 OFFICE O/P EST MOD 30 MIN: CPT | Performed by: PHYSICIAN ASSISTANT

## 2024-03-14 RX ORDER — DOXORUBICIN HYDROCHLORIDE 2 MG/ML
70 INJECTABLE, LIPOSOMAL INTRAVENOUS
COMMUNITY
Start: 2024-04-10 | End: 2024-08-21

## 2024-03-14 RX ORDER — LISINOPRIL 40 MG/1
40 TABLET ORAL DAILY
Qty: 90 TABLET | Refills: 1 | Status: SHIPPED | OUTPATIENT
Start: 2024-03-14 | End: 2024-05-01

## 2024-03-14 NOTE — PROGRESS NOTES
"  Assessment & Plan     (F33.42) Recurrent major depressive disorder, in full remission (H24)  (primary encounter diagnosis)  Comment:   Plan: stable not changes.     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 Continues treatment with oncology    Drug induced neutropenia,   Monitored by oncology    (N25.81) Secondary renal hyperparathyroidism (H24)  Comment:   Plan: stable. No changes    (G62.0) Drug-induced polyneuropathy (H24)  Comment:   Plan: no changes.     (E11.22,  N18.31) Type 2 diabetes mellitus with stage 3a chronic kidney disease, without long-term current use of insulin (H)  Comment: will stop metformin and start SLGT2 given CKD and h/o CAD  Plan: empagliflozin (JARDIANCE) 10 MG TABS tablet            (I20.9) Angina pectoris with normal coronary arteriogram (H24)  Comment: stop metformin.   Plan: empagliflozin (JARDIANCE) 10 MG TABS tablet            (I10) Essential hypertension  Comment: will continue lisinopril and amlodipine 7.5 mg daily  Recheck BP at nurse only visit  in 3-4 weeks.   Bring home cuff, suspect home cuff is not accurate.   Plan: lisinopril (ZESTRIL) 40 MG tablet,         empagliflozin (JARDIANCE) 10 MG TABS tablet            (D64.9) Anemia, unspecified type  Comment:   Plan: await recheck of Hgb            BMI  Estimated body mass index is 29.05 kg/m  as calculated from the following:    Height as of this encounter: 1.588 m (5' 2.5\").    Weight as of this encounter: 73.2 kg (161 lb 6.4 oz).             Shravan Dumont is a 73 year old, presenting for the following health issues:  Hypertension      3/14/2024    12:52 PM   Additional Questions   Roomed by AT     History of Present Illness       Hypertension: She presents for follow up of hypertension.  She does check blood pressure  regularly outside of the clinic. Outside blood pressures have been over 140/90. She does not follow a low salt diet.     She eats 2-3 servings of fruits and vegetables daily.She " "consumes 2 sweetened beverage(s) daily.She exercises with enough effort to increase her heart rate 9 or less minutes per day.  She exercises with enough effort to increase her heart rate 3 or less days per week.   She is taking medications regularly.         Hypertension Follow-up    Do you check your blood pressure regularly outside of the clinic? Yes   Are you following a low salt diet? No  Are your blood pressures ever more than 140 on the top number (systolic) OR more   than 90 on the bottom number (diastolic), for example 140/90? Yes    HOME BP CUFF: 142/68 In office today  Home readings 130-150/60s    Patient reports that she was at oncology and her hemoglobin was found to be 6.8.  She is currently being transfused in 2 days with 2 units of red blood cells.    She is not having any chest pain or shortness of breath.  She has not had to use clonidine for elevated blood pressure and is currently tolerating 40 mg of lisinopril and 7.5 mg of amlodipine daily.  She denies noticing any swelling in her lower extremities.    She is also wondering about potentially changing off of metformin to a GLP-1 for diabetes and weight loss.          Review of Systems  Constitutional, neuro, ENT, endocrine, pulmonary, cardiac, gastrointestinal, genitourinary, musculoskeletal, integument and psychiatric systems are negative, except as otherwise noted.      Objective    /69 (BP Location: Left arm, Patient Position: Chair, Cuff Size: Adult Regular)   Pulse 64   Temp 97.9  F (36.6  C) (Oral)   Resp 12   Ht 1.588 m (5' 2.5\")   Wt 73.2 kg (161 lb 6.4 oz)   SpO2 95%   BMI 29.05 kg/m    Body mass index is 29.05 kg/m .  Physical Exam   GENERAL: alert and no distress  RESP: lungs clear to auscultation - no rales, rhonchi or wheezes  CV: regular rate and rhythm, normal S1 S2, no S3 or S4, no murmur, click or rub, no peripheral edema   MS: no gross musculoskeletal defects noted, no edema  PSYCH: mentation appears normal, affect " normal/bright            Signed Electronically by: Therese Vanessa PA-C

## 2024-03-15 ENCOUNTER — MYC MEDICAL ADVICE (OUTPATIENT)
Dept: FAMILY MEDICINE | Facility: CLINIC | Age: 74
End: 2024-03-15
Payer: COMMERCIAL

## 2024-03-15 ENCOUNTER — LAB (OUTPATIENT)
Dept: LAB | Facility: CLINIC | Age: 74
End: 2024-03-15
Payer: COMMERCIAL

## 2024-03-15 DIAGNOSIS — C56.1 OVARIAN CANCER ON RIGHT (H): ICD-10-CM

## 2024-03-15 DIAGNOSIS — E11.22 TYPE 2 DIABETES MELLITUS WITH STAGE 3A CHRONIC KIDNEY DISEASE, WITHOUT LONG-TERM CURRENT USE OF INSULIN (H): ICD-10-CM

## 2024-03-15 DIAGNOSIS — I20.9 ANGINA PECTORIS WITH NORMAL CORONARY ARTERIOGRAM (H): ICD-10-CM

## 2024-03-15 DIAGNOSIS — C56.9 OVARIAN CANCER, UNSPECIFIED LATERALITY (H): ICD-10-CM

## 2024-03-15 DIAGNOSIS — N18.31 TYPE 2 DIABETES MELLITUS WITH STAGE 3A CHRONIC KIDNEY DISEASE, WITHOUT LONG-TERM CURRENT USE OF INSULIN (H): ICD-10-CM

## 2024-03-15 DIAGNOSIS — D64.9 ANEMIA, UNSPECIFIED: ICD-10-CM

## 2024-03-15 DIAGNOSIS — I10 ESSENTIAL HYPERTENSION: ICD-10-CM

## 2024-03-15 DIAGNOSIS — D64.9 ANEMIA: Primary | ICD-10-CM

## 2024-03-15 PROCEDURE — 36415 COLL VENOUS BLD VENIPUNCTURE: CPT

## 2024-03-15 PROCEDURE — 86900 BLOOD TYPING SEROLOGIC ABO: CPT

## 2024-03-15 RX ORDER — HEPARIN SODIUM (PORCINE) LOCK FLUSH IV SOLN 100 UNIT/ML 100 UNIT/ML
5 SOLUTION INTRAVENOUS
Status: CANCELLED | OUTPATIENT
Start: 2024-03-15

## 2024-03-15 RX ORDER — HEPARIN SODIUM,PORCINE 10 UNIT/ML
5-20 VIAL (ML) INTRAVENOUS DAILY PRN
Status: CANCELLED | OUTPATIENT
Start: 2024-03-15

## 2024-03-15 RX ORDER — DIPHENHYDRAMINE HYDROCHLORIDE 50 MG/ML
50 INJECTION INTRAMUSCULAR; INTRAVENOUS
Status: CANCELLED
Start: 2024-03-15

## 2024-03-15 RX ORDER — DAPAGLIFLOZIN 10 MG/1
10 TABLET, FILM COATED ORAL DAILY
Qty: 90 TABLET | Refills: 1 | Status: SHIPPED | OUTPATIENT
Start: 2024-03-15 | End: 2024-05-01

## 2024-03-15 RX ORDER — EPINEPHRINE 1 MG/ML
0.3 INJECTION, SOLUTION INTRAMUSCULAR; SUBCUTANEOUS EVERY 5 MIN PRN
Status: CANCELLED | OUTPATIENT
Start: 2024-03-15

## 2024-03-15 NOTE — TELEPHONE ENCOUNTER
Therese Vanessa PA-C- See pt's [employer] message. Please review and advise.     Routed to PCP    Jojo FISHER RN   PAL (Patient Advocate Liaison)  [facility]

## 2024-03-17 ENCOUNTER — INFUSION THERAPY VISIT (OUTPATIENT)
Dept: INFUSION THERAPY | Facility: CLINIC | Age: 74
End: 2024-03-17
Attending: OBSTETRICS & GYNECOLOGY
Payer: COMMERCIAL

## 2024-03-17 VITALS
HEART RATE: 63 BPM | OXYGEN SATURATION: 98 % | SYSTOLIC BLOOD PRESSURE: 147 MMHG | TEMPERATURE: 97.8 F | RESPIRATION RATE: 18 BRPM | DIASTOLIC BLOOD PRESSURE: 88 MMHG

## 2024-03-17 DIAGNOSIS — D64.9 ANEMIA: Primary | ICD-10-CM

## 2024-03-17 DIAGNOSIS — C56.9 OVARIAN CANCER, UNSPECIFIED LATERALITY (H): ICD-10-CM

## 2024-03-17 PROCEDURE — 250N000011 HC RX IP 250 OP 636: Performed by: OBSTETRICS & GYNECOLOGY

## 2024-03-17 PROCEDURE — 36430 TRANSFUSION BLD/BLD COMPNT: CPT

## 2024-03-17 PROCEDURE — P9016 RBC LEUKOCYTES REDUCED: HCPCS | Performed by: OBSTETRICS & GYNECOLOGY

## 2024-03-17 PROCEDURE — 86923 COMPATIBILITY TEST ELECTRIC: CPT | Performed by: OBSTETRICS & GYNECOLOGY

## 2024-03-17 RX ORDER — DIPHENHYDRAMINE HYDROCHLORIDE 50 MG/ML
50 INJECTION INTRAMUSCULAR; INTRAVENOUS
Status: CANCELLED
Start: 2024-03-17

## 2024-03-17 RX ORDER — EPINEPHRINE 1 MG/ML
0.3 INJECTION, SOLUTION INTRAMUSCULAR; SUBCUTANEOUS EVERY 5 MIN PRN
Status: CANCELLED | OUTPATIENT
Start: 2024-03-17

## 2024-03-17 RX ORDER — HEPARIN SODIUM (PORCINE) LOCK FLUSH IV SOLN 100 UNIT/ML 100 UNIT/ML
5 SOLUTION INTRAVENOUS
Status: CANCELLED | OUTPATIENT
Start: 2024-03-17

## 2024-03-17 RX ORDER — HEPARIN SODIUM (PORCINE) LOCK FLUSH IV SOLN 100 UNIT/ML 100 UNIT/ML
5 SOLUTION INTRAVENOUS
Status: DISCONTINUED | OUTPATIENT
Start: 2024-03-17 | End: 2024-03-17

## 2024-03-17 RX ORDER — HEPARIN SODIUM,PORCINE 10 UNIT/ML
5-20 VIAL (ML) INTRAVENOUS DAILY PRN
Status: CANCELLED | OUTPATIENT
Start: 2024-03-17

## 2024-03-17 RX ADMIN — Medication 5 ML: at 11:47

## 2024-03-17 NOTE — PROGRESS NOTES
Infusion Nursing Note:  Julia Andre presents today for 2 units PRBC.    Patient seen by provider today: No   present during visit today: Not Applicable.    Note: N/A.      Intravenous Access:  Implanted Port.    Treatment Conditions:  Lab Results   Component Value Date    HGB 8.2 (L) 10/27/2023    WBC 4.5 10/27/2023    ANEU 5.8 10/04/2020     10/27/2023        Results reviewed, labs MET treatment parameters, ok to proceed with treatment.  Blood transfusion consent signed 3/13/24.      Post Infusion Assessment:  Patient tolerated infusion without incident.  Blood return noted pre and post infusion.  Site patent and intact, free from redness, edema or discomfort.  No evidence of extravasations.  Access discontinued per protocol.       Discharge Plan:   Patient declined prescription refills.  Discharge instructions reviewed with: Patient.  Patient and/or family verbalized understanding of discharge instructions and all questions answered.  AVS to patient via [employer].   Patient discharged in stable condition accompanied by: self and .  Departure Mode: Ambulatory.      Rudy Lima RN

## 2024-04-01 ENCOUNTER — TELEPHONE (OUTPATIENT)
Dept: FAMILY MEDICINE | Facility: CLINIC | Age: 74
End: 2024-04-01
Payer: COMMERCIAL

## 2024-04-01 NOTE — TELEPHONE ENCOUNTER
Patient Quality Outreach    Patient is due for the following:   Hypertension -  BP check    Next Steps:   Schedule a nurse only visit for Bp Check    Type of outreach:    Sent [employer] message.      Questions for provider review:    None           Torie Deluca, CMA

## 2024-04-02 ENCOUNTER — TRANSFERRED RECORDS (OUTPATIENT)
Dept: HEALTH INFORMATION MANAGEMENT | Facility: CLINIC | Age: 74
End: 2024-04-02
Payer: COMMERCIAL

## 2024-04-10 ENCOUNTER — TRANSFERRED RECORDS (OUTPATIENT)
Dept: HEALTH INFORMATION MANAGEMENT | Facility: CLINIC | Age: 74
End: 2024-04-10
Payer: COMMERCIAL

## 2024-04-11 ENCOUNTER — ALLIED HEALTH/NURSE VISIT (OUTPATIENT)
Dept: FAMILY MEDICINE | Facility: CLINIC | Age: 74
End: 2024-04-11
Payer: COMMERCIAL

## 2024-04-11 VITALS — HEART RATE: 61 BPM | SYSTOLIC BLOOD PRESSURE: 129 MMHG | DIASTOLIC BLOOD PRESSURE: 84 MMHG

## 2024-04-11 DIAGNOSIS — Z01.30 BP CHECK: Primary | ICD-10-CM

## 2024-04-11 PROCEDURE — 99207 PR NO CHARGE NURSE ONLY: CPT

## 2024-04-11 NOTE — PROGRESS NOTES
Julia Andre is a 73 year old patient who comes in today for a Blood Pressure check.  Initial BP:  BP (!) 150/84 (BP Location: Right arm, Patient Position: Sitting, Cuff Size: Adult Regular)   Pulse 61      Data Unavailable  Disposition: follow-up as previously indicated by provider and results routed to provider     How Severe Are Your Spot(S)?: mild What Type Of Note Output Would You Prefer (Optional)?: Bullet Format What Is The Reason For Today's Visit?: Full Body Skin Examination What Is The Reason For Today's Visit? (Being Monitored For X): concerning skin lesions on an annual basis

## 2024-04-28 DIAGNOSIS — E11.22 TYPE 2 DIABETES MELLITUS WITH CHRONIC KIDNEY DISEASE, WITHOUT LONG-TERM CURRENT USE OF INSULIN, UNSPECIFIED CKD STAGE (H): ICD-10-CM

## 2024-04-29 RX ORDER — METFORMIN HCL 500 MG
500 TABLET, EXTENDED RELEASE 24 HR ORAL 2 TIMES DAILY WITH MEALS
Qty: 180 TABLET | Refills: 0 | OUTPATIENT
Start: 2024-04-29

## 2024-04-30 SDOH — HEALTH STABILITY: PHYSICAL HEALTH: ON AVERAGE, HOW MANY MINUTES DO YOU ENGAGE IN EXERCISE AT THIS LEVEL?: 0 MIN

## 2024-04-30 SDOH — HEALTH STABILITY: PHYSICAL HEALTH
ON AVERAGE, HOW MANY DAYS PER WEEK DO YOU ENGAGE IN MODERATE TO STRENUOUS EXERCISE (LIKE A BRISK WALK)?: PATIENT DECLINED

## 2024-04-30 SDOH — HEALTH STABILITY: PHYSICAL HEALTH: ON AVERAGE, HOW MANY DAYS PER WEEK DO YOU ENGAGE IN MODERATE TO STRENUOUS EXERCISE (LIKE A BRISK WALK)?: 0 DAYS

## 2024-04-30 ASSESSMENT — LIFESTYLE VARIABLES
AUDIT-C TOTAL SCORE: 0
SKIP TO QUESTIONS 9-10: 1
HOW MANY STANDARD DRINKS CONTAINING ALCOHOL DO YOU HAVE ON A TYPICAL DAY: PATIENT DOES NOT DRINK
HOW OFTEN DO YOU HAVE A DRINK CONTAINING ALCOHOL: NEVER
HOW OFTEN DO YOU HAVE SIX OR MORE DRINKS ON ONE OCCASION: NEVER

## 2024-04-30 ASSESSMENT — PATIENT HEALTH QUESTIONNAIRE - PHQ9
SUM OF ALL RESPONSES TO PHQ QUESTIONS 1-9: 0
10. IF YOU CHECKED OFF ANY PROBLEMS, HOW DIFFICULT HAVE THESE PROBLEMS MADE IT FOR YOU TO DO YOUR WORK, TAKE CARE OF THINGS AT HOME, OR GET ALONG WITH OTHER PEOPLE: NOT DIFFICULT AT ALL
SUM OF ALL RESPONSES TO PHQ QUESTIONS 1-9: 0

## 2024-04-30 ASSESSMENT — SOCIAL DETERMINANTS OF HEALTH (SDOH)
DO YOU BELONG TO ANY CLUBS OR ORGANIZATIONS SUCH AS CHURCH GROUPS UNIONS, FRATERNAL OR ATHLETIC GROUPS, OR SCHOOL GROUPS?: NO
HOW OFTEN DO YOU ATTENT MEETINGS OF THE CLUB OR ORGANIZATION YOU BELONG TO?: NEVER
HOW OFTEN DO YOU ATTEND CHURCH OR RELIGIOUS SERVICES?: NEVER
IN A TYPICAL WEEK, HOW MANY TIMES DO YOU TALK ON THE PHONE WITH FAMILY, FRIENDS, OR NEIGHBORS?: MORE THAN THREE TIMES A WEEK
HOW OFTEN DO YOU GET TOGETHER WITH FRIENDS OR RELATIVES?: MORE THAN THREE TIMES A WEEK
HOW OFTEN DO YOU GET TOGETHER WITH FRIENDS OR RELATIVES?: MORE THAN THREE TIMES A WEEK

## 2024-04-30 ASSESSMENT — ANXIETY QUESTIONNAIRES
GAD7 TOTAL SCORE: 0
6. BECOMING EASILY ANNOYED OR IRRITABLE: NOT AT ALL
IF YOU CHECKED OFF ANY PROBLEMS ON THIS QUESTIONNAIRE, HOW DIFFICULT HAVE THESE PROBLEMS MADE IT FOR YOU TO DO YOUR WORK, TAKE CARE OF THINGS AT HOME, OR GET ALONG WITH OTHER PEOPLE: NOT DIFFICULT AT ALL
8. IF YOU CHECKED OFF ANY PROBLEMS, HOW DIFFICULT HAVE THESE MADE IT FOR YOU TO DO YOUR WORK, TAKE CARE OF THINGS AT HOME, OR GET ALONG WITH OTHER PEOPLE?: NOT DIFFICULT AT ALL
GAD7 TOTAL SCORE: 0
1. FEELING NERVOUS, ANXIOUS, OR ON EDGE: NOT AT ALL
3. WORRYING TOO MUCH ABOUT DIFFERENT THINGS: NOT AT ALL
GAD7 TOTAL SCORE: 0
2. NOT BEING ABLE TO STOP OR CONTROL WORRYING: NOT AT ALL
5. BEING SO RESTLESS THAT IT IS HARD TO SIT STILL: NOT AT ALL
7. FEELING AFRAID AS IF SOMETHING AWFUL MIGHT HAPPEN: NOT AT ALL
4. TROUBLE RELAXING: NOT AT ALL
7. FEELING AFRAID AS IF SOMETHING AWFUL MIGHT HAPPEN: NOT AT ALL

## 2024-05-01 ENCOUNTER — OFFICE VISIT (OUTPATIENT)
Dept: FAMILY MEDICINE | Facility: CLINIC | Age: 74
End: 2024-05-01
Attending: PHYSICIAN ASSISTANT
Payer: COMMERCIAL

## 2024-05-01 VITALS
OXYGEN SATURATION: 97 % | DIASTOLIC BLOOD PRESSURE: 60 MMHG | BODY MASS INDEX: 28.14 KG/M2 | WEIGHT: 158.8 LBS | HEART RATE: 71 BPM | HEIGHT: 63 IN | SYSTOLIC BLOOD PRESSURE: 118 MMHG | RESPIRATION RATE: 12 BRPM | TEMPERATURE: 98.9 F

## 2024-05-01 DIAGNOSIS — N18.31 STAGE 3A CHRONIC KIDNEY DISEASE (H): ICD-10-CM

## 2024-05-01 DIAGNOSIS — F33.42 RECURRENT MAJOR DEPRESSIVE DISORDER, IN FULL REMISSION (H): ICD-10-CM

## 2024-05-01 DIAGNOSIS — R05.1 ACUTE COUGH: ICD-10-CM

## 2024-05-01 DIAGNOSIS — E78.2 MIXED HYPERLIPIDEMIA: ICD-10-CM

## 2024-05-01 DIAGNOSIS — Z00.00 ENCOUNTER FOR MEDICARE ANNUAL WELLNESS EXAM: Primary | ICD-10-CM

## 2024-05-01 DIAGNOSIS — F41.9 ANXIETY: ICD-10-CM

## 2024-05-01 DIAGNOSIS — F51.02 ADJUSTMENT INSOMNIA: ICD-10-CM

## 2024-05-01 DIAGNOSIS — I10 ESSENTIAL HYPERTENSION: ICD-10-CM

## 2024-05-01 DIAGNOSIS — C56.1 MALIGNANT NEOPLASM OF RIGHT OVARY (H): ICD-10-CM

## 2024-05-01 DIAGNOSIS — E11.22 TYPE 2 DIABETES MELLITUS WITH CHRONIC KIDNEY DISEASE, WITHOUT LONG-TERM CURRENT USE OF INSULIN, UNSPECIFIED CKD STAGE (H): ICD-10-CM

## 2024-05-01 LAB
ERYTHROCYTE [DISTWIDTH] IN BLOOD BY AUTOMATED COUNT: 18.7 % (ref 10–15)
HBA1C MFR BLD: 5.9 % (ref 0–5.6)
HCT VFR BLD AUTO: 27.6 % (ref 35–47)
HGB BLD-MCNC: 9.3 G/DL (ref 11.7–15.7)
MCH RBC QN AUTO: 36.6 PG (ref 26.5–33)
MCHC RBC AUTO-ENTMCNC: 33.7 G/DL (ref 31.5–36.5)
MCV RBC AUTO: 109 FL (ref 78–100)
PLATELET # BLD AUTO: 109 10E3/UL (ref 150–450)
RBC # BLD AUTO: 2.54 10E6/UL (ref 3.8–5.2)
WBC # BLD AUTO: 4.9 10E3/UL (ref 4–11)

## 2024-05-01 PROCEDURE — G0439 PPPS, SUBSEQ VISIT: HCPCS | Performed by: PHYSICIAN ASSISTANT

## 2024-05-01 PROCEDURE — 85027 COMPLETE CBC AUTOMATED: CPT | Performed by: PHYSICIAN ASSISTANT

## 2024-05-01 PROCEDURE — 99214 OFFICE O/P EST MOD 30 MIN: CPT | Mod: 25 | Performed by: PHYSICIAN ASSISTANT

## 2024-05-01 PROCEDURE — 80053 COMPREHEN METABOLIC PANEL: CPT | Performed by: PHYSICIAN ASSISTANT

## 2024-05-01 PROCEDURE — 82043 UR ALBUMIN QUANTITATIVE: CPT | Performed by: PHYSICIAN ASSISTANT

## 2024-05-01 PROCEDURE — 36415 COLL VENOUS BLD VENIPUNCTURE: CPT | Performed by: PHYSICIAN ASSISTANT

## 2024-05-01 PROCEDURE — 99207 PR FOOT EXAM NO CHARGE: CPT | Performed by: PHYSICIAN ASSISTANT

## 2024-05-01 PROCEDURE — 80061 LIPID PANEL: CPT | Performed by: PHYSICIAN ASSISTANT

## 2024-05-01 PROCEDURE — 83036 HEMOGLOBIN GLYCOSYLATED A1C: CPT | Performed by: PHYSICIAN ASSISTANT

## 2024-05-01 PROCEDURE — 82570 ASSAY OF URINE CREATININE: CPT | Performed by: PHYSICIAN ASSISTANT

## 2024-05-01 RX ORDER — METFORMIN HCL 500 MG
500 TABLET, EXTENDED RELEASE 24 HR ORAL
Qty: 90 TABLET | Refills: 1 | Status: SHIPPED | OUTPATIENT
Start: 2024-05-01 | End: 2024-09-19 | Stop reason: ALTCHOICE

## 2024-05-01 RX ORDER — METOPROLOL TARTRATE 50 MG
50 TABLET ORAL 2 TIMES DAILY
Qty: 180 TABLET | Refills: 3 | Status: SHIPPED | OUTPATIENT
Start: 2024-05-01

## 2024-05-01 RX ORDER — AZITHROMYCIN 250 MG/1
TABLET, FILM COATED ORAL
Qty: 6 TABLET | Refills: 0 | Status: SHIPPED | OUTPATIENT
Start: 2024-05-01 | End: 2024-05-06

## 2024-05-01 RX ORDER — TRAZODONE HYDROCHLORIDE 50 MG/1
50-100 TABLET, FILM COATED ORAL AT BEDTIME
Qty: 90 TABLET | Refills: 3 | Status: SHIPPED | OUTPATIENT
Start: 2024-05-01

## 2024-05-01 RX ORDER — AMLODIPINE BESYLATE 5 MG/1
5 TABLET ORAL DAILY
Qty: 90 TABLET | Refills: 3 | Status: SHIPPED | OUTPATIENT
Start: 2024-05-01

## 2024-05-01 RX ORDER — RESPIRATORY SYNCYTIAL VIRUS VACCINE 120MCG/0.5
0.5 KIT INTRAMUSCULAR ONCE
Qty: 1 EACH | Refills: 0 | Status: CANCELLED | OUTPATIENT
Start: 2024-05-01 | End: 2024-05-01

## 2024-05-01 RX ORDER — LISINOPRIL 40 MG/1
40 TABLET ORAL DAILY
Qty: 90 TABLET | Refills: 3 | Status: SHIPPED | OUTPATIENT
Start: 2024-05-01

## 2024-05-01 RX ORDER — CODEINE PHOSPHATE AND GUAIFENESIN 10; 100 MG/5ML; MG/5ML
1-2 SOLUTION ORAL EVERY 6 HOURS PRN
Qty: 180 ML | Refills: 0 | Status: SHIPPED | OUTPATIENT
Start: 2024-05-01 | End: 2024-08-21

## 2024-05-01 RX ORDER — AMLODIPINE BESYLATE 2.5 MG/1
2.5 TABLET ORAL DAILY
Qty: 90 TABLET | Refills: 0 | Status: CANCELLED | OUTPATIENT
Start: 2024-05-01

## 2024-05-01 RX ORDER — METOPROLOL TARTRATE 50 MG
50 TABLET ORAL 2 TIMES DAILY
Qty: 180 TABLET | Refills: 1 | Status: CANCELLED | OUTPATIENT
Start: 2024-05-01

## 2024-05-01 RX ORDER — ROSUVASTATIN CALCIUM 20 MG/1
20 TABLET, COATED ORAL DAILY
Qty: 90 TABLET | Refills: 3 | Status: SHIPPED | OUTPATIENT
Start: 2024-05-01

## 2024-05-01 RX ORDER — ALBUTEROL SULFATE 90 UG/1
2 AEROSOL, METERED RESPIRATORY (INHALATION) EVERY 6 HOURS PRN
Qty: 18 G | Refills: 1 | Status: SHIPPED | OUTPATIENT
Start: 2024-05-01 | End: 2024-09-19

## 2024-05-01 RX ORDER — LORAZEPAM 0.5 MG/1
0.5 TABLET ORAL PRN
Qty: 15 TABLET | Refills: 0 | Status: SHIPPED | OUTPATIENT
Start: 2024-05-01

## 2024-05-01 RX ORDER — AMLODIPINE BESYLATE 2.5 MG/1
2.5 TABLET ORAL DAILY
Qty: 90 TABLET | Refills: 3 | Status: SHIPPED | OUTPATIENT
Start: 2024-05-01

## 2024-05-01 RX ORDER — BUPROPION HYDROCHLORIDE 300 MG/1
300 TABLET ORAL EVERY MORNING
Qty: 90 TABLET | Refills: 3 | Status: SHIPPED | OUTPATIENT
Start: 2024-05-01

## 2024-05-01 NOTE — PROGRESS NOTES
Preventive Care Visit  [facility]  Therese Vanessa PA-C, Family Medicine  May 1, 2024      Assessment & Plan     (Z00.00) Encounter for Medicare annual wellness exam  (primary encounter diagnosis)  Comment:   Plan: [place] ANNUAL WELLNESS VISIT, PPS, SUBSEQUENT            (C56.1) Malignant neoplasm of right ovary (H)  Comment:   Plan: having spotting from vaginal cuff per pt. Oncology addressing.     (N18.31) Stage 3a chronic kidney disease (H)  Comment: await labs.   Plan: Albumin Random Urine Quantitative with Creat         Ratio, Comprehensive metabolic panel (BMP +         Alb, Alk Phos, ALT, AST, Total. Bili, TP)            (E11.22) Type 2 diabetes mellitus with chronic kidney disease, without long-term current use of insulin, unspecified CKD stage (H)  Comment: slgt2 options were too costly (over $500/month) for pt. Will go back to metformin.   Plan: HEMOGLOBIN A1C, metFORMIN (GLUCOPHAGE XR) 500         MG 24 hr tablet, FOOT EXAM            (F33.42) Recurrent major depressive disorder, in full remission (H24)  Comment: overall stable with meds.   Plan: buPROPion (WELLBUTRIN XL) 300 MG 24 hr tablet            (I10) Essential hypertension  Comment: stable with meds.   Plan: amLODIPine (NORVASC) 2.5 MG tablet, amLODIPine         (NORVASC) 5 MG tablet, lisinopril (ZESTRIL) 40         MG tablet, metoprolol tartrate (LOPRESSOR) 50         MG tablet            (F41.9) Anxiety  Comment: uses mostly for medical scans.   Plan: LORazepam (ATIVAN) 0.5 MG tablet            (R05.1) Acute cough  Comment: will treat with abx given immunocompromised state and h/o pneumonia  Plan: azithromycin (ZITHROMAX) 250 MG tablet,         amoxicillin-clavulanate (AUGMENTIN) 875-125 MG         tablet, albuterol (PROAIR HFA/PROVENTIL         HFA/VENTOLIN HFA) 108 (90 Base) MCG/ACT         inhaler, guaiFENesin-codeine (ROBITUSSIN AC)         100-10 MG/5ML solution        If symptoms not improving, call or return to  "clinic     (E78.2) Mixed hyperlipidemia  Comment: await labs.   Plan: rosuvastatin (CRESTOR) 20 MG tablet            (F51.02) Adjustment insomnia  Comment:   Plan: traZODone (DESYREL) 50 MG tablet            Patient has been advised of split billing requirements and indicates understanding: No          BMI  Estimated body mass index is 28.58 kg/m  as calculated from the following:    Height as of this encounter: 1.588 m (5' 2.5\").    Weight as of this encounter: 72 kg (158 lb 12.8 oz).       Counseling  Appropriate preventive services were discussed with this patient, including applicable screening as appropriate for fall prevention, nutrition, physical activity, Tobacco-use cessation, weight loss and cognition.  Checklist reviewing preventive services available has been given to the patient.  Reviewed patient's diet, addressing concerns and/or questions.   She is at risk for psychosocial distress and has been provided with information to reduce risk.   Information on urinary incontinence and treatment options given to patient.       See Patient Instructions    Shravan Dumont is a 73 year old, presenting for the following:  Wellness Visit (Preventative- coughing about six days, leaking urine, pain lower left quad, right shoulder, tired from chemo / Covid test Mon - neg) and Recheck Medication (Metformin needs to be refilled but needs a new order)        5/1/2024     3:45 PM   Additional Questions   Roomed by ainsley adhikari   Accompanied by Shashi         Health Care Directive  Patient does not have a Health Care Directive or Living Will: Patient states has Advance Directive and will bring in a copy to clinic.    HPI    Answers submitted by the patient for this visit:  Patient Health Questionnaire (Submitted on 4/30/2024)  If you checked off any problems, how difficult have these problems made it for you to do your work, take care of things at home, or get along with other people?: Not difficult at all  PHQ9 " TOTAL SCORE: 0  IMELDA-7 (Submitted on 4/30/2024)  IMELDA 7 TOTAL SCORE: 0  Mood overall stable with meds. No concerns       Diabetes Follow-up    How often are you checking your blood sugar? Not at all  What concerns do you have today about your diabetes? None   Do you have any of these symptoms? (Select all that apply)  Numbness in feet      BP Readings from Last 2 Encounters:   05/01/24 118/60   04/11/24 129/84     Hemoglobin A1C (%)   Date Value   05/01/2024 5.9 (H)   02/09/2024 5.7 (H)   09/02/2020 6.1 (H)     LDL Cholesterol Calculated (mg/dL)   Date Value   04/21/2023 62   12/07/2021 53             Hyperlipidemia Follow-Up    Are you regularly taking any medication or supplement to lower your cholesterol?   yes  Are you having muscle aches or other side effects that you think could be caused by your cholesterol lowering medication?  No    Hypertension Follow-up    Do you check your blood pressure regularly outside of the clinic? Yes   Are you following a low salt diet? No  Are your blood pressures ever more than 140 on the top number (systolic) OR more   than 90 on the bottom number (diastolic), for example 140/90? No    Chronic Kidney Disease Follow-up    Do you take any over the counter pain medicine?: No  Acute Illness  Acute illness concerns: cough  Onset/Duration: 1 week  Symptoms:  Fever: No  Chills/Sweats: No  Headache (location?): No  Sinus Pressure: No  Conjunctivitis:  No  Ear Pain: no  Rhinorrhea: YES  Congestion: YES  Sore Throat: YES  Cough: YES-productive of clear sputum, barking  Wheeze: YES  Decreased Appetite: No  Nausea: No  Vomiting: No  Diarrhea: No  Dysuria/Freq.: No  Dysuria or Hematuria: No  Fatigue/Achiness: YES  Sick/Strep Exposure: No  Therapies tried and outcome: over the counter cough meds      4/30/2024   General Health   How would you rate your overall physical health? (!) FAIR   Feel stress (tense, anxious, or unable to sleep) Only a little    To some extent   (!) STRESS CONCERN       4/30/2024   Nutrition   Diet: Regular (no restrictions)         4/30/2024   Exercise   Days per week of moderate/strenous exercise 0 days    Patient declined   Average minutes spent exercising at this level 0 min   (!) EXERCISE CONCERN      4/30/2024   Social Factors   Frequency of gathering with friends or relatives More than three times a week    More than three times a week   Worry food won't last until get money to buy more No    No   Food not last or not have enough money for food? No    No   Do you have housing?  Yes    Yes   Are you worried about losing your housing? No    No   Lack of transportation? No    No   Unable to get utilities (heat,electricity)? No    No         5/1/2024   Fall Risk   Fallen 2 or more times in the past year? No   Trouble with walking or balance? No   Gait Speed Test Interpretation Less than or equal to 5.00 seconds - PASS          4/30/2024   Activities of Daily Living- Home Safety   Needs help with the following daily activites None of the above   Safety concerns in the home None of the above         4/30/2024   Dental   Dentist two times every year? Yes         4/30/2024   Hearing Screening   Hearing concerns? None of the above         4/30/2024   Driving Risk Screening   Patient/family members have concerns about driving No         4/30/2024   General Alertness/Fatigue Screening   Have you been more tired than usual lately? No         4/30/2024   Urinary Incontinence Screening   Bothered by leaking urine in past 6 months Yes         4/30/2024   TB Screening   Were you born outside of the [place]? No       Today's PHQ-9 Score:       4/30/2024     5:45 PM   PHQ-9 SCORE   PHQ-9 Total Score MyChart 0   PHQ-9 Total Score 0         4/30/2024   Substance Use   Alcohol more than 3/day or more than 7/wk Not Applicable   Do you have a current opioid prescription? No   How severe/bad is pain from 1 to 10? 0/10 (No Pain)   Do you use any other substances recreationally? No     Social History      Tobacco Use    Smoking status: Former     Current packs/day: 0.00     Average packs/day: 2.5 packs/day for 27.0 years (67.5 ttl pk-yrs)     Types: Cigarettes     Start date: 1966     Quit date: 1993     Years since quittin.3    Smokeless tobacco: Never   Vaping Use    Vaping status: Never Used   Substance Use Topics    Alcohol use: Not Currently     Comment: sober for 32 years    Drug use: Never           2023   LAST FHS-7 RESULTS   1st degree relative breast or ovarian cancer No   Any relative bilateral breast cancer No   Any male have breast cancer No   Any ONE woman have BOTH breast AND ovarian cancer No   Any woman with breast cancer before 50yrs No   2 or more relatives with breast AND/OR ovarian cancer No   2 or more relatives with breast AND/OR bowel cancer No        Mammogram Screening - Annual screen due to history of breast cancer, carcinoma in situ, or hyperplasia    ASCVD Risk   The 10-year ASCVD risk score (Alissa ORTIZ, et al., 2019) is: 41%    Values used to calculate the score:      Age: 73 years      Sex: Female      Is Non- : No      Diabetic: Yes      Tobacco smoker: No      Systolic Blood Pressure: 146 mmHg      Is BP treated: Yes      HDL Cholesterol: 49 mg/dL      Total Cholesterol: 183 mg/dL            Reviewed and updated as needed this visit by Provider                    Past Medical History:   Diagnosis Date    Anxiety     Cervical cancer (H)     Coronary artery disease 2009    angina. elevated troponins, normal coronary arteries found    Depression     Depressive disorder     Diabetes (H)     type 2    Gastroesophageal reflux disease     Heart attack (H)     Hyperlipidemia     Hypertension     Iron deficiency anemia     Osteopenia     Ovarian cancer, unspecified laterality (H) 2021    Psoriasis     Sleep apnea      Current providers sharing in care for this patient include:  Patient Care Team:  Therese Vanessa PA-C as PCP -  "General  Keely Dewey MD as MD (Gastroenterology)  Therese Vanessa PA-C as Assigned PCP  Melina Hart PA-C as Physician Assistant (Colon & Rectal)  Yeo, Albert, MD as Assigned Musculoskeletal Provider    The following health maintenance items are reviewed in Epic and correct as of today:  Health Maintenance   Topic Date Due    DTAP/TDAP/TD IMMUNIZATION (1 - Tdap) Never done    RSV VACCINE (Pregnancy & 60+) (1 - 1-dose 60+ series) Never done    COVID-19 Vaccine (6 - 2023-24 season) 12/22/2023    LIPID  04/21/2024    MICROALBUMIN  04/21/2024    DIABETIC FOOT EXAM  04/21/2024    ANNUAL REVIEW OF HM ORDERS  04/21/2024    MEDICARE ANNUAL WELLNESS VISIT  04/21/2024    A1C  05/09/2024    HEMOGLOBIN  10/27/2024    PHQ-9  11/01/2024    EYE EXAM  11/16/2024    BMP  02/09/2025    FALL RISK ASSESSMENT  05/01/2025    DEXA  06/06/2025    MAMMO SCREENING  09/06/2025    ADVANCE CARE PLANNING  04/21/2028    HEPATITIS C SCREENING  Completed    DEPRESSION ACTION PLAN  Completed    INFLUENZA VACCINE  Completed    Pneumococcal Vaccine: 65+ Years  Completed    URINALYSIS  Completed    ZOSTER IMMUNIZATION  Completed    IPV IMMUNIZATION  Aged Out    HPV IMMUNIZATION  Aged Out    MENINGITIS IMMUNIZATION  Aged Out    RSV MONOCLONAL ANTIBODY  Aged Out    COLORECTAL CANCER SCREENING  Discontinued         Review of Systems  Constitutional, neuro, ENT, endocrine, pulmonary, cardiac, gastrointestinal, genitourinary, musculoskeletal, integument and psychiatric systems are negative, except as otherwise noted.     Objective    Exam  /60   Pulse 71   Temp 98.9  F (37.2  C) (Oral)   Resp 12   Ht 1.588 m (5' 2.5\")   Wt 72 kg (158 lb 12.8 oz)   SpO2 97%   BMI 28.58 kg/m     Estimated body mass index is 28.58 kg/m  as calculated from the following:    Height as of this encounter: 1.588 m (5' 2.5\").    Weight as of this encounter: 72 kg (158 lb 12.8 oz).    Physical Exam  GENERAL: alert and no distress  EYES: " Eyes grossly normal to inspection, PERRL and conjunctivae and sclerae normal  HENT: ear canals and TM's normal, nose and mouth without ulcers or lesions  NECK: no adenopathy, no asymmetry, masses, or scars  RESP: expiratory wheezes throughout  CV: regular rate and rhythm, normal S1 S2, no S3 or S4, no murmur, click or rub, no peripheral edema  ABDOMEN: soft, nontender, no hepatosplenomegaly, no masses and bowel sounds normal  MS: no gross musculoskeletal defects noted, no edema  SKIN: no suspicious lesions or rashes  NEURO: Normal strength and tone, mentation intact and speech normal  PSYCH: mentation appears normal, affect normal/bright  Diabetic foot exam: normal DP and PT pulses and no trophic changes or ulcerative lesions         5/1/2024   Mini Cog   Clock Draw Score 2 Normal   3 Item Recall 3 objects recalled   Mini Cog Total Score 5              Signed Electronically by: Therese Vanessa PA-C

## 2024-05-01 NOTE — PATIENT INSTRUCTIONS
Preventive Care Advice   This is general advice given by our system to help you stay healthy. However, your care team may have specific advice just for you. Please talk to your care team about your preventive care needs.  Nutrition  Eat 5 or more servings of fruits and vegetables each day.  Try wheat bread, brown rice and whole grain pasta (instead of white bread, rice, and pasta).  Get enough calcium and vitamin D. Check the label on foods and aim for 100% of the RDA (recommended daily allowance).  Lifestyle  Exercise at least 150 minutes each week   (30 minutes a day, 5 days a week).  Do muscle strengthening activities 2 days a week. These help control your weight and prevent disease.  No smoking.  Wear sunscreen to prevent skin cancer.  Have a dental exam and cleaning every 6 months.  Yearly exams  See your health care team every year to talk about:  Any changes in your health.  Any medicines your care team has prescribed.  Preventive care, family planning, and ways to prevent chronic diseases.  Shots (vaccines)   HPV shots (up to age 26), if you've never had them before.  Hepatitis B shots (up to age 59), if you've never had them before.  COVID-19 shot: Get this shot when it's due.  Flu shot: Get a flu shot every year.  Tetanus shot: Get a tetanus shot every 10 years.  Pneumococcal, hepatitis A, and RSV shots: Ask your care team if you need these based on your risk.  Shingles shot (for age 50 and up).  General health tests  Diabetes screening:  Starting at age 35, Get screened for diabetes at least every 3 years.  If you are younger than age 35, ask your care team if you should be screened for diabetes.  Cholesterol test: At age 39, start having a cholesterol test every 5 years, or more often if advised.  Bone density scan (DEXA): At age 50, ask your care team if you should have this scan for osteoporosis (brittle bones).  Hepatitis C: Get tested at least once in your life.  STIs (sexually transmitted  infections)  Before age 24: Ask your care team if you should be screened for STIs.  After age 24: Get screened for STIs if you're at risk. You are at risk for STIs (including HIV) if:  You are sexually active with more than one person.  You don't use condoms every time.  You or a partner was diagnosed with a sexually transmitted infection.  If you are at risk for HIV, ask about PrEP medicine to prevent HIV.  Get tested for HIV at least once in your life, whether you are at risk for HIV or not.  Cancer screening tests  Cervical cancer screening: If you have a cervix, begin getting regular cervical cancer screening tests at age 21. Most people who have regular screenings with normal results can stop after age 65. Talk about this with your provider.  Breast cancer scan (mammogram): If you've ever had breasts, begin having regular mammograms starting at age 40. This is a scan to check for breast cancer.  Colon cancer screening: It is important to start screening for colon cancer at age 45.  Have a colonoscopy test every 10 years (or more often if you're at risk) Or, ask your provider about stool tests like a FIT test every year or Cologuard test every 3 years.  To learn more about your testing options, visit: https://www.IQ Logic/178412.pdf.  For help making a decision, visit: https://bit.ly/of30845.  Prostate cancer screening test: If you have a prostate and are age 55 to 69, ask your provider if you would benefit from a yearly prostate cancer screening test.  Lung cancer screening: If you are a current or former smoker age 50 to 80, ask your care team if ongoing lung cancer screenings are right for you.  For informational purposes only. Not to replace the advice of your health care provider. Copyright   2023 [employer]. All rights reserved. Clinically reviewed by the [facility] Transitions Program. [employer] 851953 - REV 01/24.    Preventing Falls: Care Instructions  Injuries and health  problems such as trouble walking or poor eyesight can increase your risk of falling. So can some medicines. But there are things you can do to help prevent falls. You can exercise to get stronger. You can also arrange your home to make it safer.    Talk to your doctor about the medicines you take. Ask if any of them increase the risk of falls and whether they can be changed or stopped.   Try to exercise regularly. It can help improve your strength and balance. This can help lower your risk of falling.     Practice fall safety and prevention.    Wear low-heeled shoes that fit well and give your feet good support. Talk to your doctor if you have foot problems that make this hard.  Carry a cellphone or wear a medical alert device that you can use to call for help.  Use stepladders instead of chairs to reach high objects. Don't climb if you're at risk for falls. Ask for help, if needed.  Wear the correct eyeglasses, if you need them.    Make your home safer.    Remove rugs, cords, clutter, and furniture from walkways.  Keep your house well lit. Use night-lights in hallways and bathrooms.  Install and use sturdy handrails on stairways.  Wear nonskid footwear, even inside. Don't walk barefoot or in socks without shoes.    Be safe outside.    Use handrails, curb cuts, and ramps whenever possible.  Keep your hands free by using a shoulder bag or backpack.  Try to walk in well-lit areas. Watch out for uneven ground, changes in pavement, and debris.  Be careful in the winter. Walk on the grass or gravel when sidewalks are slippery. Use de-icer on steps and walkways. Add non-slip devices to shoes.    Put grab bars and nonskid mats in your shower or tub and near the toilet. Try to use a shower chair or bath bench when bathing.   Get into a tub or shower by putting in your weaker leg first. Get out with your strong side first. Have a phone or medical alert device in the bathroom with you.   Where can you learn more?  Go to  "https://www.Top100.cn.net/patiented  Enter G117 in the search box to learn more about \"Preventing Falls: Care Instructions.\"  Current as of: July 17, 2023               Content Version: 14.0    8830-0366 [employer].   Care instructions adapted under license by your healthcare professional. If you have questions about a medical condition or this instruction, always ask your healthcare professional. [employer] disclaims any warranty or liability for your use of this information.      Learning About Stress  What is stress?     Stress is your body's response to a hard situation. Your body can have a physical, emotional, or mental response. Stress is a fact of life for most people, and it affects everyone differently. What causes stress for you may not be stressful for someone else.  A lot of things can cause stress. You may feel stress when you go on a job interview, take a test, or run a race. This kind of short-term stress is normal and even useful. It can help you if you need to work hard or react quickly. For example, stress can help you finish an important job on time.  Long-term stress is caused by ongoing stressful situations or events. Examples of long-term stress include long-term health problems, ongoing problems at work, or conflicts in your family. Long-term stress can harm your health.  How does stress affect your health?  When you are stressed, your body responds as though you are in danger. It makes hormones that speed up your heart, make you breathe faster, and give you a burst of energy. This is called the fight-or-flight stress response. If the stress is over quickly, your body goes back to normal and no harm is done.  But if stress happens too often or lasts too long, it can have bad effects. Long-term stress can make you more likely to get sick, and it can make symptoms of some diseases worse. If you tense up when you are stressed, you may develop neck, shoulder, or low " back pain. Stress is linked to high blood pressure and heart disease.  Stress also harms your emotional health. It can make you reddy, tense, or depressed. Your relationships may suffer, and you may not do well at work or school.  What can you do to manage stress?  You can try these things to help manage stress:   Do something active. Exercise or activity can help reduce stress. Walking is a great way to get started. Even everyday activities such as housecleaning or yard work can help.  Try yoga or jaida chi. These techniques combine exercise and meditation. You may need some training at first to learn them.  Do something you enjoy. For example, listen to music or go to a movie. Practice your hobby or do volunteer work.  Meditate. This can help you relax, because you are not worrying about what happened before or what may happen in the future.  Do guided imagery. Imagine yourself in any setting that helps you feel calm. You can use online videos, books, or a teacher to guide you.  Do breathing exercises. For example:  From a standing position, bend forward from the waist with your knees slightly bent. Let your arms dangle close to the floor.  Breathe in slowly and deeply as you return to a standing position. Roll up slowly and lift your head last.  Hold your breath for just a few seconds in the standing position.  Breathe out slowly and bend forward from the waist.  Let your feelings out. Talk, laugh, cry, and express anger when you need to. Talking with supportive friends or family, a counselor, or a amauri leader about your feelings is a healthy way to relieve stress. Avoid discussing your feelings with people who make you feel worse.  Write. It may help to write about things that are bothering you. This helps you find out how much stress you feel and what is causing it. When you know this, you can find better ways to cope.  What can you do to prevent stress?  You might try some of these things to help prevent  "stress:  Manage your time. This helps you find time to do the things you want and need to do.  Get enough sleep. Your body recovers from the stresses of the day while you are sleeping.  Get support. Your family, friends, and community can make a difference in how you experience stress.  Limit your news feed. Avoid or limit time on social media or news that may make you feel stressed.  Do something active. Exercise or activity can help reduce stress. Walking is a great way to get started.  Where can you learn more?  Go to https://www.RVX.net/patiented  Enter N032 in the search box to learn more about \"Learning About Stress.\"  Current as of: October 24, 2023               Content Version: 14.0    1770-9253 [employer].   Care instructions adapted under license by your healthcare professional. If you have questions about a medical condition or this instruction, always ask your healthcare professional. [employer] disclaims any warranty or liability for your use of this information.      Bladder Training: Care Instructions  Your Care Instructions     Bladder training is used to treat urge incontinence and stress incontinence. Urge incontinence means that the need to urinate comes on so fast that you can't get to a toilet in time. Stress incontinence means that you leak urine because of pressure on your bladder. For example, it may happen when you laugh, cough, or lift something heavy.  Bladder training can increase how long you can wait before you have to urinate. It can also help your bladder hold more urine. And it can give you better control over the urge to urinate.  It is important to remember that bladder training takes a few weeks to a few months to make a difference. You may not see results right away, but don't give up.  Follow-up care is a key part of your treatment and safety. Be sure to make and go to all appointments, and call your doctor if you are having problems. It's " also a good idea to know your test results and keep a list of the medicines you take.  How can you care for yourself at home?  Work with your doctor to come up with a bladder training program that is right for you. You may use one or more of the following methods.  Delayed urination  In the beginning, try to keep from urinating for 5 minutes after you first feel the need to go.  While you wait, take deep, slow breaths to relax. Kegel exercises can also help you delay the need to go to the bathroom.  After some practice, when you can easily wait 5 minutes to urinate, try to wait 10 minutes before you urinate.  Slowly increase the waiting period until you are able to control when you have to urinate.  Scheduled urination  Empty your bladder when you first wake up in the morning.  Schedule times throughout the day when you will urinate.  Start by going to the bathroom every hour, even if you don't need to go.  Slowly increase the time between trips to the bathroom.  When you have found a schedule that works well for you, keep doing it.  If you wake up during the night and have to urinate, do it. Apply your schedule to waking hours only.  Kegel exercises  These tighten and strengthen pelvic muscles, which can help you control the flow of urine. (If doing these exercises causes pain, stop doing them and talk with your doctor.) To do Kegel exercises:  Squeeze your muscles as if you were trying not to pass gas. Or squeeze your muscles as if you were stopping the flow of urine. Your belly, legs, and buttocks shouldn't move.  Hold the squeeze for 3 seconds, then relax for 5 to 10 seconds.  Start with 3 seconds, then add 1 second each week until you are able to squeeze for 10 seconds.  Repeat the exercise 10 times a session. Do 3 to 8 sessions a day.  When should you call for help?  Watch closely for changes in your health, and be sure to contact your doctor if:    Your incontinence is getting worse.     You do not get better  "as expected.   Where can you learn more?  Go to https://www.AriadNEXT.net/patiented  Enter V684 in the search box to learn more about \"Bladder Training: Care Instructions.\"  Current as of: November 15, 2023               Content Version: 14.0    7181-1235 [employer].   Care instructions adapted under license by your healthcare professional. If you have questions about a medical condition or this instruction, always ask your healthcare professional. Healthwise, [employer] disclaims any warranty or liability for your use of this information.      "

## 2024-05-03 LAB
ALBUMIN SERPL BCG-MCNC: 4.3 G/DL (ref 3.5–5.2)
ALP SERPL-CCNC: 83 U/L (ref 40–150)
ALT SERPL W P-5'-P-CCNC: 12 U/L (ref 0–50)
ANION GAP SERPL CALCULATED.3IONS-SCNC: 12 MMOL/L (ref 7–15)
AST SERPL W P-5'-P-CCNC: 18 U/L (ref 0–45)
BILIRUB SERPL-MCNC: 0.5 MG/DL
BUN SERPL-MCNC: 20.7 MG/DL (ref 8–23)
CALCIUM SERPL-MCNC: 9.4 MG/DL (ref 8.8–10.2)
CHLORIDE SERPL-SCNC: 100 MMOL/L (ref 98–107)
CHOLEST SERPL-MCNC: 151 MG/DL
CREAT SERPL-MCNC: 1 MG/DL (ref 0.51–0.95)
CREAT UR-MCNC: 225 MG/DL
DEPRECATED HCO3 PLAS-SCNC: 24 MMOL/L (ref 22–29)
EGFRCR SERPLBLD CKD-EPI 2021: 59 ML/MIN/1.73M2
FASTING STATUS PATIENT QL REPORTED: NO
GLUCOSE SERPL-MCNC: 158 MG/DL (ref 70–99)
HDLC SERPL-MCNC: 45 MG/DL
LDLC SERPL CALC-MCNC: 65 MG/DL
MICROALBUMIN UR-MCNC: 132 MG/L
MICROALBUMIN/CREAT UR: 58.67 MG/G CR (ref 0–25)
NONHDLC SERPL-MCNC: 106 MG/DL
POTASSIUM SERPL-SCNC: 4.4 MMOL/L (ref 3.4–5.3)
PROT SERPL-MCNC: 6.9 G/DL (ref 6.4–8.3)
SODIUM SERPL-SCNC: 136 MMOL/L (ref 135–145)
TRIGL SERPL-MCNC: 204 MG/DL

## 2024-05-08 ENCOUNTER — PATIENT OUTREACH (OUTPATIENT)
Dept: FAMILY MEDICINE | Facility: CLINIC | Age: 74
End: 2024-05-08
Payer: COMMERCIAL

## 2024-05-08 NOTE — TELEPHONE ENCOUNTER
Patient left message on RN VM     Patient had a physical on 5/1/2024, received a message that a visit was needing to be scheduled     Patient scheduled visit, however she is uncertain what the visit is for   Lab results have been coming in and pcp has responded on these, but does not state visit needed     Ana Laura Smith, Registered Nurse  [facility]

## 2024-05-08 NOTE — TELEPHONE ENCOUNTER
RN spoke to patient, advised visit not needed per pcp     Cancelled visit 5/8/2024     Advised that we need to see pt in 1 year for medicare annual wellness visit and if anything comes up in between to return call to discuss and schedule     Patient agrees to this plan     Ana Laura Smith, Registered Nurse  [facility]

## 2024-05-08 NOTE — TELEPHONE ENCOUNTER
Therese Vanessa PA-C    Please review,   Was there a reason you wanted to see patient for follow up soon?   Patient had visit on 5/1/2024 and states she received a message to schedule so she scheduled with you on 5/8/2024             Per chart review RN does not see any notes that visit is needed now   RN thinks patient may have seen a message advising visit needed in 1 year for medicare annual wellness     Thank you   Ana Laura Smith, Registered Nurse,   [facility]

## 2024-05-29 ENCOUNTER — OFFICE VISIT (OUTPATIENT)
Dept: URGENT CARE | Facility: URGENT CARE | Age: 74
End: 2024-05-29
Payer: COMMERCIAL

## 2024-05-29 ENCOUNTER — PATIENT OUTREACH (OUTPATIENT)
Dept: FAMILY MEDICINE | Facility: CLINIC | Age: 74
End: 2024-05-29
Payer: COMMERCIAL

## 2024-05-29 ENCOUNTER — ANCILLARY PROCEDURE (OUTPATIENT)
Dept: GENERAL RADIOLOGY | Facility: CLINIC | Age: 74
End: 2024-05-29
Attending: FAMILY MEDICINE
Payer: COMMERCIAL

## 2024-05-29 VITALS
OXYGEN SATURATION: 96 % | DIASTOLIC BLOOD PRESSURE: 69 MMHG | TEMPERATURE: 99.4 F | HEART RATE: 65 BPM | RESPIRATION RATE: 18 BRPM | WEIGHT: 158 LBS | SYSTOLIC BLOOD PRESSURE: 149 MMHG | BODY MASS INDEX: 28.44 KG/M2

## 2024-05-29 DIAGNOSIS — J02.9 SORE THROAT: ICD-10-CM

## 2024-05-29 DIAGNOSIS — J00 COMMON COLD VIRUS: Primary | ICD-10-CM

## 2024-05-29 DIAGNOSIS — R05.1 ACUTE COUGH: ICD-10-CM

## 2024-05-29 LAB — DEPRECATED S PYO AG THROAT QL EIA: NEGATIVE

## 2024-05-29 PROCEDURE — 71046 X-RAY EXAM CHEST 2 VIEWS: CPT | Mod: TC | Performed by: RADIOLOGY

## 2024-05-29 PROCEDURE — 87651 STREP A DNA AMP PROBE: CPT | Performed by: FAMILY MEDICINE

## 2024-05-29 PROCEDURE — 99213 OFFICE O/P EST LOW 20 MIN: CPT | Performed by: FAMILY MEDICINE

## 2024-05-29 NOTE — TELEPHONE ENCOUNTER
RN placed call to patient     - Cough started Sunday - has not checked Covid test, will test and call RN back if positive   - Small amount yellow   - denies shortness of breath     Advised of pcp recommendations to be seen in person within 24-36 hours at this time there are no in person openings at [facility]   Discussed transferring to scheduling to see if any other close clinics have openings     Patient plans on going to  Urgent Care   Address provided     RN instructed patient to check Covid test first, if positive return call to nurse to discuss   Patient is on amlodipine so will not fit RN Covid protocol and will need virtual visit scheduled     If negative patient will go to  urgent care for evaluation per pcp recommendations     Ana Laura Smith, Registered Nurse  [facility]

## 2024-05-29 NOTE — PROGRESS NOTES
URGENT CARE VISIT:    ASSESSMENT AND PLAN:      ICD-10-CM    1. Common cold virus  J00       2. Acute cough  R05.1 XR Chest 2 Views      3. Sore throat  J02.9 Streptococcus A Rapid Screen w/Reflex to PCR     Group A Streptococcus PCR Throat Swab          CXR an RST are unremarkable today.  Suspect viral cold due to exposure from granddaughter with similar illness.  Will treat with supportive and OTC measures outlined in AVS and discussed.      Red flag symptoms for urgent evaluation via ED shared.      Follow up with primary care provider with any problems, questions or concerns or if symptoms worsen or fail to improve. Patient verbalized understanding and is agreeable to plan. The patient was discharged ambulatory and in stable condition.    SUBJECTIVE:   Julia Andre is a 73 year old female presenting for chief complaint of cough - non-productive.  Associated symptoms include sore throat.   Onset was 3 day(s) ago.  She was recently on Zpack and Augmentin at the beginning of the month for persistent cough.  Reports feeling better after medication.   She denies the following symptoms: fever, chills, runny nose, stuffy nose, wheezing, shortness of breath, ear pain bilateral, vomiting, and diarrhea  Course of illness is same.    Treatment measures tried include OTC Cough med without relief of symptoms.  Predisposing factors include ill contact with Family member .  Medical history significant for malignant neoplasm of right ovary currently on a 3mos break from chemotherapy medication.       COVID Home testing:  negative    PMH:   Past Medical History:   Diagnosis Date    Anxiety     Cervical cancer (H)     Coronary artery disease 2009    angina. elevated troponins, normal coronary arteries found    Depression     Depressive disorder     Diabetes (H)     type 2    Gastroesophageal reflux disease     Heart attack (H)     Hyperlipidemia     Hypertension     Iron deficiency anemia     Osteopenia     Ovarian cancer,  unspecified laterality (H) 11/18/2021    Psoriasis     Sleep apnea      Allergies: Atorvastatin, Simvastatin, and Sulfa antibiotics   Medications:   Current Outpatient Medications   Medication Sig Dispense Refill    albuterol (PROAIR HFA/PROVENTIL HFA/VENTOLIN HFA) 108 (90 Base) MCG/ACT inhaler Inhale 2 puffs into the lungs every 6 hours as needed for shortness of breath, wheezing or cough 18 g 1    amLODIPine (NORVASC) 2.5 MG tablet Take 1 tablet (2.5 mg) by mouth daily In addition to 5 mg tablets 90 tablet 3    amLODIPine (NORVASC) 5 MG tablet Take 1 tablet (5 mg) by mouth daily 90 tablet 3    aspirin 81 MG EC tablet Take 81 mg by mouth daily      BIOTIN PO Take 1 tablet by mouth every morning      buPROPion (WELLBUTRIN XL) 300 MG 24 hr tablet Take 1 tablet (300 mg) by mouth every morning 90 tablet 3    cloNIDine (CATAPRES) 0.1 MG tablet TAKE 1 TABLET BY MOUTH IF SYSTOLIC BLOOD PRESSURE  OR HIGHER, OR IF DIASTOLIC BLOOD PRESSURE  OR ABOVE 30 tablet 1    guaiFENesin-codeine (ROBITUSSIN AC) 100-10 MG/5ML solution Take 5-10 mLs by mouth every 6 hours as needed for cough 180 mL 0    lisinopril (ZESTRIL) 40 MG tablet Take 1 tablet (40 mg) by mouth daily 90 tablet 3    LORazepam (ATIVAN) 0.5 MG tablet Take 1 tablet (0.5 mg) by mouth as needed for anxiety 15 tablet 0    metFORMIN (GLUCOPHAGE XR) 500 MG 24 hr tablet Take 1 tablet (500 mg) by mouth daily (with dinner) 90 tablet 1    metoprolol tartrate (LOPRESSOR) 50 MG tablet Take 1 tablet (50 mg) by mouth 2 times daily 180 tablet 3    metroNIDAZOLE (METROGEL) 1 % external gel Apply topically daily 60 g 1    Multiple Vitamin (MULTIVITAMIN PO) Take 1 tablet by mouth every morning      oxymetazoline (AFRIN) 0.05 % nasal spray Spray 2 sprays into both nostrils daily as needed for congestion      rosuvastatin (CRESTOR) 20 MG tablet Take 1 tablet (20 mg) by mouth daily 90 tablet 3    traZODone (DESYREL) 50 MG tablet Take 1-2 tablets ( mg) by mouth at bedtime  90 tablet 3    darbepoetin erich (ARANESP) 60 MCG/0.3ML injection 2024 0.3 ML darbepoetin ercih 0.2 MG/ML Prefilled Syringe  subcutaneously 300.0 mcg once  2024  active (Patient not taking: Reported on 2024)      DOXOrubicin liposome (DOXIL) 2 MG/ML injection Inject 70 mg into the vein (Patient not taking: Reported on 2024)      hydrocortisone sodium succinate PF (SOLU-CORTEF) injection Inject 100 mg into the vein (Patient not taking: Reported on 2024)      hydrocortisone sodium succinate PF (SOLU-CORTEF) injection Inject 100 mg into the vein (Patient not taking: Reported on 2024)      hydrocortisone sodium succinate PF (SOLU-CORTEF) injection Inject 100 mg into the vein (Patient not taking: Reported on 2024)       Social History:   Social History     Tobacco Use    Smoking status: Former     Current packs/day: 0.00     Average packs/day: 2.5 packs/day for 27.0 years (67.5 ttl pk-yrs)     Types: Cigarettes     Start date: 1966     Quit date: 1993     Years since quittin.4    Smokeless tobacco: Never   Substance Use Topics    Alcohol use: Not Currently     Comment: sober for 32 years       ROS:  Review of systems negative except as stated above.    OBJECTIVE:  BP (!) 149/69   Pulse 65   Temp 99.4  F (37.4  C) (Tympanic)   Resp 18   Wt 71.7 kg (158 lb)   SpO2 96%   BMI 28.44 kg/m      GENERAL APPEARANCE: healthy, alert and no distress  EYES: EOMI,  PERRL, conjunctiva clear  HENT: ear canals and TM's normal.  Nose and mouth without ulcers, erythema or lesions  NECK: supple, nontender, no lymphadenopathy  RESP: lungs clear to auscultation - no rales, rhonchi or wheezes  CV: regular rates and rhythm, normal S1 S2, no murmur noted  SKIN: no suspicious lesions or rashes    Labs:      Results for orders placed or performed in visit on 24 (from the past 24 hour(s))   Streptococcus A Rapid Screen w/Reflex to PCR    Specimen: Throat; Swab   Result Value Ref Range     Group A Strep antigen Negative Negative       Narrative & Impression   EXAM: XR CHEST 2 VIEWS  LOCATION: [facility]  DATE: 5/29/2024     INDICATION: dry cough. R O pneumonia or acute etiology.  COMPARISON: CT 5/1/2024                                                                      IMPRESSION: Stable size of cardiomediastinal silhouette with right chest port tip overlying the cavoatrial junction. Large hiatal hernia is again noted. No focal airspace consolidation, pleural effusion or pneumothorax. No acute bony abnormality.

## 2024-05-29 NOTE — TELEPHONE ENCOUNTER
Patient called back to advise her covid test was negative, she will proceed to  urgent care     Ana Laura Smith, Registered Nurse  [facility]

## 2024-05-29 NOTE — TELEPHONE ENCOUNTER
"Patient left message on this RN voicemail     \" I saw Therese Vanessa PA-C  a few weeks ago for a URI\"   \" Antibiotics worked however they caused diarrhea\"   \"I have the same symptoms and would like a medication prescribed and hoping I don't have to come in\" \"I would like an antibiotic that we can try to prevent diarrhea with\"   \"No fever\"   Symptoms onset Sunday 5/26/2024    Ana Laura Smith, Registered Nurse  [facility]     "

## 2024-05-29 NOTE — TELEPHONE ENCOUNTER
Therese Vanessa PA-C    Please review message, okay for an Evisit ?   RN can call and gather more information as well but wanted to know if you felt an Evisit is appropriate   Of if you prefer F2F with provider or Urgent care if no openings since patient recently had same URI symptoms     Thank you   Ana Laura Smith, Registered Nurse  [facility]

## 2024-05-30 LAB — GROUP A STREP BY PCR: NOT DETECTED

## 2024-06-07 ENCOUNTER — TRANSFERRED RECORDS (OUTPATIENT)
Dept: HEALTH INFORMATION MANAGEMENT | Facility: CLINIC | Age: 74
End: 2024-06-07
Payer: COMMERCIAL

## 2024-06-10 ENCOUNTER — PATIENT OUTREACH (OUTPATIENT)
Dept: FAMILY MEDICINE | Facility: CLINIC | Age: 74
End: 2024-06-10
Payer: COMMERCIAL

## 2024-06-10 NOTE — TELEPHONE ENCOUNTER
Please return call to patient to discuss appointment   Patient states she received a notice that appointment follow up is due around 6/14/2024    Thank you   Ana Laura Smith, Registered Nurse  [facility]

## 2024-08-12 ENCOUNTER — PATIENT OUTREACH (OUTPATIENT)
Dept: FAMILY MEDICINE | Facility: CLINIC | Age: 74
End: 2024-08-12
Payer: COMMERCIAL

## 2024-08-12 NOTE — TELEPHONE ENCOUNTER
Patient Quality Outreach    Patient is due for the following:   Diabetes -  A1C  Hypertension -  BP check      Topic Date Due    Diptheria Tetanus Pertussis (DTAP/TDAP/TD) Vaccine (2 - Td or Tdap) 09/19/2021    COVID-19 Vaccine (6 - 2023-24 season) 12/22/2023       Next Steps:   Schedule a nurse only visit for bp check  Patient was scheduled for Aug 21 @ 4:00PM    Type of outreach:    Phone, spoke to patient/parent. Pt answered got schedule      Questions for provider review:    None           Felicita Vázquez MA

## 2024-08-19 ENCOUNTER — NURSE TRIAGE (OUTPATIENT)
Dept: FAMILY MEDICINE | Facility: CLINIC | Age: 74
End: 2024-08-19
Payer: COMMERCIAL

## 2024-08-19 NOTE — TELEPHONE ENCOUNTER
"Patient RN  message     \"I have been having some feet and ankle swelling after a vacation with long car ride, more left than right, please call me to discuss\"     Ana Laura Smith, Registered Nurse  [facility]     "

## 2024-08-19 NOTE — TELEPHONE ENCOUNTER
RN called and spoke with patient     S-(situation):   -Bilateral ankle/foot swelling post road trip     B-(background):   -Started while on road trip to [place] approx a little over a week ago, swelling has improved since coming back home but still present   -Pt states she notices swelling more towards the end of the day, elevates legs and uses cold packs once daily and is effective- swelling resolves completely    -No hx of DVT and not anticoagulated     A-(assessment):   -Mild bilateral ankle/foot swelling, pt states left foot more than right, minimal swelling present today   Pt states her sandals leave indents on her feet, she is unsure how long it takes for them to go away   Denies pain in ankles/feet     -Localized tender area on mid left thigh- began noticing this Saturday 8/17, does not recall any incident where she may have bumped something on leg  Describes as size of nickel or dime, rates pain 4/10 when touched, pt states she notices most when going to bed and laying on that side   Denies: redness, bruising, swelling    -Denies: chest pain, SOB, fever, pain in either calf     R-(recommendations):   -Scheduled pt for OV with Paula Garcia PA-C 8/21/24 at 8:40am   -Educated on red flag symptoms, instructed pt to call back if any new or worsening symptoms [phone]  -Continue to monitor closely, elevate legs, and use cold packs throughout the day     Patient was given an opportunity to ask questions, verbalized understanding of plan, and is agreeable.     Reason for Disposition   Swelling of both ankles (i.e., pedal edema)   MODERATE swelling of both ankles (e.g., swelling extends up to the knees) AND new-onset or worsening    Additional Information   Negative: Chest pain   Negative: Followed an ankle injury   Negative: Followed a bee sting and has localized swelling (e.g., small area of puffy or swollen skin)   Negative: Followed an insect bite and has localized swelling (e.g., small area of puffy or  swollen skin)   Negative: Ankle pain is main symptom   Negative: Chest pain   Negative: Followed an insect bite and has localized swelling (e.g., small area of puffy or swollen skin)   Negative: Followed a knee injury   Negative: Ankle or foot injury   Negative: Pregnant with leg swelling or edema   Negative: Difficulty breathing at rest   Negative: Entire foot is cool or blue in comparison to other side   Negative: SEVERE swelling (e.g., swelling extends above knee, entire leg is swollen, weeping fluid)   Negative: Cast on leg or ankle and has increasing pain   Negative: Can't walk or can barely stand (new-onset)   Negative: Fever and red area (or area very tender to touch)   Negative: Patient sounds very sick or weak to the triager   Negative: Swelling of face, arm or hands  (Exception: Slight puffiness of fingers during hot weather.)   Negative: Pregnant 20 or more weeks and sudden weight gain (i.e., > 2 lbs, 1 kg in one week)    Protocols used: Ankle Swelling-A-OH, Leg Swelling and Edema-A-OH      Rachelle GARRETT RN  Patient Advocate Liaison - PAL RN  [facility]

## 2024-08-19 NOTE — TELEPHONE ENCOUNTER
RN huddled with POD CARMEN Sen CNP   -Ok to wait until Wednesday OV, call back if new/worsening symptoms   -Agree with RN recommendations (see SBAR below)    RN called and spoke with pt   -Relayed POD recommendation   -Reiterated care advice     Patient was given an opportunity to ask questions, verbalized understanding of plan, and is agreeable.     Rachelle GARRETT RN  Patient Advocate Liaison - PAL RN  [facility]

## 2024-08-21 ENCOUNTER — OFFICE VISIT (OUTPATIENT)
Dept: FAMILY MEDICINE | Facility: CLINIC | Age: 74
End: 2024-08-21
Payer: COMMERCIAL

## 2024-08-21 VITALS
DIASTOLIC BLOOD PRESSURE: 70 MMHG | OXYGEN SATURATION: 96 % | RESPIRATION RATE: 20 BRPM | SYSTOLIC BLOOD PRESSURE: 138 MMHG | TEMPERATURE: 97.9 F | WEIGHT: 166.2 LBS | HEART RATE: 55 BPM | BODY MASS INDEX: 29.45 KG/M2 | HEIGHT: 63 IN

## 2024-08-21 DIAGNOSIS — M79.89 SWELLING OF LOWER LEG: Primary | ICD-10-CM

## 2024-08-21 DIAGNOSIS — I10 ESSENTIAL HYPERTENSION: ICD-10-CM

## 2024-08-21 DIAGNOSIS — C56.1 MALIGNANT NEOPLASM OF RIGHT OVARY (H): ICD-10-CM

## 2024-08-21 DIAGNOSIS — E11.22 TYPE 2 DIABETES MELLITUS WITH CHRONIC KIDNEY DISEASE, WITHOUT LONG-TERM CURRENT USE OF INSULIN, UNSPECIFIED CKD STAGE (H): ICD-10-CM

## 2024-08-21 LAB — HBA1C MFR BLD: 6.2 % (ref 0–5.6)

## 2024-08-21 PROCEDURE — 83036 HEMOGLOBIN GLYCOSYLATED A1C: CPT | Performed by: PHYSICIAN ASSISTANT

## 2024-08-21 PROCEDURE — 99214 OFFICE O/P EST MOD 30 MIN: CPT | Performed by: PHYSICIAN ASSISTANT

## 2024-08-21 PROCEDURE — 36415 COLL VENOUS BLD VENIPUNCTURE: CPT | Performed by: PHYSICIAN ASSISTANT

## 2024-08-21 NOTE — PROGRESS NOTES
Assessment & Plan     Swelling of lower leg  While swelling has improved today and overall improved as well, patient does have current cancer diagnosis and recent travel. Swelling is worse on the left side per patient as well.  Imaging ordered as noted below. I think it is low likelihood of DVT but patient does have risk factors.  - US Lower Extremity Venous Duplex Bilateral; Future    Essential hypertension  Improved upon recheck.    Malignant neoplasm of right ovary (H)  Follows oncology. Currently in break from chemotherapy.    Type 2 diabetes mellitus with chronic kidney disease, without long-term current use of insulin, unspecified CKD stage (H)  A1c due and obtained today, ordered by PCP.  Patient reports she had discussed Ozepmic with PCP previously. She looked at an option for 99 dollars monthly for the Ozepmic (likely compounded). I also discussed compounding with [place]. This is outside insurance. We can determine this based on A1c results.  SLG-2 was considered but too costly for patient.  - HEMOGLOBIN A1C      Shravan Dumont is a 73 year old, presenting for the following health issues:  Edema (Bilateral ankle/foot swelling see (8/19/24 triage encounter.) )        8/21/2024     8:43 AM   Additional Questions   Roomed by Xiao Banuelos MA Student     History of Present Illness       Reason for visit:  Swollen ankle and foot and blood pressure check  Symptom onset:  1-2 weeks ago  Symptoms include:  Swollen ankle and foot  Symptom intensity:  Moderate  Symptom progression:  Improving  Had these symptoms before:  Yes  Has tried/received treatment for these symptoms:  No  What makes it worse:  No  What makes it better:  Ice and putting footbup    She eats 2-3 servings of fruits and vegetables daily.She consumes 0 sweetened beverage(s) daily.She exercises with enough effort to increase her heart rate 9 or less minutes per day.  She exercises with enough effort to increase her heart rate 3 or less days  "per week.   She is taking medications regularly.       Concern - EDEMA   Onset: IN [place] ABOUT TWO WEEKS AGO. SHE HAS BEEN HOME FOR A WEEK SO AROUND 10 DAYS AGO   Description: SWELLING AND TINGLING  Intensity: moderate  Progression of Symptoms:  worsening  Accompanying Signs & Symptoms: USUALLY LATER IN THE DAY TINGLING   Previous history of similar problem: WHEN SHE WENT TO [place] LAST YEAR  Precipitating factors:        Worsened by: TRAVELING  Alleviating factors:        Improved by: IF SHE ELEVATES HER FEET AND APPLIES COLD TO IT  Therapies tried and outcome: See above        Objective    /70   Pulse 55   Temp 97.9  F (36.6  C) (Temporal)   Resp 20   Ht 1.588 m (5' 2.5\")   Wt 75.4 kg (166 lb 3.2 oz)   SpO2 96%   BMI 29.91 kg/m    Body mass index is 29.91 kg/m .  Physical Exam   GENERAL: No acute distress  HEENT: Normocephalic  MSK: No obvious edema today on exam. Tender area without ecchymosis of erythema of left upper and lateral thigh.  NEURO: Alert and non-focal        Signed Electronically by: Paula Garcia PA-C    "

## 2024-08-23 ENCOUNTER — HOSPITAL ENCOUNTER (OUTPATIENT)
Dept: ULTRASOUND IMAGING | Facility: CLINIC | Age: 74
Discharge: HOME | End: 2024-08-23
Attending: PHYSICIAN ASSISTANT | Admitting: PHYSICIAN ASSISTANT
Payer: COMMERCIAL

## 2024-08-23 DIAGNOSIS — M79.89 SWELLING OF LOWER LEG: ICD-10-CM

## 2024-08-23 PROCEDURE — 93970 EXTREMITY STUDY: CPT

## 2024-08-23 NOTE — PATIENT INSTRUCTIONS
----- Message from Lukas Ohara MD sent at 8/23/2024 11:48 AM CDT -----  Lab work within normal range.   Dear Julia Andre    After reviewing your responses, I've been able to diagnose you with a urinary tract infection, which is a common infection of the bladder with bacteria.  This is not a sexually transmitted infection, though urinating immediately after intercourse can help prevent infections.  Drinking lots of fluids is also helpful to clear your current infection and prevent the next one.      I have sent a prescription for antibiotics to your pharmacy to treat this infection.    It is important that you take all of your prescribed medication even if your symptoms are improving after a few doses.  Taking all of your medicine helps prevent the symptoms from returning.     If your symptoms worsen, you develop pain in your back or stomach, develop fevers, or are not improving in 5 days, please contact your primary care provider for an appointment or visit any of our convenient Walk-in or Urgent Care Centers to be seen, which can be found on our website here.    Thanks again for choosing us as your health care partner,    Therese Vanessa PA-C

## 2024-08-28 ENCOUNTER — TRANSFERRED RECORDS (OUTPATIENT)
Dept: HEALTH INFORMATION MANAGEMENT | Facility: CLINIC | Age: 74
End: 2024-08-28
Payer: COMMERCIAL

## 2024-09-11 ENCOUNTER — HOSPITAL ENCOUNTER (OUTPATIENT)
Dept: ULTRASOUND IMAGING | Facility: CLINIC | Age: 74
Discharge: HOME | End: 2024-09-11
Attending: OBSTETRICS & GYNECOLOGY | Admitting: OBSTETRICS & GYNECOLOGY
Payer: COMMERCIAL

## 2024-09-11 DIAGNOSIS — C56.1 OVARIAN CANCER ON RIGHT (H): Primary | ICD-10-CM

## 2024-09-11 PROCEDURE — 88185 FLOWCYTOMETRY/TC ADD-ON: CPT | Performed by: OBSTETRICS & GYNECOLOGY

## 2024-09-11 PROCEDURE — 88342 IMHCHEM/IMCYTCHM 1ST ANTB: CPT | Mod: TC | Performed by: OBSTETRICS & GYNECOLOGY

## 2024-09-11 PROCEDURE — 38505 NEEDLE BIOPSY LYMPH NODES: CPT

## 2024-09-11 PROCEDURE — 272N000491 US BIOPSY CORE LYMPH NODE

## 2024-09-11 PROCEDURE — 250N000009 HC RX 250: Performed by: RADIOLOGY

## 2024-09-11 PROCEDURE — 88189 FLOWCYTOMETRY/READ 16 & >: CPT | Mod: GC | Performed by: STUDENT IN AN ORGANIZED HEALTH CARE EDUCATION/TRAINING PROGRAM

## 2024-09-11 RX ADMIN — LIDOCAINE HYDROCHLORIDE 10 ML: 10 INJECTION, SOLUTION EPIDURAL; INFILTRATION; INTRACAUDAL; PERINEURAL at 14:01

## 2024-09-11 NOTE — PROGRESS NOTES
Left cervical lymph node core biopsy completed per Dr. Foss without difficulty, patient tolerated well. Discharged to home ambulatory with spouse in stable condition.

## 2024-09-12 LAB
PATH REPORT.COMMENTS IMP SPEC: NORMAL
PATH REPORT.FINAL DX SPEC: NORMAL
PATH REPORT.MICROSCOPIC SPEC OTHER STN: NORMAL
PATH REPORT.RELEVANT HX SPEC: NORMAL

## 2024-09-13 LAB
PATH REPORT.COMMENTS IMP SPEC: ABNORMAL
PATH REPORT.COMMENTS IMP SPEC: YES
PATH REPORT.FINAL DX SPEC: ABNORMAL
PATH REPORT.GROSS SPEC: ABNORMAL
PATH REPORT.MICROSCOPIC SPEC OTHER STN: ABNORMAL
PATH REPORT.MICROSCOPIC SPEC OTHER STN: ABNORMAL
PHOTO IMAGE: ABNORMAL

## 2024-09-13 PROCEDURE — 88305 TISSUE EXAM BY PATHOLOGIST: CPT | Mod: 26 | Performed by: PATHOLOGY

## 2024-09-13 PROCEDURE — 88341 IMHCHEM/IMCYTCHM EA ADD ANTB: CPT | Mod: 26 | Performed by: PATHOLOGY

## 2024-09-13 PROCEDURE — 88342 IMHCHEM/IMCYTCHM 1ST ANTB: CPT | Mod: 26 | Performed by: PATHOLOGY

## 2024-09-16 ENCOUNTER — HOSPITAL ENCOUNTER (OUTPATIENT)
Dept: MAMMOGRAPHY | Facility: CLINIC | Age: 74
Discharge: HOME | End: 2024-09-16
Attending: PHYSICIAN ASSISTANT | Admitting: PHYSICIAN ASSISTANT
Payer: COMMERCIAL

## 2024-09-16 DIAGNOSIS — Z12.31 VISIT FOR SCREENING MAMMOGRAM: ICD-10-CM

## 2024-09-16 PROCEDURE — 77063 BREAST TOMOSYNTHESIS BI: CPT

## 2024-09-17 ENCOUNTER — MYC MEDICAL ADVICE (OUTPATIENT)
Dept: FAMILY MEDICINE | Facility: CLINIC | Age: 74
End: 2024-09-17
Payer: COMMERCIAL

## 2024-09-17 NOTE — TELEPHONE ENCOUNTER
See my chart - advised visit needed to prescribe requested medications     Ana Laura Smith, Registered Nurse  [facility]

## 2024-09-19 ENCOUNTER — OFFICE VISIT (OUTPATIENT)
Dept: FAMILY MEDICINE | Facility: CLINIC | Age: 74
End: 2024-09-19
Payer: COMMERCIAL

## 2024-09-19 VITALS
DIASTOLIC BLOOD PRESSURE: 80 MMHG | WEIGHT: 167 LBS | HEIGHT: 63 IN | BODY MASS INDEX: 29.59 KG/M2 | HEART RATE: 63 BPM | TEMPERATURE: 97.7 F | OXYGEN SATURATION: 96 % | RESPIRATION RATE: 14 BRPM | SYSTOLIC BLOOD PRESSURE: 130 MMHG

## 2024-09-19 DIAGNOSIS — N18.31 STAGE 3A CHRONIC KIDNEY DISEASE (H): ICD-10-CM

## 2024-09-19 DIAGNOSIS — I10 ESSENTIAL HYPERTENSION: ICD-10-CM

## 2024-09-19 DIAGNOSIS — C56.1 MALIGNANT NEOPLASM OF RIGHT OVARY (H): ICD-10-CM

## 2024-09-19 DIAGNOSIS — L65.8 DRUG-RELATED HAIR LOSS: ICD-10-CM

## 2024-09-19 DIAGNOSIS — I20.9 ANGINA PECTORIS WITH NORMAL CORONARY ARTERIOGRAM (H): ICD-10-CM

## 2024-09-19 DIAGNOSIS — E11.22 TYPE 2 DIABETES MELLITUS WITH CHRONIC KIDNEY DISEASE, WITHOUT LONG-TERM CURRENT USE OF INSULIN, UNSPECIFIED CKD STAGE (H): Primary | ICD-10-CM

## 2024-09-19 DIAGNOSIS — E66.09 CLASS 1 OBESITY DUE TO EXCESS CALORIES WITH SERIOUS COMORBIDITY AND BODY MASS INDEX (BMI) OF 30.0 TO 30.9 IN ADULT: ICD-10-CM

## 2024-09-19 DIAGNOSIS — E66.811 CLASS 1 OBESITY DUE TO EXCESS CALORIES WITH SERIOUS COMORBIDITY AND BODY MASS INDEX (BMI) OF 30.0 TO 30.9 IN ADULT: ICD-10-CM

## 2024-09-19 DIAGNOSIS — T50.905A DRUG-RELATED HAIR LOSS: ICD-10-CM

## 2024-09-19 PROCEDURE — 99214 OFFICE O/P EST MOD 30 MIN: CPT | Performed by: PHYSICIAN ASSISTANT

## 2024-09-19 PROCEDURE — G2211 COMPLEX E/M VISIT ADD ON: HCPCS | Performed by: PHYSICIAN ASSISTANT

## 2024-09-19 NOTE — PROGRESS NOTES
Assessment & Plan     Type 2 diabetes mellitus with chronic kidney disease, without long-term current use of insulin, unspecified CKD stage (H)  Has been on metformin but would benefit from weight loss. BMI 30 today.  Ozepmic sent today to start. Visit in 3 months.  - semaglutide (OZEMPIC) 2 MG/3ML pen; Inject 0.25 mg subcutaneously every 7 days.  - semaglutide (OZEMPIC) 2 MG/3ML pen; Inject 0.5 mg subcutaneously every 7 days for 4 doses.  - Semaglutide, 1 MG/DOSE, (OZEMPIC) 4 MG/3ML pen; Inject 1 mg subcutaneously every 7 days for 4 doses.    Stage 3a chronic kidney disease (H)  Following nephrology.    Essential hypertension  Well controlled. Continue current treatments.    Malignant neoplasm of right ovary (H)  Follows oncology. In a waiting period now in regards to options for chemo etc.  She is wanting a new hair prosthesis/wig. Order placed for her today  - Prosthetics Supplies Order    Drug-related hair loss  As noted above (due to chemo).  - Prosthetics Supplies Order    Class 1 obesity due to excess calories with serious comorbidity and body mass index (BMI) of 30.0 to 30.9 in adult   As noted above.    Angina pectoris with normal coronary arteriogram (H24)   Patient has history of elevated troponin with normal angiogram in 2009 ([facility] records reviewed).  She is on a baby aspirin due to this history and wondering if she can stop it.   She reports some episodes of chest pain (typically at night). Wondering about stress testing or options to better risk stratify.  She also reports having  PET scan that says something about calcifications. She will send me a photo of this. We could consider a visit with cardiology given her history or stress testing or CT calcium score.    The longitudinal plan of care for the diagnosis(es)/condition(s) as documented were addressed during this visit. Due to the added complexity in care, I will continue to support Julia in the subsequent management and with ongoing  "continuity of care.    Subjective   Julia is a 73 year old, presenting for the following health issues:  Medication Request (Pt interested in ozempic) and Hair/Scalp Problem (Pt wanting to discuss hair loss solutions)      9/19/2024    10:45 AM   Additional Questions   Roomed by dariela bolaños   Accompanied by self     Hair/Scalp Problem    History of Present Illness       Reason for visit:  Request prescription for ozenpic   She is taking medications regularly.       Patient has had hair loss due to chemo and having trouble with regrowth. She is using topical Rogaine without improvement        Objective    /80 (BP Location: Left arm, Patient Position: Sitting, Cuff Size: Adult Large)   Pulse 63   Temp 97.7  F (36.5  C) (Oral)   Resp 14   Ht 1.588 m (5' 2.5\")   Wt 75.8 kg (167 lb)   SpO2 96%   BMI 30.06 kg/m    Body mass index is 30.06 kg/m .  Physical Exam   GENERAL: No acute distress  HEENT: Normocephalic  NEURO: Alert and non-focal        Signed Electronically by: Paula Garcia PA-C    "

## 2024-09-20 NOTE — TELEPHONE ENCOUNTER
Paula Garcia PA-C- See pt's [place] message response.     Routed to Paula FISHER RN   Clinic RN  [facility]

## 2024-09-20 NOTE — TELEPHONE ENCOUNTER
Providers covering for Therese Vanessa PA-C:    See pt's Mychart message. Please review and advise that she has started on 0.5 mg of ozempic rather than the 0.25 mg dose.     Routed to Therese Vanessa PA-C in-basket.     Jojo FISHER RN   Clinic RN  [facility]

## 2024-09-24 ENCOUNTER — LAB REQUISITION (OUTPATIENT)
Dept: LAB | Facility: CLINIC | Age: 74
End: 2024-09-24
Payer: COMMERCIAL

## 2024-10-04 LAB
BKR LAB AP ADD'L TEST STATUS: NORMAL
BKR PATH ADDL TEST FINAL COMMENTS: NORMAL

## 2024-10-29 ENCOUNTER — OFFICE VISIT (OUTPATIENT)
Dept: ORTHOPEDICS | Facility: CLINIC | Age: 74
End: 2024-10-29
Payer: COMMERCIAL

## 2024-10-29 VITALS
BODY MASS INDEX: 29.59 KG/M2 | SYSTOLIC BLOOD PRESSURE: 125 MMHG | DIASTOLIC BLOOD PRESSURE: 71 MMHG | WEIGHT: 167 LBS | HEIGHT: 63 IN

## 2024-10-29 DIAGNOSIS — M19.011 ARTHRITIS OF RIGHT GLENOHUMERAL JOINT: Primary | ICD-10-CM

## 2024-10-29 DIAGNOSIS — M54.12 CERVICAL RADICULOPATHY: ICD-10-CM

## 2024-10-29 PROCEDURE — G2211 COMPLEX E/M VISIT ADD ON: HCPCS | Performed by: FAMILY MEDICINE

## 2024-10-29 PROCEDURE — 99214 OFFICE O/P EST MOD 30 MIN: CPT | Performed by: FAMILY MEDICINE

## 2024-10-29 RX ORDER — GABAPENTIN 300 MG/1
300 CAPSULE ORAL 2 TIMES DAILY
Qty: 60 CAPSULE | Refills: 1 | Status: SHIPPED | OUTPATIENT
Start: 2024-10-29

## 2024-10-29 NOTE — LETTER
10/29/2024      Julia Andre  [address] [address]      Dear Colleague,    Thank you for referring your patient, Julia Andre, to the [facility] [place]. Please see a copy of my visit note below.    ASSESSMENT & PLAN  Patient Instructions     1. Arthritis of right glenohumeral joint    2. Cervical radiculopathy      -Patient is following up for chronic right shoulder pain due to severe arthritis.  Patient is also complaining of pain rating down her arm at nighttime likely due to cervical nerve root impingement  -Patient was seen at [facility] by an orthopedic shoulder specialist who decided not to follow through with shoulder surgery due to her cancer diagnosis.  Patient is having an updated PET scan and consult with her oncologist to determine if she will need to restart treatment  -Patient will get an MRI of her cervical spine and right shoulder for further evaluation.  Patient is claustrophobic and is requesting an open MRI at [facility]  - Your MRI has been ordered. You may call [phone] to schedule over the phone.  Please call my office to schedule a  visit 2 days after your MRI is complete to discuss results and next step treatment options.  -To consider cervical epidural steroid injection if patient has cervical stenosis corresponding with her pain.  -Patient will be referred to Dr. Roman for a suprascapular nerve block  -Patient will start gabapentin 300 mg initially at nighttime and then also in the morning for her pain.  -Patient may continue with Tylenol and Voltaren gel.  Patient may purchase Extra strength Voltaren gel online  -Call direct clinic number [[phone]] at any time with questions or concerns.    Albert Yeo MD [facility] and [facility] Medicine  [facility]        -----    SUBJECTIVE:  Julia Andre is a 73 year old female who is seen in follow-up for right shoulder pain.They were last seen  "03/12/24.     Since their last visit reports worsening pain. They indicate that their current pain level is 3/10. They have tried rest/activity avoidance, Tylenol, ice, right glenohumeral joint and subacromial corticosteroid injection (most recent date: 03/12/24) that provided no relief, and Voltaren.      The patient is seen with their .    Patient's past medical, surgical, social, and family histories were reviewed today and no changes are noted.    REVIEW OF SYSTEMS:  Constitutional: NEGATIVE for fever, chills, change in weight  Skin: NEGATIVE for worrisome rashes, moles or lesions  GI/: NEGATIVE for bowel or bladder changes  Neuro: NEGATIVE for weakness, dizziness or paresthesias    OBJECTIVE:  /71   Ht 1.588 m (5' 2.5\")   Wt 75.8 kg (167 lb)   BMI 30.06 kg/m     General: healthy, alert and in no distress  HEENT: no scleral icterus or conjunctival erythema  Skin: no suspicious lesions or rash. No jaundice.  CV: regular rhythm by palpation, no pedal edema  Resp: normal respiratory effort without conversational dyspnea   Psych: normal mood and affect  Gait: normal steady gait with appropriate coordination and balance  Neuro: normal light touch sensory exam of the extremities.    MSK:  RIGHT SHOULDER  Inspection:    no swelling, bruising, discoloration, or obvious deformity or asymmetry  Palpation:    Tender about the anterior capsule and greater tuberosity. Remainder of bony and tendinous landmarks are nontender.  Active Range of Motion:     Abduction 600 /  /  / IR L4, significant limited by pain in all planes    Strength:    Scapular plane abduction painful, weakness / ER painful, weakness / IR painful, weakness / biceps 5/5 / triceps 5/5  Special tests: Supraspinatus, Neers, Torres, Cross arm test positive    CERVICAL SPINE  Inspection:    No redness, swelling, ecchymosis, overlying skin change, or gross deformity/asymmetry, normal cervical lordosis present  Palpation:  landmarks " and nontender.  Range of Motion:     Flexion within normal limits    Extension within normal limits    Right side bend limited by tightness    Left side bend limited by tightness  Strength:    Full strength throughout all neck muscles  Special Tests:    Positive: None    Negative: Spurling's (bilateral)    Independent visualization of the below image:        Albert Yeo MD, [facility]      Again, thank you for allowing me to participate in the care of your patient.        Sincerely,        Albert Yeo, MD

## 2024-10-29 NOTE — PATIENT INSTRUCTIONS
1. Arthritis of right glenohumeral joint    2. Cervical radiculopathy      -Patient is following up for chronic right shoulder pain due to severe arthritis.  Patient is also complaining of pain rating down her arm at nighttime likely due to cervical nerve root impingement  -Patient was seen at [facility] by an orthopedic shoulder specialist who decided not to follow through with shoulder surgery due to her cancer diagnosis.  Patient is having an updated PET scan and consult with her oncologist to determine if she will need to restart treatment  -Patient will get an MRI of her cervical spine and right shoulder for further evaluation.  Patient is claustrophobic and is requesting an open MRI at [facility]  - Your MRI has been ordered. You may call [phone] to schedule over the phone.  Please call my office to schedule a  visit 2 days after your MRI is complete to discuss results and next step treatment options.  -To consider cervical epidural steroid injection if patient has cervical stenosis corresponding with her pain.  -Patient will be referred to Dr. Roman for a suprascapular nerve block  -Patient will start gabapentin 300 mg initially at nighttime and then also in the morning for her pain.  -Patient may continue with Tylenol and Voltaren gel.  Patient may purchase Extra strength Voltaren gel online  -Call direct clinic number [[phone]] at any time with questions or concerns.    Albert Yeo MD [facility] Orthopedics [facility] Sports Medicine  [facility]

## 2024-10-29 NOTE — PROGRESS NOTES
ASSESSMENT & PLAN  Patient Instructions     1. Arthritis of right glenohumeral joint    2. Cervical radiculopathy      -Patient is following up for chronic right shoulder pain due to severe arthritis.  Patient is also complaining of pain rating down her arm at nighttime likely due to cervical nerve root impingement  -Patient was seen at [facility] by an orthopedic shoulder specialist who decided not to follow through with shoulder surgery due to her cancer diagnosis.  Patient is having an updated PET scan and consult with her oncologist to determine if she will need to restart treatment  -Patient will get an MRI of her cervical spine and right shoulder for further evaluation.  Patient is claustrophobic and is requesting an open MRI at [facility]  - Your MRI has been ordered. You may call [phone] to schedule over the phone.  Please call my office to schedule a  visit 2 days after your MRI is complete to discuss results and next step treatment options.  -To consider cervical epidural steroid injection if patient has cervical stenosis corresponding with her pain.  -Patient will be referred to Dr. Roman for a suprascapular nerve block  -Patient will start gabapentin 300 mg initially at nighttime and then also in the morning for her pain.  -Patient may continue with Tylenol and Voltaren gel.  Patient may purchase Extra strength Voltaren gel online  -Call direct clinic number [[phone]] at any time with questions or concerns.    Albert Yeo MD [facility] Orthopedics and Sports Medicine  [facility] [place]        -----    SUBJECTIVE:  Julia Andre is a 73 year old female who is seen in follow-up for right shoulder pain.They were last seen 03/12/24.     Since their last visit reports worsening pain. They indicate that their current pain level is 3/10. They have tried rest/activity avoidance, Tylenol, ice, right glenohumeral joint and subacromial corticosteroid injection (most recent date:  "03/12/24) that provided no relief, and Voltaren.      The patient is seen with their .    Patient's past medical, surgical, social, and family histories were reviewed today and no changes are noted.    REVIEW OF SYSTEMS:  Constitutional: NEGATIVE for fever, chills, change in weight  Skin: NEGATIVE for worrisome rashes, moles or lesions  GI/: NEGATIVE for bowel or bladder changes  Neuro: NEGATIVE for weakness, dizziness or paresthesias    OBJECTIVE:  /71   Ht 1.588 m (5' 2.5\")   Wt 75.8 kg (167 lb)   BMI 30.06 kg/m     General: healthy, alert and in no distress  HEENT: no scleral icterus or conjunctival erythema  Skin: no suspicious lesions or rash. No jaundice.  CV: regular rhythm by palpation, no pedal edema  Resp: normal respiratory effort without conversational dyspnea   Psych: normal mood and affect  Gait: normal steady gait with appropriate coordination and balance  Neuro: normal light touch sensory exam of the extremities.    MSK:  RIGHT SHOULDER  Inspection:    no swelling, bruising, discoloration, or obvious deformity or asymmetry  Palpation:    Tender about the anterior capsule and greater tuberosity. Remainder of bony and tendinous landmarks are nontender.  Active Range of Motion:     Abduction 600 /  /  / IR L4, significant limited by pain in all planes    Strength:    Scapular plane abduction painful, weakness / ER painful, weakness / IR painful, weakness / biceps 5/5 / triceps 5/5  Special tests: Supraspinatus, Neers, Torres, Cross arm test positive    CERVICAL SPINE  Inspection:    No redness, swelling, ecchymosis, overlying skin change, or gross deformity/asymmetry, normal cervical lordosis present  Palpation:  landmarks and nontender.  Range of Motion:     Flexion within normal limits    Extension within normal limits    Right side bend limited by tightness    Left side bend limited by tightness  Strength:    Full strength throughout all neck muscles  Special Tests:    " Positive: None    Negative: Spurling's (bilateral)    Independent visualization of the below image:        Albert Yeo MD, [facility]  [place] [facility]

## 2024-10-30 ENCOUNTER — PATIENT OUTREACH (OUTPATIENT)
Dept: CARE COORDINATION | Facility: CLINIC | Age: 74
End: 2024-10-30
Payer: COMMERCIAL

## 2024-10-31 DIAGNOSIS — E66.811 CLASS 1 OBESITY DUE TO EXCESS CALORIES WITH SERIOUS COMORBIDITY AND BODY MASS INDEX (BMI) OF 30.0 TO 30.9 IN ADULT: ICD-10-CM

## 2024-10-31 DIAGNOSIS — N18.31 STAGE 3A CHRONIC KIDNEY DISEASE (H): ICD-10-CM

## 2024-10-31 DIAGNOSIS — E11.22 TYPE 2 DIABETES MELLITUS WITH CHRONIC KIDNEY DISEASE, WITHOUT LONG-TERM CURRENT USE OF INSULIN, UNSPECIFIED CKD STAGE (H): ICD-10-CM

## 2024-10-31 DIAGNOSIS — E66.09 CLASS 1 OBESITY DUE TO EXCESS CALORIES WITH SERIOUS COMORBIDITY AND BODY MASS INDEX (BMI) OF 30.0 TO 30.9 IN ADULT: ICD-10-CM

## 2024-10-31 DIAGNOSIS — I10 ESSENTIAL HYPERTENSION: ICD-10-CM

## 2024-10-31 RX ORDER — SEMAGLUTIDE 0.68 MG/ML
INJECTION, SOLUTION SUBCUTANEOUS
Qty: 3 ML | Refills: 0 | OUTPATIENT
Start: 2024-10-31

## 2024-10-31 NOTE — TELEPHONE ENCOUNTER
Patient indicated they are still on the 0.5mg/dose Ozempic, but this prescription is not in their med list.  They have indicated they are currently out and usually inject on Fridays.    Thank you,  Munir [facility]omas [facility] Pharmacy Technician

## 2024-11-02 ENCOUNTER — TRANSFERRED RECORDS (OUTPATIENT)
Dept: HEALTH INFORMATION MANAGEMENT | Facility: CLINIC | Age: 74
End: 2024-11-02
Payer: COMMERCIAL

## 2024-11-04 ENCOUNTER — OFFICE VISIT (OUTPATIENT)
Dept: ORTHOPEDICS | Facility: CLINIC | Age: 74
End: 2024-11-04
Payer: COMMERCIAL

## 2024-11-04 VITALS
DIASTOLIC BLOOD PRESSURE: 82 MMHG | BODY MASS INDEX: 29.59 KG/M2 | WEIGHT: 167 LBS | HEIGHT: 63 IN | SYSTOLIC BLOOD PRESSURE: 154 MMHG

## 2024-11-04 DIAGNOSIS — M19.011 ARTHRITIS OF RIGHT GLENOHUMERAL JOINT: Primary | ICD-10-CM

## 2024-11-04 PROCEDURE — 64418 NJX AA&/STRD SPRSCAP NRV: CPT | Mod: RT | Performed by: STUDENT IN AN ORGANIZED HEALTH CARE EDUCATION/TRAINING PROGRAM

## 2024-11-04 PROCEDURE — 76942 ECHO GUIDE FOR BIOPSY: CPT | Performed by: STUDENT IN AN ORGANIZED HEALTH CARE EDUCATION/TRAINING PROGRAM

## 2024-11-04 RX ORDER — LIDOCAINE HYDROCHLORIDE 10 MG/ML
4 INJECTION, SOLUTION INFILTRATION; PERINEURAL
Status: COMPLETED | OUTPATIENT
Start: 2024-11-04 | End: 2024-11-04

## 2024-11-04 RX ORDER — BETAMETHASONE SODIUM PHOSPHATE AND BETAMETHASONE ACETATE 3; 3 MG/ML; MG/ML
6 INJECTION, SUSPENSION INTRA-ARTICULAR; INTRALESIONAL; INTRAMUSCULAR; SOFT TISSUE
Status: COMPLETED | OUTPATIENT
Start: 2024-11-04 | End: 2024-11-04

## 2024-11-04 RX ADMIN — LIDOCAINE HYDROCHLORIDE 4 ML: 10 INJECTION, SOLUTION INFILTRATION; PERINEURAL at 08:34

## 2024-11-04 RX ADMIN — BETAMETHASONE SODIUM PHOSPHATE AND BETAMETHASONE ACETATE 6 MG: 3; 3 INJECTION, SUSPENSION INTRA-ARTICULAR; INTRALESIONAL; INTRAMUSCULAR; SOFT TISSUE at 08:34

## 2024-11-04 NOTE — PROGRESS NOTES
Sports Medicine Procedure Note    Julia was seen today for pain.    Diagnoses and all orders for this visit:    Arthritis of right glenohumeral joint  -     Orthopedic  Referral  -     Right Suprascapular Nerve Block        Julia Andre is a/an 73 year old female who is seen for Corticosteroid injection of R suprascapular nerve.   Last injection: 3/12/2024 to GH joint and subacromial bursa     -Suprascapular nerve block performed today under ultrasound guidance, see procedure note below for details  -Post-injection instructions were provided to the patient    Follow-up with Dr. Yeo as directed      Post-Injection Discharge Instructions    You may shower, however avoid swimming, tub baths or hot tubs for 24 hours following your procedure  You may have a mild to moderate increase in pain for a few days following the injection.  The lidocaine (local numbing medicine) will wear off in several hours. It usually takes 3-5 days for the steroid medication to start working although it may take up to 14 days for full effect.   You may use ice packs for 10-15 minutes, 3 to 4 times a day at the injection site for comfort if needed  You may use extra strength Tylenol for pain control if necessary   If you were fasting, you may resume your normal diet and medications after the procedure  If you have diabetes, your blood sugar may be higher than normal for 10-14 days following a steroid injection. Contact your doctor who manages your diabetes if your blood sugar is significantly higher than usual    If you experience any of the following, call Sports Medicine @  [phone]  -Fever over 100 degrees F  -Swelling, bleeding, redness, drainage, warmth at the injection site  -New or significant worsening pain        Dr. ADÁN Roman DO CAQ  [facility]  Sports Medicine     -----    Right Suprascapular Nerve Block    Date/Time: 11/4/2024 8:34 AM    Performed by: Anil Roman DO  Authorized by:  Anil Roman DO    Indications:  Pain  Needle Size:  25 G  Guidance: ultrasound    Approach:  Posterior  Location:  Shoulder   Location comment:  Right suprascapular nerve      Medications:  4 mL lidocaine 1 %; 6 mg betamethasone acet & sod phos 6 (3-3) MG/ML  Medications comment:  1ml of 8.4% Sodium Bicarbonate solution was used to buffer the local numbing agent for today's injection    Outcome:  Tolerated well, no immediate complications  Procedure discussed: discussed risks, benefits, and alternatives    Consent Given by:  Patient  Timeout: timeout called immediately prior to procedure    Prep: patient was prepped and draped in usual sterile fashion     Ultrasound was used to ensure safe and accurate needle placement and injection. Ultrasound images of the procedure were permanently stored.

## 2024-11-04 NOTE — LETTER
11/4/2024      Julia Andre  [id number] [address] [facility] [address]      Dear Colleague,    Thank you for referring your patient, Julia Andre, to the [facility] [place]. Please see a copy of my visit note below.    Sports Medicine Procedure Note    Julia was seen today for pain.    Diagnoses and all orders for this visit:    Arthritis of right glenohumeral joint  -     Orthopedic  Referral  -     Right Suprascapular Nerve Block        Julia Andre is a/an 73 year old female who is seen for Corticosteroid injection of R suprascapular nerve.   Last injection: 3/12/2024 to GH joint and subacromial bursa     -Suprascapular nerve block performed today under ultrasound guidance, see procedure note below for details  -Post-injection instructions were provided to the patient    Follow-up with Dr. Yeo as directed      Post-Injection Discharge Instructions    You may shower, however avoid swimming, tub baths or hot tubs for 24 hours following your procedure  You may have a mild to moderate increase in pain for a few days following the injection.  The lidocaine (local numbing medicine) will wear off in several hours. It usually takes 3-5 days for the steroid medication to start working although it may take up to 14 days for full effect.   You may use ice packs for 10-15 minutes, 3 to 4 times a day at the injection site for comfort if needed  You may use extra strength Tylenol for pain control if necessary   If you were fasting, you may resume your normal diet and medications after the procedure  If you have diabetes, your blood sugar may be higher than normal for 10-14 days following a steroid injection. Contact your doctor who manages your diabetes if your blood sugar is significantly higher than usual    If you experience any of the following, call Sports Medicine @  [phone]  -Fever over 100 degrees F  -Swelling, bleeding, redness, drainage, warmth at the  injection site  -New or significant worsening pain        Dr. ADÁN Roman DO CAQ  [facility]  Sports Medicine     -----    Right Suprascapular Nerve Block    Date/Time: 11/4/2024 8:34 AM    Performed by: Anil Roman DO  Authorized by: Anil Roman DO    Indications:  Pain  Needle Size:  25 G  Guidance: ultrasound    Approach:  Posterior  Location:  Shoulder   Location comment:  Right suprascapular nerve      Medications:  4 mL lidocaine 1 %; 6 mg betamethasone acet & sod phos 6 (3-3) MG/ML  Medications comment:  1ml of 8.4% Sodium Bicarbonate solution was used to buffer the local numbing agent for today's injection    Outcome:  Tolerated well, no immediate complications  Procedure discussed: discussed risks, benefits, and alternatives    Consent Given by:  Patient  Timeout: timeout called immediately prior to procedure    Prep: patient was prepped and draped in usual sterile fashion     Ultrasound was used to ensure safe and accurate needle placement and injection. Ultrasound images of the procedure were permanently stored.          Again, thank you for allowing me to participate in the care of your patient.        Sincerely,        Anil Roman DO

## 2024-11-05 ENCOUNTER — TELEPHONE (OUTPATIENT)
Dept: ORTHOPEDICS | Facility: CLINIC | Age: 74
End: 2024-11-05
Payer: COMMERCIAL

## 2024-11-05 NOTE — TELEPHONE ENCOUNTER
Right shoulder MRI results received via fax from [facility].     Document sent to scan and copy and placed in provider basket for upcoming appointment on 11/12/24.     Crsitine Thibodeaux ATC

## 2024-11-12 ENCOUNTER — OFFICE VISIT (OUTPATIENT)
Dept: ORTHOPEDICS | Facility: CLINIC | Age: 74
End: 2024-11-12
Attending: FAMILY MEDICINE
Payer: COMMERCIAL

## 2024-11-12 VITALS
WEIGHT: 167 LBS | DIASTOLIC BLOOD PRESSURE: 76 MMHG | HEIGHT: 63 IN | BODY MASS INDEX: 29.59 KG/M2 | SYSTOLIC BLOOD PRESSURE: 124 MMHG

## 2024-11-12 DIAGNOSIS — M48.02 FORAMINAL STENOSIS OF CERVICAL REGION: Primary | ICD-10-CM

## 2024-11-12 DIAGNOSIS — M54.12 CERVICAL RADICULOPATHY: ICD-10-CM

## 2024-11-12 DIAGNOSIS — M47.22 CERVICAL SPONDYLOSIS WITH RADICULOPATHY: ICD-10-CM

## 2024-11-12 DIAGNOSIS — M19.011 ARTHRITIS OF RIGHT GLENOHUMERAL JOINT: ICD-10-CM

## 2024-11-12 PROCEDURE — G2211 COMPLEX E/M VISIT ADD ON: HCPCS | Performed by: FAMILY MEDICINE

## 2024-11-12 PROCEDURE — 99214 OFFICE O/P EST MOD 30 MIN: CPT | Performed by: FAMILY MEDICINE

## 2024-11-12 NOTE — PROGRESS NOTES
ASSESSMENT & PLAN  Patient Instructions     1. Foraminal stenosis of cervical region    2. Cervical radiculopathy    3. Cervical spondylosis with radiculopathy    4. Arthritis of right glenohumeral joint      -Patient is following up for right shoulder pain and due to arthritis as well as radiation from the cervical spine and due to multilevel arthritis and degenerative disc disease causing severe stenosis and nerve root impingement  -MRI results of the right shoulder were reviewed today which shows advanced arthritis, mild rotator cuff tendinosis.    -MRI results of the cervical spine shows multilevel arthritis, degenerative disease causing moderate to severe foraminal stenosis, greatest at C5-C6 which has severe bilateral foraminal stenosis.  Patient has moderate right and severe left foraminal stenosis at C4-C5.  -Patient had a right suprascapular nerve block with minimal relief.  -Patient would prefer to pain management for further evaluation and treatment including epidural steroid injections  -Patient will also be having her follow-up PET scan next week to assess her cancer and determine next round of treatment.  -Patient will increase gabapentin 300 mg at bedtime to 600 mg at bedtime.  Patient may also add 300 mg in the morning if daytime pain is persistent.  Patient may eventually progress to 600 mg in the morning as tolerated.  -Patient will continue follow-up with me for further treatment recommendations  -Call direct clinic number [[phone]] at any time with questions or concerns.    Albert Yeo MD [facility] Orthopedics and Sports Medicine  [facility]        -----    SUBJECTIVE:  Julia Andre is a 73 year old female who is seen in follow-up for right shoulder and neck pain.They were last seen 11/04/24 by Dr. Roman for a right suprascapular nerve block.     Since their last visit reports worsening pain. They indicate that their current pain level is 3/10. They have tried  "rest/activity avoidance, ice, Tylenol, suprascapular nerve block injection (most recent date: 11/04/24) that provided no relief, and Voltaren.      The patient is seen with their .    Patient's past medical, surgical, social, and family histories were reviewed today and no changes are noted.    REVIEW OF SYSTEMS:  Constitutional: NEGATIVE for fever, chills, change in weight  Skin: NEGATIVE for worrisome rashes, moles or lesions  GI/: NEGATIVE for bowel or bladder changes  Neuro: NEGATIVE for weakness, dizziness or paresthesias    OBJECTIVE:  /76   Ht 1.588 m (5' 2.5\")   Wt 75.8 kg (167 lb)   BMI 30.06 kg/m     General: healthy, alert and in no distress  HEENT: no scleral icterus or conjunctival erythema  Skin: no suspicious lesions or rash. No jaundice.  CV: regular rhythm by palpation, no pedal edema  Resp: normal respiratory effort without conversational dyspnea   Psych: normal mood and affect  Gait: normal steady gait with appropriate coordination and balance  Neuro: normal light touch sensory exam of the extremities.    MSK:  RIGHT SHOULDER  Inspection:    no swelling, bruising, discoloration, or obvious deformity or asymmetry  Palpation:    Tender about the anterior capsule and greater tuberosity. Remainder of bony and tendinous landmarks are nontender.  Active Range of Motion:     Abduction 600 /  /  / IR L4, significant limited by pain in all planes    Strength:    Scapular plane abduction painful, weakness / ER painful, weakness / IR painful, weakness / biceps 5/5 / triceps 5/5  Special tests: Supraspinatus, Neers, Torres, Cross arm test positive     CERVICAL SPINE  Inspection:    No redness, swelling, ecchymosis, overlying skin change, or gross deformity/asymmetry, normal cervical lordosis present  Palpation:  landmarks and nontender.  Range of Motion:     Flexion within normal limits    Extension within normal limits    Right side bend limited by tightness    Left side bend " limited by tightness  Strength:    Full strength throughout all neck muscles  Special Tests:    Positive: None    Negative: Spurling's (bilateral)    Independent visualization of the below image:  EXAM: MRI of the RIGHT SHOULDER, without contrast    CLINICAL INFORMATION: Female, 73 years old, with right shoulder pain radiating down to the right hand. Symptoms for 3 years.    INDICATION: Glenohumeral joint osteoarthritis    PRIOR SURGERY: None reported.    PLAIN FILMS: None available.    COMPARISONS: No prior MRIs available.    TECHNICAL INFORMATION: Using a 1.2T MR scanner and a localizing surface coil:    Coronals: PD, T2FS    Sagittals: PDFS, T2    Axials: PD, PDFS    SEDATION: None    CONTRAST: None    FINDINGS:    Bones:    Proximal humerus: No fracture.    Glenoid: No fracture. Degenerative edema is present anteriorly centrally. No osseous Bankart lesion.    Rotator cuff and muscles/tendons:    Supraspinatus: Attenuation of the supraspinatus distal poorly defined low-grade undersurface tearing and fraying measuring approximately 1.6 cm in AP dimension. No full-thickness tendon tearing or retraction. Moderate muscle volume loss.    Infraspinatus: No tendinopathy or tear. Moderate muscle volume loss.    Teres minor: No tendinopathy, tear or atrophy.    Subscapularis: Mild subscapularis tendinosis with small region of interstitial tearing of the superior distal tendon (axial series 104 image 16).    Deltoid: No strain or atrophy.    Coracoacromial arch:    Acromion morphology: The acromion has type II morphology. No discrete subacromial osseous spur or os acromiale.    Acromiohumeral space: The acromiohumeral space measures 5 mm.    Coracohumeral space: The coracohumeral space is within normal limits.    Acromioclavicular joint:    Joint: Mild AC joint arthrosis with inferior osteophytosis which contacts the underlying supraspinatus myotendinous junction.    Ligaments: Coracoclavicular ligaments are  intact.    Bursae:    Subacromial-subdeltoid: No convincing subacromial bursal thickening/bursitis.    Subcoracoid: No convincing subcoracoid bursal thickening/bursitis.    Biceps tendon: Attenuated appearance of the proximal long head biceps tendon within the bicipital groove with longitudinal tearing. The intra-articular segment is poorly identified. There is focal fluid within the extra-articular long head biceps tendon sheath.    Glenohumeral joint:    Effusion/cyst: Moderate sized glenohumeral joint effusion with synovitis.    Articular cartilage: Broad-based full-thickness chondral loss of the humeral head and glenoid with associated osseous marrow reactive edema. Prominent appearance of inferomedial humeral head osteophytosis.    Loose bodies: Low signal body in the subscapularis recess measures 1.6 x 1.5 x 1.2 cm.    Labrum: Degeneration, fraying, and tearing is seen throughout the glenoid labrum. No paralabral ganglion cyst is identified.    Inferior glenohumeral ligament/axillary pouch: Intact. The axillary pouch is normal in thickness and signal. No evidence of adhesive capsulitis or capsular injury.    IMPRESSION:    1. Advanced glenohumeral joint osteoarthritis, please see description above. A low signal intensity body in the subscapularis recess measures 1.6 x 1.5 x 1.2 cm. Circumferential degeneration and fraying/tearing of the glenoid labrum.    2. Mild subscapularis tendinosis with small region of intermediate grade interstitial tearing of the superior distal tendon.    3. Attenuation of the supraspinatus distal tendon with poorly defined low-grade undersurface fraying/tearing, without full-thickness retracted tendon tearing. Moderate muscle volume loss.    4. Infraspinatus distal tendon appears intact with moderate muscle volume loss.    5. Poorly visualized appearance of the intra-articular long head biceps tendon may reflect high-grade tearing versus proximal rupture.    6. Mild AC joint  arthrosis with inferior osteophytosis. Mild narrowing of the acromiohumeral space.    KME    EXAM: MR CERVICAL SPINE WITHOUT CONTRAST Open 1.2T    CLINICAL INFORMATION: Right shoulder and arm pain with tingling and numbness. History of ovarian cancer. No acute injury.    TECHNICAL INFORMATION: T1, T2 FSE and STIR sagittal sections, T2 FSE sagittal oblique sections and T2 GRE/FSE axial sections at selected levels.    COMPARISON IMAGES: No comparisons.    INTERPRETATION: Neurologic structures: Normal signal intensity within the cervical and upper thoracic cord. No Chiari malformation or syrinx, no intrinsic cord lesion and no intradural mass. Normal vertebral artery flow voids.    Alignment: Normal segmentation with lordotic alignment of the cervical vertebrae, a mild cervical curve to the right and an upper thoracic curve to the left.    Upper thoracic spine: Moderate to advanced T3-4, T2-3 and T1-2 disc degeneration with mild to moderate facet arthropathy at multiple levels and no stenosis or impingement.    C7-T1: Intervertebral disc unremarkable with minimal spondylolisthesis, mild to moderate bilateral facet arthropathy and no stenosis or impingement.    C6-7: Moderate to advanced disc degeneration with moderate narrowing of the central canal and mild dorsal cord compression. Moderate left foraminal stenosis with uncovertebral arthrosis. Facet joints normal.    C5-6: Advanced disc degeneration with moderate narrowing of the central canal and mild ventral cord compression. Severe bilateral foraminal stenosis with uncovertebral arthrosis. Facet joints normal.    C4-5: Moderate to advanced disc degeneration with dorsal bulging and mild left ventral cord flattening. Moderate right and severe left foraminal stenosis with uncovertebral arthrosis and moderate left facet arthropathy.    C3-4: Advanced disc degeneration with dorsal bulging and mild ventral cord flattening. Severe foraminal stenosis and moderate facet  arthropathy on the left.    C2-3: Intervertebral disc unremarkable with moderate right facet arthropathy and no stenosis or impingement.    Craniocervical junction: No degenerative or erosive changes within the atlantoaxial or cervico-occipital joints.    Osseous structures and paraspinous soft tissues: Sagittal STIR images show moderate discogenic marrow edema associated with endplate irregularities at C3-4. No fracture or avulsion. No osteolytic or destructive bone lesion. Incidental note is made of a partially empty pituitary sella versus an 11-12 mm pituitary cyst.    CONCLUSION:    1. Advanced disc degeneration at each level from C4-5 through C3-4 with endplate irregularities and moderate discogenic signal changes at C3-4. Question discogenic pain.    2. Central canal stenosis at multiple levels, moderate at C6-7 and C5-6 with mild cord compression at each level.    3. Foraminal stenosis on the right, severe at C5-6 and moderate at C4-5.    4. Severe foraminal stenosis on the left at C5-6, C4-5 and C3-4    Incidental partially empty pituitary sella versus a pituitary cyst measuring up to 12 mm in diameter. Clinical correlation recommended.        Albert Yeo MD, [facility]

## 2024-11-12 NOTE — PATIENT INSTRUCTIONS
1. Foraminal stenosis of cervical region    2. Cervical radiculopathy    3. Cervical spondylosis with radiculopathy    4. Arthritis of right glenohumeral joint      -Patient is following up for right shoulder pain and due to arthritis as well as radiation from the cervical spine and due to multilevel arthritis and degenerative disc disease causing severe stenosis and nerve root impingement  -MRI results of the right shoulder were reviewed today which shows advanced arthritis, mild rotator cuff tendinosis.    -MRI results of the cervical spine shows multilevel arthritis, degenerative disease causing moderate to severe foraminal stenosis, greatest at C5-C6 which has severe bilateral foraminal stenosis.  Patient has moderate right and severe left foraminal stenosis at C4-C5.  -Patient had a right suprascapular nerve block with minimal relief.  -Patient would prefer to pain management for further evaluation and treatment including epidural steroid injections  -Patient will also be having her follow-up PET scan next week to assess her cancer and determine next round of treatment.  -Patient will increase gabapentin 300 mg at bedtime to 600 mg at bedtime.  Patient may also add 300 mg in the morning if daytime pain is persistent.  Patient may eventually progress to 600 mg in the morning as tolerated.  -Patient will continue follow-up with me for further treatment recommendations  -Call direct clinic number [[phone]] at any time with questions or concerns.    Albert Yeo MD [facility] Orthopedics and Sports Medicine  [facility] [place]

## 2024-11-12 NOTE — LETTER
11/12/2024      Julia Andre  [id number] [facility] [id number]      Dear Colleague,    Thank you for referring your patient, Julia Andre, to the [facility] [place]. Please see a copy of my visit note below.    ASSESSMENT & PLAN  Patient Instructions     1. Foraminal stenosis of cervical region    2. Cervical radiculopathy    3. Cervical spondylosis with radiculopathy    4. Arthritis of right glenohumeral joint      -Patient is following up for right shoulder pain and due to arthritis as well as radiation from the cervical spine and due to multilevel arthritis and degenerative disc disease causing severe stenosis and nerve root impingement  -MRI results of the right shoulder were reviewed today which shows advanced arthritis, mild rotator cuff tendinosis.    -MRI results of the cervical spine shows multilevel arthritis, degenerative disease causing moderate to severe foraminal stenosis, greatest at C5-C6 which has severe bilateral foraminal stenosis.  Patient has moderate right and severe left foraminal stenosis at C4-C5.  -Patient had a right suprascapular nerve block with minimal relief.  -Patient would prefer to pain management for further evaluation and treatment including epidural steroid injections  -Patient will also be having her follow-up PET scan next week to assess her cancer and determine next round of treatment.  -Patient will increase gabapentin 300 mg at bedtime to 600 mg at bedtime.  Patient may also add 300 mg in the morning if daytime pain is persistent.  Patient may eventually progress to 600 mg in the morning as tolerated.  -Patient will continue follow-up with me for further treatment recommendations  -Call direct clinic number [[phone]] at any time with questions or concerns.    Albert Yeo MD [facility]  [facility]        -----    SUBJECTIVE:  Julia Andre is a 73 year old female  "who is seen in follow-up for right shoulder and neck pain.They were last seen 11/04/24 by Dr. Roman for a right suprascapular nerve block.     Since their last visit reports worsening pain. They indicate that their current pain level is 3/10. They have tried rest/activity avoidance, ice, Tylenol, suprascapular nerve block injection (most recent date: 11/04/24) that provided no relief, and Voltaren.      The patient is seen with their .    Patient's past medical, surgical, social, and family histories were reviewed today and no changes are noted.    REVIEW OF SYSTEMS:  Constitutional: NEGATIVE for fever, chills, change in weight  Skin: NEGATIVE for worrisome rashes, moles or lesions  GI/: NEGATIVE for bowel or bladder changes  Neuro: NEGATIVE for weakness, dizziness or paresthesias    OBJECTIVE:  /76   Ht 1.588 m (5' 2.5\")   Wt 75.8 kg (167 lb)   BMI 30.06 kg/m     General: healthy, alert and in no distress  HEENT: no scleral icterus or conjunctival erythema  Skin: no suspicious lesions or rash. No jaundice.  CV: regular rhythm by palpation, no pedal edema  Resp: normal respiratory effort without conversational dyspnea   Psych: normal mood and affect  Gait: normal steady gait with appropriate coordination and balance  Neuro: normal light touch sensory exam of the extremities.    MSK:  RIGHT SHOULDER  Inspection:    no swelling, bruising, discoloration, or obvious deformity or asymmetry  Palpation:    Tender about the anterior capsule and greater tuberosity. Remainder of bony and tendinous landmarks are nontender.  Active Range of Motion:     Abduction 600 /  /  / IR L4, significant limited by pain in all planes    Strength:    Scapular plane abduction painful, weakness / ER painful, weakness / IR painful, weakness / biceps 5/5 / triceps 5/5  Special tests: Supraspinatus, Neers, Torres, Cross arm test positive     CERVICAL SPINE  Inspection:    No redness, swelling, ecchymosis, " overlying skin change, or gross deformity/asymmetry, normal cervical lordosis present  Palpation:  landmarks and nontender.  Range of Motion:     Flexion within normal limits    Extension within normal limits    Right side bend limited by tightness    Left side bend limited by tightness  Strength:    Full strength throughout all neck muscles  Special Tests:    Positive: None    Negative: Spurling's (bilateral)    Independent visualization of the below image:  EXAM: MRI of the RIGHT SHOULDER, without contrast    CLINICAL INFORMATION: Female, 73 years old, with right shoulder pain radiating down to the right hand. Symptoms for 3 years.    INDICATION: Glenohumeral joint osteoarthritis    PRIOR SURGERY: None reported.    PLAIN FILMS: None available.    COMPARISONS: No prior MRIs available.    TECHNICAL INFORMATION: Using a 1.2T MR scanner and a localizing surface coil:    Coronals: PD, T2FS    Sagittals: PDFS, T2    Axials: PD, PDFS    SEDATION: None    CONTRAST: None    FINDINGS:    Bones:    Proximal humerus: No fracture.    Glenoid: No fracture. Degenerative edema is present anteriorly centrally. No osseous Bankart lesion.    Rotator cuff and muscles/tendons:    Supraspinatus: Attenuation of the supraspinatus distal poorly defined low-grade undersurface tearing and fraying measuring approximately 1.6 cm in AP dimension. No full-thickness tendon tearing or retraction. Moderate muscle volume loss.    Infraspinatus: No tendinopathy or tear. Moderate muscle volume loss.    Teres minor: No tendinopathy, tear or atrophy.    Subscapularis: Mild subscapularis tendinosis with small region of interstitial tearing of the superior distal tendon (axial series 104 image 16).    Deltoid: No strain or atrophy.    Coracoacromial arch:    Acromion morphology: The acromion has type II morphology. No discrete subacromial osseous spur or os acromiale.    Acromiohumeral space: The acromiohumeral space measures 5 mm.    Coracohumeral  space: The coracohumeral space is within normal limits.    Acromioclavicular joint:    Joint: Mild AC joint arthrosis with inferior osteophytosis which contacts the underlying supraspinatus myotendinous junction.    Ligaments: Coracoclavicular ligaments are intact.    Bursae:    Subacromial-subdeltoid: No convincing subacromial bursal thickening/bursitis.    Subcoracoid: No convincing subcoracoid bursal thickening/bursitis.    Biceps tendon: Attenuated appearance of the proximal long head biceps tendon within the bicipital groove with longitudinal tearing. The intra-articular segment is poorly identified. There is focal fluid within the extra-articular long head biceps tendon sheath.    Glenohumeral joint:    Effusion/cyst: Moderate sized glenohumeral joint effusion with synovitis.    Articular cartilage: Broad-based full-thickness chondral loss of the humeral head and glenoid with associated osseous marrow reactive edema. Prominent appearance of inferomedial humeral head osteophytosis.    Loose bodies: Low signal body in the subscapularis recess measures 1.6 x 1.5 x 1.2 cm.    Labrum: Degeneration, fraying, and tearing is seen throughout the glenoid labrum. No paralabral ganglion cyst is identified.    Inferior glenohumeral ligament/axillary pouch: Intact. The axillary pouch is normal in thickness and signal. No evidence of adhesive capsulitis or capsular injury.    IMPRESSION:    1. Advanced glenohumeral joint osteoarthritis, please see description above. A low signal intensity body in the subscapularis recess measures 1.6 x 1.5 x 1.2 cm. Circumferential degeneration and fraying/tearing of the glenoid labrum.    2. Mild subscapularis tendinosis with small region of intermediate grade interstitial tearing of the superior distal tendon.    3. Attenuation of the supraspinatus distal tendon with poorly defined low-grade undersurface fraying/tearing, without full-thickness retracted tendon tearing. Moderate muscle  volume loss.    4. Infraspinatus distal tendon appears intact with moderate muscle volume loss.    5. Poorly visualized appearance of the intra-articular long head biceps tendon may reflect high-grade tearing versus proximal rupture.    6. Mild AC joint arthrosis with inferior osteophytosis. Mild narrowing of the acromiohumeral space.    KME    EXAM: MR CERVICAL SPINE WITHOUT CONTRAST Open 1.2T    CLINICAL INFORMATION: Right shoulder and arm pain with tingling and numbness. History of ovarian cancer. No acute injury.    TECHNICAL INFORMATION: T1, T2 FSE and STIR sagittal sections, T2 FSE sagittal oblique sections and T2 GRE/FSE axial sections at selected levels.    COMPARISON IMAGES: No comparisons.    INTERPRETATION: Neurologic structures: Normal signal intensity within the cervical and upper thoracic cord. No Chiari malformation or syrinx, no intrinsic cord lesion and no intradural mass. Normal vertebral artery flow voids.    Alignment: Normal segmentation with lordotic alignment of the cervical vertebrae, a mild cervical curve to the right and an upper thoracic curve to the left.    Upper thoracic spine: Moderate to advanced T3-4, T2-3 and T1-2 disc degeneration with mild to moderate facet arthropathy at multiple levels and no stenosis or impingement.    C7-T1: Intervertebral disc unremarkable with minimal spondylolisthesis, mild to moderate bilateral facet arthropathy and no stenosis or impingement.    C6-7: Moderate to advanced disc degeneration with moderate narrowing of the central canal and mild dorsal cord compression. Moderate left foraminal stenosis with uncovertebral arthrosis. Facet joints normal.    C5-6: Advanced disc degeneration with moderate narrowing of the central canal and mild ventral cord compression. Severe bilateral foraminal stenosis with uncovertebral arthrosis. Facet joints normal.    C4-5: Moderate to advanced disc degeneration with dorsal bulging and mild left ventral cord flattening.  Moderate right and severe left foraminal stenosis with uncovertebral arthrosis and moderate left facet arthropathy.    C3-4: Advanced disc degeneration with dorsal bulging and mild ventral cord flattening. Severe foraminal stenosis and moderate facet arthropathy on the left.    C2-3: Intervertebral disc unremarkable with moderate right facet arthropathy and no stenosis or impingement.    Craniocervical junction: No degenerative or erosive changes within the atlantoaxial or cervico-occipital joints.    Osseous structures and paraspinous soft tissues: Sagittal STIR images show moderate discogenic marrow edema associated with endplate irregularities at C3-4. No fracture or avulsion. No osteolytic or destructive bone lesion. Incidental note is made of a partially empty pituitary sella versus an 11-12 mm pituitary cyst.    CONCLUSION:    1. Advanced disc degeneration at each level from C4-5 through C3-4 with endplate irregularities and moderate discogenic signal changes at C3-4. Question discogenic pain.    2. Central canal stenosis at multiple levels, moderate at C6-7 and C5-6 with mild cord compression at each level.    3. Foraminal stenosis on the right, severe at C5-6 and moderate at C4-5.    4. Severe foraminal stenosis on the left at C5-6, C4-5 and C3-4    Incidental partially empty pituitary sella versus a pituitary cyst measuring up to 12 mm in diameter. Clinical correlation recommended.        Albert Yeo MD, [facility]        Again, thank you for allowing me to participate in the care of your patient.        Sincerely,        Albert Yeo, MD

## 2024-11-17 ASSESSMENT — PAIN SCALES - PAIN ENJOYMENT GENERAL ACTIVITY SCALE (PEG)
PEG_TOTALSCORE: 5
INTERFERED_ENJOYMENT_LIFE: 4
AVG_PAIN_PASTWEEK: 6
INTERFERED_GENERAL_ACTIVITY: 5
PEG_TOTALSCORE: 5
INTERFERED_ENJOYMENT_LIFE: 4
INTERFERED_GENERAL_ACTIVITY: 5
AVG_PAIN_PASTWEEK: 6

## 2024-11-17 ASSESSMENT — ANXIETY QUESTIONNAIRES
IF YOU CHECKED OFF ANY PROBLEMS ON THIS QUESTIONNAIRE, HOW DIFFICULT HAVE THESE PROBLEMS MADE IT FOR YOU TO DO YOUR WORK, TAKE CARE OF THINGS AT HOME, OR GET ALONG WITH OTHER PEOPLE: NOT DIFFICULT AT ALL
GAD7 TOTAL SCORE: 0
1. FEELING NERVOUS, ANXIOUS, OR ON EDGE: NOT AT ALL
6. BECOMING EASILY ANNOYED OR IRRITABLE: NOT AT ALL
2. NOT BEING ABLE TO STOP OR CONTROL WORRYING: NOT AT ALL
7. FEELING AFRAID AS IF SOMETHING AWFUL MIGHT HAPPEN: NOT AT ALL
GAD7 TOTAL SCORE: 0
3. WORRYING TOO MUCH ABOUT DIFFERENT THINGS: NOT AT ALL
GAD7 TOTAL SCORE: 0
5. BEING SO RESTLESS THAT IT IS HARD TO SIT STILL: NOT AT ALL
8. IF YOU CHECKED OFF ANY PROBLEMS, HOW DIFFICULT HAVE THESE MADE IT FOR YOU TO DO YOUR WORK, TAKE CARE OF THINGS AT HOME, OR GET ALONG WITH OTHER PEOPLE?: NOT DIFFICULT AT ALL
7. FEELING AFRAID AS IF SOMETHING AWFUL MIGHT HAPPEN: NOT AT ALL
4. TROUBLE RELAXING: NOT AT ALL

## 2024-11-18 ENCOUNTER — OFFICE VISIT (OUTPATIENT)
Dept: PALLIATIVE MEDICINE | Facility: CLINIC | Age: 74
End: 2024-11-18
Attending: FAMILY MEDICINE
Payer: COMMERCIAL

## 2024-11-18 VITALS — SYSTOLIC BLOOD PRESSURE: 136 MMHG | OXYGEN SATURATION: 97 % | DIASTOLIC BLOOD PRESSURE: 88 MMHG | HEART RATE: 68 BPM

## 2024-11-18 DIAGNOSIS — M54.12 CERVICAL RADICULOPATHY: Primary | ICD-10-CM

## 2024-11-18 DIAGNOSIS — M47.22 CERVICAL SPONDYLOSIS WITH RADICULOPATHY: ICD-10-CM

## 2024-11-18 DIAGNOSIS — M19.011 PRIMARY OSTEOARTHRITIS OF RIGHT SHOULDER: ICD-10-CM

## 2024-11-18 DIAGNOSIS — M48.02 FORAMINAL STENOSIS OF CERVICAL REGION: ICD-10-CM

## 2024-11-18 NOTE — LETTER
11/18/2024      Julia Andre  [address] [facility] [address]      Dear Colleague,    Thank you for referring your patient, Julia Andre, to the [facility] [place]. Please see a copy of my visit note below.    [facility]     Date of visit: 11/18/2024    Consultation: Julia Andre is a 73 year old female who has a past medical history of Anxiety, Cervical cancer (H), Coronary artery disease (2009), Depression, Depressive disorder, Diabetes (H), Gastroesophageal reflux disease, Heart attack (H), Hyperlipidemia, Hypertension, Iron deficiency anemia, Osteopenia, Ovarian cancer, unspecified laterality (H) (11/18/2021), Psoriasis, and Sleep apnea. Julia is being seen today at the request of Albert Yeo for evaluation of her pain issues and recommendations for management.     Referral placed: 11/12/24  [place]   [address] [place]   [facility] [address]   Phone: [phone]   Fax: [phone]    Diagnoses: Cervical radiculopathy   Foraminal stenosis of cervical region   Cervical spondylosis with radiculopathy   Order: Pain Management  Referral      Referring provider comments: none    11/12/24 OV with Dr. Yeo:  -Patient is following up for right shoulder pain and due to arthritis as well as radiation from the cervical spine and due to multilevel arthritis and degenerative disc disease causing severe stenosis and nerve root impingement  -MRI results of the right shoulder were reviewed today which shows advanced arthritis, mild rotator cuff tendinosis.    -MRI results of the cervical spine shows multilevel arthritis, degenerative disease causing moderate to severe foraminal stenosis, greatest at C5-C6 which has severe bilateral foraminal stenosis.  Patient has moderate right and severe left foraminal stenosis at C4-C5.  -Patient had a right suprascapular nerve block with minimal relief.  -Patient would prefer to pain management  "for further evaluation and treatment including epidural steroid injections  -Patient will also be having her follow-up PET scan next week to assess her cancer and determine next round of treatment.  -Patient will increase gabapentin 300 mg at bedtime to 600 mg at bedtime.  Patient may also add 300 mg in the morning if daytime pain is persistent.  Patient may eventually progress to 600 mg in the morning as tolerated    Relevant records/History:  I have reviewed available medical information in the patient's electronic medical record including relevant provider notes, laboratory work, and imaging.     ____________________________________________________________  History of Present Illness  Accompanied by her , Shashi Dumont reports:  - She has had right shoulder pain secondary to advanced arthritis that has been present for 2+ years. Initally, steroid injections were helping right shoulder pain. Last summer, steroid injections were not helpful.  She went on to have a suprascapular nerve block that did not provide significant relief.   - When shoulder pain started, she was not a candidate for surgery as she was in active treatment for ovarian cancer. She has reached a stage of care where she is no longer in active treatment. She saw surgeon at [facility], he declined to do surgery due to her ovarian cancer status.  - She also has pain in her right arm. It comes from the shoulder to the top of her right hand. Right elbow is \"sensitive.\" It is hard to hold her phone in her right hand at the end of the day. Occasional weakness in her right hand, notices that she may drop pills when adding them to a pill minder. She feels that pain is worse as the day goes on.  She had cervical MRI and some of her symptoms were thought be be contributing to her symptoms.   - For kerri, likes to travel and be with family. Worked out regularly up until cancer diagnosis.       Pain description:  Location: right shoulder to her hand.Occasionally in " her neck (rare, usually self limited).     Quality: (Patient-Rptd) sharp, dull, aching, throbbing   Duration: (Patient-Rptd) PM   Severity/Intensity:  Now: (Patient-Rptd) 3, Average: (Patient-Rptd) 4, Best: (Patient-Rptd) 1, Worst: (Patient-Rptd) 8  Aggravating factors include: (Patient-Rptd) lying down, standing, walking, relaxation  Relieving factors include: (Patient-Rptd) medication    Pain Intensity and Interference in the past week  Average Pain 6  Pain interference with your ENJOYMENT OF LIFE?  4  Pain interference with your GENERAL ACTIVITY?  5  PEG Total Score:  5    Current pain medications: (Patient-Rptd) Voltaren, ex tra Strength, every 12 hours. BHRR right arthritis pain relief acetaminophen extended release 650 mg. 300 mg capsules gabapentin, one in the morning and two at night.        PAIN MANAGEMENT TREATMENT HISTORY  1. MEDICATIONS:  Opioids: (Patient-Rptd) None  NSAIDs: (Patient-Rptd) None  Muscle relaxants: (Patient-Rptd) None  Pain Antidepressants/ Anxiolytics:occasional rare use of ativan  Neuroleptics: Gabapentin recently started   Topical: (Patient-Rptd) Diclofenac (Voltaren) gel twice a day  2. PHYSICAL THERAPY:   Tried, somewhat helpful  3. PAIN PSYCHOLOGY:   Has not tried  4. SURGERY:   No pain related surgeries  5. INJECTIONS:   Steroid injections intermittently to the right shoulder, typically provided good relief until recently.  6. COMPLEMENTARY THERAPY:  Chiropractic: Has not tried, interested in trying (has appointment upcoming)  Acupuncture/acupressure: Has not tried, interested in trying however does not have insurance coverage for it  TENS unit: Has not tried  heat/cold applications: Tried, somewhat helpful  7. PREVIOUS PAIN CLINIC/ TREATMENT PROVIDERS:  Julia has not been seen at a pain clinic in the past.       Past Medical History  She has a past medical history of Anxiety, Cervical cancer (H), Coronary artery disease (2009), Depression, Depressive disorder, Diabetes (H),  Gastroesophageal reflux disease, Heart attack (H), Hyperlipidemia, Hypertension, Iron deficiency anemia, Osteopenia, Ovarian cancer, unspecified laterality (H) (11/18/2021), Psoriasis, and Sleep apnea.   Past Surgical History  She has a past surgical history that includes Soft tissue surgery; GYN surgery; GYN surgery (1974); Eye surgery; GI surgery (1996); Laparotomy exploratory (N/A, 9/2/2020); Hysterectomy total abdominal, bilateral salpingo-oophorectomy, node dissection, tumor debulking, combined (Bilateral, 9/2/2020); IR Chest Port Placement > 5 Yrs of Age (9/4/2020); and Colonoscopy (N/A, 6/8/2022).   Social History  She reports that she quit smoking about 30 years ago. Her smoking use included cigarettes. She started smoking about 57 years ago. She has a 67.5 pack-year smoking history. She has never used smokeless tobacco. She reports that she does not currently use alcohol. She reports that she does not use drugs.  Social History     Social History Narrative     Not on file        Medications and Allergies reviewed.  Medications   has a current medication list which includes the following prescription(s): amlodipine, amlodipine, aspirin, biotin, bupropion, clonidine, gabapentin, hydrocortisone sodium succinate pf, lisinopril, lorazepam, metoprolol tartrate, metronidazole, multiple vitamin, oxymetazoline, rosuvastatin, semaglutide, semaglutide, semaglutide (1 mg/dose), and trazodone, and the following Facility-Administered Medications: ropivacaine, lidocaine, lidocaine, lidocaine, lidocaine, lidocaine, lidocaine, lidocaine, lidocaine, methylprednisolone, methylprednisolone, triamcinolone, triamcinolone, triamcinolone, and triamcinolone.  Allergies  Atorvastatin, Simvastatin, and Sulfa antibiotics  Objective  There were no vitals taken for this visit.  Constitutional: Well developed, well nourished, appears stated age. No acute distress.  Gait is normal and steady  HEENT: Head atraumatic, normocephalic. Eyes  without conjunctival injection or jaundice. Neck supple.   Skin: No obvious rash, lesions, or petechiae of exposed skin.   Extremities: Peripheral pulses intact. No clubbing, cyanosis, or edema. Moves all extremities.  Psychiatric/mental status: Alert, without lethargy or stupor. Speech fluent. Appropriate affect. Mood normal. Able to follow commands without difficulty.   Musculoskeletal exam: Normal bulk and tone. Unremarkable spinal curvature.   Cervical spine:  ROM: full  Rotation/ext to right: pain free  Rotation/ext to left: pain free  Myofascial tenderness: none appreciated  Normal 5/5 UE strength bilaterally with the exception of weakness with right wrist dorsiflexion 5-/5 and right elbow flexion 5-/5.  Normal sensation to light touch in the C4-T1 distributions bilaterally with the exception of mild hyperalgesia noted along the right lateral elbow  No allodynia, dysesthesia, or hyperalgesia otherwise in the upper extremities bilaterally   Right shoulder ROM moderately limited  Significant Results and Procedures   Imaging:    Labs:  Creatinine   Date Value Ref Range Status   05/01/2024 1.00 (H) 0.51 - 0.95 mg/dL Final     AST   Date Value Ref Range Status   05/01/2024 18 0 - 45 U/L Final     Comment:     Reference intervals for this test were updated on 6/12/2023 to more accurately reflect our healthy population. There may be differences in the flagging of prior results with similar values performed with this method. Interpretation of those prior results can be made in the context of the updated reference intervals.     ALT   Date Value Ref Range Status   05/01/2024 12 0 - 50 U/L Final     Comment:     Reference intervals for this test were updated on 6/12/2023 to more accurately reflect our healthy population. There may be differences in the flagging of prior results with similar values performed with this method. Interpretation of those prior results can be made in the context of the updated reference  "intervals.           Assessment & Plan  Cervical radiculopathy  Foraminal stenosis of cervical region  Cervical spondylosis with radiculopathy  Reviewed MRI findings; discussed widespread DDD and that the frequency of these findings in adults without pain can be normal. Although she has significant facet arthrosis, and bilateral foraminal stenosis, she is only having symptoms consistent with cervical radiculopathy on the right. I recommended a right paramedian C7-T1 IL NICHOLE. Discussed that this will likely not reduce her shoulder pain, however has a high likelihood of reducing her radicular arm symptoms.   - PAIN INJECTION EVAL/TREAT/FOLLOW UP    Primary osteoarthritis of right shoulder  Noted. Continue cares per Dr. Yeo. Will monitor symptoms post NICHOLE.     Recommended follow-up with me in 3-4 weeks after the injection to re-evaluate symptoms.     Talia Krishnan, CNP-BC, PMGT-BC, AP-PMN  [facility], [place]          Type of Patient: New Patient - Comprehensive Evaluation    Referral Reason: Referred by Dr. Yeo - pt states this will be the 5th \"specialist\" that she has seen about her shoulder. Pt states she has ovarian cancer so not a candidate for surgery. Nerve block last week.     Pain Clarification: Right Shoulder - approx 3 years ago which it was more limiting movement and activity. Pain progressively worsening over time especially over the last 4 months.     Goals/Objectives: \"To hopefully find out when I can get an epidural to make the pain go away\"     Other Notes: Pt was told she should get an epidural to help with the shoulder pain.       Again, thank you for allowing me to participate in the care of your patient.        Sincerely,        Talia Krishnan, NP    "

## 2024-11-18 NOTE — PROGRESS NOTES
"Type of Patient: New Patient - Comprehensive Evaluation    Referral Reason: Referred by Dr. Yeo - pt states this will be the 5th \"specialist\" that she has seen about her shoulder. Pt states she has ovarian cancer so not a candidate for surgery. Nerve block last week.     Pain Clarification: Right Shoulder - approx 3 years ago which it was more limiting movement and activity. Pain progressively worsening over time especially over the last 4 months.     Goals/Objectives: \"To hopefully find out when I can get an epidural to make the pain go away\"     Other Notes: Pt was told she should get an epidural to help with the shoulder pain.     "

## 2024-11-18 NOTE — PATIENT INSTRUCTIONS
The  will contact you to set up your cervical epidural steroid injection. Please follow up with me in 3-4 weeks to reassess symptoms and response to treatment.   ----------------------------------------------------------------  [facility] Number:  [phone]   Call with any questions about your care and for scheduling assistance.   Calls are returned Monday through Friday between 8 AM and 4:30 PM. We usually get back to you within 2 business days depending on the issue/request.

## 2024-11-18 NOTE — PROGRESS NOTES
[facility]     Date of visit: 11/18/2024    Consultation: Julia Andre is a 73 year old female who has a past medical history of Anxiety, Cervical cancer (H), Coronary artery disease (2009), Depression, Depressive disorder, Diabetes (H), Gastroesophageal reflux disease, Heart attack (H), Hyperlipidemia, Hypertension, Iron deficiency anemia, Osteopenia, Ovarian cancer, unspecified laterality (H) (11/18/2021), Psoriasis, and Sleep apnea. Julia is being seen today at the request of Albert Yeo for evaluation of her pain issues and recommendations for management.     Referral placed: 11/12/24  [place], [place]   [address]   [facility] [address]   Phone: [phone]   Fax: [phone]    Diagnoses: Cervical radiculopathy   Foraminal stenosis of cervical region   Cervical spondylosis with radiculopathy   Order: Pain Management  Referral      Referring provider comments: none    11/12/24 OV with Dr. Yeo:  -Patient is following up for right shoulder pain and due to arthritis as well as radiation from the cervical spine and due to multilevel arthritis and degenerative disc disease causing severe stenosis and nerve root impingement  -MRI results of the right shoulder were reviewed today which shows advanced arthritis, mild rotator cuff tendinosis.    -MRI results of the cervical spine shows multilevel arthritis, degenerative disease causing moderate to severe foraminal stenosis, greatest at C5-C6 which has severe bilateral foraminal stenosis.  Patient has moderate right and severe left foraminal stenosis at C4-C5.  -Patient had a right suprascapular nerve block with minimal relief.  -Patient would prefer to pain management for further evaluation and treatment including epidural steroid injections  -Patient will also be having her follow-up PET scan next week to assess her cancer and determine next round of treatment.  -Patient will increase gabapentin 300 mg at bedtime to 600  "mg at bedtime.  Patient may also add 300 mg in the morning if daytime pain is persistent.  Patient may eventually progress to 600 mg in the morning as tolerated    Relevant records/History:  I have reviewed available medical information in the patient's electronic medical record including relevant provider notes, laboratory work, and imaging.     ____________________________________________________________  History of Present Illness   Accompanied by her , Shashi Dumont reports:  - She has had right shoulder pain secondary to advanced arthritis that has been present for 2+ years. Initally, steroid injections were helping right shoulder pain. Last summer, steroid injections were not helpful.  She went on to have a suprascapular nerve block that did not provide significant relief.   - When shoulder pain started, she was not a candidate for surgery as she was in active treatment for ovarian cancer. She has reached a stage of care where she is no longer in active treatment. She saw surgeon at [facility], he declined to do surgery due to her ovarian cancer status.  - She also has pain in her right arm. It comes from the shoulder to the top of her right hand. Right elbow is \"sensitive.\" It is hard to hold her phone in her right hand at the end of the day. Occasional weakness in her right hand, notices that she may drop pills when adding them to a pill minder. She feels that pain is worse as the day goes on.  She had cervical MRI and some of her symptoms were thought be be contributing to her symptoms.   - For kerri, likes to travel and be with family. Worked out regularly up until cancer diagnosis.       Pain description:  Location: right shoulder to her hand.Occasionally in her neck (rare, usually self limited).     Quality: (Patient-Rptd) sharp, dull, aching, throbbing   Duration: (Patient-Rptd) PM   Severity/Intensity:  Now: (Patient-Rptd) 3, Average: (Patient-Rptd) 4, Best: (Patient-Rptd) 1, Worst: (Patient-Rptd) " 8  Aggravating factors include: (Patient-Rptd) lying down, standing, walking, relaxation  Relieving factors include: (Patient-Rptd) medication    Pain Intensity and Interference in the past week  Average Pain 6  Pain interference with your ENJOYMENT OF LIFE?  4  Pain interference with your GENERAL ACTIVITY?  5  PEG Total Score:  5    Current pain medications: (Patient-Rptd) Voltaren, ex tra Strength, every 12 hours. BHRR right arthritis pain relief acetaminophen extended release 650 mg. 300 mg capsules gabapentin, one in the morning and two at night.        PAIN MANAGEMENT TREATMENT HISTORY  1. MEDICATIONS:  Opioids: (Patient-Rptd) None  NSAIDs: (Patient-Rptd) None  Muscle relaxants: (Patient-Rptd) None  Pain Antidepressants/ Anxiolytics:occasional rare use of ativan  Neuroleptics: Gabapentin recently started   Topical: (Patient-Rptd) Diclofenac (Voltaren) gel twice a day  2. PHYSICAL THERAPY:   Tried, somewhat helpful  3. PAIN PSYCHOLOGY:   Has not tried  4. SURGERY:   No pain related surgeries  5. INJECTIONS:   Steroid injections intermittently to the right shoulder, typically provided good relief until recently.  6. COMPLEMENTARY THERAPY:  Chiropractic: Has not tried, interested in trying (has appointment upcoming)  Acupuncture/acupressure: Has not tried, interested in trying however does not have insurance coverage for it  TENS unit: Has not tried  heat/cold applications: Tried, somewhat helpful  7. PREVIOUS PAIN CLINIC/ TREATMENT PROVIDERS:  Julia has not been seen at a pain clinic in the past.       Past Medical History   She has a past medical history of Anxiety, Cervical cancer (H), Coronary artery disease (2009), Depression, Depressive disorder, Diabetes (H), Gastroesophageal reflux disease, Heart attack (H), Hyperlipidemia, Hypertension, Iron deficiency anemia, Osteopenia, Ovarian cancer, unspecified laterality (H) (11/18/2021), Psoriasis, and Sleep apnea.   Past Surgical History   She has a past surgical  history that includes Soft tissue surgery; GYN surgery; GYN surgery (1974); Eye surgery; GI surgery (1996); Laparotomy exploratory (N/A, 9/2/2020); Hysterectomy total abdominal, bilateral salpingo-oophorectomy, node dissection, tumor debulking, combined (Bilateral, 9/2/2020); IR Chest Port Placement > 5 Yrs of Age (9/4/2020); and Colonoscopy (N/A, 6/8/2022).   Social History   She reports that she quit smoking about 30 years ago. Her smoking use included cigarettes. She started smoking about 57 years ago. She has a 67.5 pack-year smoking history. She has never used smokeless tobacco. She reports that she does not currently use alcohol. She reports that she does not use drugs.  Social History     Social History Narrative    Not on file        Medications and Allergies reviewed.  Medications    has a current medication list which includes the following prescription(s): amlodipine, amlodipine, aspirin, biotin, bupropion, clonidine, gabapentin, hydrocortisone sodium succinate pf, lisinopril, lorazepam, metoprolol tartrate, metronidazole, multiple vitamin, oxymetazoline, rosuvastatin, semaglutide, semaglutide, semaglutide (1 mg/dose), and trazodone, and the following Facility-Administered Medications: ropivacaine, lidocaine, lidocaine, lidocaine, lidocaine, lidocaine, lidocaine, lidocaine, lidocaine, methylprednisolone, methylprednisolone, triamcinolone, triamcinolone, triamcinolone, and triamcinolone.  Allergies   Atorvastatin, Simvastatin, and Sulfa antibiotics  Objective   There were no vitals taken for this visit.  Constitutional: Well developed, well nourished, appears stated age. No acute distress.  Gait is normal and steady  HEENT: Head atraumatic, normocephalic. Eyes without conjunctival injection or jaundice. Neck supple.   Skin: No obvious rash, lesions, or petechiae of exposed skin.   Extremities: Peripheral pulses intact. No clubbing, cyanosis, or edema. Moves all extremities.  Psychiatric/mental status:  Alert, without lethargy or stupor. Speech fluent. Appropriate affect. Mood normal. Able to follow commands without difficulty.   Musculoskeletal exam: Normal bulk and tone. Unremarkable spinal curvature.   Cervical spine:  ROM: full  Rotation/ext to right: pain free  Rotation/ext to left: pain free  Myofascial tenderness: none appreciated  Normal 5/5 UE strength bilaterally with the exception of weakness with right wrist dorsiflexion 5-/5 and right elbow flexion 5-/5.  Normal sensation to light touch in the C4-T1 distributions bilaterally with the exception of mild hyperalgesia noted along the right lateral elbow  No allodynia, dysesthesia, or hyperalgesia otherwise in the upper extremities bilaterally   Right shoulder ROM moderately limited  Significant Results and Procedures    Imaging:    Labs:  Creatinine   Date Value Ref Range Status   05/01/2024 1.00 (H) 0.51 - 0.95 mg/dL Final     AST   Date Value Ref Range Status   05/01/2024 18 0 - 45 U/L Final     Comment:     Reference intervals for this test were updated on 6/12/2023 to more accurately reflect our healthy population. There may be differences in the flagging of prior results with similar values performed with this method. Interpretation of those prior results can be made in the context of the updated reference intervals.     ALT   Date Value Ref Range Status   05/01/2024 12 0 - 50 U/L Final     Comment:     Reference intervals for this test were updated on 6/12/2023 to more accurately reflect our healthy population. There may be differences in the flagging of prior results with similar values performed with this method. Interpretation of those prior results can be made in the context of the updated reference intervals.           Assessment & Plan   Cervical radiculopathy  Foraminal stenosis of cervical region  Cervical spondylosis with radiculopathy  Reviewed MRI findings; discussed widespread DDD and that the frequency of these findings in adults without  pain can be normal. Although she has significant facet arthrosis, and bilateral foraminal stenosis, she is only having symptoms consistent with cervical radiculopathy on the right. I recommended a right paramedian C7-T1 IL NICHOLE. Discussed that this will likely not reduce her shoulder pain, however has a high likelihood of reducing her radicular arm symptoms.   - PAIN INJECTION EVAL/TREAT/FOLLOW UP    Primary osteoarthritis of right shoulder  Noted. Continue cares per Dr. Yeo. Will monitor symptoms post NICHOLE.     Recommended follow-up with me in 3-4 weeks after the injection to re-evaluate symptoms.     Talia Krishnan, CNP-BC, PMGT-BC, AP-PMN  [facility]

## 2024-11-19 ENCOUNTER — TELEPHONE (OUTPATIENT)
Dept: FAMILY MEDICINE | Facility: CLINIC | Age: 74
End: 2024-11-19
Payer: COMMERCIAL

## 2024-11-19 NOTE — TELEPHONE ENCOUNTER
Patient Quality Outreach    Patient is due for the following:       Topic Date Due    Diptheria Tetanus Pertussis (DTAP/TDAP/TD) Vaccine (2 - Td or Tdap) 09/19/2021    COVID-19 Vaccine (7 - 2024-25 season) 11/11/2024       Action(s) Taken:   Schedule a nurse only visit for vaccines    Type of outreach:    Chart review performed, no outreach needed. Flu vaccine date found on care everywhere    Questions for provider review:    None           Samantha Doe, CMA

## 2024-11-21 ENCOUNTER — TELEPHONE (OUTPATIENT)
Dept: PALLIATIVE MEDICINE | Facility: CLINIC | Age: 74
End: 2024-11-21
Payer: COMMERCIAL

## 2024-11-21 NOTE — TELEPHONE ENCOUNTER
"Screening Questions for Radiology Injections:    Injection to be done at which interventional clinic site? [facility]    If choosing [facility] for location, please inform patient:  \"[facility] is a Hospital based clinic. Before your visit, you should check with your insurance about how it covers the charges for facility services in a hospital-based clinic.     Procedure ordered by Talia Krishnan     Procedure ordered? C7-T1 IL NICHOLE  Transforaminal Cervical NICHOLE - [place] [facility] - No [facility] providers perform this procedure    What insurance would patient like us to bill for this procedure? UCARE/MEDICARE  IF SCHEDULING IN [place] PAIN OR SPINE PLEASE SCHEDULE AT LEAST 7-10 BUSINESS DAYS OUT SO A PA CAN BE OBTAINED  Worker's comp or MVA (motor vehicle accident) -Any injection DO NOT SCHEDULE and route to Rut Gonzales.    [employer] insurance - For ALL INJECTIONS DO NOT SCHEDULE and route to Justa Barrera.     ALL BCBS, [employer] and [employer] CIGNA - DO NOT SCHEDULE and route to Justa Barrera  MEDICA- ALL INJECTIONS- route to Justa Barrera    Is patient scheduled at [facility]? NO   If YES, route every encounter to [facility]INE [facility] CARE NAVIGATION POOL [[phone]]    Is an  needed? No     Patient has a  home? (Review Grid) YES: Informed     Any chance of pregnancy? NO   If YES, do NOT schedule and route to RN pool  - Dr. Ulloa route to PM&R Nurse  [[id number]]      Is patient actively being treated for cancer or immunocompromised? No  If YES, do NOT schedule and route to RN pool/ Dr. Ulloa's Team    Does the patient have a bleeding or clotting disorder? No   If YES, okay to schedule AND route to RN nurse / Dr. Ulloa's Team   (For any patients with platelet count <100, RN must forward to provider)    Is patient taking any Blood Thinners OR Antiplatelet medication?  No   If hold needed, do NOT schedule, route to RN pool/ Dr. Ulloa's Team  Examples: "   Blood Thinners: (Coumadin, Warfarin, Jantoven, Pradaxa, Xarelto, Eliquis, Edoxaban, Enoxaparin, Lovenox, Heparin, Arixtra, Fondaparinux or Fragmin)  Antiplatelet Medications: (Plavix, Brilinta or Effient)     Is patient taking any aspirin products (includes Excedrin and Fiorinal)? No   If yes route to RN pool/ Dr. Ulloa's Team - Do not schedule    Is patient taking any GLP-1 Antagonist (hold needed for sedation patients only) Yes - Ozempic   (semaglutide (Ozempic, Wegovy), dulaglutide (Trulicity), exenatide ER (Bydureon), tirzepatide (Mounjaro), Liraglutide (Saxenda, Victoza), semaglutide (Rybelsus), Terzepatide (Zepbound)  If YES, okay to schedule AND route to RN nurse / Dr. Ulloa's Team      Any allergies to contrast dye, iodine, shellfish, or numbing and steroid medications? No  If YES, schedule and add allergy information to appointment notes AND route to the RN pool/ Dr. Ulloa's Team  If NICHOLE and Contrast Dye / Iodine Allergy? DO NOT SCHEDULE, route to RN pool/ Dr. Ulloa's Team  Allergies: Atorvastatin, Simvastatin, and Sulfa antibiotics     Does patient have an active infection or treated for one within the past week? No  Is patient currently taking any antibiotics or steroid medications?  No   For patients on chronic, preventative, or prophylactic antibiotics, procedures may be scheduled.   For patients on antibiotics for active or recent infection, schedule 4 days after completed.  For patients on steroid medications, schedule 4 days after completed.     Has the patient had a flu shot or any other vaccinations within the past 7 days? No  If yes, explain that for the vaccine to work best they need to:     wait 1 week before and 1 week after getting any Vaccine  wait 1 week before and 2 weeks after getting any Covid Vaccine   If patient has concerns about the timing, send to RN pool/ Dr. Ulloa's Team    Does patient have an MRI/CT?  YES: 11/02/24 Include Date and Check Procedure Scheduling Grid to see if  required.  Was the MRI/CT done within the last 3 years?  Yes   If no route to RN Pool/ Dr. Ulloa's Team  If yes, where was the MRI/CT done? [facility]   Refer to PACS Transmissions list for approved external locations and route to RN Pool High Priority/ Dr. Villalbas Team  If MRI was not done at approved external location do NOT schedule and route to RN pool/ Dr. Villalbas Team    If patient has an imaging disc, the injection MAY be scheduled but patient must bring disc to appt or appt will be cancelled.    Is patient able to transfer to a procedure table with minimal or no assistance? Yes   If no, do NOT schedule and route to RN Pool/ Dr. Ulloa's Team    Procedure Specific Instructions:  If celiac plexus block, informed patient NPO for 6 hours and that it is okay to take medications with sips of water, especially blood pressure medications Not Applicable       If this is for a cervical procedure, informed patient that aspirin needs to be held for 6 days.   Not Applicable    Sedation, If Sedation is ordered for any procedure, patient must be NPO for 6 hours prior to procedure Not Applicable    If IV needed:  Do not schedule procedures requiring IV placement in the first appointment of the day or first appointment after lunch. Do NOT schedule at 0745, 0815 or 1245.     Instructed patient to arrive 30 minutes early for IV start if required. (Check Procedure Scheduling Grid)  Not Applicable    Reminders:  If you are started on any steroids or antibiotics between now and your appointment, you must contact us because the procedure may need to be cancelled.  Yes    As a reminder, receiving steroids can decrease your body's ability to fight infection.   Would you still like to move forward with scheduling the injection?  Yes    IV Sedation is not provided for procedures. If oral anti-anxiety medication is needed, the patient should request this from their referring provider.    Instruct patient to arrive as directed prior to  the scheduled appointment time:  If IV needed 30 minutes before appointment time     For patients 85 or older we recommend having an adult stay w/ them for the remainder of the day.     If the patient is Diabetic, remind them to bring their glucometer.      Does the patient have any questions?  NO  Vicenta Capps  [facility]

## 2024-11-22 ENCOUNTER — LAB (OUTPATIENT)
Dept: LAB | Facility: CLINIC | Age: 74
End: 2024-11-22
Payer: COMMERCIAL

## 2024-11-22 DIAGNOSIS — N18.31 CHRONIC KIDNEY DISEASE, STAGE 3A (H): Primary | ICD-10-CM

## 2024-11-22 DIAGNOSIS — N18.31 CHRONIC KIDNEY DISEASE, STAGE 3A (H): ICD-10-CM

## 2024-11-22 LAB
ALBUMIN UR-MCNC: 10 MG/DL
APPEARANCE UR: CLEAR
BILIRUB UR QL STRIP: NEGATIVE
COLOR UR AUTO: ABNORMAL
GLUCOSE UR STRIP-MCNC: NEGATIVE MG/DL
HGB UR QL STRIP: ABNORMAL
HYALINE CASTS: 2 /LPF
KETONES UR STRIP-MCNC: NEGATIVE MG/DL
LEUKOCYTE ESTERASE UR QL STRIP: NEGATIVE
MUCOUS THREADS #/AREA URNS LPF: PRESENT /LPF
NITRATE UR QL: NEGATIVE
PH UR STRIP: 5.5 [PH] (ref 5–7)
RBC URINE: 2 /HPF
SP GR UR STRIP: 1.02 (ref 1–1.03)
SQUAMOUS EPITHELIAL: <1 /HPF
UROBILINOGEN UR STRIP-MCNC: NORMAL MG/DL
WBC URINE: 3 /HPF

## 2024-11-22 PROCEDURE — 81003 URINALYSIS AUTO W/O SCOPE: CPT

## 2024-11-25 ENCOUNTER — HOSPITAL ENCOUNTER (OUTPATIENT)
Dept: ULTRASOUND IMAGING | Facility: CLINIC | Age: 74
Discharge: HOME | End: 2024-11-25
Attending: INTERNAL MEDICINE | Admitting: INTERNAL MEDICINE
Payer: COMMERCIAL

## 2024-11-25 DIAGNOSIS — N18.31 CKD STAGE 3A, GFR 45-59 ML/MIN (H): ICD-10-CM

## 2024-11-25 DIAGNOSIS — I10 HYPERTENSION: ICD-10-CM

## 2024-11-25 PROCEDURE — 76770 US EXAM ABDO BACK WALL COMP: CPT

## 2024-12-01 ENCOUNTER — HEALTH MAINTENANCE LETTER (OUTPATIENT)
Age: 74
End: 2024-12-01

## 2024-12-03 ENCOUNTER — TRANSFERRED RECORDS (OUTPATIENT)
Dept: HEALTH INFORMATION MANAGEMENT | Facility: CLINIC | Age: 74
End: 2024-12-03
Payer: COMMERCIAL

## 2024-12-05 DIAGNOSIS — N18.31 STAGE 3A CHRONIC KIDNEY DISEASE (H): ICD-10-CM

## 2024-12-05 DIAGNOSIS — E11.22 TYPE 2 DIABETES MELLITUS WITH CHRONIC KIDNEY DISEASE, WITHOUT LONG-TERM CURRENT USE OF INSULIN, UNSPECIFIED CKD STAGE (H): Primary | ICD-10-CM

## 2024-12-19 ENCOUNTER — OFFICE VISIT (OUTPATIENT)
Dept: FAMILY MEDICINE | Facility: CLINIC | Age: 74
End: 2024-12-19
Attending: PHYSICIAN ASSISTANT
Payer: COMMERCIAL

## 2024-12-19 VITALS
HEART RATE: 70 BPM | WEIGHT: 165.6 LBS | OXYGEN SATURATION: 97 % | SYSTOLIC BLOOD PRESSURE: 106 MMHG | TEMPERATURE: 97.6 F | RESPIRATION RATE: 16 BRPM | HEIGHT: 63 IN | DIASTOLIC BLOOD PRESSURE: 71 MMHG | BODY MASS INDEX: 29.34 KG/M2

## 2024-12-19 DIAGNOSIS — E11.22 TYPE 2 DIABETES MELLITUS WITH CHRONIC KIDNEY DISEASE, WITHOUT LONG-TERM CURRENT USE OF INSULIN, UNSPECIFIED CKD STAGE (H): Primary | ICD-10-CM

## 2024-12-19 DIAGNOSIS — N18.31 STAGE 3A CHRONIC KIDNEY DISEASE (H): ICD-10-CM

## 2024-12-19 DIAGNOSIS — C56.1 MALIGNANT NEOPLASM OF RIGHT OVARY (H): ICD-10-CM

## 2024-12-19 DIAGNOSIS — I10 ESSENTIAL HYPERTENSION: ICD-10-CM

## 2024-12-19 LAB
ANION GAP SERPL CALCULATED.3IONS-SCNC: 12 MMOL/L (ref 7–15)
BUN SERPL-MCNC: 36.7 MG/DL (ref 8–23)
CALCIUM SERPL-MCNC: 9.6 MG/DL (ref 8.8–10.4)
CHLORIDE SERPL-SCNC: 102 MMOL/L (ref 98–107)
CREAT SERPL-MCNC: 1.34 MG/DL (ref 0.51–0.95)
EGFRCR SERPLBLD CKD-EPI 2021: 41 ML/MIN/1.73M2
EST. AVERAGE GLUCOSE BLD GHB EST-MCNC: 120 MG/DL
GLUCOSE SERPL-MCNC: 132 MG/DL (ref 70–99)
HBA1C MFR BLD: 5.8 % (ref 0–5.6)
HCO3 SERPL-SCNC: 22 MMOL/L (ref 22–29)
POTASSIUM SERPL-SCNC: 5.2 MMOL/L (ref 3.4–5.3)
SODIUM SERPL-SCNC: 136 MMOL/L (ref 135–145)

## 2024-12-19 RX ORDER — POLYETHYLENE GLYCOL 3350 17 G/17G
1 POWDER, FOR SOLUTION ORAL DAILY
COMMUNITY

## 2024-12-19 RX ORDER — AMLODIPINE BESYLATE 10 MG/1
10 TABLET ORAL DAILY
Qty: 90 TABLET | Refills: 3 | Status: SHIPPED | OUTPATIENT
Start: 2024-12-19

## 2024-12-19 RX ORDER — AMLODIPINE BESYLATE 10 MG/1
10 TABLET ORAL DAILY
COMMUNITY
End: 2024-12-19

## 2024-12-19 RX ORDER — LISINOPRIL 20 MG/1
20 TABLET ORAL DAILY
COMMUNITY

## 2024-12-19 ASSESSMENT — PATIENT HEALTH QUESTIONNAIRE - PHQ9
SUM OF ALL RESPONSES TO PHQ QUESTIONS 1-9: 2
SUM OF ALL RESPONSES TO PHQ QUESTIONS 1-9: 2

## 2024-12-19 ASSESSMENT — ANXIETY QUESTIONNAIRES
6. BECOMING EASILY ANNOYED OR IRRITABLE: NOT AT ALL
GAD7 TOTAL SCORE: 0
3. WORRYING TOO MUCH ABOUT DIFFERENT THINGS: NOT AT ALL
4. TROUBLE RELAXING: NOT AT ALL
1. FEELING NERVOUS, ANXIOUS, OR ON EDGE: NOT AT ALL
5. BEING SO RESTLESS THAT IT IS HARD TO SIT STILL: NOT AT ALL
GAD7 TOTAL SCORE: 0
7. FEELING AFRAID AS IF SOMETHING AWFUL MIGHT HAPPEN: NOT AT ALL
2. NOT BEING ABLE TO STOP OR CONTROL WORRYING: NOT AT ALL
7. FEELING AFRAID AS IF SOMETHING AWFUL MIGHT HAPPEN: NOT AT ALL
GAD7 TOTAL SCORE: 0

## 2024-12-19 NOTE — PROGRESS NOTES
Assessment & Plan     Type 2 diabetes mellitus with chronic kidney disease, without long-term current use of insulin, unspecified CKD stage (H)  Well controlled. She would benefit from some slight weight loss and has been tolerating the Ozepmic well (other than some constipation initially). Increased to 2 mg for now. Continue to monitor. Follow up in about 6 months for annual wellness/recheck.  - HEMOGLOBIN A1C  - Semaglutide, 2 MG/DOSE, (OZEMPIC) 8 MG/3ML pen; Inject 2 mg subcutaneously every 7 days.    Stage 3a chronic kidney disease (H)  Recheck today. Following nephrology.  - Basic metabolic panel    Essential hypertension  Adjustments to medications from nephrology. Well controlled today in clinic. Will defer to nephrology team.  - amLODIPine (NORVASC) 10 MG tablet; Take 1 tablet (10 mg) by mouth daily.    Malignant neoplasm of right ovary (H)  Following [facility]. Taking a break from treatment currently.        Shravan Dumont is a 74 year old, presenting for the following health issues:  Diabetes (3 month's F/U) and Medication Update (Amlodipine 10 mg/Lisinopril 20 mg )      12/19/2024    11:20 AM   Additional Questions   Roomed by Mary   Accompanied by Self         12/19/2024    11:20 AM   Patient Reported Additional Medications   Patient reports taking the following new medications Dosage on amlodipine 10 mg and lisinopril 20 mg     Via the Health Maintenance questionnaire, the patient has reported the following services have been completed -Eye Exam: [facility] 2023-12-18, this information has been sent to the abstraction team.  History of Present Illness       Diabetes:   She presents for follow up of diabetes.    She is not checking blood glucose.         She has no concerns regarding her diabetes at this time.  She is having numbness in feet.  The patient has had a diabetic eye exam in the last 12 months. Eye exam performed on 12/23. Location of last eye exam [facility].            Diabetes  "Follow-up    How often are you checking your blood sugar? Not at all  What concerns do you have today about your diabetes? None   Do you have any of these symptoms? (Select all that apply)  Numbness in feet      BP Readings from Last 2 Encounters:   12/19/24 106/71   11/18/24 136/88     Hemoglobin A1C (%)   Date Value   08/21/2024 6.2 (H)   05/01/2024 5.9 (H)   09/02/2020 6.1 (H)     LDL Cholesterol Calculated (mg/dL)   Date Value   05/01/2024 65   04/21/2023 62               Objective    /71 (BP Location: Right arm, Patient Position: Sitting, Cuff Size: Adult Large)   Pulse 70   Temp 97.6  F (36.4  C) (Oral)   Resp 16   Ht 1.588 m (5' 2.5\")   Wt 75.1 kg (165 lb 9.6 oz)   SpO2 97%   BMI 29.81 kg/m    Body mass index is 29.81 kg/m .  Physical Exam   GENERAL: No acute distress  HEENT: Normocephalic  NEURO: Alert and non-focal      Results for orders placed or performed in visit on 12/19/24 (from the past 24 hours)   HEMOGLOBIN A1C   Result Value Ref Range    Estimated Average Glucose 120 (H) <117 mg/dL    Hemoglobin A1C 5.8 (H) 0.0 - 5.6 %           Signed Electronically by: Paula Garcia PA-C    "

## 2024-12-20 DIAGNOSIS — M54.12 CERVICAL RADICULOPATHY: ICD-10-CM

## 2024-12-20 DIAGNOSIS — M19.011 ARTHRITIS OF RIGHT GLENOHUMERAL JOINT: ICD-10-CM

## 2024-12-23 NOTE — TELEPHONE ENCOUNTER
"Patient responded via Mychart with the following:  \"I take two 300 mg capsules at bedtime and I was taking one 300 mg capsule in the a.m. but didn t notice much difference. I think Therese prescribed the gabapentin about a year ago for my restless legs and I did not take it. Dr. Colby prescribed it For the pain in my shoulder to settle the nerves.\"     "

## 2024-12-23 NOTE — TELEPHONE ENCOUNTER
Medication Request for: Gabapentin 300 mg            Prescription last written on 10/29/24 by Dr. Yeo   Sig: take 1 capsule twice daily    Last Fill Quantity: 60 with # refills: 1     Last Office Visit by provider: 11/12/24    Plan: Patient will increase gabapentin 300 mg at bedtime to 600 mg at bedtime. Patient may also add 300 mg in the morning if daytime pain is persistent. Patient may eventually progress to 600 mg in the morning as tolerated.     Patient was referred to Pain Management and has an appointment on 1/8/25.     [employer] message sent to patient.     FADI Lake RN

## 2024-12-27 NOTE — TELEPHONE ENCOUNTER
I m not sure. I have some relief but also taking Tylenol 650 so I m not sure which one is doing the.  Me   TE    12/23/24  4:42 PM  Note  Please see duplicate refill encounter dated 12/20/24.      FADI Lake RN           Me to Julia Andre   TE      12/23/24  4:42 PM  Julia,      Thank you for responding.      To clarify, is the Gabapentin helping with your pain?     FADI Lake RN    Last read by Julia Andre at  6:01 PM on 12/23/2024.

## 2024-12-30 RX ORDER — GABAPENTIN 300 MG/1
300 CAPSULE ORAL 2 TIMES DAILY
Qty: 60 CAPSULE | Refills: 0 | Status: SHIPPED | OUTPATIENT
Start: 2024-12-30

## 2025-01-02 ENCOUNTER — LAB (OUTPATIENT)
Dept: LAB | Facility: CLINIC | Age: 75
End: 2025-01-02
Payer: COMMERCIAL

## 2025-01-02 DIAGNOSIS — N18.32 CHRONIC KIDNEY DISEASE, STAGE 3B (H): ICD-10-CM

## 2025-01-02 LAB
ANION GAP SERPL CALCULATED.3IONS-SCNC: 11 MMOL/L (ref 7–15)
BUN SERPL-MCNC: 28.2 MG/DL (ref 8–23)
CALCIUM SERPL-MCNC: 9.6 MG/DL (ref 8.8–10.4)
CHLORIDE SERPL-SCNC: 103 MMOL/L (ref 98–107)
CREAT SERPL-MCNC: 1.29 MG/DL (ref 0.51–0.95)
EGFRCR SERPLBLD CKD-EPI 2021: 43 ML/MIN/1.73M2
GLUCOSE SERPL-MCNC: 92 MG/DL (ref 70–99)
HCO3 SERPL-SCNC: 25 MMOL/L (ref 22–29)
POTASSIUM SERPL-SCNC: 4.7 MMOL/L (ref 3.4–5.3)
SODIUM SERPL-SCNC: 139 MMOL/L (ref 135–145)

## 2025-01-07 NOTE — PROGRESS NOTES
[facility] - Procedure Note    Date of Visit: 1/8/2025    Procedure performed: T1-T2 interlaminar epidural steroid injection with fluoroscopic guidance (no epidural space higher than this on MRI images reviewed from [facility])  Diagnosis: Cervical spondylosis; Cervical radiculitis/radiculopathy  : Alba Barroso MD   Anesthesia: none    Indications: Julia Andre is a 74 year old female who is seen at the request of Talia Krishnan CNP for cervical epidural steroid injection. The patient describes right shoulder and arm pain. The patient has been exhibiting symptoms consistent with cervical intraspinal inflammation and radiculopathy. Symptoms have been persistent, disabling, and intermittently severe. The patient reports minimal improvement with conservative treatment, including PT and medications.    CERVICAL MRI was done on 11/02/2024 which showed       Allergies:      Allergies   Allergen Reactions    Atorvastatin Muscle Pain (Myalgia)     PN: muscle aches      Muscle pain      Muscle pain PN: muscle aches    Simvastatin     Sulfa Antibiotics         Vitals:  BP (!) 146/79   Pulse 79   SpO2 96%     Review of Systems: The patient denies recent fever, chills, illness, use of antibiotics or anticoagulants. All other 10-point review of systems negative.     Procedure: The procedure and risks were explained, and informed written consent was obtained from the patient. Risks include but are not limited to: infection, bleeding, increased pain, and damage to soft tissue, nerve, muscle, and vasculature structures. After getting informed consent, patient was brought into the procedure suite and was placed in a prone position on the procedure table. A Pause for the Cause was performed. Patient was prepped and draped in sterile fashion.     The T1-T2 interspace was identified with use of fluoroscopy in AP view. A 25-gauge, 1.5 inch needle was used to anesthetize the skin and subcutaneous  tissue entry site with a total of 2 ml of 1% lidocaine. Under fluoroscopic visualization, a 20-gauge, 3.5 inch Tuohy epidural needle was slowly advanced towards the epidural space a few millimeters left of midline. The latter part of the needle advancement was guided with fluoroscopy in the lateral view. The epidural space was identified using loss of resistance technique. After negative aspiration for heme and cerebrospinal fluid, a total of 1 mL of non-ionic contrast was injected to confirm needle placement. 0 mL of contrast was wasted. Epidurogram confirmed spread within the posterior epidural space. 2 ml of 10mg/ml of dexamethasone and 1 ml of preservative free 1% lidocaine was injected. The needle was removed.  Images were saved to PACS.    The patient tolerated the procedure well, and there was no evidence of procedural complications. No new sensory or motor deficits were noted following the procedure. The patient was stable and able to ambulate on discharge home. Post-procedure instructions were provided.     Pre-procedure pain score: 2/10 in the neck, 2/10 in the arm  Post-procedure pain score: 0/10 in the neck, 0/10 in the arm    Assessment/Plan: Julia Andre is a 74 year old female s/p cervical interlaminar epidural steroid injection today for cervical spondylosis and radiculitis/radiculopathy.     1. Following today's procedure, the patient was advised to contact the Pain Management Center for any of the following:   Fever, chills, or night sweats   New onset of pain, numbness, or weakness   Any questions/concerns regarding the procedure  If unable to contact the Pain Center, the patient was instructed to go to a local Emergency Room for any complications.   2. Follow-up with the referring provider in 2 weeks for post-procedure evaluation.    NIMISHA OSWALD MD   Pain Management

## 2025-01-08 ENCOUNTER — RADIOLOGY INJECTION OFFICE VISIT (OUTPATIENT)
Dept: PALLIATIVE MEDICINE | Facility: CLINIC | Age: 75
End: 2025-01-08
Payer: COMMERCIAL

## 2025-01-08 VITALS — SYSTOLIC BLOOD PRESSURE: 146 MMHG | OXYGEN SATURATION: 96 % | DIASTOLIC BLOOD PRESSURE: 79 MMHG | HEART RATE: 79 BPM

## 2025-01-08 DIAGNOSIS — M54.12 CERVICAL RADICULOPATHY: Primary | ICD-10-CM

## 2025-01-08 PROCEDURE — 62321 NJX INTERLAMINAR CRV/THRC: CPT | Performed by: ANESTHESIOLOGY

## 2025-01-08 RX ORDER — DEXAMETHASONE SODIUM PHOSPHATE 10 MG/ML
10 INJECTION, SOLUTION INTRAMUSCULAR; INTRAVENOUS ONCE
Status: COMPLETED | OUTPATIENT
Start: 2025-01-08 | End: 2025-01-08

## 2025-01-08 RX ADMIN — DEXAMETHASONE SODIUM PHOSPHATE 10 MG: 10 INJECTION, SOLUTION INTRAMUSCULAR; INTRAVENOUS at 11:15

## 2025-01-08 ASSESSMENT — PAIN SCALES - GENERAL
PAINLEVEL_OUTOF10: MILD PAIN (2)
PAINLEVEL_OUTOF10: NO PAIN (0)

## 2025-01-08 NOTE — NURSING NOTE
Discharge Information    IV Discontiued Time:  NA    Amount of Fluid Infused:  NA    Discharge Criteria = When patient returns to baseline or as per MD order    Consciousness:  Pt is fully awake    Circulation:  BP +/- 20% of pre-procedure level    Respiration:  Patient is able to breathe deeply    O2 Sat:  Patient is able to maintain O2 Sat >92% on room air    Activity:  Moves 4 extremities on command    Ambulation:  Patient is able to stand and walk or stand and pivot into wheelchair    Dressing:  Clean/dry or No Dressing    Notes:   Discharge instructions and AVS given to patient    Patient meets criteria for discharge?  YES    Admitted to PCU?  No    Responsible adult present to accompany patient home?  Yes    Signature/Title:    Samantha Adkins RN  RN Care Coordinator  [facility] [place]

## 2025-01-08 NOTE — NURSING NOTE
Pre-procedure Intake  If YES to any questions or NO to having a   Please complete laminated checklist and leave on the computer keyboard for Provider, verbally inform provider if able.    For SCS Trial, RFA's or any sedation procedure:  Have you been fasting? NA  If yes, for how long?     Are you taking any any blood thinners such as Coumadin, Warfarin, Jantoven, Pradaxa Xarelto, Eliquis, Edoxaban, Enoxaparin, Lovenox, Heparin, Arixtra, Fondaparinux, or Fragmin? OR Antiplatelet medication such as Plavix, Brilinta, or Effient?   No   If yes, when did you take your last dose?     Do you take aspirin?  Yes -   ASA  If cervical procedure, have you held aspirin for 6 days?   Yes    Is the Pt taking any GLP-1 Antagonist (hold needed for sedation patients only)  (semaglutide (Ozempic, Wegovy), dulaglutide (Trulicity), exenatide ER (Bydureon), tirzepatide (Mounjaro), Liraglutide (Saxenda, Victoza), semaglutide (Rybelsus)     NA  If yes, when did you take your last dose?     Do you have any allergies to contrast dye, iodine, steroid and/or numbing medications?  NO    Are you currently taking antibiotics or have an active infection?  NO    Have you had a fever/elevated temperature within the past week? NO    Are you currently taking oral steroids? NO    Do you have a ? Yes    Are you pregnant or breastfeeding?  Not Applicable    Have you received any vaccinations in the last week? NO    Notify provider and RNs if systolic BP >170, diastolic BP >100, P >100 or O2 sats < 90%     Macarena Stratton MA  [facility]

## 2025-01-08 NOTE — PATIENT INSTRUCTIONS
[facility] Procedure Discharge Instructions    Today you saw:   Dr. Alba Barroso     Your procedure:  Interlaminar Epidural steroid injection       Medications used:  Lidocaine (anesthetic)  Kenalog (steroid)  Omnipaque (contrast)        If you were holding your blood thinning medication, please restart taking it: N/A        Be cautious when walking as numbness and/or weakness in the legs may occur up to 6-8 hours after the procedure due to effect of the local anesthetic  Do not drive for 6 hours. The effect of the local anesthetic could slow your reflexes.   Avoid strenuous activity for the first 24 hours. You may resume your regular activities after that.   You may shower, however avoid swimming, tub baths or hot tubs for 24 hours following your procedure  You may have a mild to moderate increase in pain for several days following the injection.    You may use ice packs for 10-15 minutes, 3 to 4 times a day at the injection site for comfort  Do not use heat to painful areas for 6 to 8 hours. This will give the local anesthetic time to wear off and prevent you from accidentally burning your skin.  Unless you have been directed to avoid the use of anti-inflammatory medications (NSAIDS-ibuprofen, Aleve, Motrin), you may use these medications or Tylenol for pain control if needed.   With diabetes, check your blood sugar more frequently than usual as your blood sugar may be higher than normal for 10-14 days following a steroid injection. Contact your doctor who manages your diabetes if your blood sugar is higher than usual  Possible side effects of steroids that you may experience include flushing, elevated blood pressure, increased appetite, mild headaches and restlessness.  All of these symptoms will get better with time.  It may take up to 14 days for the steroid medication to start working although you may feel the effect as early as a few days after the procedure.   Follow up with your referring  provider in 2-3 weeks    If you experience any of the following, call the pain center line during work hours at [phone] or on-call physician after hours at [phone]:  -Fever over 100 degree F  -Swelling, bleeding, redness, drainage, warmth at the injection site  -Progressive weakness or numbness in your legs or arms  -Loss of bowel or bladder function  -Unusual headache that is not relieved by Tylenol or your regular headache medication  -Unusual new onset of pain that is not improving

## 2025-02-18 ENCOUNTER — MYC MEDICAL ADVICE (OUTPATIENT)
Dept: PHARMACY | Facility: CLINIC | Age: 75
End: 2025-02-18
Payer: COMMERCIAL

## 2025-02-18 ENCOUNTER — VIRTUAL VISIT (OUTPATIENT)
Dept: PHARMACY | Facility: CLINIC | Age: 75
End: 2025-02-18
Payer: COMMERCIAL

## 2025-02-18 DIAGNOSIS — F41.9 ANXIETY: ICD-10-CM

## 2025-02-18 DIAGNOSIS — I25.10 CORONARY ARTERY DISEASE INVOLVING NATIVE HEART WITHOUT ANGINA PECTORIS, UNSPECIFIED VESSEL OR LESION TYPE: ICD-10-CM

## 2025-02-18 DIAGNOSIS — Z71.85 VACCINE COUNSELING: ICD-10-CM

## 2025-02-18 DIAGNOSIS — K59.00 CONSTIPATION, UNSPECIFIED CONSTIPATION TYPE: ICD-10-CM

## 2025-02-18 DIAGNOSIS — R52 PAIN: ICD-10-CM

## 2025-02-18 DIAGNOSIS — F33.42 RECURRENT MAJOR DEPRESSIVE DISORDER, IN FULL REMISSION: ICD-10-CM

## 2025-02-18 DIAGNOSIS — E78.2 MIXED HYPERLIPIDEMIA: ICD-10-CM

## 2025-02-18 DIAGNOSIS — J31.0 CHRONIC RHINITIS: ICD-10-CM

## 2025-02-18 DIAGNOSIS — Z78.9 TAKES DIETARY SUPPLEMENTS: ICD-10-CM

## 2025-02-18 DIAGNOSIS — M85.852 OSTEOPENIA OF NECKS OF BOTH FEMURS: ICD-10-CM

## 2025-02-18 DIAGNOSIS — I10 ESSENTIAL HYPERTENSION: ICD-10-CM

## 2025-02-18 DIAGNOSIS — E11.22 TYPE 2 DIABETES MELLITUS WITH CHRONIC KIDNEY DISEASE, WITHOUT LONG-TERM CURRENT USE OF INSULIN, UNSPECIFIED CKD STAGE (H): Primary | ICD-10-CM

## 2025-02-18 DIAGNOSIS — I25.2 HX OF NON-ST ELEVATION MYOCARDIAL INFARCTION (NSTEMI): ICD-10-CM

## 2025-02-18 DIAGNOSIS — M85.851 OSTEOPENIA OF NECKS OF BOTH FEMURS: ICD-10-CM

## 2025-02-18 DIAGNOSIS — N18.31 STAGE 3A CHRONIC KIDNEY DISEASE (H): ICD-10-CM

## 2025-02-18 PROCEDURE — 99607 MTMS BY PHARM ADDL 15 MIN: CPT | Mod: 93

## 2025-02-18 PROCEDURE — 99605 MTMS BY PHARM NP 15 MIN: CPT | Mod: 93

## 2025-02-18 RX ORDER — VITAMIN B COMPLEX
1 TABLET ORAL DAILY
COMMUNITY

## 2025-02-18 RX ORDER — AMOXICILLIN 250 MG
2 CAPSULE ORAL DAILY
COMMUNITY

## 2025-02-18 RX ORDER — SIMETHICONE 125 MG
125 TABLET,CHEWABLE ORAL 4 TIMES DAILY PRN
COMMUNITY

## 2025-02-18 RX ORDER — DOXAZOSIN 1 MG/1
1 TABLET ORAL AT BEDTIME
COMMUNITY

## 2025-02-18 RX ORDER — BUPROPION HYDROCHLORIDE 150 MG/1
150 TABLET ORAL EVERY MORNING
Qty: 90 TABLET | Refills: 1 | Status: SHIPPED | OUTPATIENT
Start: 2025-02-18

## 2025-02-18 NOTE — PROGRESS NOTES
Medication Therapy Management (MTM) Encounter    ASSESSMENT:                            Medication Adherence/Access: No issues identified.    Diabetes   Patient is meeting A1c goal of < 7%.  Due to kidney function and history of A1c at goal, do not recommend restarting metformin right now. If A1c is higher on recheck at PCP appointment, may consider initiation of SGLT2i for blood glucose control.    Hypertension/CAD/Hx NSTEMI/CKD  Patient is meeting blood pressure goal of < 130/80mmHg per patient report.   Recommend restart aspirin due to NSTEMI history. Continue to follow with nephrology.     Hyperlipidemia   LDL not at goal (but close) <55 mg/dL per diabetes diagnosis and secondary prevention. May consider increase rosuvastatin dose at next visit.     Allergy   Recommend stop taking Afrin, but unable to come up with full plan today due to time. Patient will likely need combination of other nasal sprays to reduce rebound symptoms. Will discuss at next visit.     Constipation   Recommend patient increase Senna-S dose to 2 tablets daily as suggested by hospital for a goal of one soft stool daily.     Mental Health - Anxiety and Depression  Max dose bupropion for patient's kidney function is 150 mg daily. Recommend reduce dose and monitor symptoms. If symptoms worsen, may consider addition or switch to selective serotonin reuptake inhibitor like escitalopram (kidney dose adjustment required) or fluoxetine. May also consider retrying sertraline if desired by patient.     Supplements/Osteopenia  Recommend discontinue fish oil and multivitamin due to lack of benefit. Recommend start calcium supplement to reach calcium goal 1200 mg daily.     Pain  May consider decrease gabapentin dose in future if patient continues to not find beneficial.     Immunizations   Due for Tdap and RSV vaccine(s) per CDC/ACIP recommendations. Patient is agreeable to plan.       PLAN:                            Heart Health  Restart taking  aspirin 81 mg daily.    Mental Health   Decrease bupropion XL to 150 mg daily.  If symptoms worsen, we can talk about other options at next visit.     Constipation  Increase Senna-S to 2 tablets once daily for a goal of 1 soft stool daily.     Supplements/Bone Health  Stop taking fish oil and multivitamin  Start taking calcium carbonate 600 mg twice daily. Ok if supplement you get has vitamin D in it too.     Immunizations  Due for RSV and tetanus booster. RSV better covered at retail pharmacy.     Follow-up: Return in about 6 weeks (around 4/1/2025) for Medication Therapy Management.    SUBJECTIVE/OBJECTIVE:                          Julia Andre is a 74 year old female seen for a yearly medication review. She was referred to me from insurance plan. Accompanied today by .     Reason for visit: Recent hopsitalization     Allergies/ADRs: Reviewed in chart  Past Medical History: Reviewed in chart  Tobacco: She reports that she quit smoking about 31 years ago. Her smoking use included cigarettes. She started smoking about 58 years ago. She has a 67.5 pack-year smoking history. She has never used smokeless tobacco.  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  Per patient, misses medication 0 time(s) per week.   The patient fills medications at [facility]: NO, fills medications at [facility].    Diabetes   No current medication     Was on Ozempic for about 4 months   Hospitalized last week in [place] for severe constipation, and stayed there for several days. Ozempic was stopped and she was told not to restart it. Appetite and energy have been down since hospitalization (discharged 2/15 per patient).   Reports she did not lose any weight while on Ozempic. She also used to be on metformin prior to Ozempic start, and is wondering if she needs to restart it.   Current diabetes symptoms: none  Diet/Exercise: not discussed due to time     Blood sugar monitoring: never  Eye exam in  "the last 12 months? No  Foot exam is up to date    Hypertension /CAD/Hx NSTEMI/CKD  Amlodipine 10 mg daily in the morning   Metoprolol tartrate 50 mg twice daily   Doxazosin 1 mg daily in the evening   Aspirin 81 mg daily - stopped because she ran out and is wondering if she should restart.    Patient reports no current medication side effects. Denies edema or dizziness.   Patient self monitors blood pressure.  Home BP monitoring 110-120s/70s . Resting HR average 60-80s.  Follows with nephrology. Recently stopped lisinopril.      Hyperlipidemia   Rosuvastatin 20 mg daily   Patient reports no significant myalgias or other side effects.     Allergy   Afrin nasal spray one spray every night before bed    Patient reports she has been using Afrin daily \"for as long as she can remember.\" If she forgets to take it, she will wake up in the middle of the night with such a stuffy nose that she can't breathe.  No other allergy symptoms or side effects.      Constipation   Miralax 1 capful daily in the morning  Senna-S 1 tablet in the morning   Simethicone 1 tablet daily     Since discharging from hospital, this has been her regimen, though she thinks she was prescribed to take 2 senna tablets daily. She has had two bowel movements since discharge on Saturday (4D ago). She reports that constipation has been an issue for most of her life and she did not think it got worse on Ozempic until her hospitalization.   Patient feels that current therapy is somewhat effective.   Patient reports no current medication side effects.       Mental Health   Anxiety and Depression  Bupropion  mg daily   Lorazepam 0.5 mg as needed - only takes for imaging procedures    No side effects.   Patient is unsure that bupropion is even helpful. Feels tiredness and lack of motivation to things sometimes. Anxiety has been better. Her biggest stressor is her history with ovarian cancer s/p chemo and radiation. Wondering about switching to Lexapro, as " she has heard this is helpful for people.  Previously tried: sertraline (very effective, negative sexual side effects)     Supplements /Osteopenia  Multivitamin tablet daily   Biotin daily for hair - patient prefers to continue  D3 daily   Fish oil twice daily     No reported issues at this time. Patient is interested in stopping/changing supplements.   DEXA history 06/2023 showing osteopenia and FRAX 11% major and 2.3% hip fracture.   Calcium in diet: minimal      Pain  Gabapentin 600 mg every night at bedtime     Side effects: has noticed she has been sleeping more in the past few months but does not correlate this with gabapentin use.  Not sure if it is very helpful for pain, but does not use anything else.   Radiculopathy, neuropathy. 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  Gets regular cortisone shots in her shoulder.  Following sports medicine.     Immunizations   Most Recent Immunizations   Administered Date(s) Administered    COVID-19 12+ (MODERNA) 09/16/2024    COVID-19 12+ (Pfizer) 10/27/2023    COVID-19 MONOVALENT 12+ (Pfizer) 09/27/2021    COVID-19 Monovalent 12+ (Pfizer 2022) 04/18/2022    Influenza (High Dose) Trivalent,PF (Fluzone) 09/16/2024    Influenza Vaccine 65+ (FLUAD) 09/22/2020    Influenza Vaccine 65+ (Fluzone HD) 10/27/2023    Pneumo Conj 13-V (2010&after) 12/15/2015    Pneumococcal 23 valent 12/19/2018    TDAP (Adacel,Boostrix) 09/19/2011    Zoster recombinant adjuvanted (SHINGRIX) 08/08/2022    Zoster vaccine, live 09/25/2013     ----------------  Post Discharge Medication Reconciliation Status: discharge medications reconciled and changed, per note/orders.    I spent 60 minutes with this patient today. All changes were made via collaborative practice agreement with Johanny Diaz MD.     A summary of these recommendations was sent via [employer].    Vilma CardonaD   Medication Therapy Management   Pharmacy Resident  Pager: [phone]    Julia Andre was seen independently  by Dr. Jayne Us.  I have reviewed and agree with the resident note and plan of care.      Natacha Rosenberg, PharmD, BCACP  Medication Therapy Management Provider, [facility]  [phone]    Telemedicine Visit Details  The patient's medications can be safely assessed via a telemedicine encounter.  Type of service:  Telephone visit  Originating Location (pt. Location): Home    Distant Location (provider location):  On-site  Start Time:  1:30 PM  End Time:  2:30 PM     Medication Therapy Recommendations  Constipation   1 Current Medication: senna-docusate (SENOKOT-S/PERICOLACE) 8.6-50 MG tablet   Current Medication Sig: Take 2 tablets by mouth daily.   Rationale: Dose too low - Dosage too low - Effectiveness   Recommendation: Increase Dose   Status: Accepted - no CPA Needed   Identified Date: 2/18/2025 Completed Date: 2/18/2025   Note: take 2 tablets by mouth daily         Depression   1 Current Medication: buPROPion (WELLBUTRIN XL) 300 MG 24 hr tablet (Discontinued)   Current Medication Sig: Take 1 tablet (300 mg) by mouth every morning   Rationale: Dose too high - Dosage too high - Safety   Recommendation: Decrease Dose - buPROPion 150 MG 24 hr tablet   Status: Accepted per CPA   Identified Date: 2/18/2025 Completed Date: 2/18/2025         Hx of non-ST elevation myocardial infarction (NSTEMI)   1 Current Medication: aspirin 81 MG EC tablet   Current Medication Sig: Take 81 mg by mouth daily   Rationale: Patient prefers not to take - Adherence - Adherence   Recommendation: Provide Education   Status: Patient Agreed - Adherence/Education   Identified Date: 2/18/2025   Note: restart         Osteopenia   1 Rationale: Untreated condition - Needs additional medication therapy - Indication   Recommendation: Start Medication - calcium carbonate 600 MG tablet   Status: Accepted - no CPA Needed   Identified Date: 2/18/2025 Completed Date: 2/18/2025         Takes dietary supplements   1 Current  Medication: Multiple Vitamin (MULTIVITAMIN PO)   Current Medication Sig: Take 1 tablet by mouth every morning   Rationale: No medical indication at this time - Unnecessary medication therapy - Indication   Recommendation: Discontinue Medication   Status: Accepted - no CPA Needed   Identified Date: 2/18/2025 Completed Date: 2/18/2025         Vaccine counseling   1 Rationale: Preventive therapy - Needs additional medication therapy - Indication   Recommendation: Order Vaccine - Tdap (tetanus-diphtheria-acell pertussis) 5-2-15.5 LF-MCG/0.5 injection   Status: Accepted - no CPA Needed   Identified Date: 2/18/2025 Completed Date: 2/18/2025

## 2025-02-18 NOTE — LETTER
_  Medication List        Prepared on: Feb 18, 2025     Bring your Medication List when you go to the doctor, hospital, or   emergency room. And, share it with your family or caregivers.     Note any changes to how you take your medications.  Cross out medications when you no longer use them.    Medication How I take it Why I use it Prescriber   amLODIPine (NORVASC) 10 MG tablet Take 1 tablet (10 mg) by mouth daily. Essential Hypertension Paula Garcia PA-C   aspirin 81 MG EC tablet Take 81 mg by mouth daily Secondary prevention Patient Reported   BIOTIN tablet Take 1 tablet by mouth every morning General health Patient Reported   buPROPion (WELLBUTRIN XL) 150 MG 24 hr tablet Take 1 tablet (150 mg) by mouth every morning. Recurrent major depressive disorder, in full remission Johanny Diaz MD   doxazosin (CARDURA) 1 MG tablet Take 1 mg by mouth at bedtime. Hypertension  Shekhar Block MD    gabapentin (NEURONTIN) 300 MG capsule Take 1 capsule by mouth twice daily Arthritis of right glenohumeral joint; Cervical Radiculopathy Anil Roman DO   LORazepam (ATIVAN) 0.5 MG tablet Take 1 tablet (0.5 mg) by mouth as needed for anxiety Anxiety Therese Vanessa PA-C   metoprolol tartrate (LOPRESSOR) 50 MG tablet Take 1 tablet (50 mg) by mouth 2 times daily Essential Hypertension Therese Vanessa PA-C   Multiple Vitamin (MULTIVITAMIN PO) Take 1 tablet by mouth every morning General Health  Patient Reported   oxymetazoline (AFRIN) 0.05 % nasal spray Spray 2 sprays into both nostrils daily as needed for congestion Rhinitis Patient Reported   polyethylene glycol (MIRALAX) 17 GM/Dose powder Take 1 Capful by mouth daily. Constipation Patient Reported   rosuvastatin (CRESTOR) 20 MG tablet Take 1 tablet (20 mg) by mouth daily Mixed Hyperlipidemia Therese Vanessa PA-C   senna-docusate (SENOKOT-S/PERICOLACE) 8.6-50 MG tablet Take 2 tablets by mouth daily. Constipation Patient Reported   simethicone (MYLICON) 125 MG  chewable tablet Take 125 mg by mouth 4 times daily as needed for intestinal gas. Gas Patient Reported   Vitamin D3 (CHOLECALCIFEROL) 25 mcg (1000 units) tablet Take 1 tablet by mouth daily. General health Patient Reported         Add new medications, over-the-counter drugs, herbals, vitamins, or  minerals in the blank rows below.    Medication How I take it Why I use it Prescriber                                      Allergies:      - Atorvastatin - Muscle Pain (Myalgia)  - Simvastatin  - Sulfa Antibiotics        Side effects I have had:      Not on File        Other Information:              My notes and questions:

## 2025-02-18 NOTE — Clinical Note
Reid Diaz!  ELISABETH Saw Julia yesterday, whom I do not think you have met yet. She was one of Therese's patients (scheduled with you in May). Made a few smaller changes today to optimize therapy. Planning to continue following.   Jayne

## 2025-02-18 NOTE — PATIENT INSTRUCTIONS
"Recommendations from today's MTM visit:                                                      Heart Health  Restart taking aspirin 81 mg daily.    Mental Health   Decrease bupropion XL to 150 mg daily.  If symptoms worsen, we can talk about other options at next visit.     Constipation  Increase Senna-S to 2 tablets once daily for a goal of 1 soft stool daily.     Supplements/Bone Health  Stop taking fish oil and multivitamin  Start taking calcium carbonate 600 mg twice daily. Ok if supplement you get has vitamin D in it too.     Immunizations  Due for RSV and tetanus booster. RSV better covered at retail pharmacy.     Follow up:  4/1/25 with MTM pharmacist    It was great speaking with you today.  I value your experience and would be very thankful for your time in providing feedback in our clinic survey. In the next few days, you may receive an email or text message from [employer] with a link to a survey related to your  clinical pharmacist.\"     To schedule another MTM appointment, please call the clinic directly or you may call the MTM scheduling line at [phone] or toll-free at [phone].     My Clinical Pharmacist's contact information:                                                      Please feel free to contact me with any questions or concerns you have.      Jayne Us, PharmD   Medication Therapy Management   Pharmacy Resident  Pager: [phone]     "

## 2025-02-18 NOTE — LETTER
February 19, 2025  Julia Andre  [id number] [facility] [address]    Dear Ms. Andre, [facility]     Thank you for talking with me on Feb 18, 2025 about your health and medications. As a follow-up to our conversation, I have included two documents:      Your Recommended To-Do List has steps you should take to get the best results from your medications.  Your Medication List will help you keep track of your medications and how to take them.    If you want to talk about these documents, please call Magui Us RPH at phone: [phone], Monday-Friday 8-4:30pm.    I look forward to working with you and your doctors to make sure your medications work well for you.    Sincerely,  Jayne Us RPH  [facility] Pharmacist, [facility]

## 2025-02-18 NOTE — LETTER
"Recommended To-Do List      Prepared on: Feb 18, 2025       You can get the best results from your medications by completing the items on this \"To-Do List.\"      Bring your To-Do List when you go to your doctor. And, share it with your family or caregivers.    My To-Do List:  What we talked about: What I should do:   The importance of taking your medication as intended    Restart taking aspirin 81 mg daily for heart health           What we talked about: What I should do:   Your medication dosage being too low    Increase your dosage of senna-docusate (SENOKOT-S/PERICOLACE)          What we talked about: What I should do:   Your medication dosage being too high    Decrease your dosage of buPROPion (WELLBUTRIN XL)          What we talked about: What I should do:   A new medication that may be of benefit to you    Start taking calcium carbonate (OS-RADHA)          What we talked about: What I should do:   A medication that you may no longer need    Stop taking MULTIVITAMIN BY MOUTH and fish oil           What we talked about: What I should do:   A vaccine that may be of benefit to you    Get the following vaccine(s): Tdap and RSV           What we talked about: What I should do:                     "

## 2025-02-24 ENCOUNTER — OFFICE VISIT (OUTPATIENT)
Dept: ORTHOPEDICS | Facility: CLINIC | Age: 75
End: 2025-02-24
Payer: COMMERCIAL

## 2025-02-24 DIAGNOSIS — M54.12 CERVICAL RADICULOPATHY: Primary | ICD-10-CM

## 2025-02-24 DIAGNOSIS — M48.02 FORAMINAL STENOSIS OF CERVICAL REGION: ICD-10-CM

## 2025-02-24 DIAGNOSIS — M47.22 CERVICAL SPONDYLOSIS WITH RADICULOPATHY: ICD-10-CM

## 2025-02-24 PROCEDURE — 99214 OFFICE O/P EST MOD 30 MIN: CPT | Performed by: FAMILY MEDICINE

## 2025-02-24 RX ORDER — METHYLPREDNISOLONE 4 MG/1
TABLET ORAL
Qty: 21 TABLET | Refills: 0 | Status: SHIPPED | OUTPATIENT
Start: 2025-02-24

## 2025-02-24 NOTE — LETTER
2/24/2025      Julia Andre  [id number] [facility] [address]      Dear Colleague,    Thank you for referring your patient, Julia Andre, to the [facility] [place]. Please see a copy of my visit note below.    ASSESSMENT & PLAN  Patient Instructions     1. Cervical radiculopathy    2. Foraminal stenosis of cervical region    3. Cervical spondylosis with radiculopathy      -Patient is following up for acute on chronic neck pain which is now radiating to the left shoulder due to multilevel arthritis and degenerative disc disease causing cervical stenosis  -Patient previously had neck pain rating down the right arm but now she is having pain radiating to the left shoulder  -Patient has multiple levels of moderate to severe foraminal stenosis to the left  -Patient will start an oral steroid taper.  Prescription was sent to her pharmacy today  -Referral was placed to pain management for comprehensive consultation and further treatment including epidural steroid injection  -If patient cannot get into our pain management providers in a timely fashion, she may call her insurance company and ask which pain management providers are in network to her.  We can fax a referral to any provider as directed  -Patient will also start formal physical therapy home exercise program to stabilize her spine and offload the nerve roots  -Call direct clinic number [[phone]] at any time with questions or concerns.    Albert Yeo MD [facility] and [facility] Medicine  [facility]        -----    SUBJECTIVE:  Julia Andre is a 74 year old female who is seen in follow-up for right shoulder and cervical radiculopathy.They were last seen 11/12/2024.     Since their last visit reports 75% improvement since injection in January with pain. They indicate that their current pain level is 7/10. They have tried heat, T1-T2 interlaminar epidural steroid injection with  pain (most recent date: 1/8/25), Voltaren gel, Tylenol.      She notes her right shoulder pain has improved since the injection with pain, but she has recently developed pain in her cervical spine that radiates to the left shoulder. She has not discussed this with pain.    The patient is seen by themselves.    Patient's past medical, surgical, social, and family histories were reviewed today and exceptions are as follows: recent hospitalization unrelated to the orthopedic concerns she is here for today.    REVIEW OF SYSTEMS:  Constitutional: NEGATIVE for fever, chills, change in weight  Skin: NEGATIVE for worrisome rashes, moles or lesions  GI/: NEGATIVE for bowel or bladder changes  Neuro: NEGATIVE for weakness, dizziness or paresthesias    OBJECTIVE:  There were no vitals taken for this visit.   General: healthy, alert and in no distress  HEENT: no scleral icterus or conjunctival erythema  Skin: no suspicious lesions or rash. No jaundice.  CV: regular rhythm by palpation, no pedal edema  Resp: normal respiratory effort without conversational dyspnea   Psych: normal mood and affect  Gait: normal steady gait with appropriate coordination and balance  Neuro: normal light touch sensory exam of the extremities.    MSK:  CERVICAL SPINE  Inspection:    No redness, swelling, ecchymosis, overlying skin change, or gross deformity/asymmetry, normal cervical lordosis present  Palpation:  landmarks and nontender.  Range of Motion:     Flexion within normal limits    Extension within normal limits    Right side bend limited by tightness    Left side bend limited by tightness  Strength:    Full strength throughout all neck muscles  Special Tests:    Positive: None    Negative: Spurling's (bilateral)    Independent visualization of the below image:        Albert Yeo MD, [facility]        Again, thank you for allowing me to participate in the care of your patient.        Sincerely,        Stevenson  Yeo, MD    Electronically signed

## 2025-02-24 NOTE — PROGRESS NOTES
ASSESSMENT & PLAN  Patient Instructions     1. Cervical radiculopathy    2. Foraminal stenosis of cervical region    3. Cervical spondylosis with radiculopathy      -Patient is following up for acute on chronic neck pain which is now radiating to the left shoulder due to multilevel arthritis and degenerative disc disease causing cervical stenosis  -Patient previously had neck pain rating down the right arm but now she is having pain radiating to the left shoulder  -Patient has multiple levels of moderate to severe foraminal stenosis to the left  -Patient will start an oral steroid taper.  Prescription was sent to her pharmacy today  -Referral was placed to pain management for comprehensive consultation and further treatment including epidural steroid injection  -If patient cannot get into our pain management providers in a timely fashion, she may call her insurance company and ask which pain management providers are in network to her.  We can fax a referral to any provider as directed  -Patient will also start formal physical therapy home exercise program to stabilize her spine and offload the nerve roots  -Call direct clinic number [[phone]] at any time with questions or concerns.    Albert Yeo MD [facility] Orthopedics and Sports Medicine  [facility] [place]        -----    SUBJECTIVE:  Julia Andre is a 74 year old female who is seen in follow-up for right shoulder and cervical radiculopathy.They were last seen 11/12/2024.     Since their last visit reports 75% improvement since injection in January with pain. They indicate that their current pain level is 7/10. They have tried heat, T1-T2 interlaminar epidural steroid injection with pain (most recent date: 1/8/25), Voltaren gel, Tylenol.      She notes her right shoulder pain has improved since the injection with pain, but she has recently developed pain in her cervical spine that radiates to the left shoulder. She has not discussed this  with pain.    The patient is seen by themselves.    Patient's past medical, surgical, social, and family histories were reviewed today and exceptions are as follows: recent hospitalization unrelated to the orthopedic concerns she is here for today.    REVIEW OF SYSTEMS:  Constitutional: NEGATIVE for fever, chills, change in weight  Skin: NEGATIVE for worrisome rashes, moles or lesions  GI/: NEGATIVE for bowel or bladder changes  Neuro: NEGATIVE for weakness, dizziness or paresthesias    OBJECTIVE:  There were no vitals taken for this visit.   General: healthy, alert and in no distress  HEENT: no scleral icterus or conjunctival erythema  Skin: no suspicious lesions or rash. No jaundice.  CV: regular rhythm by palpation, no pedal edema  Resp: normal respiratory effort without conversational dyspnea   Psych: normal mood and affect  Gait: normal steady gait with appropriate coordination and balance  Neuro: normal light touch sensory exam of the extremities.    MSK:  CERVICAL SPINE  Inspection:    No redness, swelling, ecchymosis, overlying skin change, or gross deformity/asymmetry, normal cervical lordosis present  Palpation:  landmarks and nontender.  Range of Motion:     Flexion within normal limits    Extension within normal limits    Right side bend limited by tightness    Left side bend limited by tightness  Strength:    Full strength throughout all neck muscles  Special Tests:    Positive: None    Negative: Spurling's (bilateral)    Independent visualization of the below image:        Albert Yeo MD, [facility]

## 2025-02-24 NOTE — PATIENT INSTRUCTIONS
1. Cervical radiculopathy    2. Foraminal stenosis of cervical region    3. Cervical spondylosis with radiculopathy      -Patient is following up for acute on chronic neck pain which is now radiating to the left shoulder due to multilevel arthritis and degenerative disc disease causing cervical stenosis  -Patient previously had neck pain rating down the right arm but now she is having pain radiating to the left shoulder  -Patient has multiple levels of moderate to severe foraminal stenosis to the left  -Patient will start an oral steroid taper.  Prescription was sent to her pharmacy today  -Referral was placed to pain management for comprehensive consultation and further treatment including epidural steroid injection  -If patient cannot get into our pain management providers in a timely fashion, she may call her insurance company and ask which pain management providers are in network to her.  We can fax a referral to any provider as directed  -Patient will also start formal physical therapy home exercise program to stabilize her spine and offload the nerve roots  -Call direct clinic number [[phone]] at any time with questions or concerns.    Albert Yeo MD [facility]  [facility] [place]

## 2025-03-04 ENCOUNTER — TRANSFERRED RECORDS (OUTPATIENT)
Dept: HEALTH INFORMATION MANAGEMENT | Facility: CLINIC | Age: 75
End: 2025-03-04
Payer: COMMERCIAL

## 2025-03-10 ENCOUNTER — TELEPHONE (OUTPATIENT)
Dept: MEDSURG UNIT | Facility: CLINIC | Age: 75
End: 2025-03-10
Payer: COMMERCIAL

## 2025-03-10 DIAGNOSIS — Z00.6 EXAMINATION OF PARTICIPANT IN CLINICAL TRIAL: ICD-10-CM

## 2025-03-10 DIAGNOSIS — C56.1 MALIGNANT NEOPLASM OF RIGHT OVARY (H): Primary | ICD-10-CM

## 2025-03-11 ENCOUNTER — HOSPITAL ENCOUNTER (OUTPATIENT)
Facility: CLINIC | Age: 75
Discharge: HOME | End: 2025-03-11
Admitting: OBSTETRICS & GYNECOLOGY
Payer: COMMERCIAL

## 2025-03-11 ENCOUNTER — HOSPITAL ENCOUNTER (OUTPATIENT)
Dept: CT IMAGING | Facility: CLINIC | Age: 75
Discharge: HOME | End: 2025-03-11
Attending: OBSTETRICS & GYNECOLOGY | Admitting: OBSTETRICS & GYNECOLOGY
Payer: COMMERCIAL

## 2025-03-11 ENCOUNTER — HOSPITAL ENCOUNTER (OUTPATIENT)
Dept: CARDIOLOGY | Facility: CLINIC | Age: 75
Discharge: HOME | End: 2025-03-11
Attending: OBSTETRICS & GYNECOLOGY
Payer: COMMERCIAL

## 2025-03-11 DIAGNOSIS — C56.1 MALIGNANT NEOPLASM OF RIGHT OVARY (H): ICD-10-CM

## 2025-03-11 DIAGNOSIS — Z00.6 ENCOUNTER FOR EXAMINATION FOR NORMAL COMPARISON OR CONTROL IN CLINICAL RESEARCH PROGRAM: ICD-10-CM

## 2025-03-11 DIAGNOSIS — Z00.6 EXAMINATION OF PARTICIPANT IN CLINICAL TRIAL: ICD-10-CM

## 2025-03-11 LAB
ATRIAL RATE - MUSE: 64 BPM
ATRIAL RATE - MUSE: 65 BPM
ATRIAL RATE - MUSE: 65 BPM
CREAT BLD-MCNC: 1.1 MG/DL (ref 0.5–1)
DIASTOLIC BLOOD PRESSURE - MUSE: NORMAL MMHG
EGFRCR SERPLBLD CKD-EPI 2021: 52 ML/MIN/1.73M2
INTERPRETATION ECG - MUSE: NORMAL
P AXIS - MUSE: 35 DEGREES
P AXIS - MUSE: 36 DEGREES
P AXIS - MUSE: 41 DEGREES
PR INTERVAL - MUSE: 224 MS
PR INTERVAL - MUSE: 224 MS
PR INTERVAL - MUSE: 228 MS
QRS DURATION - MUSE: 88 MS
QRS DURATION - MUSE: 88 MS
QRS DURATION - MUSE: 90 MS
QT - MUSE: 398 MS
QT - MUSE: 402 MS
QT - MUSE: 404 MS
QTC - MUSE: 410 MS
QTC - MUSE: 418 MS
QTC - MUSE: 420 MS
R AXIS - MUSE: -13 DEGREES
R AXIS - MUSE: -14 DEGREES
R AXIS - MUSE: -16 DEGREES
RADIOLOGIST FLAGS: ABNORMAL
SYSTOLIC BLOOD PRESSURE - MUSE: NORMAL MMHG
T AXIS - MUSE: 12 DEGREES
T AXIS - MUSE: 13 DEGREES
T AXIS - MUSE: 15 DEGREES
VENTRICULAR RATE- MUSE: 64 BPM
VENTRICULAR RATE- MUSE: 65 BPM
VENTRICULAR RATE- MUSE: 65 BPM

## 2025-03-11 PROCEDURE — 93005 ELECTROCARDIOGRAM TRACING: CPT

## 2025-03-11 PROCEDURE — 74177 CT ABD & PELVIS W/CONTRAST: CPT

## 2025-03-11 PROCEDURE — 70460 CT HEAD/BRAIN W/DYE: CPT

## 2025-03-11 PROCEDURE — 250N000011 HC RX IP 250 OP 636: Performed by: OBSTETRICS & GYNECOLOGY

## 2025-03-11 PROCEDURE — 82565 ASSAY OF CREATININE: CPT

## 2025-03-11 PROCEDURE — 250N000011 HC RX IP 250 OP 636: Performed by: PHYSICIAN ASSISTANT

## 2025-03-11 PROCEDURE — 250N000009 HC RX 250: Performed by: OBSTETRICS & GYNECOLOGY

## 2025-03-11 PROCEDURE — 71260 CT THORAX DX C+: CPT

## 2025-03-11 PROCEDURE — 999N000154 HC STATISTIC RADIOLOGY XRAY, US, CT, MAR, NM

## 2025-03-11 RX ORDER — HEPARIN SODIUM,PORCINE 10 UNIT/ML
5-10 VIAL (ML) INTRAVENOUS
Status: DISCONTINUED | OUTPATIENT
Start: 2025-03-11 | End: 2025-03-11 | Stop reason: HOSPADM

## 2025-03-11 RX ORDER — IOPAMIDOL 755 MG/ML
82 INJECTION, SOLUTION INTRAVASCULAR ONCE
Status: COMPLETED | OUTPATIENT
Start: 2025-03-11 | End: 2025-03-11

## 2025-03-11 RX ORDER — HEPARIN SODIUM (PORCINE) LOCK FLUSH IV SOLN 100 UNIT/ML 100 UNIT/ML
5-10 SOLUTION INTRAVENOUS
Status: DISCONTINUED | OUTPATIENT
Start: 2025-03-11 | End: 2025-03-11 | Stop reason: HOSPADM

## 2025-03-11 RX ORDER — HEPARIN SODIUM,PORCINE 10 UNIT/ML
5-10 VIAL (ML) INTRAVENOUS EVERY 24 HOURS
Status: DISCONTINUED | OUTPATIENT
Start: 2025-03-11 | End: 2025-03-11 | Stop reason: HOSPADM

## 2025-03-11 RX ADMIN — SODIUM CHLORIDE 60 ML: 9 INJECTION, SOLUTION INTRAVENOUS at 08:58

## 2025-03-11 RX ADMIN — IOPAMIDOL 82 ML: 755 INJECTION, SOLUTION INTRAVENOUS at 08:58

## 2025-03-11 RX ADMIN — HEPARIN SODIUM (PORCINE) LOCK FLUSH IV SOLN 100 UNIT/ML 5 ML: 100 SOLUTION at 09:11

## 2025-03-11 ASSESSMENT — ACTIVITIES OF DAILY LIVING (ADL): ADLS_ACUITY_SCORE: 48

## 2025-03-11 NOTE — PROGRESS NOTES
Care Suites Admission Nursing Note    Patient Information  Name: Julia Andre  Age: 74 year old  Reason for admission: Myelogram/Lumbar Spine  Care Suites arrival time: 0800    Visitor Information  Name: Santo -      Patient Admission/Assessment   Pre-procedure assessment complete: Yes  If abnormal assessment/labs, provider notified: N/A  NPO: N/A  Medications held per instructions/orders: N/A  Consent: deferred  If applicable, pregnancy test status: deferred  Patient oriented to room: Yes  Education/questions answered: Yes  Plan/other: Proceed per orders    Discharge Planning  Discharge name/phone number: Santo [phone]  Overnight post sedation caregiver:   Discharge location: home    Jojo Villela RN

## 2025-03-11 NOTE — PROGRESS NOTES
Accessed Pt Port without issue. Blood return removed for INR check via [employer]. INR in normal range. Saline locked post draw.  De accessed Pt without issue. No phlebitis or extravasation.

## 2025-03-15 ENCOUNTER — MYC MEDICAL ADVICE (OUTPATIENT)
Dept: PHARMACY | Facility: CLINIC | Age: 75
End: 2025-03-15
Payer: COMMERCIAL

## 2025-03-15 ENCOUNTER — HEALTH MAINTENANCE LETTER (OUTPATIENT)
Age: 75
End: 2025-03-15

## 2025-03-16 DIAGNOSIS — M19.011 ARTHRITIS OF RIGHT GLENOHUMERAL JOINT: ICD-10-CM

## 2025-03-16 DIAGNOSIS — M54.12 CERVICAL RADICULOPATHY: ICD-10-CM

## 2025-03-27 ENCOUNTER — TRANSFERRED RECORDS (OUTPATIENT)
Dept: HEALTH INFORMATION MANAGEMENT | Facility: CLINIC | Age: 75
End: 2025-03-27
Payer: COMMERCIAL

## 2025-03-28 ENCOUNTER — ANCILLARY ORDERS (OUTPATIENT)
Dept: CT IMAGING | Facility: CLINIC | Age: 75
End: 2025-03-28
Payer: COMMERCIAL

## 2025-03-28 DIAGNOSIS — C56.1 OVARIAN CANCER ON RIGHT (H): Primary | ICD-10-CM

## 2025-03-28 DIAGNOSIS — Z00.6 CLINICAL TRIAL PARTICIPANT: ICD-10-CM

## 2025-03-31 NOTE — PROGRESS NOTES
"Medication Therapy Management (MTM) Encounter    ASSESSMENT:                            Medication Adherence/Access: No issues identified.    Carcinoma of Right Ovary/Jugular DVT  Recommend patient see radiology for vaginal bleeding as planned by oncology. Continue to follow oncology and closely monitor hemoglobin. Concern for bleeding, low hemoglobin, and fatigue. May consider iron studies to assess for anemia. Will discuss with Dr. Diaz.     Diabetes   Patient is meeting A1c goal of < 7%.  Due to kidney function and history of A1c at goal, do not recommend taking metformin right now. If A1c is higher on recheck at PCP appointment, may consider initiation of SGLT2i for blood glucose control.     Hypertension/CAD/Hx NSTEMI/CKD  Patient is meeting blood pressure goal of < 130/80mmHg per patient report, but due for in clinic check, can do at upcoming PCP visit. Continue to follow with nephrology.      Hyperlipidemia   LDL not at goal (but close) <55 mg/dL per diabetes diagnosis and secondary prevention. May consider increase rosuvastatin dose at next visit.      Allergy   Recommend stop taking Afrin, but unable to discuss with full plan today due to time. Recommend switch to fluticasone nasal spray for congestion when discontinuing Afrin, may also use as needed saline nasal spray and/or antihistamine nasal spray for further symptom management.      Constipation   Stable, continue current regimen.      Mental Health - Anxiety, Depression, insomnia   Ok to continue bupropion at 300 mg daily due to kidney function being almost to cutoff per UpToDate: \"CrCl 15 to 60 mL/minute: Use with caution; consider a maximum daily dose of 150 mg/day.\" Discussed need for as needed anxiety medication, patient would prefer to defer at this time. Could consider addition of low-dose hydroxyzine in future. Recommend stop Benadryl due to side effect risk and little efficacy.      Supplements/Osteopenia/Hair Loss  Recommend increase " calcium to total daily intake goal 1200 mg/day. With calcium tablet also containing vitamin D, will be getting recommended daily intake 800 units daily; can discontinue additional vitamin D tablet.      Pain  Stable, continue current regimen.     PLAN:                              Anticoagulation  Finish current supply of enoxaparin, then switch to Eliquis 5 mg twice daily as planned.   See radiology tomorrow for assessment of vaginal bleeding    Blood Sugars  Stop taking Metformin    Sleep  Stop Benadryl    Supplements  Increase calcium/vitamin D to 600mg/10mcg twice daily   Stop taking Vitamin D supplement    Labs to get at PCP visit in May: get A1c and BMP    Follow-up: Return in about 5 weeks (around 5/5/2025) for Visit with your primary care provider and 6/5/25 with [facility] pharmacist .    SUBJECTIVE/OBJECTIVE:                          Julia Andre is a 74 year old female seen for a follow-up visit.       Reason for visit: Recent anticoagulation start/changes    Allergies/ADRs: Reviewed in chart  Past Medical History: Reviewed in chart  Tobacco: She reports that she quit smoking about 31 years ago. Her smoking use included cigarettes. She started smoking about 58 years ago. She has a 67.5 pack-year smoking history. She has never used smokeless tobacco.  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  Per patient, misses medication 0 time(s) per week.   The patient fills medications at [facility]: NO, fills medications at [facility].    Carcinoma of Right Ovary/Jugular DVT  Enoxaparin 80 mg twice daily - has about 8 days left   Eliquis 5 mg twice daily - planning to start after finishing enoxaparin  Rinatabart Sesutecan infusions - part of investigational drug study, FR alpha andibody-drug conjugate  Zarxio (Fligrastim-sndz) subcutaneous 480 mcg subcutaneous daily M-F except chemo days    Side effects: increased vaginal bleeding, fatigue  Was started on enoxaparin around  "3/11/25 for DVT that was incidentally found upon workup for investigational drug study enrollment. After starting enoxaparin noticed increased vaginal spotting, which she previously had some spotting, but not as severe. She also has a history of low hemoglobin. Oncology assessed her per note 3/27/25 and referred her to radiologist for further workup, whom she is seeing tomorrow. Has some bruising near injection sites and on her arms, but this is normal for her. Denies other symptoms of bleeding.   Following closely with [place] oncology, is currently taking part in a research study for new investigational drug targeting platinum-resistant ovarian cancer. Getting weekly labs with oncology.   Planning to switch enoxaparin to Eliquis per oncology after she runs out of enoxaparin supply.     Diabetes   Metformin 500 mg daily -  re-started herself in the past month, she thought her A1c was high    Previously on Ozempic but stopped after hospitalization due to bowel obstruction.   Current diabetes symptoms: none  Diet/Exercise: not discussed due to time     Blood sugar monitoring: never  Eye exam in the last 12 months? No  Foot exam: due    Hypertension   /CAD/Hx NSTEMI/CKD  Amlodipine 10 mg daily in the morning   Metoprolol tartrate 50 mg twice daily   Doxazosin 1 mg daily in the evening     Patient reports no current medication side effects. Occasional dizziness upon standing to quickly, but none otherwise.   Patient self monitors blood pressure.  Home BP monitoring 120s/60-70s . Resting HR average 60-80s.  Follows with nephrology. Recently stopped lisinopril.   History: aspirin (stopped when started enoxaparin)     Hyperlipidemia   Rosuvastatin 20 mg daily   Patient reports no significant myalgias or other side effects.     Allergy   Afrin nasal spray two sprays each nostril every night before bed    Patient reports she has been using Afrin daily \"for as long as she can remember.\" If she forgets to take it, she will wake up " "in the middle of the night with such a stuffy nose that she can't breathe.  No other allergy symptoms or side effects.      Constipation   Miralax 1 capful daily in the morning  Senna-S 2 tablets daily as needed - occasional   Simethicone 1 tablet daily     Having a bowel movement 4x/week, which is normal for her. Bms are comfortable and easy to go.   No issues with constipation since prior hospitalization.   Patient feels that current therapy is somewhat effective.   Patient reports no current medication side effects.       GERD    Famotidine 20 mg twice daily as needed - occasional for trigger foods    Patient reports no current symptoms or side effects.   Patient feels that current regimen is effective.     Mental Health   Anxiety and Depression, Insomnia   Bupropion  mg daily   Lorazepam 0.5 mg as needed - only takes for imaging procedures  Diphenhydramine 50 mg every night at bedtime as needed - most nights, though can't tell if this is helpful or not    No side effects.   Has been having increased panic type feelings recently with diagnosis and chemo treatment. Wishes she could \"get out of her body\" sometimes, however, reports she has not had an instance of this in several weeks. Maybe happened 1-2x/week before that. Had considered getting a as needed medication for anxiety at one point, but does not feel like she needs to now.   Previously reduced bupropion to 150 mg daily for kidney function, but felt mental health worsened, so increased back to 300 mg. Symptoms have been much better since.   Has more trouble staying asleep than falling asleep. May wake up a couple times a night but is able to get back to sleep quickly.   Previously tried: sertraline (very effective, negative sexual side effects)     Supplements   /Osteopenia/Hair loss  Biotin daily for hair - patient prefers to continue  D3 daily - wondering if needs to take?   Calcium 600 mg / 10 mcg once daily   Minoxidil 2% topical solution twice " daily     No reported issues at this time. Patient is interested in stopping/changing supplements.   DEXA history 06/2023 showing osteopenia and FRAX 11% major and 2.3% hip fracture.   Calcium in diet: minimal      Pain  Acetaminophen 1300 mg twice daily + 1300 mg additional as needed (infrequent)    Lidocaine (Salonpas) patches as needed - occasional, finds somewhat helpful   Voltaren 1% gel as needed - occasional, finds somewhat helpful      Side effects: none  Has chronic pain in shoulder and sometimes has pain in feet at night, but goes away pretty easily by walking around. 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 Gets regular cortisone shots in her shoulder.  Following sports medicine.   Previously taking: gabapentin (did not find effective)       Today's Vitals: There were no vitals taken for this visit.  ----------------  I spent 40 minutes with this patient today. All changes were made via collaborative practice agreement with Johanny Diaz MD.     A summary of these recommendations was sent via [employer].    Vilma CardonaD   Medication Therapy Management   Pharmacy Resident  Pager: [phone]      Medication Therapy Recommendations  Insomnia, unspecified type   1 Rationale: Unsafe medication for the patient - Adverse medication event - Safety   Recommendation: Discontinue Medication   Status: Accepted - no CPA Needed   Identified Date: 4/1/2025 Completed Date: 4/1/2025   Note: Stop taking Benadryl         Takes dietary supplements   1 Current Medication: calcium carbonate-vitamin D (CALTRATE) 600-10 MG-MCG per tablet   Current Medication Sig: Take 1 tablet by mouth 2 times daily.   Rationale: Frequency inappropriate - Dosage too low - Effectiveness   Recommendation: Increase Frequency   Status: Accepted - no CPA Needed   Identified Date: 4/1/2025   Note: Take twice daily and stop separate vitamin D supplement.         Type 2 diabetes mellitus with chronic kidney disease, without long-term  current use of insulin, unspecified CKD stage (H)   1 Rationale: Duplicate Therapy - Unnecessary medication therapy - Indication   Recommendation: Discontinue Medication   Status: Accepted per CPA   Identified Date: 4/1/2025 Completed Date: 4/1/2025   Note: Stop taking metformin

## 2025-04-01 ENCOUNTER — VIRTUAL VISIT (OUTPATIENT)
Dept: PHARMACY | Facility: CLINIC | Age: 75
End: 2025-04-01
Payer: COMMERCIAL

## 2025-04-01 DIAGNOSIS — Z78.9 TAKES DIETARY SUPPLEMENTS: ICD-10-CM

## 2025-04-01 DIAGNOSIS — N18.31 STAGE 3A CHRONIC KIDNEY DISEASE (H): ICD-10-CM

## 2025-04-01 DIAGNOSIS — R52 PAIN: ICD-10-CM

## 2025-04-01 DIAGNOSIS — I82.C29 CHRONIC DEEP VEIN THROMBOSIS (DVT) OF INTERNAL JUGULAR VEIN (H): ICD-10-CM

## 2025-04-01 DIAGNOSIS — I25.2 HX OF NON-ST ELEVATION MYOCARDIAL INFARCTION (NSTEMI): ICD-10-CM

## 2025-04-01 DIAGNOSIS — G47.00 INSOMNIA, UNSPECIFIED TYPE: ICD-10-CM

## 2025-04-01 DIAGNOSIS — I25.10 CORONARY ARTERY DISEASE INVOLVING NATIVE HEART WITHOUT ANGINA PECTORIS, UNSPECIFIED VESSEL OR LESION TYPE: ICD-10-CM

## 2025-04-01 DIAGNOSIS — M85.851 OSTEOPENIA OF NECKS OF BOTH FEMURS: ICD-10-CM

## 2025-04-01 DIAGNOSIS — F33.42 RECURRENT MAJOR DEPRESSIVE DISORDER, IN FULL REMISSION: ICD-10-CM

## 2025-04-01 DIAGNOSIS — F41.9 ANXIETY: ICD-10-CM

## 2025-04-01 DIAGNOSIS — C56.1 MALIGNANT NEOPLASM OF RIGHT OVARY (H): Primary | ICD-10-CM

## 2025-04-01 DIAGNOSIS — I10 ESSENTIAL HYPERTENSION: ICD-10-CM

## 2025-04-01 DIAGNOSIS — L65.9 HAIR LOSS: ICD-10-CM

## 2025-04-01 DIAGNOSIS — E11.22 TYPE 2 DIABETES MELLITUS WITH CHRONIC KIDNEY DISEASE, WITHOUT LONG-TERM CURRENT USE OF INSULIN, UNSPECIFIED CKD STAGE (H): ICD-10-CM

## 2025-04-01 DIAGNOSIS — M85.852 OSTEOPENIA OF NECKS OF BOTH FEMURS: ICD-10-CM

## 2025-04-01 DIAGNOSIS — K59.00 CONSTIPATION, UNSPECIFIED CONSTIPATION TYPE: ICD-10-CM

## 2025-04-01 DIAGNOSIS — E78.2 MIXED HYPERLIPIDEMIA: ICD-10-CM

## 2025-04-01 DIAGNOSIS — J31.0 CHRONIC RHINITIS: ICD-10-CM

## 2025-04-01 PROCEDURE — 99606 MTMS BY PHARM EST 15 MIN: CPT | Mod: 93

## 2025-04-01 PROCEDURE — 99607 MTMS BY PHARM ADDL 15 MIN: CPT | Mod: 93

## 2025-04-01 RX ORDER — FAMOTIDINE 20 MG/1
20 TABLET, FILM COATED ORAL 2 TIMES DAILY PRN
COMMUNITY

## 2025-04-01 RX ORDER — CALCIUM CARBONATE/VITAMIN D3 600 MG-10
1 TABLET ORAL 2 TIMES DAILY
COMMUNITY

## 2025-04-01 RX ORDER — LIDOCAINE 4 G/G
1 PATCH TOPICAL DAILY PRN
COMMUNITY

## 2025-04-01 RX ORDER — SENNOSIDES 8.6 MG
1300 CAPSULE ORAL 3 TIMES DAILY PRN
COMMUNITY

## 2025-04-01 RX ORDER — BUPROPION HYDROCHLORIDE 300 MG/1
300 TABLET ORAL EVERY MORNING
Qty: 90 TABLET | Refills: 1 | Status: SHIPPED | OUTPATIENT
Start: 2025-04-01

## 2025-04-01 NOTE — PATIENT INSTRUCTIONS
"Recommendations from today's MTM visit:                                                      Anticoagulation  Finish current supply of enoxaparin, then switch to Eliquis 5 mg twice daily as planned.   See radiology tomorrow for assessment of vaginal bleeding    Blood Sugars  Stop taking Metformin    Sleep  Stop Benadryl    Supplements  Increase calcium/vitamin D to 600mg/10mcg twice daily   Stop taking Vitamin D supplement    Follow-up: Return in about 5 weeks (around 5/5/2025) for Visit with your primary care provider and 6/5/25 with MTM pharmacist .    It was great speaking with you today.  I value your experience and would be very thankful for your time in providing feedback in our clinic survey. In the next few days, you may receive an email or text message from [employer] with a link to a survey related to your  clinical pharmacist.\"     To schedule another MTM appointment, please call the clinic directly or you may call the MTM scheduling line at [phone] or toll-free at [phone].     My Clinical Pharmacist's contact information:                                                      Please feel free to contact me with any questions or concerns you have.      Jayne Us, PharmD   Medication Therapy Management   Pharmacy Resident  Pager: [phone]     "

## 2025-04-01 NOTE — PROGRESS NOTES
I have verified the content of the note, which accurately reflects my assessment of the patient and the plan of care.   Emily Mcneill, [facility], PharmD

## 2025-04-02 ENCOUNTER — TRANSFERRED RECORDS (OUTPATIENT)
Dept: HEALTH INFORMATION MANAGEMENT | Facility: CLINIC | Age: 75
End: 2025-04-02
Payer: COMMERCIAL

## 2025-04-02 LAB
ABO + RH BLD: NORMAL
BLD GP AB SCN SERPL QL: NEGATIVE
SPECIMEN EXP DATE BLD: NORMAL

## 2025-04-03 ENCOUNTER — TRANSFERRED RECORDS (OUTPATIENT)
Dept: HEALTH INFORMATION MANAGEMENT | Facility: CLINIC | Age: 75
End: 2025-04-03

## 2025-04-03 ENCOUNTER — LAB (OUTPATIENT)
Dept: LAB | Facility: CLINIC | Age: 75
End: 2025-04-03
Payer: COMMERCIAL

## 2025-04-03 ENCOUNTER — MEDICAL CORRESPONDENCE (OUTPATIENT)
Dept: HEALTH INFORMATION MANAGEMENT | Facility: CLINIC | Age: 75
End: 2025-04-03

## 2025-04-03 DIAGNOSIS — C56.1 MALIGNANT NEOPLASM OF RIGHT OVARY (H): ICD-10-CM

## 2025-04-03 PROCEDURE — 86900 BLOOD TYPING SEROLOGIC ABO: CPT

## 2025-04-03 PROCEDURE — 86850 RBC ANTIBODY SCREEN: CPT

## 2025-04-03 PROCEDURE — 36415 COLL VENOUS BLD VENIPUNCTURE: CPT

## 2025-04-04 ENCOUNTER — HOSPITAL ENCOUNTER (OUTPATIENT)
Facility: CLINIC | Age: 75
Discharge: HOME | End: 2025-04-04
Admitting: NURSE PRACTITIONER
Payer: COMMERCIAL

## 2025-04-04 VITALS
SYSTOLIC BLOOD PRESSURE: 140 MMHG | TEMPERATURE: 97.6 F | OXYGEN SATURATION: 97 % | HEART RATE: 67 BPM | RESPIRATION RATE: 16 BRPM | DIASTOLIC BLOOD PRESSURE: 66 MMHG

## 2025-04-04 PROCEDURE — 250N000011 HC RX IP 250 OP 636: Performed by: NURSE PRACTITIONER

## 2025-04-04 PROCEDURE — 36430 TRANSFUSION BLD/BLD COMPNT: CPT

## 2025-04-04 PROCEDURE — 86923 COMPATIBILITY TEST ELECTRIC: CPT | Performed by: NURSE PRACTITIONER

## 2025-04-04 PROCEDURE — 999N000087 HC STATISTIC IV OP-OVERFLOW

## 2025-04-04 PROCEDURE — P9016 RBC LEUKOCYTES REDUCED: HCPCS | Performed by: NURSE PRACTITIONER

## 2025-04-04 RX ORDER — HEPARIN SODIUM,PORCINE 10 UNIT/ML
5-10 VIAL (ML) INTRAVENOUS
Status: DISCONTINUED | OUTPATIENT
Start: 2025-04-04 | End: 2025-04-04 | Stop reason: HOSPADM

## 2025-04-04 RX ORDER — HEPARIN SODIUM,PORCINE 10 UNIT/ML
5-10 VIAL (ML) INTRAVENOUS EVERY 24 HOURS
Status: DISCONTINUED | OUTPATIENT
Start: 2025-04-04 | End: 2025-04-04 | Stop reason: HOSPADM

## 2025-04-04 RX ORDER — HEPARIN SODIUM (PORCINE) LOCK FLUSH IV SOLN 100 UNIT/ML 100 UNIT/ML
5-10 SOLUTION INTRAVENOUS
Status: DISCONTINUED | OUTPATIENT
Start: 2025-04-04 | End: 2025-04-04 | Stop reason: HOSPADM

## 2025-04-04 RX ORDER — ENOXAPARIN SODIUM 150 MG/ML
INJECTION SUBCUTANEOUS EVERY 12 HOURS
Status: ON HOLD | COMMUNITY
End: 2025-04-07

## 2025-04-04 RX ADMIN — Medication 5 ML: at 16:19

## 2025-04-04 ASSESSMENT — ACTIVITIES OF DAILY LIVING (ADL)
ADLS_ACUITY_SCORE: 48

## 2025-04-04 NOTE — PROGRESS NOTES
Care Suites Discharge Nursing Note    Patient Information  Name: Julia Andre  Age: 74 year old    Discharge Education:  Discharge instructions reviewed: Yes  Additional education/resources provided: n/a  Patient/patient representative verbalizes understanding: Yes  Patient discharging on new medications: No  Medication education completed: N/A    Discharge Plans:   Discharge location: home  Discharge ride contacted: Yes  Approximate discharge time: 1630    Discharge Criteria:  Discharge criteria met and vital signs stable: Yes    Patient Belongs:  Patient belongings returned to patient: Yes    Sarah Varner RN

## 2025-04-04 NOTE — PROGRESS NOTES
AVS/discharge instructions reviewed thoroughly with pt and all questions answered. Signs and symptoms of a reaction reviewed. Verbal understanding received.     Sarah Varner RN on 4/4/2025 at 12:32 PM

## 2025-04-04 NOTE — PROGRESS NOTES
Care Suites Admission Nursing Note    Patient Information  Name: Julia Andre  Age: 74 year old  Reason for admission: Blood transfusion  Care Suites arrival time: 1100    Visitor Information  Name: Desmond (son)     Patient Admission/Assessment   Pre-procedure assessment complete: Yes  If abnormal assessment/labs, provider notified: N/A  NPO: Yes  Medications held per instructions/orders: Yes  Consent: obtained    Patient oriented to room: Yes  Education/questions answered: Yes  Plan/other: Proceed as planned    Discharge Planning  Discharge name/phone number: [place] [phone]  Overnight post sedation caregiver: yes  Discharge location: home    Sandy Richey RN

## 2025-04-05 ENCOUNTER — HEALTH MAINTENANCE LETTER (OUTPATIENT)
Age: 75
End: 2025-04-05

## 2025-04-06 ENCOUNTER — HOSPITAL ENCOUNTER (INPATIENT)
Facility: CLINIC | Age: 75
DRG: 809 | End: 2025-04-06
Attending: SOCIAL WORKER | Admitting: INTERNAL MEDICINE
Payer: COMMERCIAL

## 2025-04-06 ENCOUNTER — APPOINTMENT (OUTPATIENT)
Dept: GENERAL RADIOLOGY | Facility: CLINIC | Age: 75
DRG: 809 | End: 2025-04-06
Attending: SOCIAL WORKER
Payer: COMMERCIAL

## 2025-04-06 DIAGNOSIS — D70.9 NEUTROPENIC FEVER: ICD-10-CM

## 2025-04-06 DIAGNOSIS — R06.09 DYSPNEA ON EXERTION: ICD-10-CM

## 2025-04-06 DIAGNOSIS — D69.6 THROMBOCYTOPENIA: ICD-10-CM

## 2025-04-06 DIAGNOSIS — R50.81 NEUTROPENIC FEVER: ICD-10-CM

## 2025-04-06 LAB
ALBUMIN SERPL BCG-MCNC: 3.8 G/DL (ref 3.5–5.2)
ALBUMIN UR-MCNC: 30 MG/DL
ALP SERPL-CCNC: 76 U/L (ref 40–150)
ALT SERPL W P-5'-P-CCNC: 15 U/L (ref 0–50)
ANION GAP SERPL CALCULATED.3IONS-SCNC: 14 MMOL/L (ref 7–15)
APPEARANCE UR: CLEAR
AST SERPL W P-5'-P-CCNC: 19 U/L (ref 0–45)
BASOPHILS # BLD AUTO: 0 10E3/UL (ref 0–0.2)
BASOPHILS NFR BLD AUTO: 0 %
BILIRUB SERPL-MCNC: 0.4 MG/DL
BILIRUB UR QL STRIP: NEGATIVE
BUN SERPL-MCNC: 22 MG/DL (ref 8–23)
CALCIUM SERPL-MCNC: 8.9 MG/DL (ref 8.8–10.4)
CHLORIDE SERPL-SCNC: 97 MMOL/L (ref 98–107)
COLOR UR AUTO: ABNORMAL
CREAT SERPL-MCNC: 0.96 MG/DL (ref 0.51–0.95)
EGFRCR SERPLBLD CKD-EPI 2021: 61 ML/MIN/1.73M2
EOSINOPHIL # BLD AUTO: 0 10E3/UL (ref 0–0.7)
EOSINOPHIL NFR BLD AUTO: 3 %
ERYTHROCYTE [DISTWIDTH] IN BLOOD BY AUTOMATED COUNT: 14.6 % (ref 10–15)
FLUAV RNA SPEC QL NAA+PROBE: NEGATIVE
FLUBV RNA RESP QL NAA+PROBE: NEGATIVE
GLUCOSE SERPL-MCNC: 156 MG/DL (ref 70–99)
GLUCOSE UR STRIP-MCNC: NEGATIVE MG/DL
HCO3 SERPL-SCNC: 21 MMOL/L (ref 22–29)
HCT VFR BLD AUTO: 25.6 % (ref 35–47)
HGB BLD-MCNC: 8.5 G/DL (ref 11.7–15.7)
HGB UR QL STRIP: ABNORMAL
HOLD SPECIMEN: NORMAL
IMM GRANULOCYTES # BLD: 0 10E3/UL
IMM GRANULOCYTES NFR BLD: 0 %
KETONES UR STRIP-MCNC: NEGATIVE MG/DL
LACTATE SERPL-SCNC: 1 MMOL/L (ref 0.7–2)
LEUKOCYTE ESTERASE UR QL STRIP: NEGATIVE
LYMPHOCYTES # BLD AUTO: 0.6 10E3/UL (ref 0.8–5.3)
LYMPHOCYTES NFR BLD AUTO: 87 %
MCH RBC QN AUTO: 31.4 PG (ref 26.5–33)
MCHC RBC AUTO-ENTMCNC: 33.2 G/DL (ref 31.5–36.5)
MCV RBC AUTO: 95 FL (ref 78–100)
MONOCYTES # BLD AUTO: 0.1 10E3/UL (ref 0–1.3)
MONOCYTES NFR BLD AUTO: 8 %
NEUTROPHILS # BLD AUTO: 0 10E3/UL (ref 1.6–8.3)
NEUTROPHILS NFR BLD AUTO: 2 %
NITRATE UR QL: NEGATIVE
NRBC # BLD AUTO: 0 10E3/UL
NRBC BLD AUTO-RTO: 0 /100
NT-PROBNP SERPL-MCNC: 272 PG/ML (ref 0–900)
PH UR STRIP: 6 [PH] (ref 5–7)
PLAT MORPH BLD: NORMAL
PLATELET # BLD AUTO: 22 10E3/UL (ref 150–450)
POTASSIUM SERPL-SCNC: 4.3 MMOL/L (ref 3.4–5.3)
PROT SERPL-MCNC: 6.3 G/DL (ref 6.4–8.3)
RBC # BLD AUTO: 2.71 10E6/UL (ref 3.8–5.2)
RBC MORPH BLD: NORMAL
RBC URINE: 77 /HPF
RSV RNA SPEC NAA+PROBE: NEGATIVE
SARS-COV-2 RNA RESP QL NAA+PROBE: NEGATIVE
SODIUM SERPL-SCNC: 132 MMOL/L (ref 135–145)
SP GR UR STRIP: 1.02 (ref 1–1.03)
SQUAMOUS EPITHELIAL: <1 /HPF
TROPONIN T SERPL HS-MCNC: 7 NG/L
UROBILINOGEN UR STRIP-MCNC: NORMAL MG/DL
WBC # BLD AUTO: 0.6 10E3/UL (ref 4–11)
WBC URINE: 1 /HPF

## 2025-04-06 PROCEDURE — 99222 1ST HOSP IP/OBS MODERATE 55: CPT | Performed by: INTERNAL MEDICINE

## 2025-04-06 PROCEDURE — 83605 ASSAY OF LACTIC ACID: CPT | Performed by: SOCIAL WORKER

## 2025-04-06 PROCEDURE — 87186 SC STD MICRODIL/AGAR DIL: CPT | Performed by: SOCIAL WORKER

## 2025-04-06 PROCEDURE — 250N000011 HC RX IP 250 OP 636: Mod: JZ | Performed by: SOCIAL WORKER

## 2025-04-06 PROCEDURE — 96365 THER/PROPH/DIAG IV INF INIT: CPT | Mod: 59

## 2025-04-06 PROCEDURE — 84484 ASSAY OF TROPONIN QUANT: CPT | Performed by: SOCIAL WORKER

## 2025-04-06 PROCEDURE — 36415 COLL VENOUS BLD VENIPUNCTURE: CPT | Performed by: SOCIAL WORKER

## 2025-04-06 PROCEDURE — 94640 AIRWAY INHALATION TREATMENT: CPT

## 2025-04-06 PROCEDURE — 99285 EMERGENCY DEPT VISIT HI MDM: CPT | Mod: 25

## 2025-04-06 PROCEDURE — 250N000009 HC RX 250: Performed by: SOCIAL WORKER

## 2025-04-06 PROCEDURE — 82310 ASSAY OF CALCIUM: CPT | Performed by: SOCIAL WORKER

## 2025-04-06 PROCEDURE — 87040 BLOOD CULTURE FOR BACTERIA: CPT | Performed by: SOCIAL WORKER

## 2025-04-06 PROCEDURE — 71046 X-RAY EXAM CHEST 2 VIEWS: CPT

## 2025-04-06 PROCEDURE — 250N000013 HC RX MED GY IP 250 OP 250 PS 637: Performed by: SOCIAL WORKER

## 2025-04-06 PROCEDURE — 87086 URINE CULTURE/COLONY COUNT: CPT | Performed by: SOCIAL WORKER

## 2025-04-06 PROCEDURE — 93005 ELECTROCARDIOGRAM TRACING: CPT

## 2025-04-06 PROCEDURE — 120N000001 HC R&B MED SURG/OB

## 2025-04-06 PROCEDURE — 87637 SARSCOV2&INF A&B&RSV AMP PRB: CPT | Performed by: SOCIAL WORKER

## 2025-04-06 PROCEDURE — 81001 URINALYSIS AUTO W/SCOPE: CPT | Performed by: SOCIAL WORKER

## 2025-04-06 PROCEDURE — 83880 ASSAY OF NATRIURETIC PEPTIDE: CPT | Performed by: SOCIAL WORKER

## 2025-04-06 PROCEDURE — 85025 COMPLETE CBC W/AUTO DIFF WBC: CPT | Performed by: SOCIAL WORKER

## 2025-04-06 RX ORDER — ACETAMINOPHEN 500 MG
1000 TABLET ORAL ONCE
Status: COMPLETED | OUTPATIENT
Start: 2025-04-06 | End: 2025-04-06

## 2025-04-06 RX ORDER — IPRATROPIUM BROMIDE AND ALBUTEROL SULFATE 2.5; .5 MG/3ML; MG/3ML
3 SOLUTION RESPIRATORY (INHALATION) ONCE
Status: COMPLETED | OUTPATIENT
Start: 2025-04-06 | End: 2025-04-06

## 2025-04-06 RX ORDER — CEFEPIME HYDROCHLORIDE 2 G/1
2 INJECTION, POWDER, FOR SOLUTION INTRAVENOUS ONCE
Status: COMPLETED | OUTPATIENT
Start: 2025-04-06 | End: 2025-04-06

## 2025-04-06 RX ADMIN — ACETAMINOPHEN 1000 MG: 500 TABLET ORAL at 22:34

## 2025-04-06 RX ADMIN — IPRATROPIUM BROMIDE AND ALBUTEROL SULFATE 3 ML: .5; 3 SOLUTION RESPIRATORY (INHALATION) at 23:01

## 2025-04-06 RX ADMIN — CEFEPIME 2 G: 2 INJECTION, POWDER, FOR SOLUTION INTRAVENOUS at 23:02

## 2025-04-06 ASSESSMENT — COLUMBIA-SUICIDE SEVERITY RATING SCALE - C-SSRS
6. HAVE YOU EVER DONE ANYTHING, STARTED TO DO ANYTHING, OR PREPARED TO DO ANYTHING TO END YOUR LIFE?: NO
2. HAVE YOU ACTUALLY HAD ANY THOUGHTS OF KILLING YOURSELF IN THE PAST MONTH?: NO
1. IN THE PAST MONTH, HAVE YOU WISHED YOU WERE DEAD OR WISHED YOU COULD GO TO SLEEP AND NOT WAKE UP?: NO

## 2025-04-06 ASSESSMENT — ACTIVITIES OF DAILY LIVING (ADL)
ADLS_ACUITY_SCORE: 48

## 2025-04-07 ENCOUNTER — APPOINTMENT (OUTPATIENT)
Dept: CT IMAGING | Facility: CLINIC | Age: 75
DRG: 809 | End: 2025-04-07
Attending: SOCIAL WORKER
Payer: COMMERCIAL

## 2025-04-07 LAB
ALBUMIN SERPL BCG-MCNC: 3.5 G/DL (ref 3.5–5.2)
ALP SERPL-CCNC: 73 U/L (ref 40–150)
ALT SERPL W P-5'-P-CCNC: 15 U/L (ref 0–50)
ANION GAP SERPL CALCULATED.3IONS-SCNC: 11 MMOL/L (ref 7–15)
AST SERPL W P-5'-P-CCNC: 16 U/L (ref 0–45)
ATRIAL RATE - MUSE: 87 BPM
BILIRUB SERPL-MCNC: 0.4 MG/DL
BLAIMP ISLT/SPM QL: NOT DETECTED
BLAKPC ISLT/SPM QL: NOT DETECTED
BLAOXA-48 ISLT/SPM QL: NOT DETECTED
BLAVIM ISLT/SPM QL: NOT DETECTED
BUN SERPL-MCNC: 21.4 MG/DL (ref 8–23)
CALCIUM SERPL-MCNC: 9 MG/DL (ref 8.8–10.4)
CHLORIDE SERPL-SCNC: 98 MMOL/L (ref 98–107)
CREAT SERPL-MCNC: 0.95 MG/DL (ref 0.51–0.95)
DIASTOLIC BLOOD PRESSURE - MUSE: NORMAL MMHG
EGFRCR SERPLBLD CKD-EPI 2021: 62 ML/MIN/1.73M2
ERYTHROCYTE [DISTWIDTH] IN BLOOD BY AUTOMATED COUNT: 14.6 % (ref 10–15)
GLUCOSE SERPL-MCNC: 146 MG/DL (ref 70–99)
HCO3 SERPL-SCNC: 23 MMOL/L (ref 22–29)
HCT VFR BLD AUTO: 23.4 % (ref 35–47)
HGB BLD-MCNC: 7.8 G/DL (ref 11.7–15.7)
INTERPRETATION ECG - MUSE: NORMAL
MCH RBC QN AUTO: 31.1 PG (ref 26.5–33)
MCHC RBC AUTO-ENTMCNC: 33.3 G/DL (ref 31.5–36.5)
MCV RBC AUTO: 93 FL (ref 78–100)
MRSA DNA SPEC QL NAA+PROBE: NEGATIVE
NDM TARGET DNA: NOT DETECTED
P AXIS - MUSE: 53 DEGREES
PLAT MORPH BLD: ABNORMAL
PLATELET # BLD AUTO: 17 10E3/UL (ref 150–450)
POTASSIUM SERPL-SCNC: 4.5 MMOL/L (ref 3.4–5.3)
PR INTERVAL - MUSE: 184 MS
PROT SERPL-MCNC: 5.8 G/DL (ref 6.4–8.3)
QRS DURATION - MUSE: 82 MS
QT - MUSE: 344 MS
QTC - MUSE: 413 MS
R AXIS - MUSE: 1 DEGREES
RBC # BLD AUTO: 2.51 10E6/UL (ref 3.8–5.2)
RBC MORPH BLD: ABNORMAL
SA TARGET DNA: POSITIVE
SODIUM SERPL-SCNC: 132 MMOL/L (ref 135–145)
SYSTOLIC BLOOD PRESSURE - MUSE: NORMAL MMHG
T AXIS - MUSE: -10 DEGREES
TROPONIN T SERPL HS-MCNC: 7 NG/L
VARIANT LYMPHS BLD QL SMEAR: PRESENT
VENTRICULAR RATE- MUSE: 87 BPM
WBC # BLD AUTO: 0.6 10E3/UL (ref 4–11)

## 2025-04-07 PROCEDURE — 87641 MR-STAPH DNA AMP PROBE: CPT | Performed by: INTERNAL MEDICINE

## 2025-04-07 PROCEDURE — 82565 ASSAY OF CREATININE: CPT | Performed by: INTERNAL MEDICINE

## 2025-04-07 PROCEDURE — 250N000011 HC RX IP 250 OP 636: Performed by: STUDENT IN AN ORGANIZED HEALTH CARE EDUCATION/TRAINING PROGRAM

## 2025-04-07 PROCEDURE — 250N000013 HC RX MED GY IP 250 OP 250 PS 637: Performed by: INTERNAL MEDICINE

## 2025-04-07 PROCEDURE — 250N000011 HC RX IP 250 OP 636: Performed by: INTERNAL MEDICINE

## 2025-04-07 PROCEDURE — 99222 1ST HOSP IP/OBS MODERATE 55: CPT | Performed by: SPECIALIST

## 2025-04-07 PROCEDURE — 258N000003 HC RX IP 258 OP 636: Performed by: INTERNAL MEDICINE

## 2025-04-07 PROCEDURE — 120N000001 HC R&B MED SURG/OB

## 2025-04-07 PROCEDURE — 250N000011 HC RX IP 250 OP 636: Mod: JZ | Performed by: INTERNAL MEDICINE

## 2025-04-07 PROCEDURE — 87798 DETECT AGENT NOS DNA AMP: CPT | Performed by: INTERNAL MEDICINE

## 2025-04-07 PROCEDURE — 85014 HEMATOCRIT: CPT | Performed by: INTERNAL MEDICINE

## 2025-04-07 PROCEDURE — 99233 SBSQ HOSP IP/OBS HIGH 50: CPT | Performed by: INTERNAL MEDICINE

## 2025-04-07 PROCEDURE — 71250 CT THORAX DX C-: CPT

## 2025-04-07 PROCEDURE — 84155 ASSAY OF PROTEIN SERUM: CPT | Performed by: INTERNAL MEDICINE

## 2025-04-07 PROCEDURE — 85041 AUTOMATED RBC COUNT: CPT | Performed by: INTERNAL MEDICINE

## 2025-04-07 RX ORDER — AMOXICILLIN 250 MG
2 CAPSULE ORAL 2 TIMES DAILY PRN
Status: DISCONTINUED | OUTPATIENT
Start: 2025-04-07 | End: 2025-04-11 | Stop reason: HOSPADM

## 2025-04-07 RX ORDER — LORAZEPAM 0.5 MG/1
0.5 TABLET ORAL 2 TIMES DAILY PRN
Status: DISCONTINUED | OUTPATIENT
Start: 2025-04-07 | End: 2025-04-11 | Stop reason: HOSPADM

## 2025-04-07 RX ORDER — DIPHENHYDRAMINE HCL 25 MG
25 CAPSULE ORAL EVERY 6 HOURS PRN
Status: DISCONTINUED | OUTPATIENT
Start: 2025-04-07 | End: 2025-04-11 | Stop reason: HOSPADM

## 2025-04-07 RX ORDER — AMLODIPINE BESYLATE 10 MG/1
10 TABLET ORAL DAILY
Status: DISCONTINUED | OUTPATIENT
Start: 2025-04-07 | End: 2025-04-11 | Stop reason: HOSPADM

## 2025-04-07 RX ORDER — FAMOTIDINE 20 MG/1
20 TABLET, FILM COATED ORAL 2 TIMES DAILY
Status: DISCONTINUED | OUTPATIENT
Start: 2025-04-07 | End: 2025-04-11 | Stop reason: HOSPADM

## 2025-04-07 RX ORDER — ONDANSETRON 2 MG/ML
4 INJECTION INTRAMUSCULAR; INTRAVENOUS EVERY 6 HOURS PRN
Status: DISCONTINUED | OUTPATIENT
Start: 2025-04-07 | End: 2025-04-11 | Stop reason: HOSPADM

## 2025-04-07 RX ORDER — POLYETHYLENE GLYCOL 3350 17 G/17G
17 POWDER, FOR SOLUTION ORAL DAILY
Status: DISCONTINUED | OUTPATIENT
Start: 2025-04-07 | End: 2025-04-11 | Stop reason: HOSPADM

## 2025-04-07 RX ORDER — HEPARIN SODIUM (PORCINE) LOCK FLUSH IV SOLN 100 UNIT/ML 100 UNIT/ML
5-10 SOLUTION INTRAVENOUS
Status: DISCONTINUED | OUTPATIENT
Start: 2025-04-07 | End: 2025-04-11 | Stop reason: HOSPADM

## 2025-04-07 RX ORDER — ACETAMINOPHEN 325 MG/1
650 TABLET ORAL EVERY 4 HOURS PRN
Status: DISCONTINUED | OUTPATIENT
Start: 2025-04-07 | End: 2025-04-11 | Stop reason: HOSPADM

## 2025-04-07 RX ORDER — CALCIUM CARBONATE 500 MG/1
1000 TABLET, CHEWABLE ORAL 4 TIMES DAILY PRN
Status: DISCONTINUED | OUTPATIENT
Start: 2025-04-07 | End: 2025-04-11 | Stop reason: HOSPADM

## 2025-04-07 RX ORDER — AMOXICILLIN 250 MG
2 CAPSULE ORAL DAILY PRN
Status: DISCONTINUED | OUTPATIENT
Start: 2025-04-07 | End: 2025-04-07 | Stop reason: ALTCHOICE

## 2025-04-07 RX ORDER — CEFEPIME HYDROCHLORIDE 2 G/1
2 INJECTION, POWDER, FOR SOLUTION INTRAVENOUS EVERY 8 HOURS
Status: DISCONTINUED | OUTPATIENT
Start: 2025-04-07 | End: 2025-04-11 | Stop reason: HOSPADM

## 2025-04-07 RX ORDER — BUPROPION HYDROCHLORIDE 150 MG/1
300 TABLET ORAL EVERY MORNING
Status: DISCONTINUED | OUTPATIENT
Start: 2025-04-07 | End: 2025-04-11 | Stop reason: HOSPADM

## 2025-04-07 RX ORDER — METOPROLOL TARTRATE 50 MG
50 TABLET ORAL 2 TIMES DAILY
Status: DISCONTINUED | OUTPATIENT
Start: 2025-04-07 | End: 2025-04-11 | Stop reason: HOSPADM

## 2025-04-07 RX ORDER — HEPARIN SODIUM,PORCINE 10 UNIT/ML
5-10 VIAL (ML) INTRAVENOUS EVERY 24 HOURS
Status: DISCONTINUED | OUTPATIENT
Start: 2025-04-07 | End: 2025-04-11 | Stop reason: HOSPADM

## 2025-04-07 RX ORDER — LIDOCAINE 4 G/G
1 PATCH TOPICAL
Status: DISCONTINUED | OUTPATIENT
Start: 2025-04-07 | End: 2025-04-11 | Stop reason: HOSPADM

## 2025-04-07 RX ORDER — ACETAMINOPHEN 650 MG/1
650 SUPPOSITORY RECTAL EVERY 4 HOURS PRN
Status: DISCONTINUED | OUTPATIENT
Start: 2025-04-07 | End: 2025-04-11 | Stop reason: HOSPADM

## 2025-04-07 RX ORDER — ENOXAPARIN SODIUM 150 MG/ML
1 INJECTION SUBCUTANEOUS EVERY 12 HOURS
Status: DISCONTINUED | OUTPATIENT
Start: 2025-04-07 | End: 2025-04-11 | Stop reason: HOSPADM

## 2025-04-07 RX ORDER — HEPARIN SODIUM,PORCINE 10 UNIT/ML
5-10 VIAL (ML) INTRAVENOUS
Status: DISCONTINUED | OUTPATIENT
Start: 2025-04-07 | End: 2025-04-11 | Stop reason: HOSPADM

## 2025-04-07 RX ORDER — ONDANSETRON 4 MG/1
4 TABLET, ORALLY DISINTEGRATING ORAL EVERY 6 HOURS PRN
Status: DISCONTINUED | OUTPATIENT
Start: 2025-04-07 | End: 2025-04-11 | Stop reason: HOSPADM

## 2025-04-07 RX ORDER — ROSUVASTATIN CALCIUM 20 MG/1
20 TABLET, COATED ORAL DAILY
Status: DISCONTINUED | OUTPATIENT
Start: 2025-04-07 | End: 2025-04-11 | Stop reason: HOSPADM

## 2025-04-07 RX ORDER — AMOXICILLIN 250 MG
1 CAPSULE ORAL 2 TIMES DAILY PRN
Status: DISCONTINUED | OUTPATIENT
Start: 2025-04-07 | End: 2025-04-11 | Stop reason: HOSPADM

## 2025-04-07 RX ORDER — DOXAZOSIN 1 MG/1
1 TABLET ORAL AT BEDTIME
Status: DISCONTINUED | OUTPATIENT
Start: 2025-04-07 | End: 2025-04-11 | Stop reason: HOSPADM

## 2025-04-07 RX ORDER — LIDOCAINE 40 MG/G
CREAM TOPICAL
Status: DISCONTINUED | OUTPATIENT
Start: 2025-04-07 | End: 2025-04-11 | Stop reason: HOSPADM

## 2025-04-07 RX ORDER — ENOXAPARIN SODIUM 100 MG/ML
80 INJECTION SUBCUTANEOUS EVERY 12 HOURS
Status: ON HOLD | COMMUNITY
End: 2025-04-11

## 2025-04-07 RX ADMIN — LIDOCAINE 1 PATCH: 4 PATCH TOPICAL at 13:48

## 2025-04-07 RX ADMIN — POLYETHYLENE GLYCOL 3350 17 G: 17 POWDER, FOR SOLUTION ORAL at 09:42

## 2025-04-07 RX ADMIN — CEFEPIME 2 G: 2 INJECTION, POWDER, FOR SOLUTION INTRAVENOUS at 05:56

## 2025-04-07 RX ADMIN — ROSUVASTATIN CALCIUM 20 MG: 20 TABLET, FILM COATED ORAL at 09:42

## 2025-04-07 RX ADMIN — METOPROLOL TARTRATE 50 MG: 50 TABLET, FILM COATED ORAL at 09:42

## 2025-04-07 RX ADMIN — HEPARIN, PORCINE (PF) 10 UNIT/ML INTRAVENOUS SYRINGE 5 ML: at 06:51

## 2025-04-07 RX ADMIN — CEFEPIME 2 G: 2 INJECTION, POWDER, FOR SOLUTION INTRAVENOUS at 13:09

## 2025-04-07 RX ADMIN — HEPARIN, PORCINE (PF) 10 UNIT/ML INTRAVENOUS SYRINGE 5 ML: at 22:13

## 2025-04-07 RX ADMIN — VANCOMYCIN HYDROCHLORIDE 1500 MG: 10 INJECTION, POWDER, LYOPHILIZED, FOR SOLUTION INTRAVENOUS at 09:13

## 2025-04-07 RX ADMIN — AMLODIPINE BESYLATE 10 MG: 10 TABLET ORAL at 09:42

## 2025-04-07 RX ADMIN — BUPROPION HYDROCHLORIDE 300 MG: 150 TABLET, EXTENDED RELEASE ORAL at 09:42

## 2025-04-07 RX ADMIN — ACETAMINOPHEN 650 MG: 325 TABLET, FILM COATED ORAL at 13:09

## 2025-04-07 RX ADMIN — CEFEPIME 2 G: 2 INJECTION, POWDER, FOR SOLUTION INTRAVENOUS at 21:04

## 2025-04-07 ASSESSMENT — ACTIVITIES OF DAILY LIVING (ADL)
ADLS_ACUITY_SCORE: 28
ADLS_ACUITY_SCORE: 28
ADLS_ACUITY_SCORE: 33
ADLS_ACUITY_SCORE: 28
WEAR_GLASSES_OR_BLIND: YES
CONCENTRATING,_REMEMBERING_OR_MAKING_DECISIONS_DIFFICULTY: NO
ADLS_ACUITY_SCORE: 28
ADLS_ACUITY_SCORE: 48
VISION_MANAGEMENT: READERS ONLY
DRESSING/BATHING_DIFFICULTY: NO
DIFFICULTY_EATING/SWALLOWING: NO
ADLS_ACUITY_SCORE: 33
DIFFICULTY_COMMUNICATING: NO
CHANGE_IN_FUNCTIONAL_STATUS_SINCE_ONSET_OF_CURRENT_ILLNESS/INJURY: NO
FALL_HISTORY_WITHIN_LAST_SIX_MONTHS: YES
ADLS_ACUITY_SCORE: 28
ADLS_ACUITY_SCORE: 28
DOING_ERRANDS_INDEPENDENTLY_DIFFICULTY: NO
ADLS_ACUITY_SCORE: 33
ADLS_ACUITY_SCORE: 28
TOILETING_ISSUES: NO
ADLS_ACUITY_SCORE: 28
ADLS_ACUITY_SCORE: 33
ADLS_ACUITY_SCORE: 28
WALKING_OR_CLIMBING_STAIRS_DIFFICULTY: NO
ADLS_ACUITY_SCORE: 25
ADLS_ACUITY_SCORE: 28
ADLS_ACUITY_SCORE: 33
NUMBER_OF_TIMES_PATIENT_HAS_FALLEN_WITHIN_LAST_SIX_MONTHS: 1
HEARING_DIFFICULTY_OR_DEAF: NO

## 2025-04-07 NOTE — ED PROVIDER NOTES
Emergency Department Note      History of Present Illness     Chief Complaint   Fever    HPI   Julia Andre is a 74 year old female with history of ovarian cancer, type 2 diabetes, stage 3a CKD, and hypertension who presents to the ED with her  for evaluation of a fever. The patient reports that she has a history of right ovarian cancer and is currently in a study through Dr. Debbie Weinberg ([phone]) at [facility]. With this study, she is taking Rinatabart. She last received this a week ago Friday, which was her first dose. Patient also has a history of jugular DVT and is taking Lovenox without any missed dosages.  She states that since being on Lovenox, she's experienced vaginal bleeding. On 4/4 she got a blood transfusion after her hemoglobin was found to be at 6.9.  Today, patient reports that she developed a fever, they called the oncology team are told to present to the emergency department. Denies sore throat, headache, chest pain, new rash anywhere, vomiting, dysuria, hematuria, urinary frequency or dysuria, or abdominal pain.  Patient states that her vaginal bleeding is actually improved in the past few days and become less heavy.  Denies hematochezia or melena.  No bleeding from anywhere else.  She is expected to start radiation on Wednesday to help her vaginal bleeding.     Independent Historian    as detailed above.    Review of External Notes   I reviewed the virtual visit note with Magui Us [facility] on 4/1/25. Seen for medication management. History of jugular DVT for which she is on Lovenox. Plan is for her to begin Eliquis once her Lovenox runs out.      I reviewed the gynecology-oncology note with Dr. Robertson and Dr. Weinberg on 3/31/25.      I reviewed the CT c/a/p from 3/11/2025: At that time noted to have moderate emphysema and a large hiatal hernia     Past Medical History     Medical History and Problem List   Acquired multiple cysts of kidney  Anemia  Angina  pectoris  Anxiety  Arthropathy   Bacteremia   CAD  Cervical cancer   Depression  Drug-induced polyneuropathy   Dyslipidemia   Eating disorder   GERD  Heart attack  Hyperlipidemia  Hypertension  Insomnia   Iron deficiency anemia  MI  Monoclonal gammopathy   NSTEMI  Osteoarthritis   Osteopenia   Ovarian cancer  Pancytopenia   Plantar fascial fibromatosis   Psoriasis  Secondary renal hyperparathyroidism   Sleep apnea  Squamous cell carcinoma of skin   Stage 3a CKD  Thrombocytopenia   Type 2 diabetes     Medications   Aspirin 81 mg  Ativan  Avastin   Caduet   Cardura  Crestor  Desyrel   Doxil   Glucophage   Hydrocortisone   Lopressor   Lovenox  Pepcid   Protonix   Neurontin   Normodyne   Norvasc  Nyvepria   Senokot   Simethicone   Solu-Medrol   Udenyca   Wellbutrin  Zarxio   Zestril   Zyprexa      Surgical History   Appendectomy  Bilateral refractive surgery   Bladder suspension x2  Bone marrow biopsy   Cervical biopsy   Colonoscopy  Cystoscopy  Exploratory laparotomy  Eye surgery   Gyn surgery, cone biopsy   IR chest port placement  Lymph node biopsy   Soft tissue surgery, knee liposuction  Total abdominal hysterectomy with bilateral salpingo-oophorectomy, washings, omentectomy tumor debulking, pelvic and para-aortic lymphadenectomy     Physical Exam     Patient Vitals for the past 24 hrs:   BP Temp Temp src Pulse Resp SpO2 Weight   04/06/25 2234 -- 101.3  F (38.5  C) Oral -- -- -- --   04/06/25 2105 134/71 98.9  F (37.2  C) Oral 81 18 98 % --   04/06/25 2059 (!) 142/83 99.3  F (37.4  C) Oral -- -- -- --   04/06/25 2058 -- -- -- 88 16 96 % 73.8 kg (162 lb 11.2 oz)     Physical Exam  General: Overall stable and nontoxic appearing, does appear to be having some rigors  HEENT: Conjunctivae clear.   Neuro: Alert, moving all extremities equally with intention  CV: Regular rate and rhythm, radial and DP pulses equal  Respiratory: Distant breath sounds in bilateral lower lobes.  Abdomen: Soft, without rigidity or rebound  throughout. Ecchymoses over the abdomen where she has been injecting her Lovenox.  MSK: No pitting edema, no lower extremity swelling.  Skin: Port site without any overlying cellulitis or warmth, nontender to palpation.  Rash over the extremities.      Diagnostics     Lab Results   Labs Ordered and Resulted from Time of ED Arrival to Time of ED Departure   COMPREHENSIVE METABOLIC PANEL - Abnormal       Result Value    Sodium 132 (*)     Potassium 4.3      Carbon Dioxide (CO2) 21 (*)     Anion Gap 14      Urea Nitrogen 22.0      Creatinine 0.96 (*)     GFR Estimate 61      Calcium 8.9      Chloride 97 (*)     Glucose 156 (*)     Alkaline Phosphatase 76      AST 19      ALT 15      Protein Total 6.3 (*)     Albumin 3.8      Bilirubin Total 0.4     INFLUENZA A/B, RSV AND SARS-COV2 PCR - Normal    Influenza A PCR Negative      Influenza B PCR Negative      RSV PCR Negative      SARS CoV2 PCR Negative     LACTIC ACID WHOLE BLOOD WITH 1X REPEAT IN 2 HR WHEN >2 - Normal    Lactic Acid, Initial 1.0     CBC WITH PLATELETS AND DIFFERENTIAL   RBC AND PLATELET MORPHOLOGY   ROUTINE UA WITH MICROSCOPIC REFLEX TO CULTURE   TROPONIN T, HIGH SENSITIVITY   NT PROBNP INPATIENT   BLOOD CULTURE   BLOOD CULTURE   URINE CULTURE     Imaging   Chest XR,  PA & LAT   Final Result   IMPRESSION: Right internal jugular venous Port-A-Cath terminates near the superior cavoatrial junction. Multiple bilateral pulmonary nodules are better demonstrated on prior CT chest. Large hiatal hernia. Elevation of the left hemidiaphragm. Mild    atelectasis in both lower lungs. Top normal heart size. Normal pulmonary vascularity. No pneumothorax.      Chest CT w/o contrast    (Results Pending)     EKG   EKG 2230  Sinus rhythm without evidence of acute ischemia  Rate 87  QRS 82 Qtc 413    Independent Interpretation   CXR: On my independent interpretation, redemonstrates a left-sided hiatal hernia, no convincing focal infiltrate.    ED Course       Medications Administered   Medications   acetaminophen (TYLENOL) tablet 1,000 mg (1,000 mg Oral $Given 4/6/25 2234)     Procedures   Procedures     Discussion of Management   Admitting Hospitalist, Dr. Zarco    ED Course   ED Course as of 04/06/25 2356   Sun Apr 06, 2025 2130 I obtained history and examined the patient as noted above.    2200 I rechecked the patient and performed bedside ultrasound.    2244 Spoke with Dr. Flower, Maryann-S leads to myelosuppression, treat this as any other chemotherapy    2246 Thursday: White count 2100, ANC 1000, hgb was 6.9, plt were 82    2310 Reassessed, patient feels improved after the nebulizer.      Additional Documentation  None    Medical Decision Making / Diagnosis     CMS Diagnoses: None    MIPS       None    MDM   Julia LUIS Jes is a 74 year old female with a history of metastatic ovarian cancer on a trial medication, known right jugular DVT on Lovenox, vaginal bleeding for which she is planned to receive radiation, who presents to the emergency department chief complaint of fever, diabetes, hyperlipidemia, hypertension, who presents the emergency department chief complaint of a fever.  On examination she is overall stable nontoxic-appearing vitals are reassuringly within normal limits.  Initially afebrile, however upon recheck she is actually febrile received antipyretics for this.  The only infectious source that she can identify is potentially a little mild cough over the past few days.  She did have a transfusion on Friday, however I feel that this would be a very unusual time course or presentation for any transfusion related reaction, I doubt this is transfusion related lung injury.  I doubt this is PE given that patient is anticoagulated on Lovenox and has not missed any dosages of this.  I will send urine culture although she has not any urinary symptoms.  She has had no new abdominal pain and has no focal tenderness on examination here.  Performed a bedside  ultrasound which shows no evidence of pericardial effusion, pulmonary edema, large pleural effusion.  She has no rash anywhere that she has noticed that is new nor do I see any new rash.  Spoke with patient's oncologist as documented ED course above, no other particular side effects that would need to be considered.  I will start her on cefepime.  At this time, I have low suspicion for port infection and I do not think that vancomycin is needed.  She did feel better after receiving a DuoNeb and I see a CT scan that shows moderate emphysema from March 25.  Discussed with hospitalist Dr. Zarco who has kindly accepted for admission.     Disposition   Patient was admitted to the hospitalist     Diagnosis     ICD-10-CM    1. Thrombocytopenia  D69.6       2. Neutropenic fever  D70.9     R50.81       3. Dyspnea on exertion  R06.09            Discharge Medications   New Prescriptions    No medications on file     Scribe Disclosure:  STEWART RAMIREZ, am serving as a scribe at 9:26 PM on 4/6/2025 to document services personally performed by Mary Angel MD based on my observations and the provider's statements to me.      Mary Angel MD  04/07/25 0013

## 2025-04-07 NOTE — PROGRESS NOTES
1731: To PACU from OR via bed, sleeping, respirations spontaneous and non-labored via OPA. Oxygen at 6 lpm, Wound vac on scrotum has a good seal. Patient is sleeping and appears to be comfortable at this time.     1745: OPA Removed, Patient waking and denies pain or nausea at this time.    1800: Meets criteria to transfer to Stage 2 No change in surgical site assessment. JOSLYN Nunes rm 215, bed 1 Patient dozes off to sleep easily and rouses easily.     1815: Prior to departure from PACU patient complaining of 8/10 scrotal pain, plan to medicate per MAR.    1820: Pain at 6/10. Patient states 5/10 is tolerable.    1825: Pain at 7/10, Plan to medicate per MAR    1830: Patient resting, no grimace, appears to be comfortable, and dozing off. Patient rates pain as tolerable at 5/10 at present.    1835 Patient departs to U       [facility]    PROGRESS NOTE - Hospitalist Service    ASSESSMENT AND PLAN     Active Problems:    Dyspnea on exertion    Thrombocytopenia    Neutropenic fever    Julia Andre is a 74 year old female admitted on 4/6/2025.  Patient has a past medical history of metastatic ovarian cancer treatment with a clinical drug trial, HLD, HLD, HTN, type 2 diabetes who presented with fever.     Neutropenic fever  Urinalysis not convincing for UTI.  Blood cultures pending.  Chest x-ray noting atelectasis of the bilateral bases and metastatic pulmonary nodules.    Vanc/Cefepime   Appreciate ID recs   Oncology: No need for Neupogen since the patient has already received Neulasta. Expect ANC to improve within the next 3-4 days      Pancytopenia  Metastatic ovarian cancer  Management as per [facility]  Received cycle 1 of clinical trial drug on 3/27, with Neulasta the next day for neutropenia prevention  - WBC was WNL before starting treatment, now she is severely neutropenic, due to treatment  Holding anticoagulation until platelets at least above 30,000 preferably >50,000     Metabolic acidosis non-anion gap  Resolved       Left internal jugular DVT  Apixaban 5 mg twice daily PTA- on hold due to low PLT     Vaginal bleeding   Has palliative radiation to address vaginal bleeding starting Wednesday. This may need to be delayed.     Acute blood loss anemia   Blood consent obtained     Hypertension on treatment with amlodipine 10 mg daily, metoprolol 50 mg 2 times daily  Hypercholesteremia will continue with rosuvastatin 20 mg daily  GERD will continue famotidine  Depression/anxiety will continue Wellbutrin 300 mg in the morning and lorazepam 0.5 mg as needed for anxiety once daily    Barriers to discharge: IV abs, Cultures, ID recs    Anticipated length of stay: 3 days     Medically Ready for Discharge: Anticipated in 2-4 Days    Clinically Significant Risk Factors Present on Admission         #  "Hyponatremia: Lowest Na = 132 mmol/L in last 2 days, will monitor as appropriate  # Hypochloremia: Lowest Cl = 97 mmol/L in last 2 days, will monitor as appropriate       # Drug Induced Coagulation Defect: home medication list includes an anticoagulant medication  # Thrombocytopenia: Lowest platelets = 17 in last 2 days, will monitor for bleeding   # Hypertension: Noted on problem list      # Anemia: based on hgb <11       # Overweight: Estimated body mass index is 29.76 kg/m  as calculated from the following:    Height as of this encounter: 1.575 m (5' 2\").    Weight as of this encounter: 73.8 kg (162 lb 11.2 oz).                Subjective:  Patient resting comfortably in bed.  Notes that her only symptom prior to arrival was a fever.  She also complains of vaginal bleeding.  Notes she did get a blood transfusion a few days ago.  Continues to have bleeding in her vagina.  Agreeable to blood transfusion if needed.    PHYSICAL EXAM  Temp:  [98.3  F (36.8  C)-101.3  F (38.5  C)] 99.6  F (37.6  C)  Pulse:  [74-92] 80  Resp:  [16-18] 16  BP: (116-146)/(52-83) 146/67  SpO2:  [88 %-99 %] 95 %  Wt Readings from Last 1 Encounters:   04/06/25 73.8 kg (162 lb 11.2 oz)     No intake or output data in the 24 hours ending 04/07/25 1330   Body mass index is 29.76 kg/m .    GENRL: Alert and answering questions appropriately. Not in acute distress. Lying in bed   CHEST: Clear to auscultation bilaterally. No wheezes, rhonchi or crackles. Breathing easily   HEART: Regular rate and rhythm  EXTRM: No pedal edema  NEURO: No focal neurological deficit. No involuntary movements. Normal mentation  PSYCH: Normal affect and mood.   INTGM: No skin rash    Medical Decision Making       55 MINUTES SPENT BY ME on the date of service doing chart review, history, exam, documentation & further activities per the note.      PERTINENT LABS/IMAGING:  Results for orders placed or performed during the hospital encounter of 04/06/25   Chest XR,  PA & LAT "    Impression    IMPRESSION: Right internal jugular venous Port-A-Cath terminates near the superior cavoatrial junction. Multiple bilateral pulmonary nodules are better demonstrated on prior CT chest. Large hiatal hernia. Elevation of the left hemidiaphragm. Mild   atelectasis in both lower lungs. Top normal heart size. Normal pulmonary vascularity. No pneumothorax.   Chest CT w/o contrast    Impression    IMPRESSION:   1.  No evidence of pneumonia. Areas of atelectasis both lower lungs, similar to prior CT.  2.  Bilateral pulmonary nodules compatible with metastatic disease, stable since comparison chest CT last month.  3.  Partially imaged abnormally enlarged left supraclavicular lymph node , which was also seen on prior chest CT  4. Trace pleural effusions, slightly improved on the left compared to prior.  5. Large hiatal hernia.       Most Recent 3 CBC's:  Recent Labs   Lab Test 04/07/25  0548 04/06/25 2129 05/01/24  1634   WBC 0.6* 0.6* 4.9   HGB 7.8* 8.5* 9.3*   MCV 93 95 109*   PLT 17* 22* 109*     Most Recent 3 BMP's:  Recent Labs   Lab Test 04/07/25  0548 04/06/25 2129 03/11/25  0854 01/02/25  1333   * 132*  --  139   POTASSIUM 4.5 4.3  --  4.7   CHLORIDE 98 97*  --  103   CO2 23 21*  --  25   BUN 21.4 22.0  --  28.2*   CR 0.95 0.96* 1.1* 1.29*   ANIONGAP 11 14  --  11   RADHA 9.0 8.9  --  9.6   * 156*  --  92     Most Recent 2 LFT's:  Recent Labs   Lab Test 04/07/25  0548 04/06/25 2129   AST 16 19   ALT 15 15   ALKPHOS 73 76   BILITOTAL 0.4 0.4       Recent Labs   Lab Test 05/01/24  1634   CHOL 151   HDL 45*   LDL 65   TRIG 204*     Recent Labs   Lab Test 05/01/24  1634 04/21/23  1033 12/07/21  1038   LDL 65 62 53     Recent Labs   Lab Test 04/07/25  0548   *   POTASSIUM 4.5   CHLORIDE 98   CO2 23   *   BUN 21.4   CR 0.95   GFRESTIMATED 62   RADHA 9.0     Recent Labs   Lab Test 12/19/24  1144 08/21/24  0931 05/01/24  1634   A1C 5.8* 6.2* 5.9*     Recent Labs   Lab Test  "04/07/25  0548 04/06/25 2129 05/01/24  1634   HGB 7.8* 8.5* 9.3*     No results for input(s): \"TROPONINI\" in the last 74677 hours.  Recent Labs   Lab Test 04/06/25 2129   NTBNPI 272     No results for input(s): \"TSH\" in the last 02114 hours.  Recent Labs   Lab Test 08/23/21  1223 09/04/20  0708   INR 0.94 1.13       Melina Pierre, DO  Hospitalist Service  [facility]      "

## 2025-04-07 NOTE — PROGRESS NOTES
CP- and Candida auris screening information    This patient recieved healthcare outside of the [place] and [place] in the prior 12 months. and was admitted to a hospital or long-term care facility in a high risk area in the prior 12 months. and is potentially at a high risk for carrying CP- and/or Candida auris. The [place] Department of Health ([facility]) and CDC recommend that we test this patient to prevent the spread of these organisms within our healthcare facility. Testing is voluntary and includes collecting an axilla and groin swab for C. auris and a rectal swab for CP-. Due to the potential transmission of these organisms in healthcare settings, Infection Prevention requires that this patient be placed in a private room on Contact Precautions until testing is complete. While the Candida auris testing is pending, bleach or an approved disinfectant (e.g. PDI Prime) should be used clean the patient s room and equipment.      Please notify Infection Prevention if the patient declines testing.  Additional information and resources can be found on the Infection Prevention [employer] page.     4/7/2025  Zakiya Carlos, Infection Prevention

## 2025-04-07 NOTE — PLAN OF CARE
"End of Shift Summary  For vital signs and complete assessments, please see documentation flowsheets.     Pertinent assessments: Alert and oriented x4, VSS, room air sats 88% applied 2 liters of oxygen, applied 2 liters of oxygen, temp max 100.0, SBA in room, denies any pain/nausea, lung sounds clear, bowel sounds active, PORT accessed in ER prior to arrival--blood return noted and orders receive for heparin flush    Major Shift Events admit     Treatment Plan: oncology consult, [facility]     Bedside Nurse: Samira Umaña RN       Problem: Adult Inpatient Plan of Care  Goal: Plan of Care Review  Description: The Plan of Care Review/Shift note should be completed every shift.  The Outcome Evaluation is a brief statement about your assessment that the patient is improving, declining, or no change.  This information will be displayed automatically on your shiftnote.  Outcome: Not Progressing  Flowsheets (Taken 4/7/2025 0626)  Outcome Evaluation: admit  Plan of Care Reviewed With: patient  Overall Patient Progress: no change  Goal: Patient-Specific Goal (Individualized)  Description: You can add care plan individualizations to a care plan. Examples of Individualization might be:  \"Parent requests to be called daily at 9am for status\", \"I have a hard time hearing out of my right ear\", or \"Do not touch me to wake me up as it startlesme\".  Outcome: Not Progressing  Goal: Absence of Hospital-Acquired Illness or Injury  Outcome: Not Progressing  Intervention: Identify and Manage Fall Risk  Recent Flowsheet Documentation  Taken 4/7/2025 0200 by Samira Umaña RN  Safety Promotion/Fall Prevention:   activity supervised   lighting adjusted  Intervention: Prevent Skin Injury  Recent Flowsheet Documentation  Taken 4/7/2025 0200 by Samira Umaña RN  Body Position: position changed independently  Goal: Optimal Comfort and Wellbeing  Outcome: Not Progressing  Goal: Readiness for Transition of Care  4/7/2025 0626 by Leeroy, " Samira MENDEZ, RN  Outcome: Not Progressing  4/7/2025 0625 by Samira Umaña, RN  Outcome: Not Progressing  Intervention: Mutually Develop Transition Plan  Recent Flowsheet Documentation  Taken 4/7/2025 0124 by Samira Umaña, RN  Equipment Currently Used at Home: none   Goal Outcome Evaluation:      Plan of Care Reviewed With: patient    Overall Patient Progress: no changeOverall Patient Progress: no change    Outcome Evaluation: admit

## 2025-04-07 NOTE — PHARMACY-ADMISSION MEDICATION HISTORY
Pharmacist Admission Medication History    Admission medication history is complete. The information provided in this note is only as accurate as the sources available at the time of the update.    Information Source(s): Patient, Hospital records, and CareEverywhere/SureScripts via in-person    Pertinent Information: pt is on enoxaparin 80mg q12h.  Pt had dose at 2030 last night, on way to ER.  Pt has 3 day supply of enoxaparin left at home, then will start on apixaban.  Has not taken any apixaban yet.    Changes made to PTA medication list:  Added: None  Deleted: senna-docusate 2 tab daily prn  Changed: diclofenac gel from qid prn to bid prn per pt    Allergies reviewed with patient and updates made in EHR: yes    Medication History Completed By: Elizabeth Muse RPH 4/7/2025 9:34 AM    PTA Med List   Medication Sig Note Last Dose/Taking    acetaminophen (TYLENOL) 650 MG CR tablet Take 1,300 mg by mouth 3 times daily as needed for mild pain or fever.  4/6/2025    amLODIPine (NORVASC) 10 MG tablet Take 1 tablet (10 mg) by mouth daily.  4/6/2025 Morning    apixaban ANTICOAGULANT (ELIQUIS) 5 MG tablet Take 5 mg by mouth 2 times daily. 4/7/2025: 4/7-new rx, has not started.  Will start when enoxaparin injections gone. jie Taking    BIOTIN PO Take 2 chew tab by mouth every evening. OTC 10,000 mcg per serving  4/6/2025 Evening    buPROPion (WELLBUTRIN XL) 300 MG 24 hr tablet Take 1 tablet (300 mg) by mouth every morning.  4/6/2025 Morning    calcium carbonate-vitamin D (CALTRATE) 600-10 MG-MCG per tablet Take 1 tablet by mouth 2 times daily.  4/6/2025 Evening    diclofenac (VOLTAREN) 1 % topical gel Apply 2 g topically 2 times daily as needed for moderate pain.  Past Month    doxazosin (CARDURA) 1 MG tablet Take 1 mg by mouth at bedtime.  4/6/2025 Evening    enoxaparin ANTICOAGULANT (LOVENOX) 80 MG/0.8ML syringe Inject 80 mg subcutaneously every 12 hours.  4/6/2025 at  8:30 PM    famotidine (PEPCID) 20 MG tablet Take 20  mg by mouth 2 times daily as needed.  Unknown    Lidocaine (LIDOCARE) 4 % Patch Place 1 patch onto the skin daily as needed for moderate pain. To prevent lidocaine toxicity, patient should be patch free for 12 hrs daily.  Past Month    LORazepam (ATIVAN) 0.5 MG tablet Take 1 tablet (0.5 mg) by mouth as needed for anxiety  Past Week    metoprolol tartrate (LOPRESSOR) 50 MG tablet Take 1 tablet (50 mg) by mouth 2 times daily  4/6/2025 Evening    minoxidil (ROGAINE) 2 % external solution Apply topically 2 times daily.  4/6/2025 Evening    oxymetazoline (AFRIN) 0.05 % nasal spray Spray 2 sprays into both nostrils daily as needed.  Past Month    polyethylene glycol (MIRALAX) 17 GM/Dose powder Take 1 Capful by mouth daily.  4/6/2025 Morning    rosuvastatin (CRESTOR) 20 MG tablet Take 1 tablet (20 mg) by mouth daily  4/6/2025 Morning

## 2025-04-07 NOTE — ED TRIAGE NOTES
Chemo earlier this week. Blood transfusion on Friday. Temp at home tonight, 101. Patient took tylenol 10am.

## 2025-04-07 NOTE — PROGRESS NOTES
"Oncology/Hematology Follow Up Note:    Assessment and Plan:  #1 Metastatic ovarian cancer  #2 Neutropenic fever  - Received cycle 1 of clinical trial drug on 3/27, with Neulasta the next day for neutropenia prevention  - WBC was WNL before starting treatment, now she is severely neutropenic, due to treatment  - Blood cultures pending.  Urine culture pending  - CT Chest shows no evidence of pneumonia    PLAN:  - Follow up on blood and urine culture results  - Continue broad spectrum antibiotics for neutropenic fever  - No need for Neupogen since the patient has already received Neulasta  - Expect ANC to improve within the next 3-4 days  - Has palliative radiation to address vaginal bleeding starting Wednesday.  This may need to be delayed.    #3 DVT  #4 Thrombocytopenia due to treatment    PLAN:  - Hold anticoagulation until Plt is at least >30K, preferably >50K    Kevin Bravo M.D.  [facility]  [phone]    Subjective:    Patient is currently afebrile.  Denies any bleeding issues except for vaginal bleeding which is cancer related.      Labs:  All labs reviewed    CBC  Recent Labs   Lab 04/07/25  0548 04/06/25 2129   WBC 0.6* 0.6*   HGB 7.8* 8.5*   MCV 93 95   PLT 17* 22*       CMP  Recent Labs   Lab 04/07/25  0548 04/06/25 2129   * 132*   POTASSIUM 4.5 4.3   CHLORIDE 98 97*   CO2 23 21*   ANIONGAP 11 14   * 156*   BUN 21.4 22.0   CR 0.95 0.96*   GFRESTIMATED 62 61   RADHA 9.0 8.9   PROTTOTAL 5.8* 6.3*   ALBUMIN 3.5 3.8   BILITOTAL 0.4 0.4   ALKPHOS 73 76   AST 16 19   ALT 15 15       INRNo lab results found in last 7 days.    Blood CultureNo results for input(s): \"CULT\" in the last 168 hours.      Kevin Bravo MD  [facility]  4/7/2025 8:23 AM        "

## 2025-04-07 NOTE — ED NOTES
[facility]  ED Nurse Handoff Report    ED Chief complaint: Fever  . ED Diagnosis:   Final diagnoses:   None       Allergies:   Allergies   Allergen Reactions    Atorvastatin Muscle Pain (Myalgia)     PN: muscle aches      Muscle pain      Muscle pain PN: muscle aches    Simvastatin     Sulfa Antibiotics        Code Status: Full Code    Activity level - Baseline/Home:  independent.  Activity Level - Current:   independent.   Lift room needed: No.   Bariatric: No   Needed: No   Isolation: yes/ neutropenic  Infection: neutropenic    Respiratory status: Room air    Vital Signs (within 30 minutes):   Vitals:    04/06/25 2234 04/06/25 2235 04/06/25 2242 04/06/25 2243   BP:       Pulse:       Resp:       Temp: 101.3  F (38.5  C)      TempSrc: Oral      SpO2:  93% 95% 99%   Weight:           Cardiac Rhythm:  ,      Pain level:    Patient confused: No.   Patient Falls Risk: patient and family education.   Elimination Status: Has voided     Patient Report - Initial Complaint: .Chemo earlier this week. Blood transfusion on Friday. Temp at home tonight, 101. Patient took tylenol 10am    Focused Assessment: fever,  pt wheezing, shortness of breath with activity    Abnormal Results:   Labs Ordered and Resulted from Time of ED Arrival to Time of ED Departure   COMPREHENSIVE METABOLIC PANEL - Abnormal       Result Value    Sodium 132 (*)     Potassium 4.3      Carbon Dioxide (CO2) 21 (*)     Anion Gap 14      Urea Nitrogen 22.0      Creatinine 0.96 (*)     GFR Estimate 61      Calcium 8.9      Chloride 97 (*)     Glucose 156 (*)     Alkaline Phosphatase 76      AST 19      ALT 15      Protein Total 6.3 (*)     Albumin 3.8      Bilirubin Total 0.4     CBC WITH PLATELETS AND DIFFERENTIAL - Abnormal    WBC Count 0.6 (*)     RBC Count 2.71 (*)     Hemoglobin 8.5 (*)     Hematocrit 25.6 (*)     MCV 95      MCH 31.4      MCHC 33.2      RDW 14.6      Platelet Count 22 (*)     % Neutrophils 2      %  Lymphocytes 87      % Monocytes 8      % Eosinophils 3      % Basophils 0      % Immature Granulocytes 0      NRBCs per 100 WBC 0      Absolute Neutrophils 0.0 (*)     Absolute Lymphocytes 0.6 (*)     Absolute Monocytes 0.1      Absolute Eosinophils 0.0      Absolute Basophils 0.0      Absolute Immature Granulocytes 0.0      Absolute NRBCs 0.0     INFLUENZA A/B, RSV AND SARS-COV2 PCR - Normal    Influenza A PCR Negative      Influenza B PCR Negative      RSV PCR Negative      SARS CoV2 PCR Negative     LACTIC ACID WHOLE BLOOD WITH 1X REPEAT IN 2 HR WHEN >2 - Normal    Lactic Acid, Initial 1.0     RBC AND PLATELET MORPHOLOGY    RBC Morphology Confirmed RBC Indices      Platelet Assessment        Value: Automated Count Confirmed. Platelet morphology is normal.   ROUTINE UA WITH MICROSCOPIC REFLEX TO CULTURE   TROPONIN T, HIGH SENSITIVITY   NT PROBNP INPATIENT   BLOOD CULTURE   BLOOD CULTURE   URINE CULTURE        Chest XR,  PA & LAT    (Results Pending)       Treatments provided: see MAR  Family Comments:  at bedside  OBS brochure/video discussed/provided to patient:  Yes  ED Medications:   Medications   ipratropium - albuterol 0.5 mg/2.5 mg/3 mL (DUONEB) neb solution 3 mL (has no administration in time range)   acetaminophen (TYLENOL) tablet 1,000 mg (1,000 mg Oral $Given 4/6/25 2002)       Drips infusing:  No  For the majority of the shift this patient was Green.   Interventions performed were .    Sepsis treatment initiated: No    Cares/treatment/interventions/medications to be completed following ED care: all admit orders    ED Nurse Name: Lupe Plascencia RN  10:48 PM   RECEIVING UNIT ED HANDOFF REVIEW    Above ED Nurse Handoff Report was reviewed: Yes  Reviewed by: Samira Umaña RN on April 7, 2025 at 12:27 AM   I Winston called the ED to inform them the note was read: No

## 2025-04-07 NOTE — H&P
[facility]    History and Physical - Hospitalist Service       Date of Admission:  4/6/2025    Assessment & Plan      Julia Andre is a 74 year old female admitted on 4/6/2025.     Neutropenic fever  Patient's WBC count is low at 0.6 with 2% neutrophils she effectively has close to no neutrophils with absolute neutrophil count equal to 0  Patient was started on cefepime in the emergency department which will be continued  Urine culture report will be followed oncology will be consulted        Pancytopenia  Metastatic ovarian cancer  Management as per [facility]  Will check CBC in a.m.    Metabolic acidosis non-anion gap  Cause is not known with normal lactic acid  Will check BMP in a.m.    Left internal jugular DVT  On treatment with apixaban 5 mg twice daily  Will closely monitor with [facility] as patient also has vaginal bleeding after starting anticoagulation      Hypertension on treatment with amlodipine 10 mg daily, metoprolol 50 mg 2 times daily  Hypercholesteremia will continue with rosuvastatin 20 mg daily  GERD will continue famotidine  Depression/anxiety will continue Wellbutrin 300 mg in the morning and lorazepam 0.5 mg as needed for anxiety once daily            Diet:  Cardiac diet  DVT Prophylaxis: Enoxaparin (Lovenox) SQ  Gutierrez Catheter: Not present  Lines: PRESENT             Cardiac Monitoring: None  Code Status:  Full code    Clinically Significant Risk Factors Present on Admission         # Hyponatremia: Lowest Na = 132 mmol/L in last 2 days, will monitor as appropriate  # Hypochloremia: Lowest Cl = 97 mmol/L in last 2 days, will monitor as appropriate       # Drug Induced Coagulation Defect: home medication list includes an anticoagulant medication  # Thrombocytopenia: Lowest platelets = 22 in last 2 days, will monitor for bleeding   # Hypertension: Noted on problem list      # Anemia: based on hgb <11                  Disposition Plan     Medically Ready  for Discharge: Anticipated in 2-4 Days           Atif Zarco MD  Hospitalist Service  [facility]  Securely message with IIIMOBI (more info)  Text page via OpenD[employer] Paging/Directory     ______________________________________________________________________    Chief Complaint   Fever with chills    History is obtained from the patient, medical records and my discussion with emergency department physician      History of Present Illness   Julia Andre is a 74 year old lady with known history of metastatic ovarian cancer who is on a study medication doctors with [facility] and has radiation and chemotherapy.  She also has hypertension, hypercholesteremia, T2DM, stage III CKD, acute MI, GERD, depression, anxiety, psoriasis, iron deficiency anemia requiring blood transfusions the last of which was 2 days ago for hemoglobin of 6.9 when she received 2 units of RBC.    Since the morning patient has had fever with chills associated with shortness of breath with albuterol making her feel better.  She denies any cough or increased sputum production.  She denies any burning micturition or increased frequency of urination, abdominal pain, denies skin rashes or redness, denies any headache or facial pains.        Past Medical History    Past Medical History:   Diagnosis Date    Anxiety     Cervical cancer (H)     Coronary artery disease 2009    angina. elevated troponins, normal coronary arteries found    Depression     Depressive disorder     Diabetes (H)     type 2    Gastroesophageal reflux disease     Heart attack (H)     Hyperlipidemia     Hypertension     Iron deficiency anemia     Osteopenia     Ovarian cancer, unspecified laterality (H) 11/18/2021    Psoriasis     Sleep apnea        Past Surgical History   Past Surgical History:   Procedure Laterality Date    COLONOSCOPY N/A 6/8/2022    Procedure: COLONOSCOPY fv;  Surgeon: Otf Pena MD;  Location:  GI    EYE SURGERY       strabismus    GI SURGERY  1996    cystoscopy    GYN SURGERY      bladder suspension    GYN SURGERY  1974    cone biopsy    HYSTERECTOMY TOTAL ABD, BRANDI SALPINGO-OOPHORECTOMY, NODE DISSECTION, TUMOR DEBULKING, COMBINED Bilateral 9/2/2020    Procedure: TOTAL ABDOMINAL HYSTERECTOMY WITH BILATERAL SALPINGO-OOPHORECTOMY; WASHINGS; OMENTECTOMY TUMOR DEBULKING; PELVIC AND PARA-AORTIC LYMPHADENECTOMY;  Surgeon: Emily Hickey MD;  Location: [facility] OR    IR CHEST PORT PLACEMENT > 5 YRS OF AGE  9/4/2020    LAPAROTOMY EXPLORATORY N/A 9/2/2020    Procedure: EXPLORATORY LAPAROTOMY;  Surgeon: Emily Hickey MD;  Location: [facility] OR    SOFT TISSUE SURGERY      knee liposuction       Prior to Admission Medications   Prior to Admission Medications   Prescriptions Last Dose Informant Patient Reported? Taking?   BIOTIN PO  Self Yes No   Sig: Take 2 chew tab by mouth every morning. OTC 10,000 mcg per serving   LORazepam (ATIVAN) 0.5 MG tablet   No No   Sig: Take 1 tablet (0.5 mg) by mouth as needed for anxiety   Lidocaine (LIDOCARE) 4 % Patch   Yes No   Sig: Place 1 patch onto the skin daily as needed for moderate pain. To prevent lidocaine toxicity, patient should be patch free for 12 hrs daily.   acetaminophen (TYLENOL) 650 MG CR tablet   Yes No   Sig: Take 1,300 mg by mouth 3 times daily as needed for mild pain or fever.   amLODIPine (NORVASC) 10 MG tablet   No No   Sig: Take 1 tablet (10 mg) by mouth daily.   apixaban ANTICOAGULANT (ELIQUIS) 5 MG tablet   Yes No   Sig: Take 5 mg by mouth 2 times daily.   buPROPion (WELLBUTRIN XL) 300 MG 24 hr tablet   No No   Sig: Take 1 tablet (300 mg) by mouth every morning.   calcium carbonate-vitamin D (CALTRATE) 600-10 MG-MCG per tablet   Yes No   Sig: Take 1 tablet by mouth 2 times daily.   diclofenac (VOLTAREN) 1 % topical gel   Yes No   Sig: Apply 2 g topically 4 times daily as needed for moderate pain.   doxazosin (CARDURA) 1 MG tablet   Yes No   Sig: Take 1 mg by mouth at bedtime.    enoxaparin ANTICOAGULANT (LOVENOX) 150 MG/ML syringe   Yes No   Sig: Inject subcutaneously every 12 hours.   famotidine (PEPCID) 20 MG tablet   Yes No   Sig: Take 20 mg by mouth 2 times daily as needed.   metoprolol tartrate (LOPRESSOR) 50 MG tablet   No No   Sig: Take 1 tablet (50 mg) by mouth 2 times daily   minoxidil (ROGAINE) 2 % external solution   Yes No   Sig: Apply topically 2 times daily.   oxymetazoline (AFRIN) 0.05 % nasal spray  Self Yes No   Sig: Spray 2 sprays into both nostrils daily.   polyethylene glycol (MIRALAX) 17 GM/Dose powder   Yes No   Sig: Take 1 Capful by mouth daily.   rosuvastatin (CRESTOR) 20 MG tablet   No No   Sig: Take 1 tablet (20 mg) by mouth daily   senna-docusate (SENOKOT-S/PERICOLACE) 8.6-50 MG tablet   Yes No   Sig: Take 2 tablets by mouth daily as needed.      Facility-Administered Medications: None        Review of Systems    Denies any headache, blurring of vision or double vision, neck pain jaw pain or shoulder pain, chest pain, shortness of breath, cough or increased sputum production, has nausea with vomiting, abdominal pain, diarrhea or constipation.  Denies any urinary symptoms of burning or increased frequency. Denies any weakness of upper or lower extremities or any seizure activity. Fever or chills. Bleeding from anywhere else.  No rashes or cellulitis.      Social History   I have reviewed this patient's social history and updated it with pertinent information if needed.  Social History     Tobacco Use    Smoking status: Former     Current packs/day: 0.00     Average packs/day: 2.5 packs/day for 27.0 years (67.5 ttl pk-yrs)     Types: Cigarettes     Start date: 1966     Quit date: 1993     Years since quittin.3    Smokeless tobacco: Never   Vaping Use    Vaping status: Never Used   Substance Use Topics    Alcohol use: Not Currently     Comment: sober for 32 years    Drug use: Never         Family History   Father had carcinoma of the  larynx    Allergies   Allergies   Allergen Reactions    Atorvastatin Muscle Pain (Myalgia)     PN: muscle aches      Muscle pain      Muscle pain PN: muscle aches    Simvastatin     Sulfa Antibiotics         Physical Exam   Vital Signs: Temp: 101.3  F (38.5  C) Temp src: Oral BP: 129/67 Pulse: 92   Resp: 18 SpO2: 99 % O2 Device: None (Room air)    Weight: 162 lbs 11.19 oz      GENERAL:  Patient does not look in any acute distress  HEENT: EOM+, Conjunctiva is clear   NECK: I did not see a jugular Venous distention  HEART: S1 S2 regular  Rate and Rhythm,  no murmur,    LUNGS: Respirations are not laboured, Lungs are clear to auscultation without Crepitations or Wheezing   ABDOMEN: Soft, there is no tenderness,  Bowel Sounds are Positive   LOWER LIMBS: no Pedal Edema Bilaterally I did not see any cellulitic changes  CNS:  Alert,  Oriented x 3, Moving all the Four Limbs          Data   Imaging results reviewed over the past 24 hrs:   Recent Results (from the past 24 hours)   Chest XR,  PA & LAT    Narrative    EXAM: XR CHEST 2 VIEWS  LOCATION: [facility]  DATE: 4/6/2025    INDICATION: Cancer patient on a research drug, here with fever and dyspnea on exertion  COMPARISON: CT chest 3/11/2025. Chest radiographs 5/29/2024.      Impression    IMPRESSION: Right internal jugular venous Port-A-Cath terminates near the superior cavoatrial junction. Multiple bilateral pulmonary nodules are better demonstrated on prior CT chest. Large hiatal hernia. Elevation of the left hemidiaphragm. Mild   atelectasis in both lower lungs. Top normal heart size. Normal pulmonary vascularity. No pneumothorax.   Chest CT w/o contrast    Narrative    EXAM: CT CHEST W/O CONTRAST  LOCATION: [facility]  DATE: 4/7/2025    INDICATION: Neutropenic fever, please evaluate for any occult pneumonia  COMPARISON: Radiograph 04/06/2025. CT chest abdomen pelvis 03/11/2025.  TECHNIQUE: CT chest without IV contrast.  Multiplanar reformats were obtained. Dose reduction techniques were used.  CONTRAST: None.    FINDINGS:   LUNGS AND PLEURA: Trace bilateral pleural effusions. Atelectasis in the medial right lower lobe, in the anterior left lower lobe, and in the lingula, similar to prior. Apical predominant emphysema. No new large consolidation.    There are multiple bilateral pulmonary nodules. For example, left lower lobe nodule posteriorly measuring 1 cm, series 4 image 107, unchanged. Right upper lobe nodule on image 44 measuring 1.0 cm, unchanged. Left upper lobe nodule measuring 9 mm on image   50, unchanged. Right middle lobe nodule measuring 1.5 cm on image 174, unchanged.    MEDIASTINUM/AXILLAE: Large hiatal hernia. There is a partially imaged abnormally enlarged left supraclavicular lymph node which was also seen on prior chest CT. Right chest port with tip in lower SVC.    CORONARY ARTERY CALCIFICATION: Moderate.    UPPER ABDOMEN: No acute findings.    MUSCULOSKELETAL: Degenerative thoracic spondylosis. No acute findings.      Impression    IMPRESSION:   1.  No evidence of pneumonia. Areas of atelectasis both lower lungs, similar to prior CT.  2.  Bilateral pulmonary nodules compatible with metastatic disease, stable since comparison chest CT last month.  3.  Partially imaged abnormally enlarged left supraclavicular lymph node , which was also seen on prior chest CT  4. Trace pleural effusions, slightly improved on the left compared to prior.  5. Large hiatal hernia.       Most Recent 3 CBC's:  Recent Labs   Lab Test 04/06/25 2129 05/01/24  1634 10/27/23  1044   WBC 0.6* 4.9 4.5   HGB 8.5* 9.3* 8.2*   MCV 95 109* 107*   PLT 22* 109* 153     Most Recent 3 BMP's:  Recent Labs   Lab Test 04/06/25 2129 03/11/25  0854 01/02/25  1333 12/19/24  1144   *  --  139 136   POTASSIUM 4.3  --  4.7 5.2   CHLORIDE 97*  --  103 102   CO2 21*  --  25 22   BUN 22.0  --  28.2* 36.7*   CR 0.96* 1.1* 1.29* 1.34*   ANIONGAP 14  --  11  12   RADHA 8.9  --  9.6 9.6   *  --  92 132*     Most Recent 2 LFT's:  Recent Labs   Lab Test 04/06/25 2129 05/01/24  1634   AST 19 18   ALT 15 12   ALKPHOS 76 83   BILITOTAL 0.4 0.5     Most Recent Urinalysis:  Recent Labs   Lab Test 04/06/25  2238   COLOR Light Yellow   APPEARANCE Clear   URINEGLC Negative   URINEBILI Negative   URINEKETONE Negative   SG 1.021   UBLD Large*   URINEPH 6.0   PROTEIN 30*   NITRITE Negative   LEUKEST Negative   RBCU 77*   WBCU 1

## 2025-04-07 NOTE — CONSULTS
[facility]    Infectious Disease Consultation     Date of Admission:  4/6/2025  Date of Consult (When I saw the patient): 04/07/25    Assessment:  74YF with metastatic ovarian cancer , on a trial drug (first dose was 2 weeks ago), who has been admitted due to fever and chills and was found to have neutropenic fever    -Neutropenic fever  -Chemotherapy associated pancytopenia  -Metastatic ovarian cancer with pulmonary metastases- on a trial drug started hubert infusion 2 weeks ago  -Positive urine cx with 10-50,000 cfu/ml e.coli, possibly UTI  -Hyponatremia  -Hiatal hernia  -HTN, hyperlipidemia, diabetes    Recommendations:  Discontinue Vancomycin  Follow blood cx  Maintain on Cefepime  Follow clinical status and labs, ID will continue to follow    Julissa Aviles MD    Reason for Consult   Reason for consult: I was asked to evaluate this patient for antimicrobial recommendations for neutropenic fever.    Primary Care Physician   Johanny Diaz    Chief Complaint   Fever and chills    History is obtained from the patient and medical records    History of Present Illness   Julia Andre is a 74 year old female  with metastatic ovarian cancer , on a trial drug, who has been admitted due to fever and chills and was found to have neutropenic fever.    Patient received first dose of a trial drug 2 weeks ago. She always feels tired but developed a fever requiring hospitalization. She is pancytopenic, with negative blood cxs so far, has been febrile though fever curve is improving, urine cx is growing e.coli though she has no symptoms. CT chest shows pulmonary nodules compatible with metastatic disease, no obvious pneumonia.  Viral panel is negative. Patient denies focal complaints.    Patient has been  maintained on vancomycin and cefepime, and ID has been asked to assist with further antimicrobial management.    Past Medical History   I have reviewed this patient's medical history and updated it with  pertinent information if needed.   Past Medical History:   Diagnosis Date    Anxiety     Cervical cancer (H)     Coronary artery disease 2009    angina. elevated troponins, normal coronary arteries found    Depression     Depressive disorder     Diabetes (H)     type 2    Gastroesophageal reflux disease     Heart attack (H)     Hyperlipidemia     Hypertension     Iron deficiency anemia     Osteopenia     Ovarian cancer, unspecified laterality (H) 11/18/2021    Psoriasis     Sleep apnea        Past Surgical History   I have reviewed this patient's surgical history and updated it with pertinent information if needed.  Past Surgical History:   Procedure Laterality Date    COLONOSCOPY N/A 6/8/2022    Procedure: COLONOSCOPY fv;  Surgeon: Otf Pena MD;  Location:  GI    EYE SURGERY      strabismus    GI SURGERY  1996    cystoscopy    GYN SURGERY      bladder suspension    GYN SURGERY  1974    cone biopsy    HYSTERECTOMY TOTAL ABD, BRANDI SALPINGO-OOPHORECTOMY, NODE DISSECTION, TUMOR DEBULKING, COMBINED Bilateral 9/2/2020    Procedure: TOTAL ABDOMINAL HYSTERECTOMY WITH BILATERAL SALPINGO-OOPHORECTOMY; WASHINGS; OMENTECTOMY TUMOR DEBULKING; PELVIC AND PARA-AORTIC LYMPHADENECTOMY;  Surgeon: Emily Hickey MD;  Location: [facility] OR    IR CHEST PORT PLACEMENT > 5 YRS OF AGE  9/4/2020    LAPAROTOMY EXPLORATORY N/A 9/2/2020    Procedure: EXPLORATORY LAPAROTOMY;  Surgeon: Emily Hickey MD;  Location: [facility] OR    SOFT TISSUE SURGERY      knee liposuction       Prior to Admission Medications   Prior to Admission Medications   Prescriptions Last Dose Informant Patient Reported? Taking?   BIOTIN PO 4/6/2025 Evening Self Yes Yes   Sig: Take 2 chew tab by mouth every evening. OTC 10,000 mcg per serving   LORazepam (ATIVAN) 0.5 MG tablet Past Week  No Yes   Sig: Take 1 tablet (0.5 mg) by mouth as needed for anxiety   Lidocaine (LIDOCARE) 4 % Patch Past Month  Yes Yes   Sig: Place 1 patch onto the skin daily as needed for  moderate pain. To prevent lidocaine toxicity, patient should be patch free for 12 hrs daily.   acetaminophen (TYLENOL) 650 MG CR tablet 4/6/2025  Yes Yes   Sig: Take 1,300 mg by mouth 3 times daily as needed for mild pain or fever.   amLODIPine (NORVASC) 10 MG tablet 4/6/2025 Morning  No Yes   Sig: Take 1 tablet (10 mg) by mouth daily.   apixaban ANTICOAGULANT (ELIQUIS) 5 MG tablet   Yes Yes   Sig: Take 5 mg by mouth 2 times daily.   buPROPion (WELLBUTRIN XL) 300 MG 24 hr tablet 4/6/2025 Morning  No Yes   Sig: Take 1 tablet (300 mg) by mouth every morning.   calcium carbonate-vitamin D (CALTRATE) 600-10 MG-MCG per tablet 4/6/2025 Evening  Yes Yes   Sig: Take 1 tablet by mouth 2 times daily.   diclofenac (VOLTAREN) 1 % topical gel Past Month  Yes Yes   Sig: Apply 2 g topically 2 times daily as needed for moderate pain.   doxazosin (CARDURA) 1 MG tablet 4/6/2025 Evening  Yes Yes   Sig: Take 1 mg by mouth at bedtime.   enoxaparin ANTICOAGULANT (LOVENOX) 80 MG/0.8ML syringe 4/6/2025 at  8:30 PM  Yes Yes   Sig: Inject 80 mg subcutaneously every 12 hours.   famotidine (PEPCID) 20 MG tablet Unknown  Yes Yes   Sig: Take 20 mg by mouth 2 times daily as needed.   metoprolol tartrate (LOPRESSOR) 50 MG tablet 4/6/2025 Evening  No Yes   Sig: Take 1 tablet (50 mg) by mouth 2 times daily   minoxidil (ROGAINE) 2 % external solution 4/6/2025 Evening  Yes Yes   Sig: Apply topically 2 times daily.   oxymetazoline (AFRIN) 0.05 % nasal spray Past Month Self Yes Yes   Sig: Spray 2 sprays into both nostrils daily as needed.   polyethylene glycol (MIRALAX) 17 GM/Dose powder 4/6/2025 Morning  Yes Yes   Sig: Take 1 Capful by mouth daily.   rosuvastatin (CRESTOR) 20 MG tablet 4/6/2025 Morning  No Yes   Sig: Take 1 tablet (20 mg) by mouth daily      Facility-Administered Medications: None     Allergies   Allergies   Allergen Reactions    Atorvastatin Muscle Pain (Myalgia)     PN: muscle aches      Muscle pain      Muscle pain PN: muscle aches     Simvastatin     Sulfa Antibiotics        Immunization History   Immunization History   Administered Date(s) Administered    COVID-19 12+ (MODERNA) 09/16/2024    COVID-19 12+ (Pfizer) 10/27/2023    COVID-19 MONOVALENT 12+ (Pfizer) 02/25/2021, 03/18/2021, 09/27/2021    COVID-19 Monovalent 12+ (Pfizer 2022) 04/18/2022    Influenza (High Dose) Trivalent,PF (Fluzone) 09/27/2021, 09/16/2024    Influenza Vaccine 65+ (FLUAD) 09/22/2020    Influenza Vaccine 65+ (Fluzone HD) 09/22/2020, 10/27/2023    Pneumo Conj 13-V (2010&after) 12/15/2015    Pneumococcal 23 valent 09/25/2013, 12/19/2018    TDAP (Adacel,Boostrix) 09/19/2011    Zoster recombinant adjuvanted (Shingrix) 06/03/2022, 08/08/2022    Zoster vaccine, live 09/25/2013       Social History   I have reviewed this patient's social history and updated it with pertinent information if needed. Julia Andre  reports that she quit smoking about 31 years ago. Her smoking use included cigarettes. She started smoking about 58 years ago. She has a 67.5 pack-year smoking history. She has never used smokeless tobacco. She reports that she does not currently use alcohol. She reports that she does not use drugs.    Family History   I have reviewed this patient's family history and updated it with pertinent information if needed.   Family History   Problem Relation Age of Onset    Colon Cancer No family hx of        Review of Systems   The 10 point Review of Systems is as per HPI    Physical Exam   Temp: 99.6  F (37.6  C) Temp src: Oral BP: (!) 146/67 Pulse: 80   Resp: 16 SpO2: 95 % O2 Device: None (Room air) Oxygen Delivery: 2 LPM  Vital Signs with Ranges  Temp:  [98.3  F (36.8  C)-101.3  F (38.5  C)] 99.6  F (37.6  C)  Pulse:  [74-92] 80  Resp:  [16-18] 16  BP: (116-146)/(52-83) 146/67  SpO2:  [88 %-99 %] 95 %  162 lbs 11.19 oz  Body mass index is 29.76 kg/m .    GENERAL APPEARANCE:  awake  EYES: Eyes grossly normal to inspection  NECK: no adenopathy  RESP: lungs clear , right  sided port in place without surrounding erythema  CV: regular rates and rhythm  ABDOMEN: soft, nontender  MS: extremities normal  SKIN: no suspicious lesions or rashes    Data   All laboratory data reviewed  Component      Latest Ref [facility] 4/7/2025  5:48 AM   Sodium      135 - 145 mmol/L 132 (L)    Potassium      3.4 - 5.3 mmol/L 4.5    Carbon Dioxide (CO2)      22 - 29 mmol/L 23    Anion Gap      7 - 15 mmol/L 11    Urea Nitrogen      8.0 - 23.0 mg/dL 21.4    Creatinine      0.51 - 0.95 mg/dL 0.95    GFR Estimate      >60 mL/min/1.73m2 62    Calcium      8.8 - 10.4 mg/dL 9.0    Chloride      98 - 107 mmol/L 98    Glucose      70 - 99 mg/dL 146 (H)    Alkaline Phosphatase      40 - 150 U/L 73    AST      0 - 45 U/L 16    ALT      0 - 50 U/L 15    Protein Total      6.4 - 8.3 g/dL 5.8 (L)    Albumin      3.5 - 5.2 g/dL 3.5    Bilirubin Total      <=1.2 mg/dL 0.4    WBC      4.0 - 11.0 10e3/uL 0.6 (LL)    RBC Count      3.80 - 5.20 10e6/uL 2.51 (L)    Hemoglobin      11.7 - 15.7 g/dL 7.8 (L)    Hematocrit      35.0 - 47.0 % 23.4 (L)    MCV      78 - 100 fL 93    MCH      26.5 - 33.0 pg 31.1    MCHC      31.5 - 36.5 g/dL 33.3    RDW      10.0 - 15.0 % 14.6    Platelet Count      150 - 450 10e3/uL 17 (LL)       Component      Latest Ref [facility] 4/7/2025  8:26 AM   MRSA Target DNA      Negative  Negative    SA Target DNA Positive      Microbiology  04/07/2025 1140 04/07/2025 1434 Carbapenemase Resistant Organism, PCR [[id number]]    Swab from Rectum    Final result Component Value   IMP target DNA Not Detected   The Xpert Carba-R assay will generate a negative IMP result when testing samples containing IMP-7, IMP-13, or IMP-14 gene sequences.   VIM target DNA Not Detected   NDM target DNA Not Detected   KPC target DNA Not Detected   OXA-48 target DNA Not Detected          04/07/2025 0826 04/07/2025 1215 MRSA MSSA PCR, Nasal Swab [[id number]]    Swab from Nares, Bilateral    Final result Component Value   MRSA Target DNA  Negative   SA Target DNA Positive          04/06/2025 2238 04/07/2025 1356 Urine Culture [[id number]]   (Abnormal)   Urine, Midstream    Preliminary result Component Value   Culture 10,000-50,000 CFU/mL Escherichia coli Abnormal  P             04/06/2025 2130 04/06/2025 2227 Influenza A/B, RSV and SARS-CoV2 PCR (COVID-19) Nasopharyngeal [[id number]]    Swab from Nasopharyngeal    Final result Component Value   Influenza A PCR Negative   Influenza B PCR Negative   RSV PCR Negative   SARS CoV2 PCR Negative   NEGATIVE: SARS-CoV-2 (COVID-19) RNA not detected, presumed negative.          04/06/2025 2129 04/07/2025 1246 Blood Culture Peripheral Blood [[id number]]   Peripheral Blood    Preliminary result Component Value   Culture No growth after 12 hours P             04/06/2025 2129 04/07/2025 1231 Blood Culture Peripheral Blood [[id number]]   Peripheral Blood    Preliminary result Component Value   Culture No growth after 12 hours P        Imaging  EXAM: CT CHEST W/O CONTRAST  LOCATION: [facility]  DATE: 4/7/2025     INDICATION: Neutropenic fever, please evaluate for any occult pneumonia  COMPARISON: Radiograph 04/06/2025. CT chest abdomen pelvis 03/11/2025.  TECHNIQUE: CT chest without IV contrast. Multiplanar reformats were obtained. Dose reduction techniques were used.  CONTRAST: None.     FINDINGS:   LUNGS AND PLEURA: Trace bilateral pleural effusions. Atelectasis in the medial right lower lobe, in the anterior left lower lobe, and in the lingula, similar to prior. Apical predominant emphysema. No new large consolidation.     There are multiple bilateral pulmonary nodules. For example, left lower lobe nodule posteriorly measuring 1 cm, series 4 image 107, unchanged. Right upper lobe nodule on image 44 measuring 1.0 cm, unchanged. Left upper lobe nodule measuring 9 mm on image   50, unchanged. Right middle lobe nodule measuring 1.5 cm on image 174, unchanged.     MEDIASTINUM/AXILLAE:  Large hiatal hernia. There is a partially imaged abnormally enlarged left supraclavicular lymph node which was also seen on prior chest CT. Right chest port with tip in lower SVC.     CORONARY ARTERY CALCIFICATION: Moderate.     UPPER ABDOMEN: No acute findings.     MUSCULOSKELETAL: Degenerative thoracic spondylosis. No acute findings.                                                                      IMPRESSION:   1.  No evidence of pneumonia. Areas of atelectasis both lower lungs, similar to prior CT.  2.  Bilateral pulmonary nodules compatible with metastatic disease, stable since comparison chest CT last month.  3.  Partially imaged abnormally enlarged left supraclavicular lymph node , which was also seen on prior chest CT  4. Trace pleural effusions, slightly improved on the left compared to prior.  5. Large hiatal hernia.   no

## 2025-04-07 NOTE — PHARMACY-VANCOMYCIN DOSING SERVICE
Pharmacy Vancomycin Initial Note  Date of Service 2025  Patient's  1950  74 year old, female    Indication: Febrile Neutropenia    Current estimated CrCl = Estimated Creatinine Clearance: 48.1 mL/min (based on SCr of 0.95 mg/dL).    Creatinine for last 3 days  2025:  9:29 PM Creatinine 0.96 mg/dL  2025:  5:48 AM Creatinine 0.95 mg/dL    Recent Vancomycin Level(s) for last 3 days  No results found for requested labs within last 3 days.      Vancomycin IV Administrations (past 72 hours)                     vancomycin (VANCOCIN) 1,500 mg in 0.9% NaCl 265 mL intermittent infusion (mg) 1,500 mg New Bag 25 0913                    Nephrotoxins and other renal medications (From now, onward)      Start     Dose/Rate Route Frequency Ordered Stop    25 0800  vancomycin (VANCOCIN) 1,250 mg in 0.9% NaCl 262.5 mL intermittent infusion         1,250 mg  over 90 Minutes Intravenous EVERY 24 HOURS 25 1347              Contrast Orders - past 72 hours (72h ago, onward)      None            Insight[employer] Prediction of Planned Initial Vancomycin Regimen  Loading dose 1500mg 900  Regimen: 1250 mg IV every 24 hours.  Start time: 0800 on 2025  Exposure target: AUC24 (range)400-600 mg/L.hr   AUC24,ss: 475 mg/L.hr  Probability of AUC24 > 400: 70 %  Ctrough,ss: 13.9 mg/L  Probability of Ctrough,ss > 20: 19 %  Probability of nephrotoxicity (Lodise ERWIN ): 9 %          Plan:  Start vancomycin  1500 mg x 1, then 1250mg IV q24h.   Vancomycin monitoring method: AUC  Vancomycin therapeutic monitoring goal: 400-600 mg*h/L  Pharmacy will check vancomycin levels as appropriate in 1-3 Days.    Serum creatinine levels will be ordered daily for the first week of therapy and at least twice weekly for subsequent weeks.      Elizabeth Muse [facility]

## 2025-04-08 LAB
BACTERIA UR CULT: ABNORMAL
BLD PROD TYP BPU: NORMAL
BLOOD COMPONENT TYPE: NORMAL
CODING SYSTEM: NORMAL
CREAT SERPL-MCNC: 0.83 MG/DL (ref 0.51–0.95)
EGFRCR SERPLBLD CKD-EPI 2021: 73 ML/MIN/1.73M2
ERYTHROCYTE [DISTWIDTH] IN BLOOD BY AUTOMATED COUNT: 14.2 % (ref 10–15)
HCT VFR BLD AUTO: 22.8 % (ref 35–47)
HGB BLD-MCNC: 7.7 G/DL (ref 11.7–15.7)
ISSUE DATE AND TIME: NORMAL
MCH RBC QN AUTO: 31.7 PG (ref 26.5–33)
MCHC RBC AUTO-ENTMCNC: 33.8 G/DL (ref 31.5–36.5)
MCV RBC AUTO: 94 FL (ref 78–100)
PLAT MORPH BLD: NORMAL
PLATELET # BLD AUTO: 8 10E3/UL (ref 150–450)
RBC # BLD AUTO: 2.43 10E6/UL (ref 3.8–5.2)
RBC MORPH BLD: NORMAL
UNIT ABO/RH: NORMAL
UNIT NUMBER: NORMAL
UNIT STATUS: NORMAL
UNIT TYPE ISBT: 6200
WBC # BLD AUTO: 0.4 10E3/UL (ref 4–11)

## 2025-04-08 PROCEDURE — 250N000011 HC RX IP 250 OP 636: Mod: JZ | Performed by: INTERNAL MEDICINE

## 2025-04-08 PROCEDURE — 250N000011 HC RX IP 250 OP 636: Performed by: STUDENT IN AN ORGANIZED HEALTH CARE EDUCATION/TRAINING PROGRAM

## 2025-04-08 PROCEDURE — 85018 HEMOGLOBIN: CPT | Performed by: STUDENT IN AN ORGANIZED HEALTH CARE EDUCATION/TRAINING PROGRAM

## 2025-04-08 PROCEDURE — 250N000013 HC RX MED GY IP 250 OP 250 PS 637: Performed by: INTERNAL MEDICINE

## 2025-04-08 PROCEDURE — 82565 ASSAY OF CREATININE: CPT | Performed by: INTERNAL MEDICINE

## 2025-04-08 PROCEDURE — 99232 SBSQ HOSP IP/OBS MODERATE 35: CPT | Performed by: STUDENT IN AN ORGANIZED HEALTH CARE EDUCATION/TRAINING PROGRAM

## 2025-04-08 PROCEDURE — P9037 PLATE PHERES LEUKOREDU IRRAD: HCPCS | Performed by: STUDENT IN AN ORGANIZED HEALTH CARE EDUCATION/TRAINING PROGRAM

## 2025-04-08 PROCEDURE — 99232 SBSQ HOSP IP/OBS MODERATE 35: CPT | Performed by: SPECIALIST

## 2025-04-08 PROCEDURE — 120N000001 HC R&B MED SURG/OB

## 2025-04-08 PROCEDURE — 250N000013 HC RX MED GY IP 250 OP 250 PS 637: Performed by: HOSPITALIST

## 2025-04-08 PROCEDURE — 85049 AUTOMATED PLATELET COUNT: CPT | Performed by: STUDENT IN AN ORGANIZED HEALTH CARE EDUCATION/TRAINING PROGRAM

## 2025-04-08 RX ADMIN — AMLODIPINE BESYLATE 10 MG: 10 TABLET ORAL at 08:15

## 2025-04-08 RX ADMIN — CEFEPIME 2 G: 2 INJECTION, POWDER, FOR SOLUTION INTRAVENOUS at 21:20

## 2025-04-08 RX ADMIN — ROSUVASTATIN CALCIUM 20 MG: 20 TABLET, FILM COATED ORAL at 08:15

## 2025-04-08 RX ADMIN — DIPHENHYDRAMINE HYDROCHLORIDE 25 MG: 25 CAPSULE ORAL at 00:24

## 2025-04-08 RX ADMIN — POLYETHYLENE GLYCOL 3350 17 G: 17 POWDER, FOR SOLUTION ORAL at 08:19

## 2025-04-08 RX ADMIN — HEPARIN, PORCINE (PF) 10 UNIT/ML INTRAVENOUS SYRINGE 5 ML: at 21:53

## 2025-04-08 RX ADMIN — METOPROLOL TARTRATE 50 MG: 50 TABLET, FILM COATED ORAL at 21:27

## 2025-04-08 RX ADMIN — DOXAZOSIN 1 MG: 1 TABLET ORAL at 21:27

## 2025-04-08 RX ADMIN — LIDOCAINE 1 PATCH: 4 PATCH TOPICAL at 14:41

## 2025-04-08 RX ADMIN — CEFEPIME 2 G: 2 INJECTION, POWDER, FOR SOLUTION INTRAVENOUS at 05:45

## 2025-04-08 RX ADMIN — DIPHENHYDRAMINE HYDROCHLORIDE 25 MG: 25 CAPSULE ORAL at 21:34

## 2025-04-08 RX ADMIN — HEPARIN, PORCINE (PF) 10 UNIT/ML INTRAVENOUS SYRINGE 5 ML: at 06:32

## 2025-04-08 RX ADMIN — CEFEPIME 2 G: 2 INJECTION, POWDER, FOR SOLUTION INTRAVENOUS at 14:40

## 2025-04-08 RX ADMIN — BUPROPION HYDROCHLORIDE 300 MG: 150 TABLET, EXTENDED RELEASE ORAL at 08:15

## 2025-04-08 RX ADMIN — SENNOSIDES AND DOCUSATE SODIUM 2 TABLET: 50; 8.6 TABLET ORAL at 16:35

## 2025-04-08 RX ADMIN — METOPROLOL TARTRATE 50 MG: 50 TABLET, FILM COATED ORAL at 08:15

## 2025-04-08 RX ADMIN — HEPARIN, PORCINE (PF) 10 UNIT/ML INTRAVENOUS SYRINGE 5 ML: at 10:23

## 2025-04-08 RX ADMIN — HEPARIN, PORCINE (PF) 10 UNIT/ML INTRAVENOUS SYRINGE 5 ML: at 15:42

## 2025-04-08 ASSESSMENT — ACTIVITIES OF DAILY LIVING (ADL)
ADLS_ACUITY_SCORE: 29
ADLS_ACUITY_SCORE: 32
ADLS_ACUITY_SCORE: 30
ADLS_ACUITY_SCORE: 32
ADLS_ACUITY_SCORE: 29
ADLS_ACUITY_SCORE: 29
ADLS_ACUITY_SCORE: 30
ADLS_ACUITY_SCORE: 29
ADLS_ACUITY_SCORE: 30
ADLS_ACUITY_SCORE: 32
ADLS_ACUITY_SCORE: 30
ADLS_ACUITY_SCORE: 29
ADLS_ACUITY_SCORE: 32
ADLS_ACUITY_SCORE: 30
ADLS_ACUITY_SCORE: 32
ADLS_ACUITY_SCORE: 29
ADLS_ACUITY_SCORE: 30
ADLS_ACUITY_SCORE: 32
ADLS_ACUITY_SCORE: 30

## 2025-04-08 NOTE — PLAN OF CARE
"End of Shift Summary  For vital signs and complete assessments, please see documentation flowsheets.     Pertinent assessments: Alert and oriented x4, VSS, room air sats 88% applied 2 liters of oxygen, applied 2 liters of oxygen, temp max 100.0, SBA in room, denies any pain/nausea, lung sounds clear, bowel sounds active, PORT accessed in ER prior to arrival--blood return noted and orders receive for heparin flush    Major Shift Events: nasal, skin, and rectal cultures, iv abx started. CHG, port dressing changed.     Treatment Plan: oncology consult, maxipime, vanco, trend cultures.     Bedside Nurse: jb forte RN     Goal Outcome Evaluation:      Problem: Adult Inpatient Plan of Care  Goal: Plan of Care Review  Description: The Plan of Care Review/Shift note should be completed every shift.  The Outcome Evaluation is a brief statement about your assessment that the patient is improving, declining, or no change.  This information will be displayed automatically on your shiftnote.  Outcome: Progressing  Flowsheets (Taken 4/7/2025 2202)  Outcome Evaluation: swabs out for source of possible infection. temperature controlled with prn tylenol. pain controlled. independent in room, personal care needs met.  Plan of Care Reviewed With:   patient   spouse  Overall Patient Progress: no change  Goal: Patient-Specific Goal (Individualized)  Description: You can add care plan individualizations to a care plan. Examples of Individualization might be:  \"Parent requests to be called daily at 9am for status\", \"I have a hard time hearing out of my right ear\", or \"Do not touch me to wake me up as it startlesme\".  Outcome: Progressing  Goal: Absence of Hospital-Acquired Illness or Injury  Outcome: Progressing  Intervention: Identify and Manage Fall Risk  Recent Flowsheet Documentation  Taken 4/7/2025 1840 by Jb Forte, RN  Safety Promotion/Fall Prevention:   activity supervised   lighting adjusted  Taken 4/7/2025 0830 " by Jann, Jb, RN  Safety Promotion/Fall Prevention:   activity supervised   lighting adjusted  Goal: Optimal Comfort and Wellbeing  Outcome: Progressing  Goal: Readiness for Transition of Care  Outcome: Progressing     Problem: Delirium  Goal: Optimal Coping  Outcome: Progressing  Goal: Improved Behavioral Control  Outcome: Progressing  Intervention: Minimize Safety Risk  Recent Flowsheet Documentation  Taken 4/7/2025 1840 by Jb Forte RN  Enhanced Safety Measures: room near unit station  Taken 4/7/2025 0830 by Jb Forte RN  Enhanced Safety Measures: room near unit station  Goal: Improved Attention and Thought Clarity  Outcome: Progressing  Goal: Improved Sleep  Outcome: Progressing     Problem: Fever with Neutropenia  Goal: Baseline Body Temperature  Outcome: Progressing  Goal: Absence of Infection  Outcome: Progressing     Problem: Infection  Goal: Absence of Infection Signs and Symptoms  Outcome: Progressing  Intervention: Prevent or Manage Infection  Recent Flowsheet Documentation  Taken 4/7/2025 1840 by Jb Forte RN  Isolation Precautions:   contact precautions maintained   protective environment maintained  Taken 4/7/2025 0830 by Jb Forte RN  Isolation Precautions: contact precautions initiated         Plan of Care Reviewed With: patient, spouse    Overall Patient Progress: no changeOverall Patient Progress: no change    Outcome Evaluation: swabs out for source of possible infection. temperature controlled with prn tylenol. pain controlled. independent in room, personal care needs met.

## 2025-04-08 NOTE — PROGRESS NOTES
Heme-onc chart check note:   74-year-old lady with metastatic ovarian cancer  Assessment and Plan:  #1 Metastatic ovarian cancer  #2 Neutropenic fever  - Received cycle 1 of clinical trial drug ( [employer] ([id number] invest IV) Supplied by Sponsor 214 mg (120 mg/m2; BSA: 1.74 m2 = 209 mg) on 3/27, with Neulasta the next day for neutropenia prevention  - WBC was WNL before starting treatment, now she is severely neutropenic, due to treatment  - Blood cultures Negative so far    Urine culture : Growing E. coli which is pansensitive  - CT Chest shows no evidence of pneumonia  - Patient is currently on cefepime being followed by ID  PLAN:  -Continue antibiotics for neutropenic fever/ UTI   -No need for Neupogen since the patient has already received Neulasta  - Expect ANC to improve within the next 3-4 days  - Has palliative radiation to address vaginal bleeding starting Wednesday.  This may need to be delayed.     #3 DVT  #4 Thrombocytopenia due to treatment     PLAN:  - Hold anticoagulation until Plt is at least >30K, preferably >50K  Cytopenias  -Please transfuse 1 unit of packed red blood cells for hemoglobin less than 7.0   - 1 unit of platelets for platelets less than 10.     Reuben Godinez   [facility]

## 2025-04-08 NOTE — PLAN OF CARE
"End of Shift Summary  For vital signs and complete assessments, please see documentation flowsheets.     Pertinent assessments: Temp max 99.4 oral. On room air. Denies pain or nausea.  PORT hep-locked. Pt reported itching around the port dressing, benadryl given and helpful.     Treatment Plan: oncology consult, maxipime, trend cultures.     Plan of Care Reviewed With: patient    Overall Patient Progress: improving    Outcome Evaluation: VSS on RA. Denies pain. IV abx per plan of care.      Problem: Adult Inpatient Plan of Care  Goal: Plan of Care Review  Description: The Plan of Care Review/Shift note should be completed every shift.  The Outcome Evaluation is a brief statement about your assessment that the patient is improving, declining, or no change.  This information will be displayed automatically on your shiftnote.  Outcome: Progressing  Flowsheets (Taken 4/8/2025 0658)  Outcome Evaluation: VSS on RA. Denies pain. IV abx per plan of care.  Plan of Care Reviewed With: patient  Overall Patient Progress: improving  Goal: Patient-Specific Goal (Individualized)  Description: You can add care plan individualizations to a care plan. Examples of Individualization might be:  \"Parent requests to be called daily at 9am for status\", \"I have a hard time hearing out of my right ear\", or \"Do not touch me to wake me up as it startlesme\".  Outcome: Progressing  Goal: Absence of Hospital-Acquired Illness or Injury  Outcome: Progressing  Intervention: Identify and Manage Fall Risk  Recent Flowsheet Documentation  Taken 4/8/2025 0000 by Missy Staley, RN  Safety Promotion/Fall Prevention: safety round/check completed  Intervention: Prevent Skin Injury  Recent Flowsheet Documentation  Taken 4/8/2025 0000 by Missy Staley, RN  Body Position: position changed independently  Goal: Optimal Comfort and Wellbeing  Outcome: Progressing  Goal: Readiness for Transition of Care  Outcome: Progressing     Problem: Delirium  Goal: Optimal " Coping  Outcome: Progressing  Goal: Improved Behavioral Control  Outcome: Progressing  Intervention: Minimize Safety Risk  Recent Flowsheet Documentation  Taken 4/8/2025 0000 by Missy Staley RN  Enhanced Safety Measures: room near unit station  Goal: Improved Attention and Thought Clarity  Outcome: Progressing  Goal: Improved Sleep  Outcome: Progressing     Problem: Fever with Neutropenia  Goal: Baseline Body Temperature  Outcome: Progressing  Goal: Absence of Infection  Outcome: Progressing     Problem: Infection  Goal: Absence of Infection Signs and Symptoms  Outcome: Progressing

## 2025-04-08 NOTE — PLAN OF CARE
"To Do:  End of Shift Summary  For vital signs and complete assessments, please see documentation flowsheets.     Pertinent assessments: Pt A&Ox4. VSS, on RA.  Denies pain, no N/V. Port heparin locked.     Major Shift Events none    Treatment Plan: IV Abx.     Bedside Nurse: Imelda Mcgill RN               Goal Outcome Evaluation:      Problem: Adult Inpatient Plan of Care  Goal: Plan of Care Review  Description: The Plan of Care Review/Shift note should be completed every shift.  The Outcome Evaluation is a brief statement about your assessment that the patient is improving, declining, or no change.  This information will be displayed automatically on your shiftnote.  Outcome: Progressing  Flowsheets (Taken 4/8/2025 1504)  Plan of Care Reviewed With: patient  Overall Patient Progress: improving  Goal: Patient-Specific Goal (Individualized)  Description: You can add care plan individualizations to a care plan. Examples of Individualization might be:  \"Parent requests to be called daily at 9am for status\", \"I have a hard time hearing out of my right ear\", or \"Do not touch me to wake me up as it startlesme\".  Outcome: Progressing  Goal: Absence of Hospital-Acquired Illness or Injury  Outcome: Progressing  Intervention: Identify and Manage Fall Risk  Recent Flowsheet Documentation  Taken 4/8/2025 0832 by Imelda Mcgill, RN  Safety Promotion/Fall Prevention:   increase visualization of patient   room near nurse's station  Intervention: Prevent Skin Injury  Recent Flowsheet Documentation  Taken 4/8/2025 1243 by Imelda Mcgill, RN  Body Position: position changed independently  Goal: Optimal Comfort and Wellbeing  Outcome: Progressing  Goal: Readiness for Transition of Care  Outcome: Progressing     Problem: Delirium  Goal: Optimal Coping  Outcome: Progressing  Goal: Improved Behavioral Control  Outcome: Progressing  Intervention: Minimize Safety Risk  Recent Flowsheet Documentation  Taken 4/8/2025 0825 by Nano" Imelda, RN  Enhanced Safety Measures: room near unit station  Goal: Improved Attention and Thought Clarity  Outcome: Progressing  Goal: Improved Sleep  Outcome: Progressing     Problem: Fever with Neutropenia  Goal: Baseline Body Temperature  Outcome: Progressing  Goal: Absence of Infection  Outcome: Progressing     Problem: Infection  Goal: Absence of Infection Signs and Symptoms  Outcome: Progressing  Intervention: Prevent or Manage Infection  Recent Flowsheet Documentation  Taken 4/8/2025 6003 by Imelda Mcgill, RN  Isolation Precautions:   contact precautions maintained   protective environment maintained

## 2025-04-08 NOTE — PROGRESS NOTES
Admission/Transfer from: ED  2 RN skin assessment completed. Yes  Significant findings include: none  LDA added (if applicable)? NO  Requested WOC Nurse Consult from MD? NO

## 2025-04-08 NOTE — PROGRESS NOTES
[facility]    Infectious Disease Progress Note    Date of Service (when I saw the patient): 04/08/2025     Assessment:  74YF with metastatic ovarian cancer , on a trial drug (first dose was 2 weeks ago), who has been admitted due to fever and chills and was found to have neutropenic fever without apparent focus of infection.     -Neutropenic fever  -Chemotherapy associated pancytopenia  -Metastatic ovarian cancer with pulmonary metastases- on a trial drug started hubert infusion 2 weeks ago  -Positive urine cx with 10-50,000 cfu/ml e.coli, possibly UTI  -Hyponatremia  -Hiatal hernia  -HTN, hyperlipidemia, diabetes     Recommendations:  Urine cx is growing 10-50,000 cfu/ml E.coli, though low colony count, will treat as UTI in the setting of neutropenia  Maintain on Cefepime for now  Monitor fever pattern and counts  Transition to oral Amoxicillin once neutropenia and fever resolve      Julissa Aviles MD    Interval History   Remains afebrile now, no apparent source of infection, labs from this morning are not available yet      Physical Exam   Temp: 98.1  F (36.7  C) Temp src: Oral BP: 112/51 Pulse: 81   Resp: 16 SpO2: 92 % O2 Device: None (Room air)    Vitals:    04/06/25 2058   Weight: 73.8 kg (162 lb 11.2 oz)     Vital Signs with Ranges  Temp:  [97.6  F (36.4  C)-99.6  F (37.6  C)] 98.1  F (36.7  C)  Pulse:  [67-81] 81  Resp:  [16] 16  BP: (101-121)/(42-51) 112/51  SpO2:  [92 %-94 %] 92 %    GENERAL APPEARANCE:  awake  EYES: Eyes grossly normal to inspection  NECK: no adenopathy  RESP: lungs clear , right sided port in place without surrounding erythema  CV: regular rates and rhythm  ABDOMEN: soft, nontender  MS: extremities normal  SKIN: no suspicious lesions or rashes    Other:    Medications   Current Facility-Administered Medications   Medication Dose Route Frequency Provider Last Rate Last Admin    Patient is already receiving anticoagulation with heparin, enoxaparin (LOVENOX), warfarin  (COUMADIN)  or other anticoagulant medication   Does not apply Continuous PRN Atif Zarco MD         Current Facility-Administered Medications   Medication Dose Route Frequency Provider Last Rate Last Admin    amLODIPine (NORVASC) tablet 10 mg  10 mg Oral Daily Atif Zarco MD   10 mg at 04/08/25 0815    buPROPion (WELLBUTRIN XL) 24 hr tablet 300 mg  300 mg Oral QAM Atif Zarco MD   300 mg at 04/08/25 0815    ceFEPIme (MAXIPIME) 2 g vial to attach to  mL bag for ADULTS or NS 50 mL bag for PEDS  2 g Intravenous Q8H Atif Zarco MD   2 g at 04/08/25 0545    doxazosin (CARDURA) tablet 1 mg  1 mg Oral At Bedtime Atif Zarco MD        [Held by provider] enoxaparin ANTICOAGULANT (LOVENOX) injection 73.5 mg  1 mg/kg Subcutaneous Q12H Atif Zarco MD        famotidine (PEPCID) tablet 20 mg  20 mg Oral BID Atif Zarco MD        heparin lock flush 10 unit/mL injection 5-10 mL  5-10 mL Intracatheter Q24H Андрей Calloway MD   5 mL at 04/08/25 0632    heparin lock flush 100 unit/mL injection 5-10 mL  5-10 mL Intracatheter Q28 Days Андрей Calloway MD        Lidocaine (LIDOCARE) 4 % Patch 1 patch  1 patch Transdermal Q24H Melina Pierre DO   1 patch at 04/07/25 1348    metoprolol tartrate (LOPRESSOR) tablet 50 mg  50 mg Oral BID Atif Zarco MD   50 mg at 04/08/25 0815    polyethylene glycol (MIRALAX) Packet 17 g  17 g Oral Daily Atif Zarco MD   17 g at 04/08/25 0819    rosuvastatin (CRESTOR) tablet 20 mg  20 mg Oral Daily Atif Zarco MD   20 mg at 04/08/25 0815    sodium chloride (PF) 0.9% PF flush 10-20 mL  10-20 mL Intracatheter Q28 Days Андрей Calloway MD   20 mL at 04/07/25 0557    sodium chloride (PF) 0.9% PF flush 3 mL  3 mL Intracatheter Q8H Atif Barahona MD   3 mL at 04/07/25 2214       Data   All microbiology laboratory data reviewed.  Recent Labs   Lab Test 04/07/25  0548 04/06/25 2129 05/01/24  1634   WBC 0.6* 0.6* 4.9   HGB 7.8* 8.5* 9.3*   HCT 23.4* 25.6* 27.6*   MCV 93 95 109*    PLT 17* 22* 109*     Recent Labs   Lab Test 04/08/25  0547 04/07/25  0548 04/06/25  2129   CR 0.83 0.95 0.96*        4/06/2025 2238 04/08/2025 0200 Urine Culture [[id number]]    (Abnormal)   Urine, Midstream    Final result Component Value   Culture 10,000-50,000 CFU/mL Escherichia coli Abnormal        Susceptibility     Escherichia coli     SLOANE     Ampicillin <=2 ug/mL Susceptible     Ampicillin/ Sulbactam <=2 ug/mL Susceptible     Cefazolin <=1 ug/mL Susceptible     Cefepime <=0.12 ug/mL Susceptible     Ceftazidime <=0.5 ug/mL Susceptible     Ceftriaxone <=0.25 ug/mL Susceptible     Ciprofloxacin <=0.06 ug/mL Susceptible     Ertapenem <=0.12 ug/mL Susceptible *     Extended Spectrum Beta-Lactamase Negative ug/mL ESBL Negative *     Gentamicin <=1 ug/mL Susceptible     Levofloxacin <=0.12 ug/mL Susceptible     Meropenem <=0.25 ug/mL Susceptible *     Nitrofurantoin <=16 ug/mL Susceptible     Piperacillin/Tazobactam <=4 ug/mL Susceptible     Trimethoprim/Sulfamethoxazole <=1/19 ug/mL Susceptible

## 2025-04-09 LAB
ABO + RH BLD: NORMAL
BASOPHILS # BLD AUTO: 0 10E3/UL (ref 0–0.2)
BASOPHILS NFR BLD AUTO: 0 %
BLD GP AB SCN SERPL QL: NEGATIVE
BLD PROD TYP BPU: NORMAL
BLOOD COMPONENT TYPE: NORMAL
CODING SYSTEM: NORMAL
CREAT SERPL-MCNC: 0.78 MG/DL (ref 0.51–0.95)
CROSSMATCH: NORMAL
EGFRCR SERPLBLD CKD-EPI 2021: 79 ML/MIN/1.73M2
EOSINOPHIL # BLD AUTO: 0 10E3/UL (ref 0–0.7)
EOSINOPHIL NFR BLD AUTO: 5 %
ERYTHROCYTE [DISTWIDTH] IN BLOOD BY AUTOMATED COUNT: 14.1 % (ref 10–15)
ERYTHROCYTE [DISTWIDTH] IN BLOOD BY AUTOMATED COUNT: 14.2 % (ref 10–15)
HCT VFR BLD AUTO: 19.8 % (ref 35–47)
HCT VFR BLD AUTO: 23.6 % (ref 35–47)
HGB BLD-MCNC: 6.7 G/DL (ref 11.7–15.7)
HGB BLD-MCNC: 7.7 G/DL (ref 11.7–15.7)
IMM GRANULOCYTES # BLD: 0 10E3/UL
IMM GRANULOCYTES NFR BLD: 0 %
ISSUE DATE AND TIME: NORMAL
LYMPHOCYTES # BLD AUTO: 0.5 10E3/UL (ref 0.8–5.3)
LYMPHOCYTES NFR BLD AUTO: 86 %
MCH RBC QN AUTO: 30.8 PG (ref 26.5–33)
MCH RBC QN AUTO: 31.9 PG (ref 26.5–33)
MCHC RBC AUTO-ENTMCNC: 32.6 G/DL (ref 31.5–36.5)
MCHC RBC AUTO-ENTMCNC: 33.8 G/DL (ref 31.5–36.5)
MCV RBC AUTO: 94 FL (ref 78–100)
MCV RBC AUTO: 94 FL (ref 78–100)
MONOCYTES # BLD AUTO: 0 10E3/UL (ref 0–1.3)
MONOCYTES NFR BLD AUTO: 5 %
NEUTROPHILS # BLD AUTO: 0 10E3/UL (ref 1.6–8.3)
NEUTROPHILS NFR BLD AUTO: 4 %
NRBC # BLD AUTO: 0 10E3/UL
NRBC BLD AUTO-RTO: 0 /100
PLAT MORPH BLD: ABNORMAL
PLAT MORPH BLD: NORMAL
PLATELET # BLD AUTO: 35 10E3/UL (ref 150–450)
PLATELET # BLD AUTO: 36 10E3/UL (ref 150–450)
RBC # BLD AUTO: 2.1 10E6/UL (ref 3.8–5.2)
RBC # BLD AUTO: 2.5 10E6/UL (ref 3.8–5.2)
RBC MORPH BLD: ABNORMAL
RBC MORPH BLD: NORMAL
SPECIMEN EXP DATE BLD: NORMAL
UNIT ABO/RH: NORMAL
UNIT NUMBER: NORMAL
UNIT STATUS: NORMAL
UNIT TYPE ISBT: 5100
VARIANT LYMPHS BLD QL SMEAR: PRESENT
WBC # BLD AUTO: 0.5 10E3/UL (ref 4–11)
WBC # BLD AUTO: 0.6 10E3/UL (ref 4–11)

## 2025-04-09 PROCEDURE — 85025 COMPLETE CBC W/AUTO DIFF WBC: CPT | Performed by: STUDENT IN AN ORGANIZED HEALTH CARE EDUCATION/TRAINING PROGRAM

## 2025-04-09 PROCEDURE — 99232 SBSQ HOSP IP/OBS MODERATE 35: CPT | Performed by: SPECIALIST

## 2025-04-09 PROCEDURE — 86923 COMPATIBILITY TEST ELECTRIC: CPT | Performed by: INTERNAL MEDICINE

## 2025-04-09 PROCEDURE — 120N000001 HC R&B MED SURG/OB

## 2025-04-09 PROCEDURE — P9016 RBC LEUKOCYTES REDUCED: HCPCS | Performed by: INTERNAL MEDICINE

## 2025-04-09 PROCEDURE — 250N000011 HC RX IP 250 OP 636: Mod: JZ | Performed by: INTERNAL MEDICINE

## 2025-04-09 PROCEDURE — 99232 SBSQ HOSP IP/OBS MODERATE 35: CPT | Performed by: STUDENT IN AN ORGANIZED HEALTH CARE EDUCATION/TRAINING PROGRAM

## 2025-04-09 PROCEDURE — 250N000013 HC RX MED GY IP 250 OP 250 PS 637: Performed by: INTERNAL MEDICINE

## 2025-04-09 PROCEDURE — 85027 COMPLETE CBC AUTOMATED: CPT | Performed by: STUDENT IN AN ORGANIZED HEALTH CARE EDUCATION/TRAINING PROGRAM

## 2025-04-09 PROCEDURE — 82565 ASSAY OF CREATININE: CPT | Performed by: INTERNAL MEDICINE

## 2025-04-09 PROCEDURE — 86900 BLOOD TYPING SEROLOGIC ABO: CPT | Performed by: INTERNAL MEDICINE

## 2025-04-09 PROCEDURE — 250N000011 HC RX IP 250 OP 636: Performed by: STUDENT IN AN ORGANIZED HEALTH CARE EDUCATION/TRAINING PROGRAM

## 2025-04-09 RX ADMIN — FAMOTIDINE 20 MG: 20 TABLET, FILM COATED ORAL at 21:22

## 2025-04-09 RX ADMIN — DOXAZOSIN 1 MG: 1 TABLET ORAL at 21:21

## 2025-04-09 RX ADMIN — BUPROPION HYDROCHLORIDE 300 MG: 150 TABLET, EXTENDED RELEASE ORAL at 10:12

## 2025-04-09 RX ADMIN — CEFEPIME 2 G: 2 INJECTION, POWDER, FOR SOLUTION INTRAVENOUS at 23:50

## 2025-04-09 RX ADMIN — HEPARIN, PORCINE (PF) 10 UNIT/ML INTRAVENOUS SYRINGE 5 ML: at 16:31

## 2025-04-09 RX ADMIN — ROSUVASTATIN CALCIUM 20 MG: 20 TABLET, FILM COATED ORAL at 10:12

## 2025-04-09 RX ADMIN — SENNOSIDES AND DOCUSATE SODIUM 2 TABLET: 50; 8.6 TABLET ORAL at 21:28

## 2025-04-09 RX ADMIN — CEFEPIME 2 G: 2 INJECTION, POWDER, FOR SOLUTION INTRAVENOUS at 07:58

## 2025-04-09 RX ADMIN — METOPROLOL TARTRATE 50 MG: 50 TABLET, FILM COATED ORAL at 21:22

## 2025-04-09 RX ADMIN — HEPARIN, PORCINE (PF) 10 UNIT/ML INTRAVENOUS SYRINGE 5 ML: at 08:00

## 2025-04-09 RX ADMIN — CEFEPIME 2 G: 2 INJECTION, POWDER, FOR SOLUTION INTRAVENOUS at 16:31

## 2025-04-09 RX ADMIN — POLYETHYLENE GLYCOL 3350 17 G: 17 POWDER, FOR SOLUTION ORAL at 10:12

## 2025-04-09 ASSESSMENT — ACTIVITIES OF DAILY LIVING (ADL)
ADLS_ACUITY_SCORE: 29
ADLS_ACUITY_SCORE: 37
ADLS_ACUITY_SCORE: 29
ADLS_ACUITY_SCORE: 29
ADLS_ACUITY_SCORE: 37
ADLS_ACUITY_SCORE: 37
ADLS_ACUITY_SCORE: 29
ADLS_ACUITY_SCORE: 29
ADLS_ACUITY_SCORE: 37
ADLS_ACUITY_SCORE: 29
ADLS_ACUITY_SCORE: 37
ADLS_ACUITY_SCORE: 29
ADLS_ACUITY_SCORE: 37
ADLS_ACUITY_SCORE: 37

## 2025-04-09 NOTE — PROGRESS NOTES
Hemoglobin 6.7.  Platelet count up to 35 following transfusion of platelets.  Per hematology note transfuse for hemoglobin less than 7.  -Ordered 1 unit RBCs for transfusion  -Needs updated type and screen

## 2025-04-09 NOTE — PROGRESS NOTES
[facility]    Infectious Disease Progress Note    Date of Service (when I saw the patient): 04/09/2025       Assessment:  74YF with metastatic ovarian cancer , on a trial drug (first dose was 2 weeks ago), who has been admitted due to fever and chills and was found to have neutropenic fever without apparent focus of infection.     -Neutropenic fever  -Chemotherapy associated pancytopenia  -Metastatic ovarian cancer with pulmonary metastases- on a trial drug started hubert infusion 2 weeks ago  -Positive urine cx with 10-50,000 cfu/ml e.coli, possibly UTI  -Hyponatremia  -Hiatal hernia  -HTN, hyperlipidemia, diabetes     Recommendations:  Remains pancytopenic, though no more fever  When stable for discharge, can transition to oral Augmentin to complete treatment of UTI for 10-14 days      Julissa Aviles MD    Interval History   Remains afebrile, still neutropenic and pancytopenic . Received blood and platelet transfusions.   No new complaints today.    Physical Exam   Temp: 97.9  F (36.6  C) Temp src: Oral BP: 112/61 Pulse: 59   Resp: 16 SpO2: 94 % O2 Device: None (Room air)    Vitals:    04/06/25 2058   Weight: 73.8 kg (162 lb 11.2 oz)     Vital Signs with Ranges  Temp:  [97.9  F (36.6  C)-98.7  F (37.1  C)] 97.9  F (36.6  C)  Pulse:  [59-75] 59  Resp:  [16-18] 16  BP: ()/(51-63) 112/61  SpO2:  [90 %-95 %] 94 %    GENERAL APPEARANCE:  awake  EYES: Eyes grossly normal to inspection  RESP: lungs clear , right sided port in place without surrounding erythema  CV: regular rates and rhythm  ABDOMEN: soft, nontender  MS: extremities normal  SKIN: no suspicious lesions or rashes    Other:    Medications   Current Facility-Administered Medications   Medication Dose Route Frequency Provider Last Rate Last Admin    Patient is already receiving anticoagulation with heparin, enoxaparin (LOVENOX), warfarin (COUMADIN)  or other anticoagulant medication   Does not apply Continuous PRN Atif Zarco MD          Current Facility-Administered Medications   Medication Dose Route Frequency Provider Last Rate Last Admin    amLODIPine (NORVASC) tablet 10 mg  10 mg Oral Daily Atif Zarco MD   10 mg at 04/08/25 0815    buPROPion (WELLBUTRIN XL) 24 hr tablet 300 mg  300 mg Oral QAM Atif Zarco MD   300 mg at 04/09/25 1012    ceFEPIme (MAXIPIME) 2 g vial to attach to  mL bag for ADULTS or NS 50 mL bag for PEDS  2 g Intravenous Q8H Atif Zarco MD   2 g at 04/09/25 0758    doxazosin (CARDURA) tablet 1 mg  1 mg Oral At Bedtime Atif Zarco MD   1 mg at 04/08/25 2127    [Held by provider] enoxaparin ANTICOAGULANT (LOVENOX) injection 73.5 mg  1 mg/kg Subcutaneous Q12H Atif Zarco MD        famotidine (PEPCID) tablet 20 mg  20 mg Oral BID Atif Zarco MD        heparin lock flush 10 unit/mL injection 5-10 mL  5-10 mL Intracatheter Q24H Андрей Calloway MD   5 mL at 04/09/25 0800    heparin lock flush 100 unit/mL injection 5-10 mL  5-10 mL Intracatheter Q28 Days Андрей Calloway MD        Lidocaine (LIDOCARE) 4 % Patch 1 patch  1 patch Transdermal Q24H Melina Pierre DO   1 patch at 04/08/25 1441    metoprolol tartrate (LOPRESSOR) tablet 50 mg  50 mg Oral BID Atif Zarco MD   50 mg at 04/08/25 2127    polyethylene glycol (MIRALAX) Packet 17 g  17 g Oral Daily Atif Zarco MD   17 g at 04/09/25 1012    rosuvastatin (CRESTOR) tablet 20 mg  20 mg Oral Daily Atif Zarco MD   20 mg at 04/09/25 1012    sodium chloride (PF) 0.9% PF flush 10-20 mL  10-20 mL Intracatheter Q28 Days Андрей Calloway MD   20 mL at 04/07/25 0557    sodium chloride (PF) 0.9% PF flush 3 mL  3 mL Intracatheter Q8H [facility] Atif Zarco MD   3 mL at 04/08/25 3080       Data   All microbiology laboratory data reviewed.  Recent Labs   Lab Test 04/09/25  0758 04/09/25  0234 04/08/25  1022   WBC 0.6* 0.5* 0.4*   HGB 7.7* 6.7* 7.7*   HCT 23.6* 19.8* 22.8*   MCV 94 94 94   PLT 36* 35* 8*     Recent Labs   Lab Test 04/09/25  0758 04/08/25  0547  04/07/25  0548   CR 0.78 0.83 0.95

## 2025-04-09 NOTE — PLAN OF CARE
"Pt is A&Ox4, vss on ra, denies pain, Sob, nausea, Lidocaine patch removed from right shoulder, Plts finished transfusing, hemoglobin came back at 6.7 transfusing 1 unit of blood, pt is Ind in room      Goal Outcome Evaluation:      Plan of Care Reviewed With: patient    Overall Patient Progress: no changeOverall Patient Progress: no change    Outcome Evaluation: infusing blood      Problem: Adult Inpatient Plan of Care  Goal: Plan of Care Review  Description: The Plan of Care Review/Shift note should be completed every shift.  The Outcome Evaluation is a brief statement about your assessment that the patient is improving, declining, or no change.  This information will be displayed automatically on your shiftnote.  Outcome: Progressing  Flowsheets (Taken 4/9/2025 0602)  Outcome Evaluation: infusing blood  Plan of Care Reviewed With: patient  Overall Patient Progress: no change  Goal: Patient-Specific Goal (Individualized)  Description: You can add care plan individualizations to a care plan. Examples of Individualization might be:  \"Parent requests to be called daily at 9am for status\", \"I have a hard time hearing out of my right ear\", or \"Do not touch me to wake me up as it startlesme\".  Outcome: Progressing  Goal: Absence of Hospital-Acquired Illness or Injury  Outcome: Progressing  Intervention: Identify and Manage Fall Risk  Recent Flowsheet Documentation  Taken 4/9/2025 0029 by Jayne Clifton RN  Safety Promotion/Fall Prevention: safety round/check completed  Intervention: Prevent Skin Injury  Recent Flowsheet Documentation  Taken 4/9/2025 0029 by Jayne Clifton RN  Body Position: position changed independently  Intervention: Prevent Infection  Recent Flowsheet Documentation  Taken 4/9/2025 0029 by Jayne Clifton RN  Infection Prevention: cohorting utilized  Goal: Optimal Comfort and Wellbeing  Outcome: Progressing  Goal: Readiness for Transition of Care  Outcome: Progressing     Problem: " Delirium  Goal: Optimal Coping  Outcome: Progressing  Goal: Improved Behavioral Control  Outcome: Progressing  Intervention: Minimize Safety Risk  Recent Flowsheet Documentation  Taken 4/9/2025 0029 by Jayne Clifton RN  Enhanced Safety Measures: room near unit station  Goal: Improved Attention and Thought Clarity  Outcome: Progressing  Goal: Improved Sleep  Outcome: Progressing     Problem: Fever with Neutropenia  Goal: Baseline Body Temperature  Outcome: Progressing  Goal: Absence of Infection  Outcome: Progressing  Intervention: Prevent Infection and Maximize Resistance  Recent Flowsheet Documentation  Taken 4/9/2025 0029 by Jayne Clifton RN  Infection Prevention: cohorting utilized     Problem: Infection  Goal: Absence of Infection Signs and Symptoms  Outcome: Progressing  Intervention: Prevent or Manage Infection  Recent Flowsheet Documentation  Taken 4/9/2025 0029 by Jayne Clifton RN  Isolation Precautions: contact precautions maintained

## 2025-04-09 NOTE — PLAN OF CARE
"Pertinent assessments: A&Ox4. On room air. Up independently. Isolation precautions maintained. Tolerating a regular diet. Port to the Right chest is accessed and heparin locked in between medication administration. Lidocaine patch in place to the Right shoulder.     Major Shift Events: PLT: 8, [facility] Hospitalist notified of no order to transfuse per Hem/Onc recommendation. Platelet transfusion initiated w/ Allyn MENDEZ RN.    Treatment Plan: Monitor labs, IV Maxipime q8h, pain management, port, Hem/Onc/ID following, and discharge pending.     Bedside Nurse: Alhaji Olguin RN    Problem: Adult Inpatient Plan of Care  Goal: Plan of Care Review  Description: The Plan of Care Review/Shift note should be completed every shift.  The Outcome Evaluation is a brief statement about your assessment that the patient is improving, declining, or no change.  This information will be displayed automatically on your shiftnote.  Outcome: Not Progressing  Flowsheets (Taken 4/8/2025 3226)  Outcome Evaluation: Continue IV antibiotics, monitor labs, and port accessed.  Plan of Care Reviewed With: patient  Overall Patient Progress: no change  Goal: Patient-Specific Goal (Individualized)  Description: You can add care plan individualizations to a care plan. Examples of Individualization might be:  \"Parent requests to be called daily at 9am for status\", \"I have a hard time hearing out of my right ear\", or \"Do not touch me to wake me up as it startlesme\".  Outcome: Not Progressing  Goal: Absence of Hospital-Acquired Illness or Injury  Outcome: Not Progressing  Intervention: Identify and Manage Fall Risk  Recent Flowsheet Documentation  Taken 4/8/2025 1631 by Alhaji Olguin RN  Safety Promotion/Fall Prevention:   nonskid shoes/slippers when out of bed   patient and family education   safety round/check completed   treat reversible contributory factors  Intervention: Prevent Skin Injury  Recent Flowsheet Documentation  Taken 4/8/2025 1631 " by Alhaji Olguin, RN  Body Position: position changed independently  Intervention: Prevent Infection  Recent Flowsheet Documentation  Taken 4/8/2025 1631 by Alhaji Olguin, RN  Infection Prevention:   cohorting utilized   hand hygiene promoted   rest/sleep promoted   single patient room provided  Goal: Optimal Comfort and Wellbeing  Outcome: Not Progressing  Goal: Readiness for Transition of Care  Outcome: Not Progressing     Goal Outcome Evaluation:      Plan of Care Reviewed With: patient    Overall Patient Progress: no change    Overall Patient Progress: no change    Outcome Evaluation: Continue IV antibiotics, monitor labs, and port accessed.

## 2025-04-09 NOTE — PROGRESS NOTES
[facility]    PROGRESS NOTE - Hospitalist Service    ASSESSMENT AND PLAN     Active Problems:    Dyspnea on exertion    Thrombocytopenia    Neutropenic fever    Julia Andre is a 74 year old female admitted on 4/6/2025.  Patient has a past medical history of metastatic ovarian cancer treatment with a clinical drug trial, HLD, HLD, HTN, type 2 diabetes who presented with fever and was found to have neutropenia.     Neutropenic fever  Ecoli Urinary Tract Infection  Urinalysis not convincing for UTI, but Ucx returned .  Blood cultures NGTD.  Chest x-ray noting atelectasis of the bilateral bases and metastatic pulmonary nodules.    Vanc/Cefepime --> Cefepime alone  Appreciate ID recs   Oncology: No need for Neupogen since the patient has already received Neulasta. Expect ANC to improve within the next 3-4 days   ANC did not result today for unclear reasons. Will recheck tomorrow.     Pancytopenia  Metastatic ovarian cancer  Management as per [place] [facility]  Received cycle 1 of clinical trial drug on 3/27, with Neulasta the next day for neutropenia prevention  - WBC was WNL before starting treatment, now she is severely neutropenic, due to treatment  Holding anticoagulation until platelets at least above 30,000 preferably >50,000; Currently downtrending and 8,000 today.  Unfortunately required 1U PLT and 1U PRBC overnight. ANC this morning again 0.0. Requested nursing reach out to oncology to see if neupogen indicated and they said that it was not.     Metabolic acidosis non-anion gap  Resolved       Left internal jugular DVT  Apixaban 5 mg twice daily PTA- on hold due to low PLT     Vaginal bleeding   Has palliative radiation to address vaginal bleeding starting Wednesday. This may need to be delayed.     Acute blood loss anemia   Blood consent obtained     Hypertension on treatment with amlodipine 10 mg daily, metoprolol 50 mg 2 times daily  Hypercholesteremia will continue with  "rosuvastatin 20 mg daily  GERD will continue famotidine  Depression/anxiety will continue Wellbutrin 300 mg in the morning and lorazepam 0.5 mg as needed for anxiety once daily    Barriers to discharge: IV abs, Cultures, ID recs    Anticipated length of stay: 3 days     Medically Ready for Discharge: Anticipated in 2-4 Days    Clinically Significant Risk Factors                   # Thrombocytopenia: Lowest platelets = 8 in last 2 days, will monitor for bleeding   # Hypertension: Noted on problem list            # Overweight: Estimated body mass index is 29.76 kg/m  as calculated from the following:    Height as of this encounter: 1.575 m (5' 2\").    Weight as of this encounter: 73.8 kg (162 lb 11.2 oz)., PRESENT ON ADMISSION              Subjective:  Patient resting comfortably in bed.  Patient feeling well and hopeful to discharge. Discussed that with an ANC of 0.0 will need to stay in hospital until this is improving. Patient disappointed but understanding.    PHYSICAL EXAM  Temp: 97.8  F (36.6  C) Temp src: Oral BP: 128/60 Pulse: 80   Resp: 17 SpO2: 94 % O2 Device: None (Room air) Oxygen Delivery: 2 LPM Height: 157.5 cm (5' 2\") Weight: 73.8 kg (162 lb 11.2 oz)  Estimated body mass index is 29.76 kg/m  as calculated from the following:    Height as of this encounter: 1.575 m (5' 2\").    Weight as of this encounter: 73.8 kg (162 lb 11.2 oz).    General: Very pleasant female resting comfortably in hospital bed.  Awake, alert, interactive. Family at bedside.  HEENT: Normocephalic, atraumatic.  PERRL, EOMI.  Conjunctiva clear, sclerae anicteric.  Mucous membranes moist.  Cardiac: Regular rate and rhythm without murmur, gallop, or rub.  No peripheral edema.  Respiratory: Normal work of breathing.     Musculoskeletal: Moving all extremities appropriately.  Skin: No rashes or abrasions on exposed skin.  Neurologic: Alert and oriented x4.  Cranial nerves II through XII grossly intact.  Psychologic: Appropriate mood and " affect.      Medical Decision Making       45 MINUTES SPENT BY ME on the date of service doing chart review, history, exam, documentation & further activities per the note.      PERTINENT LABS/IMAGING:  Results for orders placed or performed during the hospital encounter of 04/06/25   Chest XR,  PA & LAT    Impression    IMPRESSION: Right internal jugular venous Port-A-Cath terminates near the superior cavoatrial junction. Multiple bilateral pulmonary nodules are better demonstrated on prior CT chest. Large hiatal hernia. Elevation of the left hemidiaphragm. Mild   atelectasis in both lower lungs. Top normal heart size. Normal pulmonary vascularity. No pneumothorax.   Chest CT w/o contrast    Impression    IMPRESSION:   1.  No evidence of pneumonia. Areas of atelectasis both lower lungs, similar to prior CT.  2.  Bilateral pulmonary nodules compatible with metastatic disease, stable since comparison chest CT last month.  3.  Partially imaged abnormally enlarged left supraclavicular lymph node , which was also seen on prior chest CT  4. Trace pleural effusions, slightly improved on the left compared to prior.  5. Large hiatal hernia.       Most Recent 3 CBC's:  Recent Labs   Lab Test 04/09/25  0758 04/09/25  0234 04/08/25  1022   WBC 0.6* 0.5* 0.4*   HGB 7.7* 6.7* 7.7*   MCV 94 94 94   PLT 36* 35* 8*     Most Recent 3 BMP's:  Recent Labs   Lab Test 04/09/25  0758 04/08/25  0547 04/07/25  0548 04/06/25 2129 03/11/25  0854 01/02/25  1333   NA  --   --  132* 132*  --  139   POTASSIUM  --   --  4.5 4.3  --  4.7   CHLORIDE  --   --  98 97*  --  103   CO2  --   --  23 21*  --  25   BUN  --   --  21.4 22.0  --  28.2*   CR 0.78 0.83 0.95 0.96*   < > 1.29*   ANIONGAP  --   --  11 14  --  11   RADHA  --   --  9.0 8.9  --  9.6   GLC  --   --  146* 156*  --  92    < > = values in this interval not displayed.     Most Recent 2 LFT's:  Recent Labs   Lab Test 04/07/25  0548 04/06/25 2129   AST 16 19   ALT 15 15   ALKPHOS 73 76  "  BILITOTAL 0.4 0.4       Recent Labs   Lab Test 05/01/24  1634   CHOL 151   HDL 45*   LDL 65   TRIG 204*     Recent Labs   Lab Test 05/01/24  1634 04/21/23  1033 12/07/21  1038   LDL 65 62 53     Recent Labs   Lab Test 04/07/25  0548   *   POTASSIUM 4.5   CHLORIDE 98   CO2 23   *   BUN 21.4   CR 0.95   GFRESTIMATED 62   RADHA 9.0     Recent Labs   Lab Test 12/19/24  1144 08/21/24  0931 05/01/24  1634   A1C 5.8* 6.2* 5.9*     Recent Labs   Lab Test 04/07/25  0548 04/06/25  2129 05/01/24  1634   HGB 7.8* 8.5* 9.3*     No results for input(s): \"TROPONINI\" in the last 49106 hours.  Recent Labs   Lab Test 04/06/25  2129   NTBNPI 272     No results for input(s): \"TSH\" in the last 92953 hours.  Recent Labs   Lab Test 08/23/21  1223 09/04/20  0708   INR 0.94 1.13       Dale Blackwood MD  Hospitalist      "

## 2025-04-09 NOTE — PLAN OF CARE
A&Ox4. Up independent in room. Finished 1 unit blood at start of shift, hgb will be rechecked tomorrow. Continues to be in contact and protective isolation. No fevers. Continuing Cefepime. Discharge pending.     Problem: Adult Inpatient Plan of Care  Goal: Plan of Care Review  Description: The Plan of Care Review/Shift note should be completed every shift.  The Outcome Evaluation is a brief statement about your assessment that the patient is improving, declining, or no change.  This information will be displayed automatically on your shiftnote.  Outcome: Progressing  Flowsheets (Taken 4/9/2025 9969)  Outcome Evaluation: 1 unit blood finished this morning. ANC continues to be 0. Continuing IV Cefepime.  Plan of Care Reviewed With: patient  Overall Patient Progress: no change  Goal: Absence of Hospital-Acquired Illness or Injury  Outcome: Progressing  Intervention: Identify and Manage Fall Risk  Recent Flowsheet Documentation  Taken 4/9/2025 0840 by Elenita Muhammad RN  Safety Promotion/Fall Prevention: safety round/check completed  Intervention: Prevent Skin Injury  Recent Flowsheet Documentation  Taken 4/9/2025 0840 by Elenita Muhammad RN  Body Position: position changed independently  Goal: Optimal Comfort and Wellbeing  Outcome: Progressing  Goal: Readiness for Transition of Care  Outcome: Progressing     Problem: Delirium  Goal: Optimal Coping  Outcome: Progressing  Goal: Improved Behavioral Control  Outcome: Progressing  Goal: Improved Attention and Thought Clarity  Outcome: Progressing  Goal: Improved Sleep  Outcome: Progressing     Problem: Fever with Neutropenia  Goal: Baseline Body Temperature  Outcome: Progressing  Goal: Absence of Infection  Outcome: Progressing     Problem: Infection  Goal: Absence of Infection Signs and Symptoms  Outcome: Progressing  Intervention: Prevent or Manage Infection  Recent Flowsheet Documentation  Taken 4/9/2025 0840 by Elenita Muhammad RN  Isolation Precautions:   contact  precautions maintained   protective environment maintained     Goal Outcome Evaluation:    Plan of Care Reviewed With: patient    Overall Patient Progress: no changeOverall Patient Progress: no change    Outcome Evaluation: 1 unit blood finished this morning. ANC continues to be 0. Continuing IV Cefepime.

## 2025-04-09 NOTE — SIGNIFICANT EVENT
Plt of 8. Oncology recommends transfusion of 1U platelets if <10.   - 1U platelet transfusion ordered    Kaylee Lomas DO

## 2025-04-10 VITALS
TEMPERATURE: 99.1 F | OXYGEN SATURATION: 94 % | SYSTOLIC BLOOD PRESSURE: 120 MMHG | RESPIRATION RATE: 16 BRPM | DIASTOLIC BLOOD PRESSURE: 64 MMHG | BODY MASS INDEX: 29.94 KG/M2 | HEIGHT: 62 IN | WEIGHT: 162.7 LBS | HEART RATE: 73 BPM

## 2025-04-10 LAB
BASOPHILS # BLD AUTO: 0 10E3/UL (ref 0–0.2)
BASOPHILS NFR BLD AUTO: 0 %
CREAT SERPL-MCNC: 0.74 MG/DL (ref 0.51–0.95)
DACRYOCYTES BLD QL SMEAR: SLIGHT
EGFRCR SERPLBLD CKD-EPI 2021: 84 ML/MIN/1.73M2
ELLIPTOCYTES BLD QL SMEAR: SLIGHT
EOSINOPHIL # BLD AUTO: 0 10E3/UL (ref 0–0.7)
EOSINOPHIL NFR BLD AUTO: 6 %
ERYTHROCYTE [DISTWIDTH] IN BLOOD BY AUTOMATED COUNT: 14.2 % (ref 10–15)
HCT VFR BLD AUTO: 23.7 % (ref 35–47)
HGB BLD-MCNC: 7.9 G/DL (ref 11.7–15.7)
IMM GRANULOCYTES # BLD: 0 10E3/UL
IMM GRANULOCYTES NFR BLD: 0 %
LYMPHOCYTES # BLD AUTO: 0.4 10E3/UL (ref 0.8–5.3)
LYMPHOCYTES NFR BLD AUTO: 88 %
MCH RBC QN AUTO: 31 PG (ref 26.5–33)
MCHC RBC AUTO-ENTMCNC: 33.3 G/DL (ref 31.5–36.5)
MCV RBC AUTO: 93 FL (ref 78–100)
MONOCYTES # BLD AUTO: 0 10E3/UL (ref 0–1.3)
MONOCYTES NFR BLD AUTO: 4 %
NEUTROPHILS # BLD AUTO: 0 10E3/UL (ref 1.6–8.3)
NEUTROPHILS NFR BLD AUTO: 2 %
NRBC # BLD AUTO: 0 10E3/UL
NRBC BLD AUTO-RTO: 0 /100
PLAT MORPH BLD: ABNORMAL
PLATELET # BLD AUTO: 27 10E3/UL (ref 150–450)
RBC # BLD AUTO: 2.55 10E6/UL (ref 3.8–5.2)
RBC MORPH BLD: ABNORMAL
VARIANT LYMPHS BLD QL SMEAR: PRESENT
WBC # BLD AUTO: 0.5 10E3/UL (ref 4–11)

## 2025-04-10 PROCEDURE — 99232 SBSQ HOSP IP/OBS MODERATE 35: CPT | Performed by: STUDENT IN AN ORGANIZED HEALTH CARE EDUCATION/TRAINING PROGRAM

## 2025-04-10 PROCEDURE — 85025 COMPLETE CBC W/AUTO DIFF WBC: CPT | Performed by: STUDENT IN AN ORGANIZED HEALTH CARE EDUCATION/TRAINING PROGRAM

## 2025-04-10 PROCEDURE — 250N000011 HC RX IP 250 OP 636: Performed by: STUDENT IN AN ORGANIZED HEALTH CARE EDUCATION/TRAINING PROGRAM

## 2025-04-10 PROCEDURE — 120N000001 HC R&B MED SURG/OB

## 2025-04-10 PROCEDURE — 250N000013 HC RX MED GY IP 250 OP 250 PS 637: Performed by: INTERNAL MEDICINE

## 2025-04-10 PROCEDURE — 82565 ASSAY OF CREATININE: CPT | Performed by: INTERNAL MEDICINE

## 2025-04-10 PROCEDURE — 250N000011 HC RX IP 250 OP 636: Mod: JZ | Performed by: INTERNAL MEDICINE

## 2025-04-10 RX ADMIN — BUPROPION HYDROCHLORIDE 300 MG: 150 TABLET, EXTENDED RELEASE ORAL at 09:06

## 2025-04-10 RX ADMIN — SENNOSIDES AND DOCUSATE SODIUM 2 TABLET: 50; 8.6 TABLET ORAL at 21:04

## 2025-04-10 RX ADMIN — METOPROLOL TARTRATE 50 MG: 50 TABLET, FILM COATED ORAL at 21:05

## 2025-04-10 RX ADMIN — HEPARIN, PORCINE (PF) 10 UNIT/ML INTRAVENOUS SYRINGE 5 ML: at 06:02

## 2025-04-10 RX ADMIN — LIDOCAINE 1 PATCH: 4 PATCH TOPICAL at 21:04

## 2025-04-10 RX ADMIN — HEPARIN, PORCINE (PF) 10 UNIT/ML INTRAVENOUS SYRINGE 5 ML: at 00:36

## 2025-04-10 RX ADMIN — AMLODIPINE BESYLATE 10 MG: 10 TABLET ORAL at 09:04

## 2025-04-10 RX ADMIN — POLYETHYLENE GLYCOL 3350 17 G: 17 POWDER, FOR SOLUTION ORAL at 09:08

## 2025-04-10 RX ADMIN — CEFEPIME 2 G: 2 INJECTION, POWDER, FOR SOLUTION INTRAVENOUS at 09:14

## 2025-04-10 RX ADMIN — CEFEPIME 2 G: 2 INJECTION, POWDER, FOR SOLUTION INTRAVENOUS at 17:05

## 2025-04-10 RX ADMIN — HEPARIN, PORCINE (PF) 10 UNIT/ML INTRAVENOUS SYRINGE 5 ML: at 17:50

## 2025-04-10 RX ADMIN — DOXAZOSIN 1 MG: 1 TABLET ORAL at 21:05

## 2025-04-10 RX ADMIN — METOPROLOL TARTRATE 50 MG: 50 TABLET, FILM COATED ORAL at 09:13

## 2025-04-10 RX ADMIN — ROSUVASTATIN CALCIUM 20 MG: 20 TABLET, FILM COATED ORAL at 09:05

## 2025-04-10 RX ADMIN — HEPARIN, PORCINE (PF) 10 UNIT/ML INTRAVENOUS SYRINGE 5 ML: at 09:57

## 2025-04-10 RX ADMIN — ACETAMINOPHEN 650 MG: 325 TABLET, FILM COATED ORAL at 10:45

## 2025-04-10 ASSESSMENT — ACTIVITIES OF DAILY LIVING (ADL)
ADLS_ACUITY_SCORE: 36
ADLS_ACUITY_SCORE: 37
ADLS_ACUITY_SCORE: 36
ADLS_ACUITY_SCORE: 37
ADLS_ACUITY_SCORE: 37
ADLS_ACUITY_SCORE: 36
ADLS_ACUITY_SCORE: 37
ADLS_ACUITY_SCORE: 36
ADLS_ACUITY_SCORE: 37
ADLS_ACUITY_SCORE: 37
ADLS_ACUITY_SCORE: 36
ADLS_ACUITY_SCORE: 37
ADLS_ACUITY_SCORE: 37

## 2025-04-10 NOTE — PROGRESS NOTES
Oncology/Hematology Follow Up Note:    Assessment and Plan:  #1 Metastatic ovarian cancer  #2 Neutropenic fever  - Received cycle 1 of clinical trial drug on 3/27, with Neulasta the next day for neutropenia prevention  - WBC was WNL before starting treatment, now she is severely neutropenic, due to treatment  - Blood cultures negative so far.  Positive urine cx with 10-50,000 cfu/ml e.coli, possibly UTI   - CT Chest shows no evidence of pneumonia     PLAN:  - Continue Cefepime.  Transition to oral antibiotics on discharge per ID.  - No need for Neupogen since the patient has already received Neulasta.  Overlapping Neulasta with Neupogen can increase the risk of splenic rupture  - Expect ANC to improve within the next few days  - Don't need to keep her here until her blood counts are completely back to normal.  Reasonable to discharge if she remains afebrile and blood counts are starting to go up.  - Has palliative radiation to address vaginal bleeding starting Wednesday.  This may need to be delayed.     #3 DVT  #4 Thrombocytopenia due to treatment     PLAN:  - Hold anticoagulation until Plt is at least >50K     Kevin Bravo M.D.  [facility]  [phone]    Subjective:    Patient feels OK.  No new symptoms.  Afebrile.      Labs:  All labs reviewed    CBC  Recent Labs   Lab 04/09/25  0758 04/09/25  0234 04/08/25  1022   WBC 0.6* 0.5* 0.4*   HGB 7.7* 6.7* 7.7*   MCV 94 94 94   PLT 36* 35* 8*       CMP  Recent Labs   Lab 04/09/25  0758 04/08/25  0547 04/07/25  0548 04/06/25  2129   NA  --   --  132* 132*   POTASSIUM  --   --  4.5 4.3   CHLORIDE  --   --  98 97*   CO2  --   --  23 21*   ANIONGAP  --   --  11 14   GLC  --   --  146* 156*   BUN  --   --  21.4 22.0   CR 0.78 0.83 0.95 0.96*   GFRESTIMATED 79 73 62 61   RADHA  --   --  9.0 8.9   PROTTOTAL  --   --  5.8* 6.3*   ALBUMIN  --   --  3.5 3.8   BILITOTAL  --   --  0.4 0.4   ALKPHOS  --   --  73 76   AST  --   --  16 19   ALT  --   --  15 15       INRNo lab  "results found in last 7 days.    Blood CultureNo results for input(s): \"CULT\" in the last 168 hours.      Kevin Bravo MD  [facility]  4/9/2025 8:29 PM        "

## 2025-04-10 NOTE — PLAN OF CARE
"Goal Outcome Evaluation:    Pertinent assessments: Aox4. VSS. BS active and audible. Continuing Cefepime. No fever during shift. Discharge pending. Patient slept well.  Major Shift Events: Cefepime administered.   Treatment Plan: Monitor labs and vitals. Continue Q8H Cefepime.  Bedside Nurse: Niko Alfonso RN       Plan of Care Reviewed With: patient    Overall Patient Progress: improvingOverall Patient Progress: improving    Outcome Evaluation: Continuing IV Cefepime.      Problem: Adult Inpatient Plan of Care  Goal: Plan of Care Review  Description: The Plan of Care Review/Shift note should be completed every shift.  The Outcome Evaluation is a brief statement about your assessment that the patient is improving, declining, or no change.  This information will be displayed automatically on your shiftnote.  Outcome: Progressing  Flowsheets (Taken 4/10/2025 0659)  Outcome Evaluation: Continuing IV Cefepime.  Plan of Care Reviewed With: patient  Overall Patient Progress: improving  Goal: Patient-Specific Goal (Individualized)  Description: You can add care plan individualizations to a care plan. Examples of Individualization might be:  \"Parent requests to be called daily at 9am for status\", \"I have a hard time hearing out of my right ear\", or \"Do not touch me to wake me up as it startlesme\".  Outcome: Progressing  Goal: Absence of Hospital-Acquired Illness or Injury  Outcome: Progressing  Intervention: Identify and Manage Fall Risk  Recent Flowsheet Documentation  Taken 4/9/2025 2200 by Niko Alfonso RN  Safety Promotion/Fall Prevention:   activity supervised   assistive device/personal items within reach   clutter free environment maintained   room organization consistent   safety round/check completed  Intervention: Prevent Infection  Recent Flowsheet Documentation  Taken 4/9/2025 2200 by Niko Alfonso RN  Infection Prevention: cohorting utilized  Goal: Optimal Comfort and Wellbeing  Outcome: " Progressing  Intervention: Provide Person-Centered Care  Recent Flowsheet Documentation  Taken 4/9/2025 2200 by Niko Alfonso RN  Trust Relationship/Rapport:   questions encouraged   care explained  Goal: Readiness for Transition of Care  Outcome: Progressing     Problem: Delirium  Goal: Optimal Coping  Outcome: Progressing  Goal: Improved Behavioral Control  Outcome: Progressing  Intervention: Minimize Safety Risk  Recent Flowsheet Documentation  Taken 4/9/2025 2200 by Niko Alfonso RN  Enhanced Safety Measures: room near unit station  Trust Relationship/Rapport:   questions encouraged   care explained  Goal: Improved Attention and Thought Clarity  Outcome: Progressing  Goal: Improved Sleep  Outcome: Progressing     Problem: Fever with Neutropenia  Goal: Baseline Body Temperature  Outcome: Progressing  Goal: Absence of Infection  Outcome: Progressing  Intervention: Prevent Infection and Maximize Resistance  Recent Flowsheet Documentation  Taken 4/9/2025 2200 by Niko Alfonso RN  Infection Prevention: cohorting utilized     Problem: Infection  Goal: Absence of Infection Signs and Symptoms  Outcome: Progressing  Intervention: Prevent or Manage Infection  Recent Flowsheet Documentation  Taken 4/9/2025 2200 by Niko Alfonso RN  Isolation Precautions: contact precautions maintained

## 2025-04-10 NOTE — PROGRESS NOTES
[facility]    PROGRESS NOTE - Hospitalist Service    ASSESSMENT AND PLAN     Active Problems:    Dyspnea on exertion    Thrombocytopenia    Neutropenic fever    Julia Andre is a 74 year old female admitted on 4/6/2025.  Patient has a past medical history of metastatic ovarian cancer treatment with a clinical drug trial, HLD, HLD, HTN, type 2 diabetes who presented with fever and was found to have neutropenia.     Neutropenic fever  Ecoli Urinary Tract Infection  Urinalysis not convincing for UTI, but Ucx returned .  Blood cultures NGTD.  Chest x-ray noting atelectasis of the bilateral bases and metastatic pulmonary nodules.    Vanc/Cefepime --> Cefepime alone  Appreciate ID recs        Pancytopenia  Metastatic ovarian cancer  Management as per [place] [facility]  Received cycle 1 of clinical trial drug on 3/27, with Neulasta the next day for neutropenia prevention  - WBC was WNL before starting treatment, now she is severely neutropenic, due to treatment  Holding anticoagulation until platelets at least above 30,000 preferably >50,000; Currently downtrending and 8,000 today.  Unfortunately required 1U PLT and 1U PRBC overnight 4/8/2025. ANC this morning again 0.0.   Oncology: No need for Neupogen since the patient has already received Neulasta. Apparently combining the two medications can increase risk of splenic rupture.   Expect ANC to improve within the next 3-4 days; unfortunately still 0.0 today.   Patient will need to remain inpatient until labs start to improve, as per hematology/oncology.     Metabolic acidosis non-anion gap  Resolved       Left internal jugular DVT  Apixaban 5 mg twice daily PTA- on hold due to low PLT     Vaginal bleeding   Has palliative radiation to address vaginal bleeding starting Wednesday. This may need to be delayed.     Acute blood loss anemia   Blood consent obtained     Hypertension on treatment with amlodipine 10 mg daily, metoprolol 50 mg 2 times  "daily  Hypercholesteremia will continue with rosuvastatin 20 mg daily  GERD will continue famotidine  Depression/anxiety will continue Wellbutrin 300 mg in the morning and lorazepam 0.5 mg as needed for anxiety once daily    Barriers to discharge: Improving pancytopenia    Anticipated length of stay: 3 days     Medically Ready for Discharge: Anticipated in 2-4 Days    Clinically Significant Risk Factors                   # Thrombocytopenia: Lowest platelets = 27 in last 2 days, will monitor for bleeding   # Hypertension: Noted on problem list            # Overweight: Estimated body mass index is 29.76 kg/m  as calculated from the following:    Height as of this encounter: 1.575 m (5' 2\").    Weight as of this encounter: 73.8 kg (162 lb 11.2 oz)., PRESENT ON ADMISSION              Subjective:  Patient resting comfortably in bed. Reports feeling well and eager to discharge. Disappointed that lab values have not improved at all and she needs to remain inpatient.    PHYSICAL EXAM  Temp: 98  F (36.7  C) Temp src: Oral BP: 132/72 (manual) Pulse: 76   Resp: 16 SpO2: 93 % O2 Device: None (Room air) Oxygen Delivery: 2 LPM Height: 157.5 cm (5' 2\") Weight: 73.8 kg (162 lb 11.2 oz)  Estimated body mass index is 29.76 kg/m  as calculated from the following:    Height as of this encounter: 1.575 m (5' 2\").    Weight as of this encounter: 73.8 kg (162 lb 11.2 oz).    General: Very pleasant female resting comfortably in hospital bed.  Awake, alert, interactive.  at bedside.  HEENT: Normocephalic, atraumatic.  PERRL, EOMI.  Conjunctiva clear, sclerae anicteric.  Mucous membranes moist.  Cardiac: Regular rate and rhythm without murmur, gallop, or rub.  No peripheral edema.  Respiratory: Normal work of breathing.     Musculoskeletal: Moving all extremities appropriately.  Skin: No rashes or abrasions on exposed skin.  Neurologic: Alert and oriented x4.  Cranial nerves II through XII grossly intact.  Psychologic: Appropriate " mood and affect.      Medical Decision Making       45 MINUTES SPENT BY ME on the date of service doing chart review, history, exam, documentation & further activities per the note.      PERTINENT LABS/IMAGING:  Results for orders placed or performed during the hospital encounter of 04/06/25   Chest XR,  PA & LAT    Impression    IMPRESSION: Right internal jugular venous Port-A-Cath terminates near the superior cavoatrial junction. Multiple bilateral pulmonary nodules are better demonstrated on prior CT chest. Large hiatal hernia. Elevation of the left hemidiaphragm. Mild   atelectasis in both lower lungs. Top normal heart size. Normal pulmonary vascularity. No pneumothorax.   Chest CT w/o contrast    Impression    IMPRESSION:   1.  No evidence of pneumonia. Areas of atelectasis both lower lungs, similar to prior CT.  2.  Bilateral pulmonary nodules compatible with metastatic disease, stable since comparison chest CT last month.  3.  Partially imaged abnormally enlarged left supraclavicular lymph node , which was also seen on prior chest CT  4. Trace pleural effusions, slightly improved on the left compared to prior.  5. Large hiatal hernia.       Most Recent 3 CBC's:  Recent Labs   Lab Test 04/10/25  0554 04/09/25  0758 04/09/25  0234   WBC 0.5* 0.6* 0.5*   HGB 7.9* 7.7* 6.7*   MCV 93 94 94   PLT 27* 36* 35*     Most Recent 3 BMP's:  Recent Labs   Lab Test 04/10/25  0554 04/09/25  0758 04/08/25  0547 04/07/25  0548 04/06/25  2129 03/11/25  0854 01/02/25  1333   NA  --   --   --  132* 132*  --  139   POTASSIUM  --   --   --  4.5 4.3  --  4.7   CHLORIDE  --   --   --  98 97*  --  103   CO2  --   --   --  23 21*  --  25   BUN  --   --   --  21.4 22.0  --  28.2*   CR 0.74 0.78 0.83 0.95 0.96*   < > 1.29*   ANIONGAP  --   --   --  11 14  --  11   RADHA  --   --   --  9.0 8.9  --  9.6   GLC  --   --   --  146* 156*  --  92    < > = values in this interval not displayed.     Most Recent 2 LFT's:  Recent Labs   Lab Test  "04/07/25  0548 04/06/25 2129   AST 16 19   ALT 15 15   ALKPHOS 73 76   BILITOTAL 0.4 0.4       Recent Labs   Lab Test 05/01/24  1634   CHOL 151   HDL 45*   LDL 65   TRIG 204*     Recent Labs   Lab Test 05/01/24  1634 04/21/23  1033 12/07/21  1038   LDL 65 62 53     Recent Labs   Lab Test 04/07/25  0548   *   POTASSIUM 4.5   CHLORIDE 98   CO2 23   *   BUN 21.4   CR 0.95   GFRESTIMATED 62   RADHA 9.0     Recent Labs   Lab Test 12/19/24  1144 08/21/24  0931 05/01/24  1634   A1C 5.8* 6.2* 5.9*     Recent Labs   Lab Test 04/07/25  0548 04/06/25 2129 05/01/24  1634   HGB 7.8* 8.5* 9.3*     No results for input(s): \"TROPONINI\" in the last 72385 hours.  Recent Labs   Lab Test 04/06/25 2129   NTBNPI 272     No results for input(s): \"TSH\" in the last 99960 hours.  Recent Labs   Lab Test 08/23/21  1223 09/04/20  0708   INR 0.94 1.13       Dale Blackwood MD  Hospitalist      "

## 2025-04-10 NOTE — PROGRESS NOTES
Hematology Chart Check:    Reviewed labs from this morning.  Still afebrile with stable vital signs, but still profoundly neutropenic.  Would still hold off on Neupogen since she did receive Neulasta 2 weeks ago.  Would still hold anticoagulation due to Plt being under 50K.  Reasonable to monitor here until her blood counts start the get better but don't need to keep her here until her blood counts fully normalize.    Kevin Bravo M.D.  [facility]  [phone]

## 2025-04-10 NOTE — PLAN OF CARE
"Pt continues infection management via abx, no changes to lab values from morning draw.  Continued pain and difficulty moving right arm/shoulder.  Grapefruit sized contusion observed in left upper arm, patient states from automatic blood pressure cuff use.  Held famotidine per patient.  Minimal appetite, good spirits today.  Concerns for afternoon appt for lab draw to help determine effectiveness of current drug trial.  Hospitalist advised no discharge until at least minor improvement in lab results.      Goal Outcome Evaluation:      Plan of Care Reviewed With: patient    Overall Patient Progress: improvingOverall Patient Progress: improving    Outcome Evaluation: No change to lab values, continued abx.  Pt developed left arm contusion.      Problem: Adult Inpatient Plan of Care  Goal: Plan of Care Review  Description: The Plan of Care Review/Shift note should be completed every shift.  The Outcome Evaluation is a brief statement about your assessment that the patient is improving, declining, or no change.  This information will be displayed automatically on your shiftnote.  Outcome: Progressing  Flowsheets (Taken 4/10/2025 1023)  Outcome Evaluation: No change to lab values, continued abx.  Pt developed left arm contusion.  Plan of Care Reviewed With: patient  Overall Patient Progress: improving  Goal: Patient-Specific Goal (Individualized)  Description: You can add care plan individualizations to a care plan. Examples of Individualization might be:  \"Parent requests to be called daily at 9am for status\", \"I have a hard time hearing out of my right ear\", or \"Do not touch me to wake me up as it startlesme\".  Outcome: Progressing  Goal: Absence of Hospital-Acquired Illness or Injury  Outcome: Progressing  Intervention: Identify and Manage Fall Risk  Recent Flowsheet Documentation  Taken 4/10/2025 1000 by Michael Estes, RN  Safety Promotion/Fall Prevention: safety round/check completed  Intervention: Prevent Skin " Injury  Recent Flowsheet Documentation  Taken 4/10/2025 1000 by Michael Estes RN  Body Position: position changed independently  Goal: Optimal Comfort and Wellbeing  Outcome: Progressing  Goal: Readiness for Transition of Care  Outcome: Progressing     Problem: Delirium  Goal: Optimal Coping  Outcome: Progressing  Goal: Improved Behavioral Control  Outcome: Progressing  Intervention: Minimize Safety Risk  Recent Flowsheet Documentation  Taken 4/10/2025 1000 by Michael Estes RN  Enhanced Safety Measures: pain management  Goal: Improved Attention and Thought Clarity  Outcome: Progressing  Goal: Improved Sleep  Outcome: Progressing     Problem: Fever with Neutropenia  Goal: Baseline Body Temperature  Outcome: Progressing  Goal: Absence of Infection  Outcome: Progressing     Problem: Infection  Goal: Absence of Infection Signs and Symptoms  Outcome: Progressing

## 2025-04-11 VITALS
RESPIRATION RATE: 16 BRPM | OXYGEN SATURATION: 92 % | HEART RATE: 73 BPM | DIASTOLIC BLOOD PRESSURE: 62 MMHG | TEMPERATURE: 98.6 F | BODY MASS INDEX: 29.94 KG/M2 | SYSTOLIC BLOOD PRESSURE: 106 MMHG | WEIGHT: 162.7 LBS | HEIGHT: 62 IN

## 2025-04-11 LAB
BASOPHILS # BLD AUTO: 0 10E3/UL (ref 0–0.2)
BASOPHILS NFR BLD AUTO: 0 %
EOSINOPHIL # BLD AUTO: 0 10E3/UL (ref 0–0.7)
EOSINOPHIL NFR BLD AUTO: 5 %
ERYTHROCYTE [DISTWIDTH] IN BLOOD BY AUTOMATED COUNT: 13.7 % (ref 10–15)
HCT VFR BLD AUTO: 23.2 % (ref 35–47)
HGB BLD-MCNC: 7.6 G/DL (ref 11.7–15.7)
IMM GRANULOCYTES # BLD: 0 10E3/UL
IMM GRANULOCYTES NFR BLD: 2 %
LYMPHOCYTES # BLD AUTO: 0.5 10E3/UL (ref 0.8–5.3)
LYMPHOCYTES NFR BLD AUTO: 82 %
MCH RBC QN AUTO: 30.6 PG (ref 26.5–33)
MCHC RBC AUTO-ENTMCNC: 32.8 G/DL (ref 31.5–36.5)
MCV RBC AUTO: 94 FL (ref 78–100)
MONOCYTES # BLD AUTO: 0 10E3/UL (ref 0–1.3)
MONOCYTES NFR BLD AUTO: 6 %
NEUTROPHILS # BLD AUTO: 0 10E3/UL (ref 1.6–8.3)
NEUTROPHILS NFR BLD AUTO: 6 %
NRBC # BLD AUTO: 0 10E3/UL
NRBC BLD AUTO-RTO: 0 /100
PLATELET # BLD AUTO: 20 10E3/UL (ref 150–450)
RBC # BLD AUTO: 2.48 10E6/UL (ref 3.8–5.2)
WBC # BLD AUTO: 0.7 10E3/UL (ref 4–11)

## 2025-04-11 PROCEDURE — 250N000013 HC RX MED GY IP 250 OP 250 PS 637: Performed by: INTERNAL MEDICINE

## 2025-04-11 PROCEDURE — 85004 AUTOMATED DIFF WBC COUNT: CPT | Performed by: INTERNAL MEDICINE

## 2025-04-11 PROCEDURE — 250N000013 HC RX MED GY IP 250 OP 250 PS 637: Performed by: HOSPITALIST

## 2025-04-11 PROCEDURE — 250N000011 HC RX IP 250 OP 636: Performed by: STUDENT IN AN ORGANIZED HEALTH CARE EDUCATION/TRAINING PROGRAM

## 2025-04-11 PROCEDURE — 99232 SBSQ HOSP IP/OBS MODERATE 35: CPT | Performed by: SPECIALIST

## 2025-04-11 PROCEDURE — 250N000011 HC RX IP 250 OP 636: Mod: JZ | Performed by: INTERNAL MEDICINE

## 2025-04-11 RX ORDER — LEVOFLOXACIN 500 MG/1
500 TABLET, FILM COATED ORAL DAILY
Qty: 8 TABLET | Refills: 0 | Status: SHIPPED | OUTPATIENT
Start: 2025-04-11 | End: 2025-04-19

## 2025-04-11 RX ADMIN — CEFEPIME 2 G: 2 INJECTION, POWDER, FOR SOLUTION INTRAVENOUS at 08:09

## 2025-04-11 RX ADMIN — CEFEPIME 2 G: 2 INJECTION, POWDER, FOR SOLUTION INTRAVENOUS at 00:16

## 2025-04-11 RX ADMIN — Medication 5 ML: at 11:25

## 2025-04-11 RX ADMIN — ROSUVASTATIN CALCIUM 20 MG: 20 TABLET, FILM COATED ORAL at 08:09

## 2025-04-11 RX ADMIN — POLYETHYLENE GLYCOL 3350 17 G: 17 POWDER, FOR SOLUTION ORAL at 08:09

## 2025-04-11 RX ADMIN — DIPHENHYDRAMINE HYDROCHLORIDE 25 MG: 25 CAPSULE ORAL at 00:18

## 2025-04-11 RX ADMIN — BUPROPION HYDROCHLORIDE 300 MG: 150 TABLET, EXTENDED RELEASE ORAL at 08:09

## 2025-04-11 RX ADMIN — HEPARIN, PORCINE (PF) 10 UNIT/ML INTRAVENOUS SYRINGE 5 ML: at 06:30

## 2025-04-11 RX ADMIN — HEPARIN, PORCINE (PF) 10 UNIT/ML INTRAVENOUS SYRINGE 5 ML: at 08:58

## 2025-04-11 RX ADMIN — FAMOTIDINE 20 MG: 20 TABLET, FILM COATED ORAL at 08:09

## 2025-04-11 ASSESSMENT — ACTIVITIES OF DAILY LIVING (ADL)
ADLS_ACUITY_SCORE: 37
ADLS_ACUITY_SCORE: 36
ADLS_ACUITY_SCORE: 37
ADLS_ACUITY_SCORE: 36
ADLS_ACUITY_SCORE: 37
ADLS_ACUITY_SCORE: 37

## 2025-04-11 NOTE — PLAN OF CARE
"Pertinent assessments: A&Ox4. VSS on room air, afebrile. Lidocaine patches to R shoulder. Denies nausea and SOB. Slept well overnight. Up independently. Port hep locked.     Major Shift Events: None   Treatment Plan: Monitor labs and vitals. Continue Q8H Cefepime.  Bedside Nurse: Sarah Ellis RN     Goal Outcome Evaluation:      Plan of Care Reviewed With: patient    Overall Patient Progress: improvingOverall Patient Progress: improving      Problem: Adult Inpatient Plan of Care  Goal: Plan of Care Review  Description: The Plan of Care Review/Shift note should be completed every shift.  The Outcome Evaluation is a brief statement about your assessment that the patient is improving, declining, or no change.  This information will be displayed automatically on your shiftnote.  Outcome: Progressing  Flowsheets (Taken 4/11/2025 0636)  Plan of Care Reviewed With: patient  Overall Patient Progress: improving  Goal: Patient-Specific Goal (Individualized)  Description: You can add care plan individualizations to a care plan. Examples of Individualization might be:  \"Parent requests to be called daily at 9am for status\", \"I have a hard time hearing out of my right ear\", or \"Do not touch me to wake me up as it startlesme\".  Outcome: Progressing  Goal: Absence of Hospital-Acquired Illness or Injury  Outcome: Progressing  Intervention: Identify and Manage Fall Risk  Recent Flowsheet Documentation  Taken 4/10/2025 2105 by Sarah Ellis, RN  Safety Promotion/Fall Prevention:   assistive device/personal items within reach   clutter free environment maintained   safety round/check completed  Intervention: Prevent Skin Injury  Recent Flowsheet Documentation  Taken 4/10/2025 2105 by Sarah Ellis, RN  Body Position: position changed independently  Intervention: Prevent and Manage VTE (Venous Thromboembolism) Risk  Recent Flowsheet Documentation  Taken 4/10/2025 2105 by Sarah Ellis, RN  VTE Prevention/Management: SCDs off " (sequential compression devices)  Intervention: Prevent Infection  Recent Flowsheet Documentation  Taken 4/10/2025 2105 by Sarah Ellis, RN  Infection Prevention:   cohorting utilized   rest/sleep promoted   single patient room provided  Goal: Optimal Comfort and Wellbeing  Outcome: Progressing  Intervention: Monitor Pain and Promote Comfort  Recent Flowsheet Documentation  Taken 4/10/2025 2105 by Sarah Ellis, RN  Pain Management Interventions: medication (see MAR)  Goal: Readiness for Transition of Care  Outcome: Progressing     Problem: Delirium  Goal: Optimal Coping  Outcome: Progressing  Goal: Improved Behavioral Control  Outcome: Progressing  Intervention: Minimize Safety Risk  Recent Flowsheet Documentation  Taken 4/10/2025 2105 by Sarah Ellis, RN  Enhanced Safety Measures: room near unit station  Goal: Improved Attention and Thought Clarity  Outcome: Progressing  Goal: Improved Sleep  Outcome: Progressing     Problem: Fever with Neutropenia  Goal: Baseline Body Temperature  Outcome: Progressing  Goal: Absence of Infection  Outcome: Progressing  Intervention: Prevent Infection and Maximize Resistance  Recent Flowsheet Documentation  Taken 4/10/2025 2105 by Sarah Ellis, RN  Infection Prevention:   cohorting utilized   rest/sleep promoted   single patient room provided     Problem: Infection  Goal: Absence of Infection Signs and Symptoms  Outcome: Progressing  Intervention: Prevent or Manage Infection  Recent Flowsheet Documentation  Taken 4/10/2025 2105 by Sarah Ellis, RN  Isolation Precautions: protective environment maintained

## 2025-04-11 NOTE — PROGRESS NOTES
[facility]    Infectious Disease Progress Note    Date of Service (when I saw the patient): 04/11/2025     Assessment:  74YF with metastatic ovarian cancer , on a trial drug (first dose was 2 weeks ago), who has been admitted due to fever and chills and was found to have neutropenic fever possibly related to UTI.     -Neutropenic fever  -Chemotherapy associated pancytopenia  -Metastatic ovarian cancer with pulmonary metastases- on a trial drug started hubert infusion 2 weeks ago  -Positive urine cx with 10-50,000 cfu/ml e.coli, possibly UTI  -Hyponatremia  -Hiatal hernia  -HTN, hyperlipidemia, diabetes     Recommendations:  Maintain on antibiotics until neutropenia resolves  Transition to oral Levofloxacin for discharge 500 mg daily (Qtc is stable)  Discussed with oncology, CBC/diff will be checked daily  Recommendations were discussed with the hospitalist service    Discharge is planned today, ID will sign off.    Julissa Aviles MD    Interval History   Feels quite well, no new complaints, has remained afebrile, discharge is planned today      Physical Exam   Temp: 98.6  F (37  C) Temp src: Oral BP: 100/60 Pulse: 73   Resp: 16 SpO2: 92 % O2 Device: None (Room air)    Vitals:    04/06/25 2058   Weight: 73.8 kg (162 lb 11.2 oz)     Vital Signs with Ranges  Temp:  [97.7  F (36.5  C)-99.1  F (37.3  C)] 98.6  F (37  C)  Pulse:  [62-76] 73  Resp:  [16] 16  BP: (100-142)/(60-72) 100/60  SpO2:  [92 %-99 %] 92 %    Constitutional: Awake, alert, cooperative, no apparent distress  Lungs: Clear to auscultation bilaterally, no crackles or wheezing  Cardiovascular: Regular rate and rhythm, normal S1 and S2, and no murmur noted  Abdomen: Normal bowel sounds, soft, non-distended, non-tender  Skin: No rashes, no cyanosis, no edema  Other:    Medications   Current Facility-Administered Medications   Medication Dose Route Frequency Provider Last Rate Last Admin    Patient is already receiving anticoagulation with  heparin, enoxaparin (LOVENOX), warfarin (COUMADIN)  or other anticoagulant medication   Does not apply Continuous PRN Atif Zarco MD         Current Facility-Administered Medications   Medication Dose Route Frequency Provider Last Rate Last Admin    amLODIPine (NORVASC) tablet 10 mg  10 mg Oral Daily Atif Zarco MD   10 mg at 04/10/25 0904    buPROPion (WELLBUTRIN XL) 24 hr tablet 300 mg  300 mg Oral QAM Atif Zarco MD   300 mg at 04/10/25 0906    ceFEPIme (MAXIPIME) 2 g vial to attach to  mL bag for ADULTS or NS 50 mL bag for PEDS  2 g Intravenous Q8H Atif Zarco MD   2 g at 04/11/25 0016    doxazosin (CARDURA) tablet 1 mg  1 mg Oral At Bedtime Atif Zarco MD   1 mg at 04/10/25 2105    [Held by provider] enoxaparin ANTICOAGULANT (LOVENOX) injection 73.5 mg  1 mg/kg Subcutaneous Q12H Atif Zarco MD        famotidine (PEPCID) tablet 20 mg  20 mg Oral BID Atif Zarco MD   20 mg at 04/09/25 2122    heparin lock flush 10 unit/mL injection 5-10 mL  5-10 mL Intracatheter Q24H Андрей Calloway MD   5 mL at 04/11/25 0630    heparin lock flush 100 unit/mL injection 5-10 mL  5-10 mL Intracatheter Q28 Days Андрей Calloway MD        Lidocaine (LIDOCARE) 4 % Patch 1 patch  1 patch Transdermal Q24H Melina Pierre DO   1 patch at 04/10/25 2104    metoprolol tartrate (LOPRESSOR) tablet 50 mg  50 mg Oral BID Atif Zarco MD   50 mg at 04/10/25 2105    polyethylene glycol (MIRALAX) Packet 17 g  17 g Oral Daily Atif Zarco MD   17 g at 04/10/25 0908    rosuvastatin (CRESTOR) tablet 20 mg  20 mg Oral Daily Atif Zarco MD   20 mg at 04/10/25 0905    sodium chloride (PF) 0.9% PF flush 10-20 mL  10-20 mL Intracatheter Q28 Days Андрей Calloway MD   20 mL at 04/07/25 0557    sodium chloride (PF) 0.9% PF flush 3 mL  3 mL Intracatheter Q8H Atif Barahona MD   3 mL at 04/10/25 2108       Data   All microbiology laboratory data reviewed.  Recent Labs   Lab Test 04/11/25  0630 04/10/25  0554 04/09/25  0755    WBC 0.7* 0.5* 0.6*   HGB 7.6* 7.9* 7.7*   HCT 23.2* 23.7* 23.6*   MCV 94 93 94   PLT 20* 27* 36*     Recent Labs   Lab Test 04/10/25  0554 04/09/25  0758 04/08/25  0547   CR 0.74 0.78 0.83

## 2025-04-11 NOTE — PROGRESS NOTES
Oncology/Hematology Follow Up Note:    Assessment and Plan:  #1 Metastatic ovarian cancer  #2 Neutropenic fever  - Received cycle 1 of clinical trial drug on 3/27, with Neulasta the next day for neutropenia prevention  - WBC was WNL before starting treatment, now she is severely neutropenic, due to treatment  - Blood cultures negative so far.  Positive urine cx with 10-50,000 cfu/ml e.coli, possibly UTI   - CT Chest shows no evidence of pneumonia     PLAN:  - Continue Cefepime while here  - Talking to Dr. Blackwood and ID about possible discharge home on oral antibiotics for neutropenic fever.  We typically use Levaquin and Augmentin in the outpatient setting for neutropenic fever.  Continue until ANC is >1.0  - No need for Neupogen since the patient has already received Neulasta.  Overlapping Neulasta with Neupogen can increase the risk of splenic rupture  - If discharged today, we will arrange CBC w/ diff daily for the next week     #3 DVT in left UE  #4 Thrombocytopenia due to treatment     PLAN:  - Hold anticoagulation until Plt is at least >50K     Kevin Bravo M.D.  [facility]  [phone]    Subjective:    Still afebrile with stable vital signs.  No new symptoms.  Wants to go home      Labs:  All labs reviewed    CBC  Recent Labs   Lab 04/11/25  0630 04/10/25  0554 04/09/25  0758   WBC 0.7* 0.5* 0.6*   HGB 7.6* 7.9* 7.7*   MCV 94 93 94   PLT 20* 27* 36*       CMP  Recent Labs   Lab 04/10/25  0554 04/09/25  0758 04/08/25  0547 04/07/25  0548 04/06/25  2129   NA  --   --   --  132* 132*   POTASSIUM  --   --   --  4.5 4.3   CHLORIDE  --   --   --  98 97*   CO2  --   --   --  23 21*   ANIONGAP  --   --   --  11 14   GLC  --   --   --  146* 156*   BUN  --   --   --  21.4 22.0   CR 0.74 0.78 0.83 0.95 0.96*   GFRESTIMATED 84 79 73 62 61   RADHA  --   --   --  9.0 8.9   PROTTOTAL  --   --   --  5.8* 6.3*   ALBUMIN  --   --   --  3.5 3.8   BILITOTAL  --   --   --  0.4 0.4   ALKPHOS  --   --   --  73 76   AST  --   --  "  --  16 19   ALT  --   --   --  15 15       INRNo lab results found in last 7 days.    Blood CultureNo results for input(s): \"CULT\" in the last 168 hours.      Kevin Bravo MD  [facility]  4/11/2025 8:28 AM        "

## 2025-04-11 NOTE — DISCHARGE SUMMARY
[facility]    Discharge Summary  Hospitalist    Date of Admission:  4/6/2025  Date of Discharge:  4/11/2025 11:58 AM  Discharging Provider: Dale Blackwood MD  Date of Service (when I saw the patient): 04/11/25    Discharge Diagnoses   Neutropenic fever  Ecoli Urinary Tract Infection  Pancytopenia  Metastatic ovarian cancer  Metabolic acidosis non-anion gap   Left internal jugular DVT   Vaginal bleeding  Acute blood loss anemia   Hypertension   Hypercholesteremia   GERD   Depression/anxiety       Hospital Course   Julia Andre  is a 74 year old female admitted on 4/6/2025.  Patient has a past medical history of metastatic ovarian cancer treatment with a clinical drug trial, HLD, HLD, HTN, type 2 diabetes who presented with fever and was found to have neutropenia.     Source of infection suspected to be urinary with urine culture later returning with Ecoli despite UA reassuring. Bcx NGTD. CXR without opacities. Received vancomycin + cefepime --> cefepime alone with improvement. Stable to discharge with levofloxacin 500mg daily x8 additional days.     During stay patient was noted to have persistent neurtropenia with ANC 0.0 on multiple days. Discussed with oncology, who felt it was safe at this time to discharge patient home for self isolation. She will follow up in clinic for repeat CBC checks next week.    Dale Blackwood MD    Significant Results and Procedures   ANC 0.0    Pending Results   These results will be followed up by oncology.  Unresulted Labs Ordered in the Past 30 Days of this Admission       Date and Time Order Name Status Description    4/7/2025  9:19 AM Candida auris by PCR In process     4/6/2025  9:29 PM Blood Culture Peripheral Blood Preliminary     4/6/2025  9:29 PM Blood Culture Peripheral Blood Preliminary             Code Status   Full Code       Primary Care Physician   Johanny Diaz    Physical Exam   Temp: 98.6  F (37  C) Temp src: Oral BP: 106/62 Pulse: 73   Resp: 16 SpO2:  92 % O2 Device: None (Room air)    Vitals:    04/06/25 2058   Weight: 73.8 kg (162 lb 11.2 oz)     Vital Signs with Ranges  Temp:  [97.7  F (36.5  C)-99.1  F (37.3  C)] 98.6  F (37  C)  Pulse:  [62-73] 73  Resp:  [16] 16  BP: (100-129)/(60-68) 106/62  SpO2:  [92 %-99 %] 92 %  No intake/output data recorded.    General: Very pleasant female resting comfortably in hospital bed.  Awake, alert, interactive.  at bedside.  HEENT: Normocephalic, atraumatic.  PERRL, EOMI.  Conjunctiva clear, sclerae anicteric.  Mucous membranes moist.  Cardiac: Regular rate and rhythm without murmur, gallop, or rub.  No peripheral edema.  Respiratory: Normal work of breathing.     Musculoskeletal: Moving all extremities appropriately.  Skin: No rashes or abrasions on exposed skin.  Neurologic: Alert and oriented x4.  Cranial nerves II through XII grossly intact.  Psychologic: Appropriate mood and affect.     Discharge Disposition   Discharged to home  Condition at discharge: Stable    Consultations This Hospital Stay   HEMATOLOGY & ONCOLOGY IP CONSULT  INFECTIOUS DISEASES IP CONSULT  PHARMACY TO DOSE VANCO    Time Spent on this Encounter   I, Dale Blackwood MD, personally saw the patient today and spent greater than 30 minutes discharging this patient.    Discharge Orders      Reason for your hospital stay    You were admitted to the hospital for fever, neutropenia, and urinary tract infection     Activity    Your activity upon discharge: activity as tolerated     Follow Up    Follow up with your oncologist on Monday 4/14 for a lab check.     Diet    Follow this diet upon discharge: Current Diet:Orders Placed This Encounter      Combination Diet Regular Diet Adult     Discharge Medications   Discharge Medication List as of 4/11/2025 11:14 AM        START taking these medications    Details   levofloxacin (LEVAQUIN) 500 MG tablet Take 1 tablet (500 mg) by mouth daily for 8 days., Disp-8 tablet, R-0, E-Prescribe           CONTINUE these  medications which have NOT CHANGED    Details   acetaminophen (TYLENOL) 650 MG CR tablet Take 1,300 mg by mouth 3 times daily as needed for mild pain or fever., Historical      BIOTIN PO Take 2 chew tab by mouth every evening. OTC 10,000 mcg per serving, Historical      buPROPion (WELLBUTRIN XL) 300 MG 24 hr tablet Take 1 tablet (300 mg) by mouth every morning., Disp-90 tablet, R-1, E-Prescribe      calcium carbonate-vitamin D (CALTRATE) 600-10 MG-MCG per tablet Take 1 tablet by mouth 2 times daily., Historical      diclofenac (VOLTAREN) 1 % topical gel Apply 2 g topically 2 times daily as needed for moderate pain., Historical      doxazosin (CARDURA) 1 MG tablet Take 1 mg by mouth at bedtime., Historical      famotidine (PEPCID) 20 MG tablet Take 20 mg by mouth 2 times daily as needed., Historical      Lidocaine (LIDOCARE) 4 % Patch Place 1 patch onto the skin daily as needed for moderate pain. To prevent lidocaine toxicity, patient should be patch free for 12 hrs daily.Historical      LORazepam (ATIVAN) 0.5 MG tablet Take 1 tablet (0.5 mg) by mouth as needed for anxiety, Disp-15 tablet, R-0, E-Prescribe      minoxidil (ROGAINE) 2 % external solution Apply topically 2 times daily.Historical      oxymetazoline (AFRIN) 0.05 % nasal spray Spray 2 sprays into both nostrils daily as needed., Historical      polyethylene glycol (MIRALAX) 17 GM/Dose powder Take 1 Capful by mouth daily., Historical      rosuvastatin (CRESTOR) 20 MG tablet Take 1 tablet (20 mg) by mouth daily, Disp-90 tablet, R-3, E-Prescribe           STOP taking these medications       amLODIPine (NORVASC) 10 MG tablet Comments:   Reason for Stopping:         apixaban ANTICOAGULANT (ELIQUIS) 5 MG tablet Comments:   Reason for Stopping:         enoxaparin ANTICOAGULANT (LOVENOX) 150 MG/ML syringe Comments:   Reason for Stopping:         enoxaparin ANTICOAGULANT (LOVENOX) 80 MG/0.8ML syringe Comments:   Reason for Stopping:         metoprolol tartrate  (LOPRESSOR) 50 MG tablet Comments:   Reason for Stopping:             Allergies   Allergies   Allergen Reactions    Atorvastatin Muscle Pain (Myalgia)     PN: muscle aches      Muscle pain      Muscle pain PN: muscle aches    Simvastatin     Sulfa Antibiotics      Data   Most Recent 3 CBC's:  Recent Labs   Lab Test 04/11/25  0630 04/10/25  0554 04/09/25  0758   WBC 0.7* 0.5* 0.6*   HGB 7.6* 7.9* 7.7*   MCV 94 93 94   PLT 20* 27* 36*      Most Recent 3 BMP's:  Recent Labs   Lab Test 04/10/25  0554 04/09/25  0758 04/08/25  0547 04/07/25  0548 04/06/25 2129 03/11/25 0854 01/02/25  1333   NA  --   --   --  132* 132*  --  139   POTASSIUM  --   --   --  4.5 4.3  --  4.7   CHLORIDE  --   --   --  98 97*  --  103   CO2  --   --   --  23 21*  --  25   BUN  --   --   --  21.4 22.0  --  28.2*   CR 0.74 0.78 0.83 0.95 0.96*   < > 1.29*   ANIONGAP  --   --   --  11 14  --  11   RADHA  --   --   --  9.0 8.9  --  9.6   GLC  --   --   --  146* 156*  --  92    < > = values in this interval not displayed.     Most Recent 2 LFT's:  Recent Labs   Lab Test 04/07/25 0548 04/06/25 2129   AST 16 19   ALT 15 15   ALKPHOS 73 76   BILITOTAL 0.4 0.4     Most Recent INR's and Anticoagulation Dosing History:  Anticoagulation Dose History          Latest Ref Rng & Units 9/4/2020 8/23/2021   Recent Dosing and Labs   INR 0.85 - 1.15 1.13  0.94        Effective 7/11/2021, the reference range for this assay has changed.     Most Recent 3 Troponin's:No lab results found.  Most Recent Cholesterol Panel:  Recent Labs   Lab Test 05/01/24  1634   CHOL 151   LDL 65   HDL 45*   TRIG 204*     Most Recent 6 Bacteria Isolates From Any Culture (See EPIC Reports for Culture Details):  Recent Labs   Lab Test 09/10/20  1450 09/08/20  1615 09/08/20  0420 09/06/20  2353 09/06/20  2315   CULT On day 5, isolated in broth only:  Anaerobic gram negative rods  See anaerobic report for identification  *  Light growth  Bacteroides thetaiotaomicron  Susceptibility  testing not routinely done  * No growth Cultured on the 1st day of incubation:  Bacteroides thetaiotaomicron  *  Critical Value/Significant Value, preliminary result only, called to and read back by  Caitlin Hatch RN at 1254 9.9.20 KZ    Susceptibility testing done on previous specimen Cultured on the 1st day of incubation:  Bacteroides thetaiotaomicron  Susceptibility testing done on previous specimen  *  Critical Value/Significant Value, preliminary result only, called to and read back by  Nela Catherine RN @ 0730 9.8.20 JE/LM   Cultured on the 1st day of incubation:  Bacteroides thetaiotaomicron  *  Critical Value/Significant Value, preliminary result only, called to and read back by  Dante Coffey RN at 0346 9.8.20. amd    (Note)  NEGATIVE for the following organisms and resistance markers:  Acinetobacter sp., Citrobacter sp., Enterobacter sp., Proteus sp., E.  coli, K. pneumoniae/oxytoca, P. aeruginosa, CTX-M, KPC, NDM, VIM, IMP  and OXA by [employer] multiplex nucleic acid test. Final identification  and antimicrobial susceptibility testing will be verified by standard  methods.    Critical Value/Significant Value called to and read back by  Clara Kwon RN 9/8/20 @ 0557 TF       Most Recent TSH, T4 and A1c Labs:  Recent Labs   Lab Test 12/19/24  1144   A1C 5.8*

## 2025-04-11 NOTE — PROGRESS NOTES
"SPIRITUAL HEALTH SERVICES - Progress Note   [facility] MS 5     Referral Source: Assess pt's emotional/spiritual resources and needs per her length of stay.     Pt Julia reported that she came into the hospital due to a \"low blood count\".  Julia was attended by her  \"Don\" and she named him, her 3 children, and her 8 grandchildren all whom live in the area as a part of her support network.  Julia enjoys spending time with her friends and her book club.  Julia shared that she is spiritual and would like to find a  someday.  Julia is not a member of a [place] community.     Plan: Informed pt how she can request further  support.  This author and other chaplains remain available per pt/family request.     VIKASH Lindsey   Intern    [facility] available 24/7 for emergent requests/referrals, either by paging the on-call  or by entering an ASAP/STAT consult in [facility], which will also page the on-call .         "

## 2025-04-11 NOTE — CONSULTS
"CLINICAL NUTRITION SERVICES - ASSESSMENT NOTE    Registered Dietitian Interventions:  ***       REASON FOR ASSESSMENT  Length of stay.    PMH: Metastatic ovarian cancer, GERD, HTN.    Admit 2/2: Pancytopenia, neutropenic fever.    INFORMATION OBTAINED  {RDNSubjectiveinformation:933135}    NUTRITION HISTORY  - Diet at home: ***  - Usual intakes: ***  - Barriers to PO intakes: ***  - Use of oral supplements: ***  - Chewing/swallowing issues: ***  - Meal preparation/grocery shopping: ***  - Allergies: ***    CURRENT NUTRITION ORDERS  Diet: Regular  Snacks/Supplements:  None       CURRENT INTAKE/TOLERANCE  - 2 flowsheet recordings, 50% and 100%.    - Only ordering meals 1x/day in the AM.  ***    LABS: Reviewed    MEDICATIONS: Reviewed  - Scheduled bowel regimen     SYSTEM AND PHYSICAL FINDINGS:  - No recorded BM.   - No documentation of PI.    ANTHROPOMETRICS  Height: 157.5 cm (5' 2\")  Admission Weight: 73.8 kg (162 lb 11.2 oz) (04/06/25 2058)   Most Recent Weight: 73.8 kg (162 lb 11.2 oz) (04/06/25 2058)  BMI: Body mass index is 29.76 kg/m .   Weight History:   Wt Readings from Last 10 Encounters:   04/06/25 73.8 kg (162 lb 11.2 oz)   12/19/24 75.1 kg (165 lb 9.6 oz)   11/12/24 75.8 kg (167 lb)   11/04/24 75.8 kg (167 lb)   10/29/24 75.8 kg (167 lb)   09/19/24 75.8 kg (167 lb)   08/21/24 75.4 kg (166 lb 3.2 oz)   05/29/24 71.7 kg (158 lb)   05/01/24 72 kg (158 lb 12.8 oz)   03/14/24 73.2 kg (161 lb 6.4 oz)     - No edema documented.   - ***3%    ASSESSED NUTRITION NEEDS  Dosing Weight: 74 kg, based on admission wt (method not recorded)  Estimated Energy Needs: >/=1480 kcals/day (>/=20 kcals/kg)  Justification: Minimum maintenance  Estimated Protein Needs: >/=89 grams protein/day (>/=1.2 grams of pro/kg)  Justification: Preservation of lean body mass, {RDNEstimatedproteinneedsjustification:860971}  Estimated Fluid Needs: 1 mL/kcal or per provider    MALNUTRITION  % Intake: {RDNMalnutrition%intake:092109}  % Weight Loss: " "{RDNMalnutrition%weightloss:226907}  Subcutaneous Fat Loss: {RDNMalnutritionsubcutaneousfatloss:763645}  Muscle Loss: {RDNMalnutritionmuscleloss:342932}  Fluid Accumulation/Edema: {RDNMalnutritionfluidaccumulationedema:656721}  Malnutrition Diagnosis: {RDNMalnutritiondiagnosis:690673}  Malnutrition Present on Admission: {RDNMalnutritionpresentonadmit:806609}    NUTRITION DIAGNOSIS  {RDNNutritiondiagnosis:066185} related to *** as evidenced by ***    Predicted inadequate nutrient intake (energy/protein)related to ***    INTERVENTIONS  {RDNInterventions:232191}    Collaboration by nutrition professional with other providers: Discussed POC w/ team during rounds.  Checked-in w/ RN on unit. ***    GOALS  {RDNGoals:552100}     MONITORING/EVALUATION  Progress toward goals will be monitored and evaluated per policy.      Natacha Quevedo RDN, , LD  Clinical Dietitian  [place] Message Group: \"Dietitian [[place]]\"  Office: [phone]  Pagers:  3rd floor/ICU: [phone]  All other floors: [phone]  Weekend/holiday: [phone]    "

## 2025-04-11 NOTE — PLAN OF CARE
Updated MD regarding L large bruise with partial hardness on palpitation. Utilizing manual bps cuffs

## 2025-04-11 NOTE — PROGRESS NOTES
Discharge Note    Patient discharged to home via private vehicle  accompanied by significant other .  IV and Port : Discontinued and Heparinized and De-accessed   Prescriptions filled and given to patient/family.   Belongings reviewed and sent with patient and family.   Home medications returned to patient: NA  Equipment sent with: patient, N/A.   patient and family verbalizes understanding of discharge instructions. AVS given to patient.  Additional education completed?  Antibiotic.

## 2025-04-12 LAB
BACTERIA BLD CULT: NO GROWTH
BACTERIA BLD CULT: NO GROWTH

## 2025-04-13 ENCOUNTER — PATIENT OUTREACH (OUTPATIENT)
Dept: CARE COORDINATION | Facility: CLINIC | Age: 75
End: 2025-04-13
Payer: COMMERCIAL

## 2025-04-13 NOTE — PROGRESS NOTES
"Clinic Care Coordination Contact  Transitions of Care Outreach  Chief Complaint   Patient presents with    Clinic Care Coordination - Post [facility]       Most Recent Admission Date: 4/6/2025   Most Recent Admission Diagnosis: Dyspnea on exertion - R06.09  Thrombocytopenia - D69.6  Neutropenic fever - D70.9, R50.81     Most Recent Discharge Date: 4/11/2025   Most Recent Discharge Diagnosis: Thrombocytopenia - D69.6  Neutropenic fever - D70.9, R50.81  Dyspnea on exertion - R06.09     Transitions of Care Assessment    Discharge Assessment  How are you doing now that you are home?: \" I'm doing okay \"  How are your symptoms? (Red Flag symptoms escalate to triage hotline per guidelines): Improved  Do you know how to contact your clinic care team if you have future questions or changes to your health status? : Yes  Does the patient have their discharge instructions? : Yes  Does the patient have questions regarding their discharge instructions? : No  Were you started on any new medications or were there changes to any of your previous medications? : Yes  Does the patient have all of their medications?: Yes  Do you have questions regarding any of your medications? : No  Do you have all of your needed medical supplies or equipment (DME)?  (i.e. oxygen tank, CPAP, cane, etc.): Yes    Post-op ([facility] CTA Only)  If the patient had a surgery or procedure, do they have any questions for a nurse?: No         [facility] Explained and offered Care Coordination support to eligible patients: Yes    Patient accepted? No    Follow up Plan     Discharge Follow-Up  Discharge follow up appointment scheduled in alignment with recommended follow up timeframe or Transitions of Risk Category? (Low = within 30 days; Moderate= within 14 days; High= within 7 days): Yes  Discharge Follow Up Appointment Date: 05/05/25  Discharge Follow Up Appointment Scheduled with?: Primary Care Provider    Future Appointments   Date Time Provider Department Center "   4/29/2025 10:40 AM [facility]   5/5/2025 10:30 AM Johanny Diaz MD [facility] CR   6/4/2025  3:00 PM Johanny Diaz MD [facility] CR   6/5/2025  1:00 PM Magui Us, [facility] CR       Outpatient Plan as outlined on AVS reviewed with patient.    For any urgent concerns, please contact our 24 hour nurse triage line: [phone] ([phone])       Linda Rivers MA

## 2025-04-17 LAB — CANDIDA AURIS CONFIRMATION PCR: NEGATIVE

## 2025-04-24 ENCOUNTER — TRANSFERRED RECORDS (OUTPATIENT)
Dept: HEALTH INFORMATION MANAGEMENT | Facility: CLINIC | Age: 75
End: 2025-04-24
Payer: COMMERCIAL

## 2025-04-28 ENCOUNTER — TELEPHONE (OUTPATIENT)
Dept: MEDSURG UNIT | Facility: CLINIC | Age: 75
End: 2025-04-28
Payer: COMMERCIAL

## 2025-04-29 ENCOUNTER — HOSPITAL ENCOUNTER (OUTPATIENT)
Facility: CLINIC | Age: 75
Discharge: HOME | End: 2025-04-29
Admitting: OBSTETRICS & GYNECOLOGY
Payer: COMMERCIAL

## 2025-04-29 ENCOUNTER — TRANSFERRED RECORDS (OUTPATIENT)
Dept: HEALTH INFORMATION MANAGEMENT | Facility: CLINIC | Age: 75
End: 2025-04-29

## 2025-04-29 ENCOUNTER — HOSPITAL ENCOUNTER (OUTPATIENT)
Dept: CT IMAGING | Facility: CLINIC | Age: 75
Discharge: HOME | End: 2025-04-29
Attending: OBSTETRICS & GYNECOLOGY
Payer: COMMERCIAL

## 2025-04-29 DIAGNOSIS — Z00.6 CLINICAL TRIAL PARTICIPANT: ICD-10-CM

## 2025-04-29 DIAGNOSIS — C56.1 OVARIAN CANCER ON RIGHT (H): ICD-10-CM

## 2025-04-29 LAB
ABO + RH BLD: NORMAL
BLD GP AB SCN SERPL QL: NEGATIVE
SPECIMEN EXP DATE BLD: NORMAL

## 2025-04-29 PROCEDURE — 250N000011 HC RX IP 250 OP 636: Performed by: OBSTETRICS & GYNECOLOGY

## 2025-04-29 PROCEDURE — 999N000154 HC STATISTIC RADIOLOGY XRAY, US, CT, MAR, NM

## 2025-04-29 PROCEDURE — 74177 CT ABD & PELVIS W/CONTRAST: CPT

## 2025-04-29 PROCEDURE — 250N000009 HC RX 250: Performed by: OBSTETRICS & GYNECOLOGY

## 2025-04-29 PROCEDURE — 250N000011 HC RX IP 250 OP 636: Performed by: PHYSICIAN ASSISTANT

## 2025-04-29 RX ORDER — IOPAMIDOL 755 MG/ML
79 INJECTION, SOLUTION INTRAVASCULAR ONCE
Status: COMPLETED | OUTPATIENT
Start: 2025-04-29 | End: 2025-04-29

## 2025-04-29 RX ORDER — HEPARIN SODIUM (PORCINE) LOCK FLUSH IV SOLN 100 UNIT/ML 100 UNIT/ML
5-10 SOLUTION INTRAVENOUS
Status: DISCONTINUED | OUTPATIENT
Start: 2025-04-29 | End: 2025-04-29 | Stop reason: HOSPADM

## 2025-04-29 RX ORDER — HEPARIN SODIUM,PORCINE 10 UNIT/ML
5-10 VIAL (ML) INTRAVENOUS EVERY 24 HOURS
Status: DISCONTINUED | OUTPATIENT
Start: 2025-04-29 | End: 2025-04-29 | Stop reason: HOSPADM

## 2025-04-29 RX ORDER — HEPARIN SODIUM,PORCINE 10 UNIT/ML
5-10 VIAL (ML) INTRAVENOUS
Status: DISCONTINUED | OUTPATIENT
Start: 2025-04-29 | End: 2025-04-29 | Stop reason: HOSPADM

## 2025-04-29 RX ADMIN — IOPAMIDOL 79 ML: 755 INJECTION, SOLUTION INTRAVENOUS at 10:59

## 2025-04-29 RX ADMIN — SODIUM CHLORIDE 63 ML: 9 INJECTION, SOLUTION INTRAVENOUS at 10:59

## 2025-04-29 RX ADMIN — Medication 5 ML: at 11:07

## 2025-04-29 ASSESSMENT — ACTIVITIES OF DAILY LIVING (ADL)
ADLS_ACUITY_SCORE: 58
ADLS_ACUITY_SCORE: 58

## 2025-04-29 NOTE — PROGRESS NOTES
Pt here in CT dept for scan.  Port accessed without complication.  Immediate blood return noted.  Pt requested port remain accessed for next appt in clinic for blood work.  Will plan to heparinize port and leave accessed per her request.

## 2025-04-30 ENCOUNTER — LAB (OUTPATIENT)
Dept: LAB | Facility: CLINIC | Age: 75
End: 2025-04-30
Payer: COMMERCIAL

## 2025-04-30 DIAGNOSIS — C56.1 MALIGNANT NEOPLASM OF RIGHT OVARY (H): ICD-10-CM

## 2025-04-30 DIAGNOSIS — C56.1 MALIGNANT NEOPLASM OF RIGHT OVARY (H): Primary | ICD-10-CM

## 2025-04-30 PROCEDURE — 36415 COLL VENOUS BLD VENIPUNCTURE: CPT

## 2025-04-30 PROCEDURE — 86900 BLOOD TYPING SEROLOGIC ABO: CPT

## 2025-04-30 PROCEDURE — 86923 COMPATIBILITY TEST ELECTRIC: CPT

## 2025-05-01 ENCOUNTER — HOSPITAL ENCOUNTER (OUTPATIENT)
Facility: CLINIC | Age: 75
Discharge: HOME | End: 2025-05-01
Payer: COMMERCIAL

## 2025-05-01 VITALS
TEMPERATURE: 98.1 F | SYSTOLIC BLOOD PRESSURE: 128 MMHG | HEIGHT: 62 IN | WEIGHT: 150 LBS | DIASTOLIC BLOOD PRESSURE: 77 MMHG | OXYGEN SATURATION: 96 % | BODY MASS INDEX: 27.6 KG/M2 | HEART RATE: 67 BPM | RESPIRATION RATE: 16 BRPM

## 2025-05-01 LAB
BLD PROD TYP BPU: NORMAL
BLOOD COMPONENT TYPE: NORMAL
CODING SYSTEM: NORMAL
CROSSMATCH: NORMAL
ISSUE DATE AND TIME: NORMAL
UNIT ABO/RH: NORMAL
UNIT NUMBER: NORMAL
UNIT STATUS: NORMAL
UNIT TYPE ISBT: 9500

## 2025-05-01 PROCEDURE — 999N000087 HC STATISTIC IV OP-OVERFLOW

## 2025-05-01 PROCEDURE — 258N000003 HC RX IP 258 OP 636

## 2025-05-01 PROCEDURE — P9016 RBC LEUKOCYTES REDUCED: HCPCS

## 2025-05-01 PROCEDURE — 36430 TRANSFUSION BLD/BLD COMPNT: CPT

## 2025-05-01 RX ORDER — HEPARIN SODIUM,PORCINE 10 UNIT/ML
5-10 VIAL (ML) INTRAVENOUS EVERY 24 HOURS
Status: DISCONTINUED | OUTPATIENT
Start: 2025-05-01 | End: 2025-05-01 | Stop reason: HOSPADM

## 2025-05-01 RX ORDER — HEPARIN SODIUM (PORCINE) LOCK FLUSH IV SOLN 100 UNIT/ML 100 UNIT/ML
5-10 SOLUTION INTRAVENOUS
Status: DISCONTINUED | OUTPATIENT
Start: 2025-05-01 | End: 2025-05-01 | Stop reason: HOSPADM

## 2025-05-01 RX ORDER — HEPARIN SODIUM,PORCINE 10 UNIT/ML
5-10 VIAL (ML) INTRAVENOUS
Status: DISCONTINUED | OUTPATIENT
Start: 2025-05-01 | End: 2025-05-01 | Stop reason: HOSPADM

## 2025-05-01 RX ADMIN — SODIUM CHLORIDE 10 ML: 0.9 INJECTION, SOLUTION INTRAVENOUS at 11:36

## 2025-05-01 RX ADMIN — SODIUM CHLORIDE 50 ML: 0.9 INJECTION, SOLUTION INTRAVENOUS at 13:30

## 2025-05-01 ASSESSMENT — ACTIVITIES OF DAILY LIVING (ADL)
ADLS_ACUITY_SCORE: 58

## 2025-05-01 NOTE — PROGRESS NOTES
1115 Pt A/O. Family at bedside. Pt resting quietly.    1150 Blood infusing w/o difficulty. No s/s of reaction.     1200 Report to Madina GOMEZ RN, who will assume cares.    1310 Resumed cares of pt    1333 Blood transfusion finished. No signs or symptoms of reaction. VSS.    1335 Discharge instructions given to pt &  with verbal understanding received. Pt has blood draw appointment with [facility] following this appointment. [facility] called to confirm appointment, and chest port saline locked and left accessed.    1337 Pt discharged per ambulatory to private vehicle. All personal belongings taken with pt.

## 2025-05-13 LAB
ABO + RH BLD: NORMAL
BLD GP AB SCN SERPL QL: NEGATIVE
SPECIMEN EXP DATE BLD: NORMAL

## 2025-05-14 ENCOUNTER — MEDICAL CORRESPONDENCE (OUTPATIENT)
Dept: HEALTH INFORMATION MANAGEMENT | Facility: CLINIC | Age: 75
End: 2025-05-14

## 2025-05-14 ENCOUNTER — TRANSFERRED RECORDS (OUTPATIENT)
Dept: HEALTH INFORMATION MANAGEMENT | Facility: CLINIC | Age: 75
End: 2025-05-14

## 2025-05-14 ENCOUNTER — LAB (OUTPATIENT)
Dept: LAB | Facility: CLINIC | Age: 75
End: 2025-05-14
Payer: COMMERCIAL

## 2025-05-14 DIAGNOSIS — D64.9 ANEMIA, UNSPECIFIED: ICD-10-CM

## 2025-05-14 DIAGNOSIS — C56.1 OVARIAN CANCER ON RIGHT (H): ICD-10-CM

## 2025-05-14 PROCEDURE — 86900 BLOOD TYPING SEROLOGIC ABO: CPT

## 2025-05-14 PROCEDURE — 36415 COLL VENOUS BLD VENIPUNCTURE: CPT

## 2025-05-15 ENCOUNTER — HOSPITAL ENCOUNTER (OUTPATIENT)
Facility: CLINIC | Age: 75
Discharge: HOME | End: 2025-05-15
Admitting: NURSE PRACTITIONER
Payer: COMMERCIAL

## 2025-05-15 VITALS
RESPIRATION RATE: 16 BRPM | BODY MASS INDEX: 26.58 KG/M2 | DIASTOLIC BLOOD PRESSURE: 48 MMHG | SYSTOLIC BLOOD PRESSURE: 155 MMHG | OXYGEN SATURATION: 98 % | TEMPERATURE: 97.8 F | HEART RATE: 64 BPM | WEIGHT: 150 LBS | HEIGHT: 63 IN

## 2025-05-15 PROCEDURE — 999N000087 HC STATISTIC IV OP-OVERFLOW

## 2025-05-15 PROCEDURE — P9016 RBC LEUKOCYTES REDUCED: HCPCS | Performed by: NURSE PRACTITIONER

## 2025-05-15 PROCEDURE — 250N000011 HC RX IP 250 OP 636: Performed by: NURSE PRACTITIONER

## 2025-05-15 PROCEDURE — 36430 TRANSFUSION BLD/BLD COMPNT: CPT

## 2025-05-15 PROCEDURE — 86923 COMPATIBILITY TEST ELECTRIC: CPT | Performed by: NURSE PRACTITIONER

## 2025-05-15 RX ORDER — HEPARIN SODIUM (PORCINE) LOCK FLUSH IV SOLN 100 UNIT/ML 100 UNIT/ML
5-10 SOLUTION INTRAVENOUS
Status: DISCONTINUED | OUTPATIENT
Start: 2025-05-15 | End: 2025-05-15 | Stop reason: HOSPADM

## 2025-05-15 RX ORDER — HEPARIN SODIUM,PORCINE 10 UNIT/ML
5-10 VIAL (ML) INTRAVENOUS EVERY 24 HOURS
Status: DISCONTINUED | OUTPATIENT
Start: 2025-05-15 | End: 2025-05-15 | Stop reason: HOSPADM

## 2025-05-15 RX ORDER — HEPARIN SODIUM,PORCINE 10 UNIT/ML
5-10 VIAL (ML) INTRAVENOUS
Status: DISCONTINUED | OUTPATIENT
Start: 2025-05-15 | End: 2025-05-15 | Stop reason: HOSPADM

## 2025-05-15 RX ADMIN — Medication 5 ML: at 13:35

## 2025-05-15 ASSESSMENT — ACTIVITIES OF DAILY LIVING (ADL)
ADLS_ACUITY_SCORE: 58

## 2025-05-15 NOTE — PROGRESS NOTES
Care Suites Admission Nursing Note    Patient Information  Name: Julia Andre  Age: 74 year old  Reason for admission: Blood transfusion  Care Suites arrival time: 0850    Visitor Information  Name: Tin     Patient Admission/Assessment   Pre-procedure assessment complete: Yes  If abnormal assessment/labs, provider notified: N/A  NPO: N/A  Medications held per instructions/orders: N/A  Consent: obtained on May 14, 2025    Patient oriented to room: Yes  Education/questions answered: Yes  Plan/other: Proceed as planned    Discharge Planning  Discharge name/phone number: Elenita (daughter) [phone]  Overnight post sedation caregiver: LEAH  Discharge location: home    Sandy Richey RN

## 2025-05-15 NOTE — PROGRESS NOTES
Care Suites Procedure Nursing Note    Patient Information  Name: Julia Andre  Age: 74 year old    Procedure  Procedure: Blood transfusion   Procedure start time: 0939  Procedure complete time: 1345  Concerns/abnormal assessment: NA  If abnormal assessment, provider notified: N/A  Plan/Other: Continue monitoring pt closely during transfusion.  Pt tolerated well.  VS stable.      Care Suites Discharge Nursing Note    Patient Information  Name: Julia Andre  Age: 74 year old    Discharge Education:  Discharge instructions reviewed: Yes  Additional education/resources provided: NA  Patient/patient representative verbalizes understanding: Yes  Patient discharging on new medications: No  Medication education completed: N/A    Discharge Plans:   Discharge location: home  Discharge ride contacted: Yes  Approximate discharge time: 1345    Discharge Criteria:  Discharge criteria met and vital signs stable: Yes    Patient Belongs:  Patient belongings returned to patient: Yes    WILLIAM Blackwell RN

## 2025-05-15 NOTE — DISCHARGE INSTRUCTIONS
Blood Transfusion Discharge Instructions     After you go home:    After a blood transfusion (red blood cells, platelets, plasma, cryo or granulocytes), you will need to watch for signs of a reaction for the next 48 hours.    Medicines:    You may resume all your medications            Call the provider who ordered your blood transfusion or call 911 or go to the Emergency Room if you have any signs of a reaction:    Shaking or chills  Fever - temperature above 100.0 F  Headache  Nausea  Hives  Itching  Swelling of the face or feeling flushed  Ongoing dry cough (nothing is coughed up)  Trouble breathing or wheezing    Some signs of a reaction won't show up for a few days or up to 4 weeks. These may include:    Fatigue  Dizziness  Pink or red urine    If you have questions call your provider who ordered the blood transfusion.

## 2025-05-21 ENCOUNTER — TRANSFERRED RECORDS (OUTPATIENT)
Dept: HEALTH INFORMATION MANAGEMENT | Facility: CLINIC | Age: 75
End: 2025-05-21
Payer: COMMERCIAL

## 2025-05-22 ENCOUNTER — TRANSFERRED RECORDS (OUTPATIENT)
Dept: HEALTH INFORMATION MANAGEMENT | Facility: CLINIC | Age: 75
End: 2025-05-22
Payer: COMMERCIAL

## 2025-06-02 NOTE — PROGRESS NOTES
Medication Therapy Management (MTM) Encounter    ASSESSMENT:                            Medication Adherence/Access: No issues identified.     Hypertension/CAD/Hx NSTEMI/CKD  Patient well below blood pressure goal of < 130/80mmHg in clinic, but blood pressure at goal at home. Continue to monitor for low blood pressure and SE at home.      Constipation   Recommend patient try taking Miralax regularly once daily to help get into routine and avoid urgency, which may be more likely with senna use.      Mental Health - Anxiety, Depression, insomnia   Recommend consider try doxepin for sleep maintenance, as other strategies have not been successful. There is risk for anticholinergic effects, especially with patient age, so would recommend low dose and will consult for provider's opinion. Reviewed appropriate use, benefits, risks and monitoring at length. May also consider CBT-I for future visit.      Supplements/Osteopenia/Hair Loss  Recommend continue with current calcium dose 1200mg/day, but get formulation without vitamin D as suggested by nephrology. Can take 10-15 mcg vitamin d tablet by itself, though 10 mcg more readily available.      Pain  Recommend patient switch to lower dose diclofenac so she can take up to four times daily for better pain coverage.     PLAN:                              Hypertension:   Please let us know if you start to regularly get blood pressure <100/60 mmHg at home, or if you feel dizzy/lightheaded out of the ordinary.     Insomnia:   Will ask Dr. Diaz about starting doxepin 3 mg once daily 30 min before bedtime for sleep.     Pain:   Try diclofenac (Voltaren) 1% gel and can use four times daily as needed     Supplements:   Change calcium supplement to calcium carbonate 600 mg twice daily.  Add vitamin D3 15 mcg (600 units) once daily. It may be easier to find Vitamin D3 10 mcg (400 units) to take once daily.     Constipation:   Try taking Miralax once daily scheduled to regulate bowel  movements.       Follow-up: Return in about 7 weeks (around 7/23/2025) for Medication Therapy Management.    SUBJECTIVE/OBJECTIVE:                          Julia Andre is a 74 year old female seen for a follow-up visit.       Reason for visit: Pain/sleep    Allergies/ADRs: Reviewed in chart  Past Medical History: Reviewed in chart  Tobacco: She reports that she quit smoking about 31 years ago. Her smoking use included cigarettes. She started smoking about 58 years ago. She has a 67.5 pack-year smoking history. She has never used smokeless tobacco.  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  Per patient, misses medication 0 time(s) per week.   The patient fills medications at [facility]: NO, fills medications at [facility].      Hypertension   /CAD/Hx NSTEMI/CKD  Metoprolol tartrate 50 mg twice daily   Doxazosin 2 mg daily in the evening     Patient reports no current medication side effects. Denies dizziness, though does report some fatigue (not new).  Patient self monitors blood pressure.  Home BP monitoring in past week:   128/76  144/64  120/60  143/56  135/59  106/69  Follows with nephrology LISA Recinos at [facility].   History: aspirin (stopped when started enoxaparin), lisinopril (KRISSY)     Constipation   Miralax 1 capful daily as needed   Senna-S 2 tablets daily as needed   Docusate 50 mg twice daily as needed     Patient reports BM every 3-4 days, which has been typical for her her whole life. Bms are usually soft and do not require straining. Since bowel obstruction, has been having a tough time finding a good routine to have a BM regularly without causing urgency with bowel movements. Wondering what to take and when.   Patient reports no current medication side effects.    Hospitalized with bowel obstruction January 2025.      Mental Health   Anxiety and Depression, Insomnia   Bupropion  mg daily   Lorazepam 0.5 mg as needed - only takes for imaging  procedures    No side effects.   Recently tried decreasing bupropion but mental health got noticeably worse, so she is back on 300 mg and feeling good. Her health has been getting better in general, which is good for her mental health. Her cancer treatment has been going well.   She is still struggling with sleep, her main problem being staying asleep. She wakes up probably 5x/night and has not found anything that helps.   Previously tried: sertraline (very effective, negative sexual side effects), melatonin (not effective), diphenhydramine (not effective), trazodone (not effective)     Supplements   /Osteopenia/Hair loss  Biotin daily for hair - patient prefers to continue  Calcium 600 mg / 10 mcg twice daily     No reported issues at this time.   Per patient and nephrology note 5/16/25, they recommend max dose 600 units vitamin D daily due to previous high vit D level (106 ng/dL at one point). Recommended to take plain calcium tablet and then additional vitamin D.   DEXA history 06/2023 showing osteopenia and FRAX 11% major and 2.3% hip fracture.   Calcium in diet: minimal      Pain  Acetaminophen 1300 mg twice daily + 1300 mg additional as needed (infrequent)    Lidocaine (Salonpas) patches as needed - occasional, finds somewhat helpful   Voltaren 2.32% twice daily gel as needed - occasional, finds somewhat helpful - getting this higher strength from [facility]     Side effects: none  Has chronic pain in shoulder that has been bothering her recently. With current chemo regimen, can't get cortisone shots. Has been using higher strength diclofenac from [facility] which is twice daily only dosing, and feels like it wears off before 12 hr.   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   Following sports medicine.   Previously taking: gabapentin (did not find effective)       Today's Vitals: BP (!) 86/54   Pulse 63   SpO2 97%   ----------------    I spent 40 minutes with this patient today. All changes were made via  collaborative practice agreement with Johanny Diaz MD.     A summary of these recommendations was given to the patient.    Jayne Us PharmD   Medication Therapy Management   Pharmacy Resident    Julia Andre was seen independently by Dr. Jayne Us.  I have reviewed and agree with the resident note and plan of care.      Natacha Rosenberg, PharmD, [facility]  Medication Therapy Management Provider, [facility]  [phone]     Medication Therapy Recommendations  Constipation   1 Current Medication: polyethylene glycol (MIRALAX) 17 GM/Dose powder   Current Medication Sig: Take 1 Capful by mouth daily as needed.   Rationale: Dose too low - Dosage too low - Effectiveness   Recommendation: Increase Frequency   Status: Accepted - no CPA Needed   Identified Date: 6/5/2025 Completed Date: 6/5/2025         Insomnia, unspecified type   1 Rationale: Untreated condition - Needs additional medication therapy - Indication   Recommendation: Start Medication - doxepin 3 MG tablet   Status: Contact Provider - Awaiting Response   Identified Date: 6/5/2025         Osteopenia   1 Current Medication: calcium carbonate-vitamin D (CALTRATE) 600-10 MG-MCG per tablet (Discontinued)   Current Medication Sig: Take 1 tablet by mouth 2 times daily.   Rationale: Dose too high - Dosage too high - Safety   Recommendation: Decrease Dose - Vitamin D3 10 MCG (400 UNIT) tablet   Status: Accepted - no CPA Needed   Identified Date: 6/5/2025 Completed Date: 6/5/2025         Pain   1 Rationale: Dose too high - Dosage too high - Safety   Recommendation: Decrease Dose - diclofenac 1 % topical gel - Use lower dose voltaren in order to use more frequently.   Status: Accepted - no CPA Needed   Identified Date: 6/5/2025 Completed Date: 6/5/2025

## 2025-06-04 ENCOUNTER — OFFICE VISIT (OUTPATIENT)
Dept: FAMILY MEDICINE | Facility: CLINIC | Age: 75
End: 2025-06-04
Attending: PHYSICIAN ASSISTANT
Payer: COMMERCIAL

## 2025-06-04 ENCOUNTER — HOSPITAL ENCOUNTER (OUTPATIENT)
Facility: CLINIC | Age: 75
End: 2025-06-04
Payer: COMMERCIAL

## 2025-06-04 VITALS
OXYGEN SATURATION: 97 % | HEART RATE: 76 BPM | DIASTOLIC BLOOD PRESSURE: 69 MMHG | BODY MASS INDEX: 28.84 KG/M2 | WEIGHT: 162.8 LBS | SYSTOLIC BLOOD PRESSURE: 106 MMHG | HEIGHT: 63 IN | RESPIRATION RATE: 16 BRPM | TEMPERATURE: 98.1 F

## 2025-06-04 DIAGNOSIS — N18.31 STAGE 3A CHRONIC KIDNEY DISEASE (H): ICD-10-CM

## 2025-06-04 DIAGNOSIS — M85.88 OSTEOPENIA OF LUMBAR SPINE: ICD-10-CM

## 2025-06-04 DIAGNOSIS — E11.22 TYPE 2 DIABETES MELLITUS WITH CHRONIC KIDNEY DISEASE, WITHOUT LONG-TERM CURRENT USE OF INSULIN, UNSPECIFIED CKD STAGE (H): ICD-10-CM

## 2025-06-04 DIAGNOSIS — Z13.6 SCREENING FOR CARDIOVASCULAR CONDITION: ICD-10-CM

## 2025-06-04 DIAGNOSIS — E78.2 MIXED HYPERLIPIDEMIA: ICD-10-CM

## 2025-06-04 DIAGNOSIS — I25.10 CORONARY ARTERY DISEASE INVOLVING NATIVE HEART WITHOUT ANGINA PECTORIS, UNSPECIFIED VESSEL OR LESION TYPE: ICD-10-CM

## 2025-06-04 DIAGNOSIS — Z00.00 ENCOUNTER FOR MEDICARE ANNUAL WELLNESS EXAM: Primary | ICD-10-CM

## 2025-06-04 DIAGNOSIS — C78.6 MALIGNANT NEOPLASM METASTATIC TO PERITONEUM (H): ICD-10-CM

## 2025-06-04 DIAGNOSIS — Z23 NEED FOR VACCINATION: ICD-10-CM

## 2025-06-04 PROBLEM — M19.90 OSTEOARTHRITIS: Status: ACTIVE | Noted: 2025-06-04

## 2025-06-04 PROBLEM — N28.1 MULTIPLE ACQUIRED CYSTS OF KIDNEY: Status: ACTIVE | Noted: 2024-05-08

## 2025-06-04 PROBLEM — D47.2 MONOCLONAL GAMMOPATHY: Status: ACTIVE | Noted: 2025-06-04

## 2025-06-04 PROBLEM — Z85.828 HISTORY OF SQUAMOUS CELL CARCINOMA OF SKIN: Status: ACTIVE | Noted: 2018-09-12

## 2025-06-04 LAB
CREAT UR-MCNC: 126 MG/DL
MICROALBUMIN UR-MCNC: <12 MG/L
MICROALBUMIN/CREAT UR: NORMAL MG/G{CREAT}

## 2025-06-04 PROCEDURE — G2211 COMPLEX E/M VISIT ADD ON: HCPCS | Performed by: FAMILY MEDICINE

## 2025-06-04 PROCEDURE — 82043 UR ALBUMIN QUANTITATIVE: CPT | Performed by: FAMILY MEDICINE

## 2025-06-04 PROCEDURE — 82570 ASSAY OF URINE CREATININE: CPT | Performed by: FAMILY MEDICINE

## 2025-06-04 PROCEDURE — 99214 OFFICE O/P EST MOD 30 MIN: CPT | Mod: 25 | Performed by: FAMILY MEDICINE

## 2025-06-04 PROCEDURE — 3074F SYST BP LT 130 MM HG: CPT | Performed by: FAMILY MEDICINE

## 2025-06-04 PROCEDURE — G0439 PPPS, SUBSEQ VISIT: HCPCS | Performed by: FAMILY MEDICINE

## 2025-06-04 PROCEDURE — 3078F DIAST BP <80 MM HG: CPT | Performed by: FAMILY MEDICINE

## 2025-06-04 RX ORDER — LISINOPRIL 40 MG/1
1 TABLET ORAL
COMMUNITY
End: 2025-06-04 | Stop reason: ALTCHOICE

## 2025-06-04 RX ORDER — HYDROCORTISONE SODIUM SUCCINATE 100 MG/2ML
100 INJECTION INTRAMUSCULAR; INTRAVENOUS
COMMUNITY
Start: 2025-03-28 | End: 2025-06-04

## 2025-06-04 RX ORDER — AMLODIPINE BESYLATE AND ATORVASTATIN CALCIUM 10; 10 MG/1; MG/1
TABLET, FILM COATED ORAL
COMMUNITY
Start: 2024-12-06 | End: 2025-06-04

## 2025-06-04 RX ORDER — AMOXICILLIN 250 MG
2 CAPSULE ORAL DAILY PRN
COMMUNITY
Start: 2025-02-15

## 2025-06-04 RX ORDER — METOPROLOL TARTRATE 50 MG
50 TABLET ORAL
COMMUNITY

## 2025-06-04 RX ORDER — SIMETHICONE 80 MG
TABLET,CHEWABLE ORAL
COMMUNITY
Start: 2025-02-12 | End: 2025-06-05

## 2025-06-04 RX ORDER — ASPIRIN 81 MG/1
TABLET, COATED ORAL
COMMUNITY
End: 2025-06-04 | Stop reason: ALTCHOICE

## 2025-06-04 RX ORDER — PEGFILGRASTIM-CBQV 6 MG/.6ML
6 INJECTION, SOLUTION SUBCUTANEOUS
COMMUNITY
Start: 2025-06-12

## 2025-06-04 RX ORDER — PANTOPRAZOLE SODIUM 40 MG/1
TABLET, DELAYED RELEASE ORAL
COMMUNITY
End: 2025-06-04

## 2025-06-04 RX ORDER — TRIAMCINOLONE ACETONIDE 1 MG/G
CREAM TOPICAL
COMMUNITY
Start: 2024-08-02 | End: 2025-06-05

## 2025-06-04 RX ORDER — ROSUVASTATIN CALCIUM 20 MG/1
20 TABLET, COATED ORAL DAILY
Qty: 90 TABLET | Refills: 3 | Status: SHIPPED | OUTPATIENT
Start: 2025-06-04

## 2025-06-04 RX ORDER — APIXABAN 5 MG/1
5 TABLET, FILM COATED ORAL 2 TIMES DAILY
COMMUNITY
Start: 2025-04-24

## 2025-06-04 SDOH — HEALTH STABILITY: PHYSICAL HEALTH: ON AVERAGE, HOW MANY DAYS PER WEEK DO YOU ENGAGE IN MODERATE TO STRENUOUS EXERCISE (LIKE A BRISK WALK)?: 0 DAYS

## 2025-06-04 ASSESSMENT — PATIENT HEALTH QUESTIONNAIRE - PHQ9
10. IF YOU CHECKED OFF ANY PROBLEMS, HOW DIFFICULT HAVE THESE PROBLEMS MADE IT FOR YOU TO DO YOUR WORK, TAKE CARE OF THINGS AT HOME, OR GET ALONG WITH OTHER PEOPLE: NOT DIFFICULT AT ALL
SUM OF ALL RESPONSES TO PHQ QUESTIONS 1-9: 5
SUM OF ALL RESPONSES TO PHQ QUESTIONS 1-9: 5

## 2025-06-04 ASSESSMENT — SOCIAL DETERMINANTS OF HEALTH (SDOH): HOW OFTEN DO YOU GET TOGETHER WITH FRIENDS OR RELATIVES?: MORE THAN THREE TIMES A WEEK

## 2025-06-04 NOTE — PATIENT INSTRUCTIONS
You are eligible for the following vaccines but they would be covered best if you received them at the pharmacy:    RSV  Tdap (tetanus booster)      Patient Education   Preventive Care Advice   This is general advice given by our system to help you stay healthy. However, your care team may have specific advice just for you. Please talk to your care team about your preventive care needs.  Nutrition  Eat 5 or more servings of fruits and vegetables each day.  Try wheat bread, brown rice and whole grain pasta (instead of white bread, rice, and pasta).  Get enough calcium and vitamin D. Check the label on foods and aim for 100% of the RDA (recommended daily allowance).  Lifestyle  Exercise at least 150 minutes each week  (30 minutes a day, 5 days a week).  Do muscle strengthening activities 2 days a week. These help control your weight and prevent disease.  No smoking.  Wear sunscreen to prevent skin cancer.  Have a dental exam and cleaning every 6 months.  Yearly exams  See your health care team every year to talk about:  Any changes in your health.  Any medicines your care team has prescribed.  Preventive care, family planning, and ways to prevent chronic diseases.  Shots (vaccines)   HPV shots (up to age 26), if you've never had them before.  Hepatitis B shots (up to age 59), if you've never had them before.  COVID-19 shot: Get this shot when it's due.  Flu shot: Get a flu shot every year.  Tetanus shot: Get a tetanus shot every 10 years.  Pneumococcal, hepatitis A, and RSV shots: Ask your care team if you need these based on your risk.  Shingles shot (for age 50 and up)  General health tests  Diabetes screening:  Starting at age 35, Get screened for diabetes at least every 3 years.  If you are younger than age 35, ask your care team if you should be screened for diabetes.  Cholesterol test: At age 39, start having a cholesterol test every 5 years, or more often if advised.  Bone density scan (DEXA): At age 50, ask  your care team if you should have this scan for osteoporosis (brittle bones).  Hepatitis C: Get tested at least once in your life.  STIs (sexually transmitted infections)  Before age 24: Ask your care team if you should be screened for STIs.  After age 24: Get screened for STIs if you're at risk. You are at risk for STIs (including HIV) if:  You are sexually active with more than one person.  You don't use condoms every time.  You or a partner was diagnosed with a sexually transmitted infection.  If you are at risk for HIV, ask about PrEP medicine to prevent HIV.  Get tested for HIV at least once in your life, whether you are at risk for HIV or not.  Cancer screening tests  Cervical cancer screening: If you have a cervix, begin getting regular cervical cancer screening tests starting at age 21.  Breast cancer scan (mammogram): If you've ever had breasts, begin having regular mammograms starting at age 40. This is a scan to check for breast cancer.  Colon cancer screening: It is important to start screening for colon cancer at age 45.  Have a colonoscopy test every 10 years (or more often if you're at risk) Or, ask your provider about stool tests like a FIT test every year or Cologuard test every 3 years.  To learn more about your testing options, visit:   .  For help making a decision, visit:   https://bit.ly/xy05333.  Prostate cancer screening test: If you have a prostate, ask your care team if a prostate cancer screening test (PSA) at age 55 is right for you.  Lung cancer screening: If you are a current or former smoker ages 50 to 80, ask your care team if ongoing lung cancer screenings are right for you.  For informational purposes only. Not to replace the advice of your health care provider. Copyright   2023 [employer]. All rights reserved. Clinically reviewed by the [facility]itions [facility]. [employer] [id number] - REV 01/24.  Learning About Activities of Daily Living  What are activities  of daily living?     Activities of daily living (ADLs) are the basic self-care tasks you do every day. These include eating, bathing, dressing, and moving around.  As you age, and if you have health problems, you may find that it's harder to do some of these tasks. If so, your doctor can suggest ideas that may help.  To measure what kind of help you may need, your doctor will ask how well you are able to do ADLs. Let your doctor know if there are any tasks that you are having trouble doing. This is an important first step to getting help. And when you have the help you need, you can stay as independent as possible.  How will a doctor assess your ADLs?  Asking about ADLs is part of a routine health checkup your doctor will likely do as you age. Your health check might be done in a doctor's office, in your home, or at a hospital. The goal is to find out if you are having any problems that could make it hard to care for yourself or that make it unsafe for you to be on your own.  To measure your ADLs, your doctor will ask how hard it is for you to do routine tasks. Your doctor may also want to know if you have changed the way you do a task because of a health problem. Your doctor may watch how you:  Walk back and forth.  Keep your balance while you stand or walk.  Move from sitting to standing or from a bed to a chair.  Button or unbutton a shirt or sweater.  Remove and put on your shoes.  It's common to feel a little worried or anxious if you find you can't do all the things you used to be able to do. Talking with your doctor about ADLs is a way to make sure you're as safe as possible and able to care for yourself as well as you can. You may want to bring a caregiver, friend, or family member to your checkup. They can help you talk to your doctor.  Follow-up care is a key part of your treatment and safety. Be sure to make and go to all appointments, and call your doctor if you are having problems. It's also a good idea  to know your test results and keep a list of the medicines you take.  Current as of: October 24, 2024  Content Version: 14.4    1584-3482 [employer].   Care instructions adapted under license by your healthcare professional. If you have questions about a medical condition or this instruction, always ask your healthcare professional. [employer] disclaims any warranty or liability for your use of this information.    Preventing Falls: Care Instructions  Injuries and health problems such as trouble walking or poor eyesight can increase your risk of falling. So can some medicines. But there are things you can do to help prevent falls. You can exercise to get stronger. You can also arrange your home to make it safer.    Talk to your doctor about the medicines you take. Ask if any of them increase the risk of falls and whether they can be changed or stopped.   Try to exercise regularly. It can help improve your strength and balance. This can help lower your risk of falling.         Practice fall safety and prevention.   Wear low-heeled shoes that fit well and give your feet good support. Talk to your doctor if you have foot problems that make this hard.  Carry a cellphone or wear a medical alert device that you can use to call for help.  Use stepladders instead of chairs to reach high objects. Don't climb if you're at risk for falls. Ask for help, if needed.  Wear the correct eyeglasses, if you need them.        Make your home safer.   Remove rugs, cords, clutter, and furniture from walkways.  Keep your house well lit. Use night-lights in hallways and bathrooms.  Install and use sturdy handrails on stairways.  Wear nonskid footwear, even inside. Don't walk barefoot or in socks without shoes.        Be safe outside.   Use handrails, curb cuts, and ramps whenever possible.  Keep your hands free by using a shoulder bag or backpack.  Try to walk in well-lit areas. Watch out for uneven ground, changes  "in pavement, and debris.  Be careful in the winter. Walk on the grass or gravel when sidewalks are slippery. Use de-icer on steps and walkways. Add non-slip devices to shoes.    Put grab bars and nonskid mats in your shower or tub and near the toilet. Try to use a shower chair or bath bench when bathing.   Get into a tub or shower by putting in your weaker leg first. Get out with your strong side first. Have a phone or medical alert device in the bathroom with you.   Where can you learn more?  Go to https://www.[facility].net/patiented  Enter G117 in the search box to learn more about \"Preventing Falls: Care Instructions.\"  Current as of: July 31, 2024  Content Version: 14.4    6623-5353 [employer].   Care instructions adapted under license by your healthcare professional. If you have questions about a medical condition or this instruction, always ask your healthcare professional. [employer] disclaims any warranty or liability for your use of this information.    Hearing Loss: Care Instructions  Overview     Hearing loss is a sudden or slow decrease in how well you hear. It can range from slight to profound. Permanent hearing loss can occur with aging. It also can happen when you are exposed long-term to loud noise. Examples include listening to loud music, riding motorcycles, or being around other loud machines.  Hearing loss can affect your work and home life. It can make you feel lonely or depressed. You may feel that you have lost your independence. But hearing aids and other devices can help you hear better and feel connected to others.  Follow-up care is a key part of your treatment and safety. Be sure to make and go to all appointments, and call your doctor if you are having problems. It's also a good idea to know your test results and keep a list of the medicines you take.  How can you care for yourself at home?  Avoid loud noises whenever possible. This helps keep your hearing from " "getting worse.  Always wear hearing protection around loud noises.  Wear a hearing aid as directed.  A professional can help you pick a hearing aid that will work best for you.  You can also get hearing aids over the counter for mild to moderate hearing loss.  Have hearing tests as your doctor suggests. They can show whether your hearing has changed. Your hearing aid may need to be adjusted.  Use other devices as needed. These may include:  Telephone amplifiers and hearing aids that can connect to a television, stereo, radio, or microphone.  Devices that use lights or vibrations. These alert you to the doorbell, a ringing telephone, or a baby monitor.  Television closed-captioning. This shows the words at the bottom of the screen. Most new TVs can do this.  TTY (text telephone). This lets you type messages back and forth on the telephone instead of talking or listening. These devices are also called TDD. When messages are typed on the keyboard, they are sent over the phone line to a receiving TTY. The message is shown on a monitor.  Use text messaging, social media, and email if it is hard for you to communicate by telephone.  Try to learn a listening technique called speechreading. It is not lipreading. You pay attention to people's gestures, expressions, posture, and tone of voice. These clues can help you understand what a person is saying. Face the person you are talking to, and have them face you. Make sure the lighting is good. You need to see the other person's face clearly.  Think about counseling if you need help to adjust to your hearing loss.  When should you call for help?  Watch closely for changes in your health, and be sure to contact your doctor if:    You think your hearing is getting worse.     You have new symptoms, such as dizziness or nausea.   Where can you learn more?  Go to https://www.healthwise.net/patiented  Enter R798 in the search box to learn more about \"Hearing Loss: Care " "Instructions.\"  Current as of: October 27, 2024  Content Version: 14.4    0448-9970 [employer].   Care instructions adapted under license by your healthcare professional. If you have questions about a medical condition or this instruction, always ask your healthcare professional. [employer] disclaims any warranty or liability for your use of this information.    Bladder Training: Care Instructions  Your Care Instructions     Bladder training is used to treat urge incontinence and stress incontinence. Urge incontinence means that the need to urinate comes on so fast that you can't get to a toilet in time. Stress incontinence means that you leak urine because of pressure on your bladder. For example, it may happen when you laugh, cough, or lift something heavy.  Bladder training can increase how long you can wait before you have to urinate. It can also help your bladder hold more urine. And it can give you better control over the urge to urinate.  It is important to remember that bladder training takes a few weeks to a few months to make a difference. You may not see results right away, but don't give up.  Follow-up care is a key part of your treatment and safety. Be sure to make and go to all appointments, and call your doctor if you are having problems. It's also a good idea to know your test results and keep a list of the medicines you take.  How can you care for yourself at home?  Work with your doctor to come up with a bladder training program that is right for you. You may use one or more of the following methods.  Delayed urination  In the beginning, try to keep from urinating for 5 minutes after you first feel the need to go.  While you wait, take deep, slow breaths to relax. Kegel exercises can also help you delay the need to go to the bathroom.  After some practice, when you can easily wait 5 minutes to urinate, try to wait 10 minutes before you urinate.  Slowly increase the waiting " "period until you are able to control when you have to urinate.  Scheduled urination  Empty your bladder when you first wake up in the morning.  Schedule times throughout the day when you will urinate.  Start by going to the bathroom every hour, even if you don't need to go.  Slowly increase the time between trips to the bathroom.  When you have found a schedule that works well for you, keep doing it.  If you wake up during the night and have to urinate, do it. Apply your schedule to waking hours only.  Kegel exercises  These tighten and strengthen pelvic muscles, which can help you control the flow of urine. (If doing these exercises causes pain, stop doing them and talk with your doctor.) To do Kegel exercises:  Squeeze your muscles as if you were trying not to pass gas. Or squeeze your muscles as if you were stopping the flow of urine. Your belly, legs, and buttocks shouldn't move.  Hold the squeeze for 3 seconds, then relax for 5 to 10 seconds.  Start with 3 seconds, then add 1 second each week until you are able to squeeze for 10 seconds.  Repeat the exercise 10 times a session. Do 3 to 8 sessions a day.  When should you call for help?  Watch closely for changes in your health, and be sure to contact your doctor if:    Your incontinence is getting worse.     You do not get better as expected.   Where can you learn more?  Go to https://www.[employer].net/patiented  Enter V684 in the search box to learn more about \"Bladder Training: Care Instructions.\"  Current as of: April 30, 2024  Content Version: 14.4    4519-2114 [employer].   Care instructions adapted under license by your healthcare professional. If you have questions about a medical condition or this instruction, always ask your healthcare professional. [employer] disclaims any warranty or liability for your use of this information.    Learning About Depression Screening  What is depression screening?  Depression screening is a way " to see if you have depression symptoms. It may be done by a doctor or counselor. It's often part of a routine checkup. That's because your mental health is just as important as your physical health.  Depression is a mental health condition that affects how you feel, think, and act. You may:  Have less energy.  Lose interest in your daily activities.  Feel sad and grouchy for a long time.  Depression is very common. It affects people of all ages.  Many things can lead to depression. Some people become depressed after they have a stroke or find out they have a major illness like cancer or heart disease. The death of a loved one or a breakup may lead to depression. It can run in families. Most experts believe that a combination of inherited genes and stressful life events can cause it.  What happens during screening?  You may be asked to fill out a form about your depression symptoms. You and the doctor will discuss your answers. The doctor may ask you more questions to learn more about how you think, act, and feel.  What happens after screening?  If you have symptoms of depression, your doctor will talk to you about your options.  Doctors usually treat depression with medicines or counseling. Often, combining the two works best. Many people don't get help because they think that they'll get over the depression on their own. But people with depression may not get better unless they get treatment.  The cause of depression is not well understood. There may be many factors involved. But if you have depression, it's not your fault.  A serious symptom of depression is thinking about death or suicide. If you or someone you care about talks about this or about feeling hopeless, get help right away.  It's important to know that depression can be treated. Medicine, counseling, and self-care may help.  Where can you learn more?  Go to https://www.healthwise.net/patiented  Enter T185 in the search box to learn more about  "\"Learning About Depression Screening.\"  Current as of: July 31, 2024  Content Version: 14.4    5104-6079 [employer].   Care instructions adapted under license by your healthcare professional. If you have questions about a medical condition or this instruction, always ask your healthcare professional. [employer] disclaims any warranty or liability for your use of this information.       "

## 2025-06-04 NOTE — PROGRESS NOTES
"Preventive Care Visit  [facility]  Johanny Diaz MD, Family Medicine  Jun 4, 2025      Assessment & Plan     Encounter for Medicare annual wellness exam    - REVIEW OF HEALTH MAINTENANCE PROTOCOL ORDERS    Need for vaccination  Discussed     Type 2 diabetes mellitus with chronic kidney disease, without long-term current use of insulin, unspecified CKD stage (H)  Under control   - HEMOGLOBIN A1C    Screening for cardiovascular condition  Lipid panel     Coronary artery disease involving native heart without angina pectoris, unspecified vessel or lesion type  Recheck - no issues in last year   - Lipid panel reflex to direct LDL Non-fasting    Stage 3a chronic kidney disease (H)  Has been stable - check every 6 months   - Albumin Random Urine Quantitative with Creat Ratio  - Basic metabolic panel  (Ca, Cl, CO2, Creat, Gluc, K, Na, BUN)  - Albumin Random Urine Quantitative with Creat Ratio    Osteopenia of lumbar spine  In the past     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    Weight as of this encounter: 73.8 kg (162 lb 12.8 oz).       Counseling  Appropriate preventive services were addressed with this patient via screening, questionnaire, or discussion as appropriate for fall prevention, nutrition, physical activity, Tobacco-use cessation, social engagement, weight loss and cognition.  Checklist reviewing preventive services available has been given to the patient.  Reviewed patient's diet, addressing concerns and/or questions.   Updated plan of care.  Patient reported difficulty with activities of daily living were addressed " today.Patient reported safety concerns were addressed today.The patient was provided with written information regarding signs of hearing loss.   Information on urinary incontinence and treatment options given to patient.   The patient's PHQ-9 score is consistent with mild depression. She was provided with information regarding depression.       Follow-up  Return in about 1 year (around 6/4/2026).    Shravan Dumont is a 74 year old, presenting for the following:  Annual Visit (doxazosin (CARDURA) 1 MG pt states is 2mg)  She is here to recheck on BP ad also her annual lipid test and refills on her meds.    She sees oncology and nephrology        6/4/2025     2:55 PM   Additional Questions   Roomed by Vera CARBONE   Accompanied by Self          HPI  See above          Advance Care Planning    Discussed advance care planning with patient; informed [facility] has link to [facility].        6/4/2025   General Health   How would you rate your overall physical health? (!) POOR   Feel stress (tense, anxious, or unable to sleep) Only a little   (!) STRESS CONCERN      6/4/2025   Nutrition   Diet: Regular (no restrictions)         6/4/2025   Exercise   Days per week of moderate/strenous exercise 0 days   (!) EXERCISE CONCERN      6/4/2025   Social Factors   Frequency of gathering with friends or relatives More than three times a week   Worry food won't last until get money to buy more No   Food not last or not have enough money for food? No   Do you have housing? (Housing is defined as stable permanent housing and does not include staying outside in a car, in a tent, in an abandoned building, in an overnight shelter, or couch-surfing.) Yes   Are you worried about losing your housing? No   Lack of transportation? No   Unable to get utilities (heat,electricity)? No         6/4/2025   Fall Risk   Fallen 2 or more times in the past year? Yes   Trouble with walking or balance? No   Gait Speed Test (Document in seconds) 4.4   Gait Speed  Test Interpretation Less than or equal to 5.00 seconds - PASS          2025   Activities of Daily Living- Home Safety   Needs help with the following daily activites Dressing   Safety concerns in the home No handrails on the stairs         2025   Dental   Dentist two times every year? Yes         2025   Hearing Screening   Hearing concerns? (!) IT'S HARD TO FOLLOW A CONVERSATION IN A NOISY RESTAURANT OR CROWDED ROOM.         2025   Driving Risk Screening   Patient/family members have concerns about driving No         2025   General Alertness/Fatigue Screening   Have you been more tired than usual lately? No         2025   Urinary Incontinence Screening   Bothered by leaking urine in past 6 months Yes       Today's PHQ-9 Score:       2025     2:37 PM   PHQ-9 SCORE   PHQ-9 Total Score MyChart 5 (Mild depression)   PHQ-9 Total Score 5        Patient-reported         2025   Substance Use   Alcohol more than 3/day or more than 7/wk Not Applicable   Do you have a current opioid prescription? No   How severe/bad is pain from 1 to 10? /10   Do you use any other substances recreationally? No     Social History     Tobacco Use    Smoking status: Former     Current packs/day: 0.00     Average packs/day: 2.5 packs/day for 27.0 years (67.5 ttl pk-yrs)     Types: Cigarettes     Start date: 1966     Quit date: 1993     Years since quittin.4    Smokeless tobacco: Never   Vaping Use    Vaping status: Never Used   Substance Use Topics    Alcohol use: Not Currently     Comment: sober for 32 years    Drug use: Never           2024   LAST FHS-7 RESULTS   1st degree relative breast or ovarian cancer No   Any relative bilateral breast cancer No   Any male have breast cancer No   Any ONE woman have BOTH breast AND ovarian cancer No   Any woman with breast cancer before 50yrs No   2 or more relatives with breast AND/OR ovarian cancer No   2 or more relatives with breast AND/OR bowel  cancer No        Mammogram Screening - Mammogram every 1-2 years updated in Health Maintenance based on mutual decision making    ASCVD Risk   The ASCVD Risk score (Alissa ORTIZ, et al., 2019) failed to calculate for the following reasons:    Risk score cannot be calculated because patient has a medical history suggesting prior/existing ASCVD            Reviewed and updated as needed this visit by Provider                    Labs reviewed in EPIC  BP Readings from Last 3 Encounters:   06/04/25 106/69   05/15/25 (!) 155/48   05/01/25 128/77    Wt Readings from Last 3 Encounters:   06/04/25 73.8 kg (162 lb 12.8 oz)   05/15/25 68 kg (150 lb)   05/01/25 68 kg (150 lb)                  Patient Active Problem List   Diagnosis    Pelvic mass    Elevated CA-125    CAD (coronary artery disease)    Type 2 diabetes mellitus with chronic kidney disease, without long-term current use of insulin, unspecified CKD stage (H)    Essential hypertension    Hyperlipidemia    GERD (gastroesophageal reflux disease)    Anxiety and depression    Constipation    Iron deficiency anemia    Osteopenia    Psoriasis    Depression    Pelvic mass in female    Acute infectious disease    Bacteremia    Malignant neoplasm of right ovary (H)    Anemia    Angina pectoris with normal coronary arteriogram    Drug-induced polyneuropathy    Anemia of chronic renal failure    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)    Drug-induced neutropenia    Stage 3a chronic kidney disease (H)    Recurrent major depressive disorder, in full remission    Secondary renal hyperparathyroidism    Dyspnea on exertion    Thrombocytopenia    Neutropenic fever    Sleep apnea    Osteoarthritis    Multiple acquired cysts of kidney    Monoclonal gammopathy    Malignant neoplasm of cervix uteri (H)    History of squamous cell carcinoma of skin     Past Surgical History:   Procedure Laterality Date    APPENDECTOMY  9/2/2020    BIOPSY  09/2021    COLONOSCOPY N/A  2022    Procedure: COLONOSCOPY fv;  Surgeon: Otf Pena MD;  Location:  GI    EYE SURGERY      strabismus    GENITOURINARY SURGERY      Bladder sling    GI SURGERY      cystoscopy    GYN SURGERY      bladder suspension    GYN SURGERY  1974    cone biopsy    HYSTERECTOMY TOTAL ABD, BRANDI SALPINGO-OOPHORECTOMY, NODE DISSECTION, TUMOR DEBULKING, COMBINED Bilateral 2020    Procedure: TOTAL ABDOMINAL HYSTERECTOMY WITH BILATERAL SALPINGO-OOPHORECTOMY; WASHINGS; OMENTECTOMY TUMOR DEBULKING; PELVIC AND PARA-AORTIC LYMPHADENECTOMY;  Surgeon: Emily Hickey MD;  Location: [facility] OR    IR CHEST PORT PLACEMENT > 5 YRS OF AGE  2020    LAPAROTOMY EXPLORATORY N/A 2020    Procedure: EXPLORATORY LAPAROTOMY;  Surgeon: Emily Hickey MD;  Location: [facility] OR    SOFT TISSUE SURGERY      knee liposuction       Social History     Tobacco Use    Smoking status: Former     Current packs/day: 0.00     Average packs/day: 2.5 packs/day for 27.0 years (67.5 ttl pk-yrs)     Types: Cigarettes     Start date: 1966     Quit date: 1993     Years since quittin.4    Smokeless tobacco: Never   Substance Use Topics    Alcohol use: Not Currently     Comment: sober for 32 years     Family History   Problem Relation Age of Onset    Diabetes Father     Hypertension Father     Other Cancer Father         Larnyx    Substance Abuse Father     Breast Cancer Paternal Grandmother     Hypertension Brother     Hyperlipidemia Brother     Cerebrovascular Disease Brother     Substance Abuse Brother     Other Cancer Brother         Kidney, lung    Substance Abuse Brother     Anxiety Disorder Son     Anxiety Disorder Daughter     Colon Cancer No family hx of          Current Outpatient Medications   Medication Sig Dispense Refill    acetaminophen (TYLENOL) 650 MG CR tablet Take 1,300 mg by mouth 3 times daily as needed for mild pain or fever.      BIOTIN PO Take 2 chew tab by mouth every evening. OTC 10,000  mcg per serving      buPROPion (WELLBUTRIN XL) 300 MG 24 hr tablet Take 1 tablet (300 mg) by mouth every morning. 90 tablet 1    calcium carbonate-vitamin D (CALTRATE) 600-10 MG-MCG per tablet Take 1 tablet by mouth 2 times daily.      diclofenac (VOLTAREN) 1 % topical gel Apply 2 g topically 2 times daily as needed for moderate pain.      doxazosin (CARDURA) 2 MG tablet Take 2 mg by mouth at bedtime.      ELIQUIS ANTICOAGULANT 5 MG tablet Take 5 mg by mouth 2 times daily.      famotidine (PEPCID) 20 MG tablet Take 20 mg by mouth 2 times daily as needed.      Lidocaine (LIDOCARE) 4 % Patch Place 1 patch onto the skin daily as needed for moderate pain. To prevent lidocaine toxicity, patient should be patch free for 12 hrs daily.      LORazepam (ATIVAN) 0.5 MG tablet Take 1 tablet (0.5 mg) by mouth as needed for anxiety 15 tablet 0    oxymetazoline (AFRIN) 0.05 % nasal spray Spray 2 sprays into both nostrils daily as needed.      polyethylene glycol (MIRALAX) 17 GM/Dose powder Take 1 Capful by mouth daily.      rosuvastatin (CRESTOR) 20 MG tablet Take 1 tablet (20 mg) by mouth daily. 90 tablet 3    senna-docusate (SENOKOT-S/PERICOLACE) 8.6-50 MG tablet Take 2 tablets by mouth.      simethicone (MYLICON) 80 MG chewable tablet       triamcinolone (KENALOG) 0.1 % external cream APPLY TO RASH ON BACK EVERY MORNING ND NIGHT FOR 2 WEEKS      metoprolol tartrate (LOPRESSOR) 50 MG tablet Take 50 mg by mouth.      [START ON 6/12/2025] pegfilgrastim-cbqv (UDENYCA ONBODY) 6 MG/0.6ML On-body injector Inject 6 mg subcutaneously every 21 days.       Allergies   Allergen Reactions    Atorvastatin Muscle Pain (Myalgia)     PN: muscle aches      Muscle pain      Muscle pain PN: muscle aches    Simvastatin     Sulfa Antibiotics      Current providers sharing in care for this patient include:  Patient Care Team:  Johanny Diaz MD as PCP - General (Family Medicine)  Keely Dewey MD as MD (Gastroenterology)  Tanner,  "Melina Baez PA-C as Physician Assistant (Colon & Rectal)  Yeo, Albert, MD as Assigned Musculoskeletal Provider  Talia Krishnan NP as Nurse Practitioner (Nurse Practitioner)  Cecile De La Rosa MD as Assigned PCP  Magui Us RPH as Pharmacist (Pharmacy)  Magui Us RPH as Assigned MTM Pharmacist  Ni Lemus DNP as Nurse Practitioner (Pain Clinic)    The following health maintenance items are reviewed in Epic and correct as of today:  Health Maintenance   Topic Date Due    DTAP/TDAP/TD VACCINE (2 - Td or Tdap) 09/19/2021    ANNUAL REVIEW OF HM ORDERS  04/21/2024    EYE EXAM  11/16/2024    RSV VACCINE (1 - 1-dose 75+ series) Never done    COVID-19 VACCINE (7 - Pfizer risk 2024-25 season) 03/16/2025    A1C  03/19/2025    MEDICARE ANNUAL WELLNESS VISIT  05/01/2025    LIPID  05/01/2025    MICROALBUMIN  05/01/2025    DIABETIC FOOT EXAM  05/01/2025    DEXA  06/06/2025    PHQ-9  12/04/2025    BMP  04/07/2026    HEMOGLOBIN  04/11/2026    FALL RISK ASSESSMENT  06/04/2026    ADVANCE CARE PLANNING  05/01/2029    HEPATITIS C SCREENING  Completed    DEPRESSION ACTION PLAN  Completed    INFLUENZA VACCINE  Completed    PNEUMOCOCCAL VACCINE 50+ YEARS  Completed    URINALYSIS  Completed    ZOSTER VACCINE  Completed    Medicare Annual MTM Pharmacist Visit (once per calendar year)  Completed    HPV VACCINE  Aged Out    MENINGITIS VACCINE  Aged Out    MAMMO SCREENING  Discontinued    COLORECTAL CANCER SCREENING  Discontinued         Review of Systems  Constitutional, HEENT, cardiovascular, pulmonary, gi and gu systems are negative, except as otherwise noted.     Objective    Exam  /69 (BP Location: Left arm, Patient Position: Chair, Cuff Size: Adult Regular)   Pulse 76   Temp 98.1  F (36.7  C) (Oral)   Resp 16   Ht 1.588 m (5' 2.5\")   Wt 73.8 kg (162 lb 12.8 oz)   SpO2 97%   BMI 29.30 kg/m     Estimated body mass index is 29.3 kg/m  as calculated from the following:    Height as of this " "encounter: 1.588 m (5' 2.5\").    Weight as of this encounter: 73.8 kg (162 lb 12.8 oz).    Physical Exam  GENERAL: alert and no distress  EYES: Eyes grossly normal to inspection, PERRL and conjunctivae and sclerae normal  HENT: ear canals and TM's normal, nose and mouth without ulcers or lesions  NECK: no adenopathy, no asymmetry, masses, or scars  RESP: lungs clear to auscultation - no rales, rhonchi or wheezes  BREAST: normal without masses, tenderness or nipple discharge and no palpable axillary masses or adenopathy  CV: regular rate and rhythm, normal S1 S2, no S3 or S4, no murmur, click or rub, no peripheral edema  ABDOMEN: soft, nontender, no hepatosplenomegaly, no masses and bowel sounds normal  MS: no gross musculoskeletal defects noted, no edema  SKIN: no suspicious lesions or rashes  NEURO: Normal strength and tone, mentation intact and speech normal  PSYCH: mentation appears normal, affect normal/bright  LYMPH: no cervical, supraclavicular, axillary, or inguinal adenopathy  Gait and balance assessed per Gait Speed Test.  Result as above.        6/4/2025   Mini Cog   Clock Draw Score 2 Normal   3 Item Recall 3 objects recalled   Mini Cog Total Score 5              Signed Electronically by: Johanny Diaz MD    Answers submitted by the patient for this visit:  Patient Health Questionnaire (Submitted on 6/4/2025)  If you checked off any problems, how difficult have these problems made it for you to do your work, take care of things at home, or get along with other people?: Not difficult at all  PHQ9 TOTAL SCORE: 5    "

## 2025-06-05 ENCOUNTER — TELEPHONE (OUTPATIENT)
Dept: FAMILY MEDICINE | Facility: CLINIC | Age: 75
End: 2025-06-05
Payer: COMMERCIAL

## 2025-06-05 ENCOUNTER — OFFICE VISIT (OUTPATIENT)
Dept: PHARMACY | Facility: CLINIC | Age: 75
End: 2025-06-05
Payer: COMMERCIAL

## 2025-06-05 VITALS — HEART RATE: 63 BPM | DIASTOLIC BLOOD PRESSURE: 54 MMHG | OXYGEN SATURATION: 97 % | SYSTOLIC BLOOD PRESSURE: 86 MMHG

## 2025-06-05 DIAGNOSIS — G47.00 INSOMNIA, UNSPECIFIED TYPE: ICD-10-CM

## 2025-06-05 DIAGNOSIS — I25.2 HX OF NON-ST ELEVATION MYOCARDIAL INFARCTION (NSTEMI): ICD-10-CM

## 2025-06-05 DIAGNOSIS — N18.31 STAGE 3A CHRONIC KIDNEY DISEASE (H): ICD-10-CM

## 2025-06-05 DIAGNOSIS — F41.9 ANXIETY: ICD-10-CM

## 2025-06-05 DIAGNOSIS — L65.9 HAIR LOSS: ICD-10-CM

## 2025-06-05 DIAGNOSIS — Z78.9 TAKES DIETARY SUPPLEMENTS: ICD-10-CM

## 2025-06-05 DIAGNOSIS — F33.42 RECURRENT MAJOR DEPRESSIVE DISORDER, IN FULL REMISSION: ICD-10-CM

## 2025-06-05 DIAGNOSIS — I25.10 CORONARY ARTERY DISEASE INVOLVING NATIVE HEART WITHOUT ANGINA PECTORIS, UNSPECIFIED VESSEL OR LESION TYPE: ICD-10-CM

## 2025-06-05 DIAGNOSIS — M85.851 OSTEOPENIA OF NECKS OF BOTH FEMURS: ICD-10-CM

## 2025-06-05 DIAGNOSIS — I10 ESSENTIAL HYPERTENSION: Primary | ICD-10-CM

## 2025-06-05 DIAGNOSIS — K59.00 CONSTIPATION, UNSPECIFIED CONSTIPATION TYPE: ICD-10-CM

## 2025-06-05 DIAGNOSIS — M85.852 OSTEOPENIA OF NECKS OF BOTH FEMURS: ICD-10-CM

## 2025-06-05 DIAGNOSIS — R52 PAIN: ICD-10-CM

## 2025-06-05 PROCEDURE — 3074F SYST BP LT 130 MM HG: CPT

## 2025-06-05 PROCEDURE — 99607 MTMS BY PHARM ADDL 15 MIN: CPT

## 2025-06-05 PROCEDURE — 99606 MTMS BY PHARM EST 15 MIN: CPT

## 2025-06-05 PROCEDURE — 3078F DIAST BP <80 MM HG: CPT

## 2025-06-05 NOTE — TELEPHONE ENCOUNTER
Caty calling with [facility].    [facility] patient's Medicare nurse was doing an annual exam for patient and was informed the PCP wanted A1c, lipid panel, and BMP drawn after AWV 6/4/25.    [facility] they have drawn these labs at their facility on the patient, BMP recently and A1c and lipid last done in March.   Inquiring if Dr. Diaz is agreeable to not redraw labs now and is suffice to review labs drawn at their facility recently on the patient.    [facility] patient has lab draw right now, informed PCP not in office so answer cannot be obtained right away. [facility] they will fax over their recent labs results for A1c, lipid, and BMP to [facility] and if Dr. Diaz would like them redrawn, they could do it when the patient returns for her lab draw (done every 21 days) if orders are faxed to them at [phone].     Dr. Diaz please review and advise.     Venice Kang RN

## 2025-06-05 NOTE — PATIENT INSTRUCTIONS
"Recommendations from today's MTM visit:                                                        Hypertension:   Please let us know if you start to regularly get blood pressure <100/60 mmHg at home, or if you feel dizzy/lightheaded out of the ordinary.     Insomnia:   Will ask Dr. Diaz about starting doxepin 3 mg once daily 30 min before bedtime for sleep.     Pain:   Try diclofenac (Voltaren) 1% gel and can use four times daily as needed     Supplements:   Change calcium supplement to calcium carbonate 600 mg twice daily.  Add vitamin D3 15 mcg (600 units) once daily.     Constipation:   Try taking Miralax once daily scheduled to regulate bowel movements.     Follow-up: 3 months with MTM     It was great speaking with you today.  I value your experience and would be very thankful for your time in providing feedback in our clinic survey. In the next few days, you may receive an email or text message from [employer] with a link to a survey related to your  clinical pharmacist.\"     To schedule another MTM appointment, please call the clinic directly or you may call the MTM scheduling line at [phone] or toll-free at [phone].     My Clinical Pharmacist's contact information:                                                      Please feel free to contact me with any questions or concerns you have.      Jayne Us PharmD   Medication Therapy Management   Pharmacy Resident    Direct: [phone]  [facility]: [phone]  [facility]: [phone]     "

## 2025-06-06 ENCOUNTER — RESULTS FOLLOW-UP (OUTPATIENT)
Dept: FAMILY MEDICINE | Facility: CLINIC | Age: 75
End: 2025-06-06

## 2025-06-09 ENCOUNTER — OFFICE VISIT (OUTPATIENT)
Dept: URGENT CARE | Facility: URGENT CARE | Age: 75
End: 2025-06-09
Payer: COMMERCIAL

## 2025-06-09 ENCOUNTER — ANCILLARY PROCEDURE (OUTPATIENT)
Dept: ULTRASOUND IMAGING | Facility: CLINIC | Age: 75
End: 2025-06-09
Attending: INTERNAL MEDICINE
Payer: COMMERCIAL

## 2025-06-09 ENCOUNTER — MYC MEDICAL ADVICE (OUTPATIENT)
Dept: PHARMACY | Facility: CLINIC | Age: 75
End: 2025-06-09

## 2025-06-09 ENCOUNTER — OFFICE VISIT (OUTPATIENT)
Dept: PEDIATRICS | Facility: CLINIC | Age: 75
End: 2025-06-09
Payer: COMMERCIAL

## 2025-06-09 VITALS
RESPIRATION RATE: 16 BRPM | OXYGEN SATURATION: 96 % | TEMPERATURE: 98.3 F | HEART RATE: 60 BPM | WEIGHT: 162 LBS | DIASTOLIC BLOOD PRESSURE: 76 MMHG | HEIGHT: 63 IN | SYSTOLIC BLOOD PRESSURE: 119 MMHG | BODY MASS INDEX: 28.7 KG/M2

## 2025-06-09 VITALS
RESPIRATION RATE: 20 BRPM | OXYGEN SATURATION: 97 % | SYSTOLIC BLOOD PRESSURE: 119 MMHG | HEART RATE: 64 BPM | TEMPERATURE: 98.3 F | HEIGHT: 63 IN | WEIGHT: 162 LBS | DIASTOLIC BLOOD PRESSURE: 78 MMHG | BODY MASS INDEX: 28.7 KG/M2

## 2025-06-09 DIAGNOSIS — T45.1X5A ANTINEOPLASTIC CHEMOTHERAPY INDUCED PANCYTOPENIA: ICD-10-CM

## 2025-06-09 DIAGNOSIS — G47.00 INSOMNIA, UNSPECIFIED TYPE: Primary | ICD-10-CM

## 2025-06-09 DIAGNOSIS — D61.810 ANTINEOPLASTIC CHEMOTHERAPY INDUCED PANCYTOPENIA: ICD-10-CM

## 2025-06-09 DIAGNOSIS — M79.89 LEFT ARM SWELLING: ICD-10-CM

## 2025-06-09 DIAGNOSIS — L03.114 CELLULITIS OF LEFT UPPER EXTREMITY: ICD-10-CM

## 2025-06-09 DIAGNOSIS — M79.89 LEFT ARM SWELLING: Primary | ICD-10-CM

## 2025-06-09 DIAGNOSIS — C56.1 MALIGNANT NEOPLASM OF RIGHT OVARY (H): ICD-10-CM

## 2025-06-09 LAB
ERYTHROCYTE [DISTWIDTH] IN BLOOD BY AUTOMATED COUNT: 19.2 % (ref 10–15)
HCT VFR BLD AUTO: 28.4 % (ref 35–47)
HGB BLD-MCNC: 9.3 G/DL (ref 11.7–15.7)
MCH RBC QN AUTO: 29.3 PG (ref 26.5–33)
MCHC RBC AUTO-ENTMCNC: 32.7 G/DL (ref 31.5–36.5)
MCV RBC AUTO: 90 FL (ref 78–100)
PLATELET # BLD AUTO: 43 10E3/UL (ref 150–450)
RBC # BLD AUTO: 3.17 10E6/UL (ref 3.8–5.2)
WBC # BLD AUTO: 4.6 10E3/UL (ref 4–11)

## 2025-06-09 PROCEDURE — 93971 EXTREMITY STUDY: CPT | Mod: LT

## 2025-06-09 PROCEDURE — 3074F SYST BP LT 130 MM HG: CPT | Performed by: INTERNAL MEDICINE

## 2025-06-09 PROCEDURE — 99214 OFFICE O/P EST MOD 30 MIN: CPT | Performed by: INTERNAL MEDICINE

## 2025-06-09 PROCEDURE — 99207 PR FIRST ORDER ACUTE REFERRAL: CPT | Performed by: FAMILY MEDICINE

## 2025-06-09 PROCEDURE — 1125F AMNT PAIN NOTED PAIN PRSNT: CPT | Performed by: INTERNAL MEDICINE

## 2025-06-09 PROCEDURE — 3074F SYST BP LT 130 MM HG: CPT | Performed by: FAMILY MEDICINE

## 2025-06-09 PROCEDURE — 3078F DIAST BP <80 MM HG: CPT | Performed by: INTERNAL MEDICINE

## 2025-06-09 PROCEDURE — 85027 COMPLETE CBC AUTOMATED: CPT | Performed by: INTERNAL MEDICINE

## 2025-06-09 PROCEDURE — 36415 COLL VENOUS BLD VENIPUNCTURE: CPT | Performed by: INTERNAL MEDICINE

## 2025-06-09 PROCEDURE — 3078F DIAST BP <80 MM HG: CPT | Performed by: FAMILY MEDICINE

## 2025-06-09 RX ORDER — CEFADROXIL 500 MG/1
500 CAPSULE ORAL 2 TIMES DAILY
Qty: 10 CAPSULE | Refills: 0 | Status: SHIPPED | OUTPATIENT
Start: 2025-06-09 | End: 2025-06-14

## 2025-06-09 RX ORDER — DOXEPIN 3 MG/1
3 TABLET, FILM COATED ORAL
Qty: 30 TABLET | Refills: 2 | Status: SHIPPED | OUTPATIENT
Start: 2025-06-09

## 2025-06-09 ASSESSMENT — PAIN SCALES - GENERAL: PAINLEVEL_OUTOF10: MODERATE PAIN (4)

## 2025-06-09 NOTE — TELEPHONE ENCOUNTER
Prescription sent per verbal from Dr. Diaz.     Jayne Us, PharmD   Medication Therapy Management   Pharmacy Resident

## 2025-06-09 NOTE — PROGRESS NOTES
Assessment & Plan     Left arm swelling      While this can certainly be a hypersensitive reaction to recent bug bite given the location and level of redness and swelling with new developing tenderness patient was referred to acute diagnostic services for further evaluation for  ultrasound to be considered to rule out DVT as there is certainly possibility for treatment failure with recent change to Eliquis.  Patient in no distress.  No present fever at this time although reports recent fevers of the past weekend which has since subsided.      Pieter Wiley MD   [place] [facility]    Shravan Dumont is a 74 year old female who presents to clinic today for the following health issues:  Chief Complaint   Patient presents with    Urgent Care    Derm Problem     Pt reports rash under left arm that is itchy, painful, swollen, red and warm onset Friday.   Fever 101.9 on Friday, since resolved- Pt is currently on trial treatment for ovarian cancer, its not unusual for her to have fevers, had two unit of blood transfusion on Saturday   Applied cream on Saturday with no improvement      HPI    Patient reporting on her left proximal arm she has had itching and swelling now warm and tender --there is a potential for there being a bug bite     Patient has been on Eliquis for 2 months previously on heparin injections he has a prior history of thrombosis    She has been diagnosed with ovarian cancer stage III for the past 5 years recently through [facility] is on therapeutic clinical trial with Maryann-S ( [employer])     She notes that with his current medication she can have periods of a fever has had hospitalization previously which patient reports she will not go to the hospital for a fever to the extended stays in which she feels as being in a necessary workup    Otherwise she reports no body aches headaches lightheadedness or dizziness    Friday received transfusions for hemoglobin as low as 7  while symptomatic        Patient Active Problem List    Diagnosis Date Noted    Antineoplastic chemotherapy induced pancytopenia 06/09/2025     Priority: Medium    Osteoarthritis 06/04/2025     Priority: Medium    Monoclonal gammopathy 06/04/2025     Priority: Medium    Dyspnea on exertion 04/06/2025     Priority: Medium    Thrombocytopenia 04/06/2025     Priority: Medium    Neutropenic fever 04/06/2025     Priority: Medium    Multiple acquired cysts of kidney 05/08/2024     Priority: Medium    Recurrent major depressive disorder, in full remission 03/14/2024     Priority: Medium    Secondary renal hyperparathyroidism 03/14/2024     Priority: Medium    Stage 3a chronic kidney disease (H) 02/09/2024     Priority: Medium    Anemia of chronic renal failure 10/27/2023     Priority: Medium    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) 10/27/2023     Priority: Medium    Drug-induced neutropenia 10/27/2023     Priority: Medium    Drug-induced polyneuropathy 04/21/2023     Priority: Medium    Angina pectoris with normal coronary arteriogram 03/17/2023     Priority: Medium    Malignant neoplasm of right ovary (H) 11/18/2021     Priority: Medium    Anemia 11/18/2021     Priority: Medium    Acute infectious disease 09/16/2020     Priority: Medium    Bacteremia 09/16/2020     Priority: Medium    Pelvic mass in female 09/02/2020     Priority: Medium    Iron deficiency anemia      Priority: Medium    Osteopenia      Priority: Medium    Psoriasis      Priority: Medium    Depression      Priority: Medium    Pelvic mass 08/21/2020     Priority: Medium    Elevated CA-125 08/21/2020     Priority: Medium    CAD (coronary artery disease) 08/21/2020     Priority: Medium    Type 2 diabetes mellitus with chronic kidney disease, without long-term current use of insulin, unspecified CKD stage (H) 08/21/2020     Priority: Medium    Essential hypertension 08/21/2020     Priority: Medium    Hyperlipidemia 08/21/2020     Priority: Medium     GERD (gastroesophageal reflux disease) 08/21/2020     Priority: Medium    Anxiety and depression 08/21/2020     Priority: Medium    Constipation 08/21/2020     Priority: Medium    History of squamous cell carcinoma of skin 09/12/2018     Priority: Medium     SCC-IS on mid back s/p ED&C 12/9/16.      Sleep apnea 04/30/2010     Priority: Medium     APAP 6-15.  ss 2010  AHI 18, RDI 35  [facility]  Dr. Amezcua  ; Resolved after weight loss      Malignant neoplasm of cervix uteri (H) 06/13/2006     Priority: Medium     LW Modifier:  s/p cone biopsy  LW Onset:  age 25  ; Ca Cervix         Current Outpatient Medications   Medication Sig Dispense Refill    acetaminophen (TYLENOL) 650 MG CR tablet Take 1,300 mg by mouth 3 times daily as needed for mild pain or fever.      BIOTIN PO Take 2 chew tab by mouth every evening. OTC 10,000 mcg per serving      buPROPion (WELLBUTRIN XL) 300 MG 24 hr tablet Take 1 tablet (300 mg) by mouth every morning. 90 tablet 1    calcium 600 MG tablet Take 1 tablet by mouth 2 times daily.      cholecalciferol (VITAMIN D3) 10 mcg (400 units) TABS tablet Take 10 mcg by mouth daily.      diclofenac (VOLTAREN) 1 % topical gel Apply 2 g topically 2 times daily as needed for moderate pain.      docusate sodium (COLACE) 50 MG capsule Take 50 mg by mouth 2 times daily as needed for constipation.      doxazosin (CARDURA) 2 MG tablet Take 2 mg by mouth at bedtime.      ELIQUIS ANTICOAGULANT 5 MG tablet Take 5 mg by mouth 2 times daily.      famotidine (PEPCID) 20 MG tablet Take 20 mg by mouth 2 times daily as needed.      Lidocaine (LIDOCARE) 4 % Patch Place 1 patch onto the skin daily as needed for moderate pain. To prevent lidocaine toxicity, patient should be patch free for 12 hrs daily.      LORazepam (ATIVAN) 0.5 MG tablet Take 1 tablet (0.5 mg) by mouth as needed for anxiety 15 tablet 0    metoprolol tartrate (LOPRESSOR) 50 MG tablet Take 50 mg by mouth 2 times daily.      oxymetazoline (AFRIN) 0.05 %  "nasal spray Spray 2 sprays into both nostrils daily as needed.      [START ON 6/12/2025] pegfilgrastim-cbqv (UDENYCA ONBODY) 6 MG/0.6ML On-body injector Inject 6 mg subcutaneously every 21 days.      polyethylene glycol (MIRALAX) 17 GM/Dose powder Take 1 Capful by mouth daily as needed.      rosuvastatin (CRESTOR) 20 MG tablet Take 1 tablet (20 mg) by mouth daily. 90 tablet 3    senna-docusate (SENOKOT-S/PERICOLACE) 8.6-50 MG tablet Take 2 tablets by mouth daily as needed.      cefadroxil (DURICEF) 500 MG capsule Take 1 capsule (500 mg) by mouth 2 times daily for 5 days. 10 capsule 0    doxepin (SILENOR) 3 MG tablet Take 1 tablet (3 mg) by mouth nightly as needed for sleep. 30 tablet 2     No current facility-administered medications for this visit.           Objective    /76   Pulse 60   Temp 98.3  F (36.8  C) (Tympanic)   Resp 16   Ht 1.588 m (5' 2.5\")   Wt 73.5 kg (162 lb)   SpO2 96%   Breastfeeding No   BMI 29.16 kg/m    Physical Exam   As noted above and including:   GEN: NAD  Moderate left proximal arm swelling and redness on medial section, warm to touch, full ROM of left extremity/shoulder                      The use of Dragon/[employer] dictation services may have been used to construct the content in this note; any grammatical or spelling errors are non-intentional. Please contact the author of this note directly if you are in need of any clarification.   "

## 2025-06-09 NOTE — TELEPHONE ENCOUNTER
Patient returning call. Relayed message below.    States she does not have Rn that comes to her home to draw labs.     States sees [facility] this week but only for CBC. States she will message Caty to inquire if the lipid panel, BMP, and A1c were faxed and if so on what day. Will also ask if orders that Dr. Diaz placed could be faxed to them and drawn at her lab appointment with them this week.     Informed we are waiting on [facility] faxed labs to be added to chart as recent faxed labs are only CBCs.    Awaiting faxes from [facility] at this time.    Venice Kang RN

## 2025-06-09 NOTE — PROGRESS NOTES
Urgent Care Clinic Visit    Chief Complaint   Patient presents with    Urgent Care    Derm Problem     Pt reports rash under left arm that is itchy, painful, swollen, red and warm onset Friday.   Fever 101.9 on Friday, since resolved- Pt is currently on trial treatment for ovarian cancer, its not unusual for her to have fevers, had two unit of blood transfusion on Saturday   Applied cream on Saturday with no improvement                6/9/2025    11:56 AM   Additional Questions   Roomed by Kate   Accompanied by self

## 2025-06-09 NOTE — PROGRESS NOTES
"Acute and Diagnostic Services Clinic Visit    Assessment & Plan     Left arm swelling  With pain and erythema. She has a history of thrombosis and is currently on DOAC. US done to rule out clot, none seen. Labs at baseline and expected given cancer treatment.     - CBC with platelets; Future  - US Upper Extremity Venous Duplex Left; Future  - CBC with platelets    Cellulitis of left upper extremity  As above. Will treat with abx. Copy of today's labs provided to bring to her oncologist.     - cefadroxil (DURICEF) 500 MG capsule; Take 1 capsule (500 mg) by mouth 2 times daily for 5 days.    Ovarian cancer  Pancytopenia  Chronic, noted.                Subjective   Julia is a 74 year old, presenting for the following health issues:  Infection (Patient is here with possible infection in left arm.  )    HPI      Concern - Left arm possible infection   Onset: 6/6/2025  Description: Left arm swollen, painful, red, and warm  Intensity: moderate  Progression of Symptoms:  same  Accompanying Signs & Symptoms: No   Previous history of similar problem: no  Precipitating factors:        Worsened by: NO  Alleviating factors:        Improved by: NO.              Objective    /78 (BP Location: Right arm, Patient Position: Sitting, Cuff Size: Adult Large)   Pulse 64   Temp 98.3  F (36.8  C) (Temporal)   Resp 20   Ht 1.588 m (5' 2.5\")   Wt 73.5 kg (162 lb)   SpO2 97%   BMI 29.16 kg/m    Body mass index is 29.16 kg/m .  Physical Exam     Well appearing  RRR  CTAB  Left upper arm with area of soft tissue edema and erythema proximal to the elbow    Results for orders placed or performed in visit on 06/09/25 (from the past 24 hours)   CBC with platelets   Result Value Ref Range    WBC Count 4.6 4.0 - 11.0 10e3/uL    RBC Count 3.17 (L) 3.80 - 5.20 10e6/uL    Hemoglobin 9.3 (L) 11.7 - 15.7 g/dL    Hematocrit 28.4 (L) 35.0 - 47.0 %    MCV 90 78 - 100 fL    MCH 29.3 26.5 - 33.0 pg    MCHC 32.7 31.5 - 36.5 g/dL    RDW 19.2 (H) " 10.0 - 15.0 %    Platelet Count 43 (LL) 150 - 450 10e3/uL           Signed Electronically by: Brenda Farias MD

## 2025-06-09 NOTE — TELEPHONE ENCOUNTER
Johanny Diaz MD to [facility] (Selected Message)  POPEYE      6/9/25  8:15 AM  I cannot see those march labs so if they can be sent, that is fine

## 2025-06-09 NOTE — TELEPHONE ENCOUNTER
Message #1 left for patient to return call     Upon return call, please let patient know if she has had the requested labs ordered by Dr. Del Castillo at an outside agency we do not need to draw again at this time   If outside entity can send the results for Dr. Del Castillo to review, this will be sufficient   [facility] is faxing their results to us   If patient can ask medicare RN that came to her home and loc labs to fax results to us they can also be reviewed by Dr. Del Castillo    RN placed call to [facility] (number listed in contacts from original call is not a working number)   RN called [phone]   Spoke to [facility] staff who will relay message to Caty THOMAS - after fax received with lab results from [facility] Dr. Del Castillo will review to see if any additional labs are needed and if so will fax order to [facility] as they draw labs q 21 days     Ana Laura Smith, Registered Nurse  [facility]

## 2025-06-10 NOTE — TELEPHONE ENCOUNTER
Awaiting fax from [facility]- postponing to tomorrow.     Need to check to see if we received fax with other lab results (lipid panel, BMP, and A1c ).     Need to follow up with patient (or potentially [facility]) to see if they can they draw other labs if Dr. Diaz orders them for appt this week?    Samantha JEONG RN  [facility]

## 2025-06-11 ENCOUNTER — TELEPHONE (OUTPATIENT)
Dept: FAMILY MEDICINE | Facility: CLINIC | Age: 75
End: 2025-06-11
Payer: COMMERCIAL

## 2025-06-11 NOTE — TELEPHONE ENCOUNTER
Received a call from Jefferson from [facility] [phone].    She faxed over some labs - yesterday and today.       Labs are from March 11, 2025.    Please call the above number if you don't receive them.

## 2025-06-11 NOTE — TELEPHONE ENCOUNTER
Not able to locate results.  - called [facility] (Emilie) and have them re-fax to [phone].    Awaiting fax to be sent.      Cecile THOMAS, - [place] [id number]  [facility]- [place]  [facility]

## 2025-06-11 NOTE — TELEPHONE ENCOUNTER
[facility] called at [phone] and left message to request them to re-fax lab results of A1c, BMP, and Lipid panel to [facility] Attn Dr. Del Castillo at  as we have not received these yet.     Spoke with Denise at [facility].  Awaiting fax at this time, postponing encounter to tomorrow.    Meg Crawford RN BSN  Clinic Nurse  [facility]

## 2025-06-11 NOTE — TELEPHONE ENCOUNTER
Received lab results from [facility]  - faxed to abstracting  - placed in provider basket for review.      Cecile THOMAS, - [facility]  Primary Care- [place] [facility]

## 2025-06-12 ENCOUNTER — TRANSFERRED RECORDS (OUTPATIENT)
Dept: HEALTH INFORMATION MANAGEMENT | Facility: CLINIC | Age: 75
End: 2025-06-12
Payer: COMMERCIAL

## 2025-06-14 RX ORDER — GABAPENTIN 300 MG/1
300 CAPSULE ORAL 2 TIMES DAILY
Qty: 60 CAPSULE | Refills: 0 | OUTPATIENT
Start: 2025-06-14

## 2025-06-16 ENCOUNTER — HOSPITAL ENCOUNTER (OUTPATIENT)
Dept: CT IMAGING | Facility: CLINIC | Age: 75
Discharge: HOME | End: 2025-06-16
Attending: OBSTETRICS & GYNECOLOGY
Payer: COMMERCIAL

## 2025-06-16 ENCOUNTER — HOSPITAL ENCOUNTER (OUTPATIENT)
Facility: CLINIC | Age: 75
Discharge: HOME | End: 2025-06-16
Payer: COMMERCIAL

## 2025-06-16 DIAGNOSIS — C56.1: ICD-10-CM

## 2025-06-16 DIAGNOSIS — Z00.6 EXAMINATION OF PARTICIPANT IN CLINICAL TRIAL: ICD-10-CM

## 2025-06-16 LAB
CREAT BLD-MCNC: 0.8 MG/DL (ref 0.5–1)
EGFRCR SERPLBLD CKD-EPI 2021: >60 ML/MIN/1.73M2

## 2025-06-16 PROCEDURE — 250N000009 HC RX 250: Performed by: OBSTETRICS & GYNECOLOGY

## 2025-06-16 PROCEDURE — 71260 CT THORAX DX C+: CPT

## 2025-06-16 PROCEDURE — 999N000154 HC STATISTIC RADIOLOGY XRAY, US, CT, MAR, NM

## 2025-06-16 PROCEDURE — 82565 ASSAY OF CREATININE: CPT

## 2025-06-16 PROCEDURE — 250N000011 HC RX IP 250 OP 636: Performed by: OBSTETRICS & GYNECOLOGY

## 2025-06-16 PROCEDURE — 250N000011 HC RX IP 250 OP 636

## 2025-06-16 RX ORDER — HEPARIN SODIUM (PORCINE) LOCK FLUSH IV SOLN 100 UNIT/ML 100 UNIT/ML
5-10 SOLUTION INTRAVENOUS
Status: DISCONTINUED | OUTPATIENT
Start: 2025-06-16 | End: 2025-06-16 | Stop reason: HOSPADM

## 2025-06-16 RX ORDER — HEPARIN SODIUM (PORCINE) LOCK FLUSH IV SOLN 100 UNIT/ML 100 UNIT/ML
5-10 SOLUTION INTRAVENOUS
OUTPATIENT
Start: 2025-06-16

## 2025-06-16 RX ORDER — HEPARIN SODIUM,PORCINE 10 UNIT/ML
5-10 VIAL (ML) INTRAVENOUS EVERY 24 HOURS
Status: DISCONTINUED | OUTPATIENT
Start: 2025-06-16 | End: 2025-06-16 | Stop reason: HOSPADM

## 2025-06-16 RX ORDER — HEPARIN SODIUM,PORCINE 10 UNIT/ML
5-10 VIAL (ML) INTRAVENOUS
Status: DISCONTINUED | OUTPATIENT
Start: 2025-06-16 | End: 2025-06-16 | Stop reason: HOSPADM

## 2025-06-16 RX ORDER — IOPAMIDOL 755 MG/ML
79 INJECTION, SOLUTION INTRAVASCULAR ONCE
Status: COMPLETED | OUTPATIENT
Start: 2025-06-16 | End: 2025-06-16

## 2025-06-16 RX ORDER — HEPARIN SODIUM,PORCINE 10 UNIT/ML
5-10 VIAL (ML) INTRAVENOUS
OUTPATIENT
Start: 2025-06-16

## 2025-06-16 RX ORDER — HEPARIN SODIUM,PORCINE 10 UNIT/ML
5-10 VIAL (ML) INTRAVENOUS EVERY 24 HOURS
OUTPATIENT
Start: 2025-06-16

## 2025-06-16 RX ADMIN — IOPAMIDOL 79 ML: 755 INJECTION, SOLUTION INTRAVENOUS at 11:13

## 2025-06-16 RX ADMIN — SODIUM CHLORIDE 63 ML: 9 INJECTION, SOLUTION INTRAVENOUS at 11:13

## 2025-06-16 RX ADMIN — Medication 5 ML: at 11:21

## 2025-06-16 ASSESSMENT — ACTIVITIES OF DAILY LIVING (ADL)
ADLS_ACUITY_SCORE: 58

## 2025-06-16 NOTE — PROGRESS NOTES
Care Suites Procedure Nursing Note    Patient Information  Name: Julia Andre  Age: 74 year old    Procedure- CT powerport accessed  Procedure start time: 1100  Creatinine lab done-  0.8  Plan/Other:   1120-port de-accessed per protocol    Luke Mckinley RN

## 2025-06-17 ENCOUNTER — TRANSFERRED RECORDS (OUTPATIENT)
Dept: HEALTH INFORMATION MANAGEMENT | Facility: CLINIC | Age: 75
End: 2025-06-17
Payer: COMMERCIAL

## 2025-06-24 ENCOUNTER — TRANSFERRED RECORDS (OUTPATIENT)
Dept: HEALTH INFORMATION MANAGEMENT | Facility: CLINIC | Age: 75
End: 2025-06-24
Payer: COMMERCIAL

## 2025-06-24 ENCOUNTER — MEDICAL CORRESPONDENCE (OUTPATIENT)
Dept: HEALTH INFORMATION MANAGEMENT | Facility: CLINIC | Age: 75
End: 2025-06-24
Payer: COMMERCIAL

## 2025-06-25 DIAGNOSIS — C56.1 MALIGNANT NEOPLASM OF RIGHT OVARY (H): Primary | ICD-10-CM

## 2025-06-25 DIAGNOSIS — D64.9 ANEMIA, UNSPECIFIED: ICD-10-CM

## 2025-06-25 LAB
ABO + RH BLD: NORMAL
BLD GP AB SCN SERPL QL: NEGATIVE
SPECIMEN EXP DATE BLD: NORMAL

## 2025-06-26 ENCOUNTER — LAB (OUTPATIENT)
Dept: LAB | Facility: CLINIC | Age: 75
End: 2025-06-26
Payer: COMMERCIAL

## 2025-06-26 DIAGNOSIS — C56.1 MALIGNANT NEOPLASM OF RIGHT OVARY (H): ICD-10-CM

## 2025-06-26 DIAGNOSIS — D64.9 ANEMIA, UNSPECIFIED: ICD-10-CM

## 2025-06-26 PROCEDURE — 36415 COLL VENOUS BLD VENIPUNCTURE: CPT

## 2025-06-26 PROCEDURE — 86900 BLOOD TYPING SEROLOGIC ABO: CPT

## 2025-07-08 ENCOUNTER — TRANSFERRED RECORDS (OUTPATIENT)
Dept: HEALTH INFORMATION MANAGEMENT | Facility: CLINIC | Age: 75
End: 2025-07-08
Payer: COMMERCIAL

## 2025-07-22 DIAGNOSIS — N18.9 ANEMIA OF CHRONIC RENAL FAILURE: ICD-10-CM

## 2025-07-22 DIAGNOSIS — C56.1 MALIGNANT NEOPLASM OF RIGHT OVARY (H): Primary | ICD-10-CM

## 2025-07-22 DIAGNOSIS — D64.9 ANEMIA, UNSPECIFIED: ICD-10-CM

## 2025-07-22 DIAGNOSIS — D63.1 ANEMIA OF CHRONIC RENAL FAILURE: ICD-10-CM

## 2025-07-22 LAB
ABO + RH BLD: NORMAL
BLD GP AB SCN SERPL QL: NEGATIVE
SPECIMEN EXP DATE BLD: NORMAL

## 2025-07-22 NOTE — PROGRESS NOTES
Medication Therapy Management (MTM) Encounter    ASSESSMENT:                            Medication Adherence/Access: No issues identified.    Diabetes  A1c due. Likely to be at goal <7% (can also consider goal <8% per age), but if not, may consider re-addition of metformin or SGLT2i.     Hypertension/CAD/Hx NSTEMI/CKD  Patient at pressure goal of < 130/80mmHg.     Mental Health - Anxiety, Depression, insomnia   Would not recommend trial of higher dose doxepin due to age and risk of anticholinergic effects. Discontinue use. Recommend consider CBT-I as medication therapy ineffective.      Pain  Recommend patient ask about pregabalin at follow-up visit with [facility]. Can help treat neuropathy and shooting pains, even if gabapentin was ineffective.     Supplements/Osteopenia  Recommend increase calcium dose to 600 mg twice daily, but get formulation without vitamin D as suggested by nephrology. Can take 10-15 mcg vitamin d tablet by itself, though 10 mcg more readily available.     PLAN:                            Diabetes  Rechecking A1c today and can discuss further options based on results.     Sleep  Try CBT-I - therapy for insomnia.     Pain  Ask [facility] about pregabalin (Lyrica) for nerve pain.     Supplements - as discussed last visit  Increase calcium supplement to calcium carbonate 600 mg twice daily.  Start taking Vitamin D3 10 mcg (400 units) to take once daily.       Follow-up: Return in about 3 months (around 10/21/2025) for Medication Therapy Management.    SUBJECTIVE/OBJECTIVE:                          Julia Andre is a 74 year old female seen for a follow-up visit.       Reason for visit: Next steps for sleep and pain. Wondering about A1c.     Allergies/ADRs: Reviewed in chart  Past Medical History: Reviewed in chart  Tobacco: She reports that she quit smoking about 31 years ago. Her smoking use included cigarettes. She started smoking about 58 years ago. She has a 67.5 pack-year  smoking history. She has never used smokeless tobacco.  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  Per patient, misses medication 0 time(s) per week.   The patient fills medications at [facility]: NO, fills medications at [facility].    Diabetes   Previously on Ozempic but stopped after hospitalization due to bowel obstruction. Was also previously on metformin but stopped due to taking Ozempic. Is now wondering that since she is off of both, did her A1c go back up? Should she restart something?   Current diabetes symptoms: none  Diet/Exercise: not discussed due to time     Blood sugar monitoring: never  Eye exam in the last 12 months? No  Foot exam: due    Hypertension   /CAD/Hx NSTEMI/CKD  Metoprolol tartrate 50 mg twice daily   Doxazosin 2 mg daily in the evening     Patient reports no current medication side effects. Denies dizziness, though does report some fatigue (not new - due to anemia).   Patient self monitors blood pressure, but not recently.   Follows with nephrology LISA Recinos at [facility].   History: aspirin (stopped when started enoxaparin), lisinopril (KRISSY)     Mental Health   Anxiety and Depression, Insomnia   Bupropion  mg daily   Lorazepam 0.5 mg as needed - only takes for imaging procedures  Doxepin 3 mg  every night at bedtime     No side effects.   Recently tried decreasing bupropion but mental health got noticeably worse, so she is back on 300 mg and feeling good. Her health has been getting better in general, which is good for her mental health. Her cancer treatment has been going well.   Sleep has not changed at all on doxepin. Still waking up about every 2 hours, sometimes needing to walk around because she has tingling in her feet (see below).   Previously tried: sertraline (very effective, negative sexual side effects), melatonin (not effective), diphenhydramine (not effective), trazodone (not effective)     Pain   Acetaminophen 1300 mg  three times daily as needed - three times daily most days  Lidocaine (Salonpas) patches as needed - occasional, finds somewhat helpful   Voltaren 2.32% twice daily gel as needed - occasional, finds somewhat helpful - getting this higher strength from [facility]     Side effects: none  Tried acupuncture recently and it made pain worse.   Has chronic pain in shoulder that has been bothering her. The pain also shoots down her arm. With current chemo regimen, can't get cortisone shots.   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 Does have regular tingling/weird feeling in feet where she needs to walk around.   Previously taking: gabapentin (did not find effective)  Recently established with [facility] and has been working on options. Tried a nerve block in the shoulder but this did not do anything. Has follow-up with them next week.      Supplements   /Osteopenia  Calcium 600 mg / 10 mcg once daily     Has been unable to find vitamin D 600 unit tablets to switch to.   Per patient and nephrology note 5/16/25, they recommend max dose 600 units vitamin D daily due to previous high vit D level (106 ng/dL at one point). Recommended to take plain calcium tablet and then additional vitamin D.   DEXA history 06/2023 showing osteopenia and FRAX 11% major and 2.3% hip fracture.   Calcium in diet: minimal          Today's Vitals: /58   Pulse 64   SpO2 97%  No weight per patient preference.   ----------------    I spent 40 minutes with this patient today. All changes were made via collaborative practice agreement with Johanny Diaz MD.     A summary of these recommendations was given to the patient.    Jayne Us PharmD   Medication Therapy Management (MTM)  Pharmacist Practitioner         Medication Therapy Recommendations  Pain   1 Current Medication: acetaminophen (TYLENOL) 650 MG CR tablet   Current Medication Sig: Take 1,300 mg by mouth 3 times daily as needed for mild pain or fever.   Rationale: Synergistic  therapy - Needs additional medication therapy - Indication   Recommendation: Start Medication - pregabalin 100 MG capsule   Status: Contact Provider - Awaiting Response   Identified Date: 7/23/2025

## 2025-07-23 ENCOUNTER — LAB (OUTPATIENT)
Dept: LAB | Facility: CLINIC | Age: 75
End: 2025-07-23
Payer: COMMERCIAL

## 2025-07-23 ENCOUNTER — OFFICE VISIT (OUTPATIENT)
Dept: PHARMACY | Facility: CLINIC | Age: 75
End: 2025-07-23
Payer: COMMERCIAL

## 2025-07-23 VITALS — DIASTOLIC BLOOD PRESSURE: 58 MMHG | HEART RATE: 64 BPM | SYSTOLIC BLOOD PRESSURE: 121 MMHG | OXYGEN SATURATION: 97 %

## 2025-07-23 DIAGNOSIS — M85.852 OSTEOPENIA OF NECKS OF BOTH FEMURS: ICD-10-CM

## 2025-07-23 DIAGNOSIS — F33.42 RECURRENT MAJOR DEPRESSIVE DISORDER, IN FULL REMISSION: ICD-10-CM

## 2025-07-23 DIAGNOSIS — N18.31 STAGE 3A CHRONIC KIDNEY DISEASE (H): ICD-10-CM

## 2025-07-23 DIAGNOSIS — M85.851 OSTEOPENIA OF NECKS OF BOTH FEMURS: ICD-10-CM

## 2025-07-23 DIAGNOSIS — I25.10 CORONARY ARTERY DISEASE INVOLVING NATIVE HEART WITHOUT ANGINA PECTORIS, UNSPECIFIED VESSEL OR LESION TYPE: ICD-10-CM

## 2025-07-23 DIAGNOSIS — G47.00 INSOMNIA, UNSPECIFIED TYPE: Primary | ICD-10-CM

## 2025-07-23 DIAGNOSIS — N18.9 ANEMIA OF CHRONIC RENAL FAILURE: ICD-10-CM

## 2025-07-23 DIAGNOSIS — D64.9 ANEMIA, UNSPECIFIED: ICD-10-CM

## 2025-07-23 DIAGNOSIS — F41.9 ANXIETY: ICD-10-CM

## 2025-07-23 DIAGNOSIS — C56.1 MALIGNANT NEOPLASM OF RIGHT OVARY (H): ICD-10-CM

## 2025-07-23 DIAGNOSIS — E11.22 TYPE 2 DIABETES MELLITUS WITH CHRONIC KIDNEY DISEASE, WITHOUT LONG-TERM CURRENT USE OF INSULIN, UNSPECIFIED CKD STAGE (H): ICD-10-CM

## 2025-07-23 DIAGNOSIS — Z78.9 TAKES DIETARY SUPPLEMENTS: ICD-10-CM

## 2025-07-23 DIAGNOSIS — I25.2 HX OF NON-ST ELEVATION MYOCARDIAL INFARCTION (NSTEMI): ICD-10-CM

## 2025-07-23 DIAGNOSIS — I10 ESSENTIAL HYPERTENSION: ICD-10-CM

## 2025-07-23 DIAGNOSIS — D63.1 ANEMIA OF CHRONIC RENAL FAILURE: ICD-10-CM

## 2025-07-23 DIAGNOSIS — R52 PAIN: ICD-10-CM

## 2025-07-23 LAB
EST. AVERAGE GLUCOSE BLD GHB EST-MCNC: 146 MG/DL
HBA1C MFR BLD: 6.7 % (ref 0–5.6)

## 2025-07-23 PROCEDURE — 99606 MTMS BY PHARM EST 15 MIN: CPT

## 2025-07-23 PROCEDURE — 80048 BASIC METABOLIC PNL TOTAL CA: CPT

## 2025-07-23 PROCEDURE — 86850 RBC ANTIBODY SCREEN: CPT

## 2025-07-23 PROCEDURE — 80061 LIPID PANEL: CPT

## 2025-07-23 PROCEDURE — 3078F DIAST BP <80 MM HG: CPT

## 2025-07-23 PROCEDURE — 3074F SYST BP LT 130 MM HG: CPT

## 2025-07-23 PROCEDURE — 99607 MTMS BY PHARM ADDL 15 MIN: CPT

## 2025-07-23 PROCEDURE — 36415 COLL VENOUS BLD VENIPUNCTURE: CPT

## 2025-07-23 PROCEDURE — 83036 HEMOGLOBIN GLYCOSYLATED A1C: CPT

## 2025-07-23 NOTE — PATIENT INSTRUCTIONS
"Recommendations from today's MTM visit:                                                      Pain  Ask [facility] about pregabalin (Lyrica)     Supplements  Increase calcium supplement to calcium carbonate 600 mg twice daily.  Start taking Vitamin D3 10 mcg (400 units) to take once daily.     Sleep  Try CBT-I - therapy for insomnia.     Follow-up: Return in about 3 months (around 10/21/2025) for Medication Therapy Management.    It was great speaking with you today.  I value your experience and would be very thankful for your time in providing feedback in our clinic survey. In the next few days, you may receive an email or text message from [employer] with a link to a survey related to your  clinical pharmacist.\"     To schedule another MTM appointment, please call the clinic directly or you may call the MTM scheduling line at [phone] or toll-free at [phone].     My Clinical Pharmacist's contact information:                                                      Please feel free to contact me with any questions or concerns you have.      Jayne Us, PharmD   Medication Therapy Management (MTM)  Pharmacist Practitioner         "

## 2025-07-24 ENCOUNTER — PATIENT OUTREACH (OUTPATIENT)
Dept: CARE COORDINATION | Facility: CLINIC | Age: 75
End: 2025-07-24
Payer: COMMERCIAL

## 2025-07-24 ENCOUNTER — HOSPITAL ENCOUNTER (OUTPATIENT)
Facility: CLINIC | Age: 75
Discharge: HOME | End: 2025-07-24
Admitting: NURSE PRACTITIONER
Payer: COMMERCIAL

## 2025-07-24 VITALS
HEART RATE: 60 BPM | SYSTOLIC BLOOD PRESSURE: 140 MMHG | OXYGEN SATURATION: 97 % | RESPIRATION RATE: 16 BRPM | WEIGHT: 158 LBS | DIASTOLIC BLOOD PRESSURE: 48 MMHG | HEIGHT: 63 IN | BODY MASS INDEX: 28 KG/M2 | TEMPERATURE: 98 F

## 2025-07-24 LAB
ANION GAP SERPL CALCULATED.3IONS-SCNC: 12 MMOL/L (ref 7–15)
BLD PROD TYP BPU: NORMAL
BLD PROD TYP BPU: NORMAL
BLOOD COMPONENT TYPE: NORMAL
BLOOD COMPONENT TYPE: NORMAL
BUN SERPL-MCNC: 24.6 MG/DL (ref 8–23)
CALCIUM SERPL-MCNC: 9.1 MG/DL (ref 8.8–10.4)
CHLORIDE SERPL-SCNC: 107 MMOL/L (ref 98–107)
CHOLEST SERPL-MCNC: 107 MG/DL
CODING SYSTEM: NORMAL
CODING SYSTEM: NORMAL
CREAT SERPL-MCNC: 1.01 MG/DL (ref 0.51–0.95)
CROSSMATCH: NORMAL
CROSSMATCH: NORMAL
EGFRCR SERPLBLD CKD-EPI 2021: 58 ML/MIN/1.73M2
FASTING STATUS PATIENT QL REPORTED: NO
GLUCOSE SERPL-MCNC: 114 MG/DL (ref 70–99)
HCO3 SERPL-SCNC: 23 MMOL/L (ref 22–29)
HDLC SERPL-MCNC: 38 MG/DL
ISSUE DATE AND TIME: NORMAL
ISSUE DATE AND TIME: NORMAL
LDLC SERPL CALC-MCNC: 20 MG/DL
NONHDLC SERPL-MCNC: 69 MG/DL
POTASSIUM SERPL-SCNC: 4.7 MMOL/L (ref 3.4–5.3)
SODIUM SERPL-SCNC: 142 MMOL/L (ref 135–145)
TRIGL SERPL-MCNC: 247 MG/DL
UNIT ABO/RH: NORMAL
UNIT ABO/RH: NORMAL
UNIT NUMBER: NORMAL
UNIT NUMBER: NORMAL
UNIT STATUS: NORMAL
UNIT STATUS: NORMAL
UNIT TYPE ISBT: 5100
UNIT TYPE ISBT: 5100

## 2025-07-24 PROCEDURE — 999N000087 HC STATISTIC IV OP-OVERFLOW

## 2025-07-24 PROCEDURE — P9016 RBC LEUKOCYTES REDUCED: HCPCS | Performed by: NURSE PRACTITIONER

## 2025-07-24 PROCEDURE — 86923 COMPATIBILITY TEST ELECTRIC: CPT | Performed by: NURSE PRACTITIONER

## 2025-07-24 PROCEDURE — 36430 TRANSFUSION BLD/BLD COMPNT: CPT

## 2025-07-24 PROCEDURE — 250N000011 HC RX IP 250 OP 636: Performed by: PHYSICIAN ASSISTANT

## 2025-07-24 RX ORDER — HEPARIN SODIUM,PORCINE 10 UNIT/ML
5-10 VIAL (ML) INTRAVENOUS EVERY 24 HOURS
Status: DISCONTINUED | OUTPATIENT
Start: 2025-07-24 | End: 2025-07-24 | Stop reason: HOSPADM

## 2025-07-24 RX ORDER — HEPARIN SODIUM (PORCINE) LOCK FLUSH IV SOLN 100 UNIT/ML 100 UNIT/ML
5-10 SOLUTION INTRAVENOUS
Status: DISCONTINUED | OUTPATIENT
Start: 2025-07-24 | End: 2025-07-24 | Stop reason: HOSPADM

## 2025-07-24 RX ORDER — HEPARIN SODIUM,PORCINE 10 UNIT/ML
5-10 VIAL (ML) INTRAVENOUS
Status: DISCONTINUED | OUTPATIENT
Start: 2025-07-24 | End: 2025-07-24 | Stop reason: HOSPADM

## 2025-07-24 RX ADMIN — Medication 5 ML: at 14:30

## 2025-07-24 ASSESSMENT — ACTIVITIES OF DAILY LIVING (ADL)
ADLS_ACUITY_SCORE: 58

## 2025-07-24 NOTE — PROGRESS NOTES
Care Suites Admission Nursing Note    Patient Information  Name: Julia Andre  Age: 74 year old  Reason for admission: Blood consent  Care Suites arrival time: 1010         Patient Admission/Assessment   Pre-procedure assessment complete: Yes  If abnormal assessment/labs, provider notified: N/A  NPO: N/A  Medications held per instructions/orders: Yes  Consent: obtained  If applicable, pregnancy test status: deferred  Patient oriented to room: Yes  Education/questions answered: Yes  Plan/other: Prep for infusion    Discharge Planning    Discharge location: [place]    Kadeem Oliver RN

## 2025-07-24 NOTE — PROGRESS NOTES
Care Suites Discharge Nursing Note    Patient Information  Name: Julia Andre  Age: 74 year old    Discharge Education:  Discharge instructions reviewed: Yes  Additional education/resources provided:   Patient/patient representative verbalizes understanding: Yes  Patient discharging on new medications: No  Medication education completed: Yes    Discharge Plans:   Discharge location: home  Discharge ride contacted: Yes  Approximate discharge time: 1440    Discharge Criteria:  Discharge criteria met and vital signs stable: Yes    Patient Belongs:  Patient belongings returned to patient: Yes    Kadeem Oliver RN

## 2025-07-25 ENCOUNTER — MYC REFILL (OUTPATIENT)
Dept: FAMILY MEDICINE | Facility: CLINIC | Age: 75
End: 2025-07-25
Payer: COMMERCIAL

## 2025-07-25 DIAGNOSIS — I25.10 CORONARY ARTERY DISEASE INVOLVING NATIVE HEART WITHOUT ANGINA PECTORIS, UNSPECIFIED VESSEL OR LESION TYPE: Primary | ICD-10-CM

## 2025-07-28 ENCOUNTER — TELEPHONE (OUTPATIENT)
Dept: MEDSURG UNIT | Facility: CLINIC | Age: 75
End: 2025-07-28
Payer: COMMERCIAL

## 2025-07-28 ENCOUNTER — PATIENT OUTREACH (OUTPATIENT)
Dept: CARE COORDINATION | Facility: CLINIC | Age: 75
End: 2025-07-28
Payer: COMMERCIAL

## 2025-07-28 RX ORDER — METOPROLOL TARTRATE 50 MG
50 TABLET ORAL 2 TIMES DAILY
Qty: 90 TABLET | Refills: 1 | Status: SHIPPED | OUTPATIENT
Start: 2025-07-28

## 2025-07-29 ENCOUNTER — TRANSFERRED RECORDS (OUTPATIENT)
Dept: HEALTH INFORMATION MANAGEMENT | Facility: CLINIC | Age: 75
End: 2025-07-29
Payer: COMMERCIAL

## 2025-07-31 ENCOUNTER — HOSPITAL ENCOUNTER (OUTPATIENT)
Facility: CLINIC | Age: 75
Discharge: HOME | End: 2025-07-31
Payer: COMMERCIAL

## 2025-07-31 PROCEDURE — 999N000154 HC STATISTIC RADIOLOGY XRAY, US, CT, MAR, NM

## 2025-07-31 PROCEDURE — 250N000011 HC RX IP 250 OP 636

## 2025-07-31 RX ORDER — HEPARIN SODIUM,PORCINE 10 UNIT/ML
5-10 VIAL (ML) INTRAVENOUS EVERY 24 HOURS
Status: DISCONTINUED | OUTPATIENT
Start: 2025-07-31 | End: 2025-07-31 | Stop reason: HOSPADM

## 2025-07-31 RX ORDER — HEPARIN SODIUM (PORCINE) LOCK FLUSH IV SOLN 100 UNIT/ML 100 UNIT/ML
5-10 SOLUTION INTRAVENOUS
Status: DISCONTINUED | OUTPATIENT
Start: 2025-07-31 | End: 2025-07-31 | Stop reason: HOSPADM

## 2025-07-31 RX ORDER — HEPARIN SODIUM,PORCINE 10 UNIT/ML
5-10 VIAL (ML) INTRAVENOUS
Status: DISCONTINUED | OUTPATIENT
Start: 2025-07-31 | End: 2025-07-31 | Stop reason: HOSPADM

## 2025-07-31 RX ADMIN — Medication 5 ML: at 12:19

## 2025-07-31 ASSESSMENT — ACTIVITIES OF DAILY LIVING (ADL): ADLS_ACUITY_SCORE: 58

## 2025-08-12 DIAGNOSIS — C56.1 OVARIAN CANCER ON RIGHT (H): Primary | ICD-10-CM

## 2025-08-12 LAB
ABO + RH BLD: NORMAL
BLD GP AB SCN SERPL QL: NEGATIVE
SPECIMEN EXP DATE BLD: NORMAL

## 2025-08-13 ENCOUNTER — LAB (OUTPATIENT)
Dept: LAB | Facility: CLINIC | Age: 75
End: 2025-08-13
Payer: COMMERCIAL

## 2025-08-13 ENCOUNTER — MEDICAL CORRESPONDENCE (OUTPATIENT)
Dept: HEALTH INFORMATION MANAGEMENT | Facility: CLINIC | Age: 75
End: 2025-08-13

## 2025-08-13 DIAGNOSIS — C56.1 OVARIAN CANCER ON RIGHT (H): ICD-10-CM

## 2025-08-13 PROCEDURE — 36415 COLL VENOUS BLD VENIPUNCTURE: CPT

## 2025-08-13 PROCEDURE — 86901 BLOOD TYPING SEROLOGIC RH(D): CPT

## 2025-08-13 RX ORDER — EPINEPHRINE 1 MG/ML
0.3 INJECTION, SOLUTION INTRAMUSCULAR; SUBCUTANEOUS EVERY 5 MIN PRN
OUTPATIENT
Start: 2025-08-13

## 2025-08-13 RX ORDER — HEPARIN SODIUM,PORCINE 10 UNIT/ML
5-20 VIAL (ML) INTRAVENOUS DAILY PRN
OUTPATIENT
Start: 2025-08-13

## 2025-08-13 RX ORDER — HEPARIN SODIUM (PORCINE) LOCK FLUSH IV SOLN 100 UNIT/ML 100 UNIT/ML
5 SOLUTION INTRAVENOUS
Status: CANCELLED | OUTPATIENT
Start: 2025-08-13

## 2025-08-13 RX ORDER — DIPHENHYDRAMINE HYDROCHLORIDE 50 MG/ML
50 INJECTION, SOLUTION INTRAMUSCULAR; INTRAVENOUS
Start: 2025-08-13

## 2025-08-13 RX ORDER — HEPARIN SODIUM,PORCINE 10 UNIT/ML
5-20 VIAL (ML) INTRAVENOUS DAILY PRN
Status: CANCELLED | OUTPATIENT
Start: 2025-08-13

## 2025-08-13 RX ORDER — DIPHENHYDRAMINE HYDROCHLORIDE 50 MG/ML
50 INJECTION, SOLUTION INTRAMUSCULAR; INTRAVENOUS
Status: CANCELLED
Start: 2025-08-13

## 2025-08-13 RX ORDER — EPINEPHRINE 1 MG/ML
0.3 INJECTION, SOLUTION INTRAMUSCULAR; SUBCUTANEOUS EVERY 5 MIN PRN
Status: CANCELLED | OUTPATIENT
Start: 2025-08-13

## 2025-08-14 ENCOUNTER — INFUSION THERAPY VISIT (OUTPATIENT)
Dept: INFUSION THERAPY | Facility: CLINIC | Age: 75
End: 2025-08-14
Attending: NURSE PRACTITIONER
Payer: COMMERCIAL

## 2025-08-14 VITALS
RESPIRATION RATE: 16 BRPM | SYSTOLIC BLOOD PRESSURE: 153 MMHG | TEMPERATURE: 97.7 F | HEART RATE: 54 BPM | DIASTOLIC BLOOD PRESSURE: 75 MMHG | OXYGEN SATURATION: 97 %

## 2025-08-14 DIAGNOSIS — C56.1 MALIGNANT NEOPLASM OF RIGHT OVARY (H): ICD-10-CM

## 2025-08-14 DIAGNOSIS — D64.9 ANEMIA: Primary | ICD-10-CM

## 2025-08-14 RX ORDER — HEPARIN SODIUM (PORCINE) LOCK FLUSH IV SOLN 100 UNIT/ML 100 UNIT/ML
5 SOLUTION INTRAVENOUS
Status: DISCONTINUED | OUTPATIENT
Start: 2025-08-14 | End: 2025-08-14 | Stop reason: HOSPADM

## 2025-08-14 RX ADMIN — Medication 250 ML: at 11:00

## 2025-08-14 RX ADMIN — HEPARIN SODIUM (PORCINE) LOCK FLUSH IV SOLN 100 UNIT/ML 5 ML: 100 SOLUTION at 14:38

## 2025-08-19 ENCOUNTER — TRANSFERRED RECORDS (OUTPATIENT)
Dept: HEALTH INFORMATION MANAGEMENT | Facility: CLINIC | Age: 75
End: 2025-08-19
Payer: COMMERCIAL

## 2025-08-20 ENCOUNTER — TELEPHONE (OUTPATIENT)
Dept: MEDSURG UNIT | Facility: CLINIC | Age: 75
End: 2025-08-20
Payer: COMMERCIAL

## 2025-09-02 ENCOUNTER — TELEPHONE (OUTPATIENT)
Dept: MEDSURG UNIT | Facility: CLINIC | Age: 75
End: 2025-09-02
Payer: COMMERCIAL

## 2025-09-03 ENCOUNTER — HOSPITAL ENCOUNTER (OUTPATIENT)
Dept: CT IMAGING | Facility: CLINIC | Age: 75
Discharge: HOME | End: 2025-09-03
Attending: OBSTETRICS & GYNECOLOGY
Payer: COMMERCIAL

## 2025-09-03 ENCOUNTER — HOSPITAL ENCOUNTER (OUTPATIENT)
Facility: CLINIC | Age: 75
Discharge: HOME | End: 2025-09-03
Payer: COMMERCIAL

## 2025-09-03 DIAGNOSIS — Z00.6 EXAMINATION OF PARTICIPANT IN CLINICAL TRIAL: ICD-10-CM

## 2025-09-03 DIAGNOSIS — C56.1 OVARIAN CANCER ON RIGHT (H): ICD-10-CM

## 2025-09-03 LAB
ABO + RH BLD: NORMAL
BLD GP AB SCN SERPL QL: NEGATIVE
CREAT BLD-MCNC: 1.1 MG/DL (ref 0.5–1)
EGFRCR SERPLBLD CKD-EPI 2021: 52 ML/MIN/1.73M2
SPECIMEN EXP DATE BLD: NORMAL

## 2025-09-03 PROCEDURE — 71260 CT THORAX DX C+: CPT

## 2025-09-03 PROCEDURE — 36591 DRAW BLOOD OFF VENOUS DEVICE: CPT | Performed by: NURSE PRACTITIONER

## 2025-09-03 PROCEDURE — 250N000009 HC RX 250: Performed by: OBSTETRICS & GYNECOLOGY

## 2025-09-03 PROCEDURE — 250N000011 HC RX IP 250 OP 636: Performed by: PHYSICIAN ASSISTANT

## 2025-09-03 PROCEDURE — 999N000154 HC STATISTIC RADIOLOGY XRAY, US, CT, MAR, NM

## 2025-09-03 PROCEDURE — 82565 ASSAY OF CREATININE: CPT

## 2025-09-03 PROCEDURE — 86900 BLOOD TYPING SEROLOGIC ABO: CPT | Performed by: NURSE PRACTITIONER

## 2025-09-03 PROCEDURE — 255N000002 HC RX 255 OP 636: Performed by: OBSTETRICS & GYNECOLOGY

## 2025-09-03 RX ORDER — HEPARIN SODIUM (PORCINE) LOCK FLUSH IV SOLN 100 UNIT/ML 100 UNIT/ML
5-10 SOLUTION INTRAVENOUS
Status: DISCONTINUED | OUTPATIENT
Start: 2025-09-03 | End: 2025-09-03 | Stop reason: HOSPADM

## 2025-09-03 RX ORDER — HEPARIN SODIUM,PORCINE 10 UNIT/ML
5-10 VIAL (ML) INTRAVENOUS EVERY 24 HOURS
Status: DISCONTINUED | OUTPATIENT
Start: 2025-09-03 | End: 2025-09-03 | Stop reason: HOSPADM

## 2025-09-03 RX ORDER — HEPARIN SODIUM,PORCINE 10 UNIT/ML
5-10 VIAL (ML) INTRAVENOUS
Status: DISCONTINUED | OUTPATIENT
Start: 2025-09-03 | End: 2025-09-03 | Stop reason: HOSPADM

## 2025-09-03 RX ADMIN — Medication 5 ML: at 11:13

## 2025-09-03 RX ADMIN — IOHEXOL 82 ML: 350 INJECTION, SOLUTION INTRAVENOUS at 11:02

## 2025-09-03 RX ADMIN — SODIUM CHLORIDE 64 ML: 9 INJECTION, SOLUTION INTRAVENOUS at 11:03

## 2025-09-03 ASSESSMENT — ACTIVITIES OF DAILY LIVING (ADL): ADLS_ACUITY_SCORE: 58

## (undated) DEVICE — BARRIER SEPRAFILM 5X6" SINGLE SHEET 4301-02

## (undated) DEVICE — CLIP ETHICON LIGACLIP SM BLUE LT100

## (undated) DEVICE — DRSG TELFA ISLAND 4X10"

## (undated) DEVICE — SU VICRYL 0 CT-1 27" UND J260H

## (undated) DEVICE — SU VICRYL 3-0 SH 27" J316H

## (undated) DEVICE — DRAIN JACKSON PRATT RESERVOIR 100ML SU130-1305

## (undated) DEVICE — SYR 50ML CATH TIP W/O NDL 309620

## (undated) DEVICE — KIT ABDOMINAL HYST SMA15AHFSM

## (undated) DEVICE — PREP SKIN SCRUB TRAY 4461A

## (undated) DEVICE — ESU LIGASURE IMPACT OPEN SEALER/DVDR CVD LG JAW LF4418

## (undated) DEVICE — SPONGE RAY-TEC 4X4" 7317

## (undated) DEVICE — WIPES FOLEY CARE SURESTEP PROVON DFC100

## (undated) DEVICE — DRAPE CV SPLIT II 147X106" 9158

## (undated) DEVICE — SUCTION CANISTER MEDIVAC LINER 3000ML W/LID 65651-530

## (undated) DEVICE — BLADE KNIFE SURG 10 371110

## (undated) DEVICE — SOL WATER IRRIG 1000ML BOTTLE 2F7114

## (undated) DEVICE — BNDG ABDOMINAL BINDER 9X45-62" 79-89071

## (undated) DEVICE — GLOVE BIOGEL PI ULTRATOUCH G SZ 6.5 42165

## (undated) DEVICE — CATH TRAY FOLEY SURESTEP 16FR WDRAIN BAG STLK LATEX A300316A

## (undated) DEVICE — ESU GROUND PAD UNIVERSAL W/O CORD

## (undated) DEVICE — DRAIN JACKSON PRATT 15FR ROUND SIL LF JP-2229

## (undated) DEVICE — DRAPE LEGGINGS 8421

## (undated) DEVICE — DRAIN JACKSON PRATT 10FR ROUND SU130-1321

## (undated) DEVICE — RX SURGIFLO HEMOSTATIC MATRIX W/THROMBIN 8ML 2994

## (undated) DEVICE — GLOVE PROTEXIS BLUE W/NEU-THERA 6.5  2D73EB65

## (undated) DEVICE — ESU LIGASURE IMPACT TRIAD LF4200

## (undated) DEVICE — STPL SKIN 35W 6.9MM  PXW35

## (undated) DEVICE — DRSG GAUZE 4X4" 3033

## (undated) DEVICE — SU ETHIBOND 1 CT-1 30" X425H

## (undated) DEVICE — GLOVE PROTEXIS MICRO 6.5  2D73PM65

## (undated) DEVICE — STPL LINEAR CUT 30X3.5MM TX30B

## (undated) DEVICE — SU VICRYL 0 TIE 12X18" J906G

## (undated) DEVICE — LINEN TOWEL PACK X5 5464

## (undated) DEVICE — Device

## (undated) DEVICE — KIT ENDO TURNOVER/PROCEDURE W/CLEAN A SCOPE LINERS 103888

## (undated) DEVICE — DRAPE SHEET REV FOLD 3/4 9349

## (undated) DEVICE — VESSEL LOOPS RED MAXI

## (undated) DEVICE — SPONGE KITTNER 31001010

## (undated) DEVICE — SU PDS II 1 TP-1 48" Z880G

## (undated) DEVICE — SU VICRYL 0 CT-2 27" J334H

## (undated) DEVICE — DRAPE MAYO STAND 23X54 8337

## (undated) DEVICE — STPL SKIN 35W ROTATING HEAD PRW35

## (undated) RX ORDER — ACETAMINOPHEN 325 MG/1
TABLET ORAL
Status: DISPENSED
Start: 2020-09-02

## (undated) RX ORDER — DEXAMETHASONE SODIUM PHOSPHATE 4 MG/ML
INJECTION, SOLUTION INTRA-ARTICULAR; INTRALESIONAL; INTRAMUSCULAR; INTRAVENOUS; SOFT TISSUE
Status: DISPENSED
Start: 2020-09-02

## (undated) RX ORDER — FENTANYL CITRATE 50 UG/ML
INJECTION, SOLUTION INTRAMUSCULAR; INTRAVENOUS
Status: DISPENSED
Start: 2020-09-02

## (undated) RX ORDER — GLYCOPYRROLATE 0.2 MG/ML
INJECTION, SOLUTION INTRAMUSCULAR; INTRAVENOUS
Status: DISPENSED
Start: 2020-09-02

## (undated) RX ORDER — HEPARIN SODIUM (PORCINE) LOCK FLUSH IV SOLN 100 UNIT/ML 100 UNIT/ML
SOLUTION INTRAVENOUS
Status: DISPENSED
Start: 2025-09-03

## (undated) RX ORDER — NALOXONE HYDROCHLORIDE 0.4 MG/ML
INJECTION, SOLUTION INTRAMUSCULAR; INTRAVENOUS; SUBCUTANEOUS
Status: DISPENSED
Start: 2021-08-23

## (undated) RX ORDER — PROPOFOL 10 MG/ML
INJECTION, EMULSION INTRAVENOUS
Status: DISPENSED
Start: 2020-09-02

## (undated) RX ORDER — ONDANSETRON 2 MG/ML
INJECTION INTRAMUSCULAR; INTRAVENOUS
Status: DISPENSED
Start: 2020-09-02

## (undated) RX ORDER — HEPARIN SODIUM (PORCINE) LOCK FLUSH IV SOLN 100 UNIT/ML 100 UNIT/ML
SOLUTION INTRAVENOUS
Status: DISPENSED
Start: 2023-03-08

## (undated) RX ORDER — HEPARIN SODIUM (PORCINE) LOCK FLUSH IV SOLN 100 UNIT/ML 100 UNIT/ML
SOLUTION INTRAVENOUS
Status: DISPENSED
Start: 2023-05-09

## (undated) RX ORDER — HEPARIN SODIUM (PORCINE) LOCK FLUSH IV SOLN 100 UNIT/ML 100 UNIT/ML
SOLUTION INTRAVENOUS
Status: DISPENSED
Start: 2025-04-29

## (undated) RX ORDER — FLUMAZENIL 0.1 MG/ML
INJECTION, SOLUTION INTRAVENOUS
Status: DISPENSED
Start: 2021-08-23

## (undated) RX ORDER — ALBUMIN, HUMAN INJ 5% 5 %
SOLUTION INTRAVENOUS
Status: DISPENSED
Start: 2020-09-02

## (undated) RX ORDER — HEPARIN SODIUM (PORCINE) LOCK FLUSH IV SOLN 100 UNIT/ML 100 UNIT/ML
SOLUTION INTRAVENOUS
Status: DISPENSED
Start: 2025-07-24

## (undated) RX ORDER — FENTANYL CITRATE 50 UG/ML
INJECTION, SOLUTION INTRAMUSCULAR; INTRAVENOUS
Status: DISPENSED
Start: 2020-09-10

## (undated) RX ORDER — NEOSTIGMINE METHYLSULFATE 1 MG/ML
VIAL (ML) INJECTION
Status: DISPENSED
Start: 2020-09-02

## (undated) RX ORDER — FENTANYL CITRATE 50 UG/ML
INJECTION, SOLUTION INTRAMUSCULAR; INTRAVENOUS
Status: DISPENSED
Start: 2020-09-04

## (undated) RX ORDER — CEFAZOLIN SODIUM 2 G/100ML
INJECTION, SOLUTION INTRAVENOUS
Status: DISPENSED
Start: 2020-09-02

## (undated) RX ORDER — HEPARIN SODIUM (PORCINE) LOCK FLUSH IV SOLN 100 UNIT/ML 100 UNIT/ML
SOLUTION INTRAVENOUS
Status: DISPENSED
Start: 2025-04-04

## (undated) RX ORDER — LIDOCAINE HYDROCHLORIDE 20 MG/ML
INJECTION, SOLUTION EPIDURAL; INFILTRATION; INTRACAUDAL; PERINEURAL
Status: DISPENSED
Start: 2020-09-02

## (undated) RX ORDER — HEPARIN SODIUM (PORCINE) LOCK FLUSH IV SOLN 100 UNIT/ML 100 UNIT/ML
SOLUTION INTRAVENOUS
Status: DISPENSED
Start: 2025-06-16

## (undated) RX ORDER — CEFAZOLIN SODIUM 1 G/3ML
INJECTION, POWDER, FOR SOLUTION INTRAMUSCULAR; INTRAVENOUS
Status: DISPENSED
Start: 2020-09-02

## (undated) RX ORDER — HYDROMORPHONE HYDROCHLORIDE 1 MG/ML
INJECTION, SOLUTION INTRAMUSCULAR; INTRAVENOUS; SUBCUTANEOUS
Status: DISPENSED
Start: 2020-09-02

## (undated) RX ORDER — FENTANYL CITRATE 0.05 MG/ML
INJECTION, SOLUTION INTRAMUSCULAR; INTRAVENOUS
Status: DISPENSED
Start: 2022-06-08

## (undated) RX ORDER — HEPARIN SODIUM (PORCINE) LOCK FLUSH IV SOLN 100 UNIT/ML 100 UNIT/ML
SOLUTION INTRAVENOUS
Status: DISPENSED
Start: 2025-03-11

## (undated) RX ORDER — FENTANYL CITRATE 0.05 MG/ML
INJECTION, SOLUTION INTRAMUSCULAR; INTRAVENOUS
Status: DISPENSED
Start: 2020-09-02

## (undated) RX ORDER — HEPARIN SODIUM (PORCINE) LOCK FLUSH IV SOLN 100 UNIT/ML 100 UNIT/ML
SOLUTION INTRAVENOUS
Status: DISPENSED
Start: 2025-07-31

## (undated) RX ORDER — FENTANYL CITRATE 50 UG/ML
INJECTION, SOLUTION INTRAMUSCULAR; INTRAVENOUS
Status: DISPENSED
Start: 2021-08-23

## (undated) RX ORDER — NALOXONE HYDROCHLORIDE 0.4 MG/ML
INJECTION, SOLUTION INTRAMUSCULAR; INTRAVENOUS; SUBCUTANEOUS
Status: DISPENSED
Start: 2020-09-10

## (undated) RX ORDER — LIDOCAINE HYDROCHLORIDE 10 MG/ML
INJECTION, SOLUTION INFILTRATION; PERINEURAL
Status: DISPENSED
Start: 2020-09-04

## (undated) RX ORDER — HEPARIN SODIUM (PORCINE) LOCK FLUSH IV SOLN 100 UNIT/ML 100 UNIT/ML
SOLUTION INTRAVENOUS
Status: DISPENSED
Start: 2025-05-15